# Patient Record
Sex: FEMALE | Race: OTHER | HISPANIC OR LATINO | Employment: OTHER | URBAN - METROPOLITAN AREA
[De-identification: names, ages, dates, MRNs, and addresses within clinical notes are randomized per-mention and may not be internally consistent; named-entity substitution may affect disease eponyms.]

---

## 2017-03-02 ENCOUNTER — ALLSCRIPTS OFFICE VISIT (OUTPATIENT)
Dept: OTHER | Facility: OTHER | Age: 62
End: 2017-03-02

## 2017-05-08 ENCOUNTER — HOSPITAL ENCOUNTER (OUTPATIENT)
Dept: RADIOLOGY | Facility: CLINIC | Age: 62
Discharge: HOME | End: 2017-05-08
Payer: COMMERCIAL

## 2017-05-08 ENCOUNTER — TRANSCRIBE ORDERS (OUTPATIENT)
Dept: URGENT CARE | Facility: CLINIC | Age: 62
End: 2017-05-08

## 2017-05-08 DIAGNOSIS — M54.2 NECK PAIN: ICD-10-CM

## 2017-05-08 DIAGNOSIS — M54.2 NECK PAIN: Primary | ICD-10-CM

## 2017-05-08 PROCEDURE — 72040 X-RAY EXAM NECK SPINE 2-3 VW: CPT

## 2017-08-16 ENCOUNTER — ALLSCRIPTS OFFICE VISIT (OUTPATIENT)
Dept: OTHER | Facility: OTHER | Age: 62
End: 2017-08-16

## 2017-10-20 ENCOUNTER — GENERIC CONVERSION - ENCOUNTER (OUTPATIENT)
Dept: OTHER | Facility: OTHER | Age: 62
End: 2017-10-20

## 2017-10-20 ENCOUNTER — ALLSCRIPTS OFFICE VISIT (OUTPATIENT)
Dept: OTHER | Facility: OTHER | Age: 62
End: 2017-10-20

## 2017-10-20 DIAGNOSIS — Z12.31 ENCOUNTER FOR SCREENING MAMMOGRAM FOR MALIGNANT NEOPLASM OF BREAST: ICD-10-CM

## 2017-12-01 ENCOUNTER — GENERIC CONVERSION - ENCOUNTER (OUTPATIENT)
Dept: OTHER | Facility: OTHER | Age: 62
End: 2017-12-01

## 2017-12-01 DIAGNOSIS — E78.1 PURE HYPERGLYCERIDEMIA: ICD-10-CM

## 2017-12-01 DIAGNOSIS — R73.01 IMPAIRED FASTING GLUCOSE: ICD-10-CM

## 2017-12-07 ENCOUNTER — LAB CONVERSION - ENCOUNTER (OUTPATIENT)
Dept: OTHER | Facility: OTHER | Age: 62
End: 2017-12-07

## 2017-12-07 LAB
A/G RATIO (HISTORICAL): 1.7 (CALC) (ref 1–2.5)
ALBUMIN SERPL BCP-MCNC: 4.3 G/DL (ref 3.6–5.1)
ALP SERPL-CCNC: 84 U/L (ref 33–130)
ALT SERPL W P-5'-P-CCNC: 19 U/L (ref 6–29)
AST SERPL W P-5'-P-CCNC: 17 U/L (ref 10–35)
BILIRUB SERPL-MCNC: 0.6 MG/DL (ref 0.2–1.2)
BUN SERPL-MCNC: 18 MG/DL (ref 7–25)
BUN/CREA RATIO (HISTORICAL): ABNORMAL (CALC) (ref 6–22)
CALCIUM SERPL-MCNC: 9.8 MG/DL (ref 8.6–10.4)
CHLORIDE SERPL-SCNC: 105 MMOL/L (ref 98–110)
CHOLEST SERPL-MCNC: 204 MG/DL
CHOLEST/HDLC SERPL: 3.3 (CALC)
CO2 SERPL-SCNC: 31 MMOL/L (ref 20–31)
CREAT SERPL-MCNC: 0.84 MG/DL (ref 0.5–0.99)
EGFR AFRICAN AMERICAN (HISTORICAL): 88 ML/MIN/1.73M2
EGFR-AMERICAN CALC (HISTORICAL): 76 ML/MIN/1.73M2
GAMMA GLOBULIN (HISTORICAL): 2.5 G/DL (CALC) (ref 1.9–3.7)
GLUCOSE (HISTORICAL): 102 MG/DL (ref 65–99)
HDLC SERPL-MCNC: 61 MG/DL
LDL CHOLESTEROL (HISTORICAL): 122 MG/DL (CALC)
NON-HDL-CHOL (CHOL-HDL) (HISTORICAL): 143 MG/DL (CALC)
POTASSIUM SERPL-SCNC: 4.7 MMOL/L (ref 3.5–5.3)
SODIUM SERPL-SCNC: 142 MMOL/L (ref 135–146)
TOTAL PROTEIN (HISTORICAL): 6.8 G/DL (ref 6.1–8.1)
TRIGL SERPL-MCNC: 106 MG/DL

## 2018-01-08 ENCOUNTER — ALLSCRIPTS OFFICE VISIT (OUTPATIENT)
Dept: OTHER | Facility: OTHER | Age: 63
End: 2018-01-08

## 2018-01-09 NOTE — PROGRESS NOTES
Assessment   1  Paronychia of toe of right foot (681 11) (L03 031)   2  Allergic rhinitis (477 9) (J30 9)    Plan   Allergic rhinitis    · Fexofenadine HCl - 180 MG Oral Tablet (Allegra Allergy); TAKE 1 TABLET DAILY  Paronychia of toe of right foot    · Cephalexin 500 MG Oral Capsule (Keflex); TAKE 1 CAPSULE 3 TIMES DAILY   · Ibuprofen 600 MG Oral Tablet; TAKE 1 TABLET 3 TIMES DAILY WITH FOOD AS    NEEDED    Discussion/Summary      Paronychia of right great toe - no area to I&D appreciated today  start Keflex and 600mg Ibuprofen for pain control  hold on Aleve  continue with warm soaks  follow up if not improving  work note provided  for a refill of her Allegra at end of visit, refill sent to pharmacy  Chief Complaint   toe pain      History of Present Illness   HPI: 63yo female presents c/o right great toe pain since 12/31/17  no trauma  getting worse, wore slippers today because hurts to put a shoe on  has been doing warm soaks and Aleve with minimal improvement  Review of Systems        Constitutional: no fever-- and-- no chills  ROS reviewed  Active Problems   1  Acid reflux disease (530 81) (K21 9)   2  Benign paroxysmal positional vertigo (386 11) (H81 10)   3  Carpal tunnel syndrome on left (354 0) (G56 02)   4  Carpal tunnel syndrome, bilateral (354 0) (G56 03)   5  Cataract (366 9) (H26 9)   6  Depression with anxiety (300 4) (F41 8)   7  Dyspareunia (625 0)   8  Encounter for annual routine gynecological examination (V72 31) (Z01 419)   9  Encounter for screening mammogram for breast cancer (V76 12) (Z12 31)   10  Essential hypertriglyceridemia (272 1) (E78 1)   11  Generalized osteoarthritis of multiple sites (715 09) (M15 9)   12  Hemorrhoids (455 6) (K64 9)   13  Impaired fasting glucose (790 21) (R73 01)   14  Insomnia (780 52) (G47 00)   15  Palpitations (785 1) (R00 2)   16  Vaginal dryness (625 8) (N89 8)   17   Varicose Veins Of Lower Extremities (454 9)    Past Medical History   1  History of Abnormal glucose (790 29) (R73 09)   2  History of Bilateral hand numbness (782 0) (R20 0)   3  History of Colonoscopy (Fiberoptic) Screening   4  History of Contact dermatitis due to plants, except food, unspecified contact dermatitis     type (692 6) (L25 5)   5  History of Cramp of limb (729 82) (R25 2)   6  History of Encounter for screening mammogram for malignant neoplasm of breast     (V76 12) (Z12 31)   7  History of Encounter for screening mammogram for malignant neoplasm of breast     (V76 12) (Z12 31)   8  History of bursitis (V13 59) (Z87 39)   9  History of chest pain (V13 89) (Z87 898)   10  History of depression (V11 8) (Z86 59)   11  History of flank pain (V13 89) (Z87 898)   12  History of ganglion cyst (V13 3) (Z87 39)   13  History of low back pain (V13 59) (Z87 39)   14  History of nausea (V12 79) (Z87 898)   15  History of paronychia of finger (V13 3) (Z87 2)   16  History of renal calculi (V13 01) (Z87 442)   17  History of vertigo (V12 49) (Z87 898)   18  History of Joint pain, hip (719 45) (M25 559)   19  History of Joint pain, knee (719 46) (M25 569)   20  History of Left knee pain (719 46) (M25 562)   21  History of Limb pain (729 5) (M79 609)   22  History of Otitis externa, unspecified laterality   23  History of Pain of thigh, unspecified laterality (729 5) (M79 659)   24  History of Pain, joint, hand (719 44) (M25 549)   25  History of Patellofemoral dysfunction (719 86) (M25 869)   26  History of Preop examination (V72 84) (Z01 818)   27  History of Right knee sprain (844 9) (S83 91XA)   28  History of Routine Gynecological Exam With Cervical Pap Smear (V72 31)   29  History of Screening for cervical cancer (V76 2) (Z12 4)   30  History of Stiffness of finger joint (719 54) (M25 649)   31  History of Tear of cartilage or meniscus of knee, current (836 2)   32  History of Tear of medial meniscus of right knee (836 0) (S88 795A)   33   History of Trigger middle finger of right hand (727 03) (M65 331)   34  History of Trochanteric bursitis of right hip (726 5) (M70 61)    Family History   Mother    1  Family history of Coronary Artery Disease (V17 49)   2  Family history of Diabetes Mellitus (V18 0)   3  Family history of Hypertension (V17 49)   4  Family history of Hypothyroidism  Father    5  Family history of arthritis (V17 7) (Z82 61)  Family History    6  Denied: Family history of Crohn's disease   7  Denied: Family history of psoriasis   8  Denied: Family history of rheumatoid arthritis   9  Denied: Family history of systemic lupus erythematosus   10  Denied: Family history of ulcerative colitis    Social History    · Being A Social Drinker   · Rare   · Never A Smoker   · No drug use    Surgical History   1  History of Knee Surgery Right   2  History of Tubal Ligation    Current Meds    1  Aspirin EC 81 MG Oral Tablet Delayed Release; TAKE 1 TABLET DAILY AS DIRECTED; Therapy: 38SLM0720 to (Kristy Stoll)  Requested for: 96DQJ1097; Last     Rx:10Laj6691 Ordered   2  Fexofenadine HCl - 180 MG Oral Tablet; TAKE 1 TABLET DAILY; Last Rx:03Nov2017     Ordered   3  Omega-3 CAPS; OMEGA XL HAS VITIAMIN E BY [employer]      TAKE TWO SOFT GEL CAPSULES; Therapy: (Recorded:02Mar2017) to Recorded   4  Omeprazole 20 MG Oral Capsule Delayed Release; TAKE 1 CAPSULE DAILY EVERY     MORNING BEFORE BREAKFAST -DO NOT CRUSH; Therapy: 83OIT8451 to (Kristy Stoll)  Requested for: 34FVD1169; Last     Rx:70Dju1464 Ordered   5  Premarin 0 625 MG/GM Vaginal Cream; INSERT 0 5 GRAM INTRAVAGINALLY DAILY  (     3 WEEKS ON  1 WEEK OFF ); Therapy: 40RDZ0018 to (Last Rx:20Oct2017)  Requested for: 20Oct2017 Ordered    Allergies   1  No Known Drug Allergies  2   Pollen    Vitals    Recorded: 93HXB6331 09:37AM   Temperature 96 1 F, Tympanic   Heart Rate 76   Respiration 14   Systolic 154, LUE, Sitting   Diastolic 80, LUE, Sitting   Height 5 ft 1 in   Weight 144 lb 6 oz BMI Calculated 27 28   BSA Calculated 1 64   Pain Scale 0     Physical Exam        Constitutional      General appearance: No acute distress, well appearing and well nourished  Musculoskeletal      Gait and station: Normal        Digits and nails: Abnormal  -- right great toe with erythema and mild edema surrounding entire nail bed  no area of fluctuance  no drainage  not tender to palpation over nail bed           Signatures    Electronically signed by : MARCELLUS Medrano ; Jan 8 2018 10:35AM EST                       (Author)

## 2018-01-12 ENCOUNTER — GENERIC CONVERSION - ENCOUNTER (OUTPATIENT)
Dept: OTHER | Facility: OTHER | Age: 63
End: 2018-01-12

## 2018-01-12 ENCOUNTER — HOSPITAL ENCOUNTER (OUTPATIENT)
Dept: MAMMOGRAPHY | Facility: HOSPITAL | Age: 63
Discharge: HOME | End: 2018-01-12
Payer: COMMERCIAL

## 2018-01-12 VITALS
DIASTOLIC BLOOD PRESSURE: 78 MMHG | SYSTOLIC BLOOD PRESSURE: 110 MMHG | TEMPERATURE: 97.8 F | BODY MASS INDEX: 27.4 KG/M2 | HEIGHT: 61 IN | RESPIRATION RATE: 18 BRPM | WEIGHT: 145.13 LBS | HEART RATE: 74 BPM

## 2018-01-12 DIAGNOSIS — Z12.31 ENCOUNTER FOR SCREENING MAMMOGRAM FOR MALIGNANT NEOPLASM OF BREAST: ICD-10-CM

## 2018-01-12 PROCEDURE — 77067 SCR MAMMO BI INCL CAD: CPT

## 2018-01-12 NOTE — MISCELLANEOUS
Message  Return to work or school:   Radha Baird is under my professional care  She was seen in my office on 1/19/16   She is able to return to work on  1/21/16      Dr Fito Fuller/april          Signatures   Electronically signed by : Kuldip Florian, ; Jan 21 2016  5:55AM EST                       (Author)

## 2018-01-13 ENCOUNTER — LAB CONVERSION - ENCOUNTER (OUTPATIENT)
Dept: OTHER | Facility: OTHER | Age: 63
End: 2018-01-13

## 2018-01-13 VITALS
BODY MASS INDEX: 27.59 KG/M2 | HEIGHT: 61 IN | HEART RATE: 80 BPM | DIASTOLIC BLOOD PRESSURE: 72 MMHG | RESPIRATION RATE: 16 BRPM | SYSTOLIC BLOOD PRESSURE: 110 MMHG | WEIGHT: 146.13 LBS | TEMPERATURE: 98 F

## 2018-01-13 LAB — HBA1C MFR BLD HPLC: 5.9 % OF TOTAL HGB

## 2018-01-15 ENCOUNTER — GENERIC CONVERSION - ENCOUNTER (OUTPATIENT)
Dept: OTHER | Facility: OTHER | Age: 63
End: 2018-01-15

## 2018-01-15 NOTE — RESULT NOTES
Message  - Your recent labs test showed abnormal urine test possibly secondary to stones  We will contact if further reports suggests an infection or condition requiring further treatment  - It also showed sightly high cholesterol otherwise within normal limits  - The plan - Continue healthy lifestyle measures  Verified Results  (1) URINALYSIS w URINE C/S REFLEX (will reflex a microscopy if leukocytes, occult blood, or nitrites are not within normal limits) 76ZOS9247 12:00AM [facility]     Test Name Result Flag Reference   COLOR YELLOW  YELLOW   APPEARANCE CLOUDY A CLEAR   SPECIFIC GRAVITY 1 018  1 001-1 035   PH 5 5  5 0-8 0   GLUCOSE NEGATIVE  NEGATIVE   BILIRUBIN NEGATIVE  NEGATIVE   KETONES NEGATIVE  NEGATIVE   OCCULT BLOOD 2+ A NEGATIVE   PROTEIN NEGATIVE  NEGATIVE   NITRITE NEGATIVE  NEGATIVE   LEUKOCYTE ESTERASE 3+ A NEGATIVE   WBC 10-20 /HPF A < OR = 5   RBC 0-2 /HPF  < OR = 2   SQUAMOUS EPITHELIAL CELLS 0-5 /HPF  < OR = 5   BACTERIA FEW /HPF A NONE SEEN   HYALINE CAST NONE SEEN /LPF  NONE SEEN   REFLEXIVE URINE CULTURE      CULTURE INDICATED - RESULTS TO FOLLOW     (1) LIPID PANEL, FASTING 28QWV2128 09:31AM [facility]     Test Name Result Flag Reference   CHOLESTEROL, TOTAL 219 mg/dL H 125-200   HDL CHOLESTEROL 64 mg/dL  > OR = 46   TRIGLICERIDES 97 mg/dL  <381   LDL-CHOLESTEROL 136 mg/dL (calc) H <130   Desirable range <100 mg/dL for patients with CHD or  diabetes and <70 mg/dL for diabetic patients with  known heart disease  CHOL/HDLC RATIO 3 4 (calc)  < OR = 5 0   NON HDL CHOLESTEROL 155 mg/dL (calc)     Target for non-HDL cholesterol is 30 mg/dL higher than   LDL cholesterol target       (1) CBC/PLT/DIFF 84IMV9577 09:31AM [facility]   REPORT COMMENT:  FASTING:YES     Test Name Result Flag Reference   WHITE BLOOD CELL COUNT 8 0 Thousand/uL  3 8-10 8   RED BLOOD CELL COUNT 4 64 Million/uL  3 80-5 10   HEMOGLOBIN 13 9 g/dL  11 7-15 5   HEMATOCRIT 43 0 %  35 0-45 0   MCV 92 6 fL  80 0-100 0   MCH 30 0 pg  27 0-33 0   MCHC 32 4 g/dL  32 0-36 0   RDW 12 6 %  11 0-15 0   PLATELET COUNT 201 Thousand/uL  140-400   MPV 9 0 fL  7 5-11 5   ABSOLUTE NEUTROPHILS 6104 cells/uL  5234-8635   ABSOLUTE LYMPHOCYTES 1472 cells/uL  850-3900   ABSOLUTE MONOCYTES 320 cells/uL  200-950   ABSOLUTE EOSINOPHILS 88 cells/uL     ABSOLUTE BASOPHILS 16 cells/uL  0-200   NEUTROPHILS 76 3 %     LYMPHOCYTES 18 4 %     MONOCYTES 4 0 %     EOSINOPHILS 1 1 %     BASOPHILS 0 2 %         Plan  Acid reflux disease    · Omeprazole 20 MG Oral Capsule Delayed Release; TAKE 1 CAPSULE DAILY  EVERY MORNING BEFORE BREAKFAST -DO NOT CRUSH  Flank pain    · (1) URINALYSIS w URINE C/S REFLEX (will reflex a microscopy if leukocytes, occult  blood, or nitrites are not within normal limits); Status:Complete;   Done: 40UIH4095  12:00AM  Flank pain, Nephrolithiasis    · Naproxen 500 MG Oral Tablet; TAKE 1 TABLET EVERY 12 HOURS WITH FOOD  AS NEEDED  Nausea    · Ondansetron 4 MG Oral Tablet Dispersible; TAKE 4 MG Twice daily PRN  Nephrolithiasis    · *1 - [facility]ician Referral  Consult  Status: Active  Requested  for: 27HZD5747  Care Summary provided  : Yes    Signatures   Electronically signed by : China Noyola, ; Jan 21 2016  1:09PM EST                       (Author)

## 2018-01-15 NOTE — PROGRESS NOTES
Assessment    1  Nephrolithiasis (592 0) (N20 0)   2  Nausea (787 02) (R11 0)   3  Acid reflux disease (530 81) (K21 9)   4  Flank pain (789 09) (R10 9)    Plan  Acid reflux disease    · Omeprazole 20 MG Oral Capsule Delayed Release; TAKE 1 CAPSULE DAILY  EVERY MORNING BEFORE BREAKFAST -DO NOT CRUSH  Flank pain    · (1) URINALYSIS w URINE C/S REFLEX (will reflex a microscopy if leukocytes, occult  blood, or nitrites are not within normal limits); Status:Complete;   Done: 05ZZV5230  12:00AM  Flank pain, Nephrolithiasis    · Naproxen 500 MG Oral Tablet; TAKE 1 TABLET EVERY 12 HOURS WITH FOOD  AS NEEDED  Nausea    · Ondansetron 4 MG Oral Tablet Dispersible; TAKE 4 MG Twice daily PRN  Nephrolithiasis    · *1 - [facility] Physician Referral  Consult  Status: Active  Requested  for: [id number]  Care Summary provided  : Yes    Discussion/Summary    Patient with h/o renal stones here with recurrence of left flank pain  Passed a 2 mm sized stone few weeks ago  Latest CT result reviewed shows obstructing distal left ureter calculus measuring 3 mm & nonobstructing left renal collecting system calculi measuring up to 6 mm  Pain likely 2/2 to obstructing calculi  R/o UTI  For urine dip/UA/Cx, To continue to ensure hydration and check for passage of stone  DC Percocet and continue naproxen with PPI cover for pain  Patient also referred to urologist 2/2 multiple/repeated stone formation  Routine follow up iin 1 - 2 weeks if pain persists and yet to have specialist input  More urgent if significant worsening of symptoms or as needed  Chief Complaint  f/u of flank pain      History of Present Illness  Patient with h/o nephrolithiasis here for f/u  Reports recurrence of dull aching left flank pain x 3 D  Nausea with use of percocet but no other associated symptoms  Attended the ER, repeat CT showed a stone of approx 3 mm size  Review of Systems    Constitutional: as noted in HPI       Over the past 2 weeks, how often have you been bothered by the following problems? 1 ) Little interest or pleasure in doing things? Not at all    2 ) Feeling down, depressed or hopeless? Not at all    3 ) Trouble falling asleep or sleeping too much? Several days  4 ) Feeling tired or having little energy? Several days  5 ) Poor appetite or overeating? Not at all    6 ) Feeling bad about yourself, or that you are a failure, or have let yourself or your family down? Not at all    7 ) Trouble concentrating on things, such as reading a newspaper or watching television? Not at all    8 ) Moving or speaking so slowly that other people could have noticed, or the opposite, moving or speaking faster than usual? Not at all    9 ) Thoughts that you would be better off dead or of hurting yourself in some way? Not at all  Constitutional: as noted in HPI  Cardiovascular: no complaints of slow or fast heart rate, no chest pain, no palpitations, no leg claudication or lower extremity edema  Respiratory: no complaints of shortness of breath, no wheezing, no dyspnea on exertion, no orthopnea or PND  Gastrointestinal: no complaints of abdominal pain, no constipation, no nausea or diarrhea, no vomiting, no bloody stools  Genitourinary: as noted in HPI  Musculoskeletal: no complaints of arthralgia, no myalgia, no joint swelling or stiffness, no limb pain or swelling  Neurological: no fainting  Active Problems    1  Abnormal glucose (790 29) (R73 09)   2  Acid reflux disease (530 81) (K21 9)   3  Benign paroxysmal positional vertigo (386 11) (H81 10)   4  Bilateral hand numbness (782 0) (R20 0)   5  Carpal tunnel syndrome on left (354 0) (G56 02)   6  Carpal tunnel syndrome, bilateral (354 0) (G56 01,G56 02)   7  Cataract (366 9) (H26 9)   8  Cramp of limb (729 82) (R25 2)   9  Depression (311) (F32 9)   10  Depression with anxiety (300 4) (F41 8)   11  Essential hypertriglyceridemia (272 1) (E78 1)   12   Generalized osteoarthritis of multiple sites (715 09) (M15 9)   13  Impaired fasting glucose (790 21) (R73 01)   14  Insomnia (780 52) (G47 00)   15  Joint pain, hip (719 45) (M25 559)   16  Joint pain, knee (719 46) (M25 569)   17  Left knee pain (719 46) (M25 562)   18  Limb pain (729 5) (M79 609)   19  Lumbago (724 2) (M54 5)   20  Nephrolithiasis (592 0) (N20 0)   21  Pain of thigh, unspecified laterality (729 5) (M79 659)   22  Pain, joint, hand (719 44) (M79 643)   23  Palpitations (785 1) (R00 2)   24  Paronychia, finger (681 02) (L03 019)   25  Patellofemoral dysfunction (719 86) (M25 869)   26  Preop examination (V72 84) (Z01 818)   27  Right knee sprain (844 9) (S83 91XA)   28  Routine Gynecological Exam With Cervical Pap Smear (V72 31)   29  Stiffness of finger joint (719 54) (M25 649)   30  Tear of cartilage or meniscus of knee, current (836 2)   31  Tear of medial meniscus of right knee (836 0) (S83 241A)   32  Varicose Veins Of Lower Extremities (454 9)   33  Vertigo (780 4) (R42)    Past Medical History    1  History of Colonoscopy (Fiberoptic) Screening   2  History of Encounter for screening mammogram for malignant neoplasm of breast   (V76 12) (Z12 31)   3  History of Encounter for screening mammogram for malignant neoplasm of breast   (V76 12) (Z12 31)   4  History of bursitis (V13 59) (Z87 39)   5  History of chest pain (V13 89) (Z87 898)   6  History of Otitis externa, unspecified laterality    The active problems and past medical history were reviewed and updated today  Surgical History    1  History of Knee Surgery Right   2  History of Tubal Ligation    The surgical history was reviewed and updated today  Family History    1  Family history of Coronary Artery Disease (V17 49)   2  Family history of Diabetes Mellitus (V18 0)   3  Family history of Hypertension (V17 49)   4  Family history of Hypothyroidism    5  Family history of arthritis (V17 7) (Z82 61)    6  Denied: Family history of Crohn's disease   7  Denied: Family history of psoriasis   8  Denied: Family history of rheumatoid arthritis   9  Denied: Family history of systemic lupus erythematosus   10  Denied: Family history of ulcerative colitis    Social History    · Being A Social Drinker   · Never A Smoker   · No drug use  The social history was reviewed and updated today  The social history was reviewed and is unchanged  Current Meds   1  Allegra 60 MG TABS Recorded   2  Allegra Allergy 180 MG Oral Tablet; TAKE 1 TABLET DAILY; Therapy: (Recorded:25Pqc7420) to Recorded   3  Aspirin 81 MG Oral Tablet Recorded   4  Aspirin EC 81 MG Oral Tablet Delayed Release; TAKE 1 TABLET DAILY AS DIRECTED; Therapy: 82XJQ9993 to (Evaluate:84Fbw8343)  Requested for: 89AMB0499; Last   Rx:20Nov2015 Ordered   5  Escitalopram Oxalate 10 MG Oral Tablet Recorded   6  Nabumetone 500 MG Oral Tablet; TAKE 1 TABLET Twice daily PRN pain After Meals; Therapy: 41PVX7428 to (Quique Hidalgo)  Requested for: 56HBE1792; Last   Rx:87Cve4196 Ordered   7  Naproxen 500 MG Oral Tablet; TAKE 1 TABLET EVERY 12 HOURS WITH FOOD AS   NEEDED; Therapy: 43GKU7021 to (Evaluate:09Xyp1832)  Requested for: 22ZCN6508; Last   Rx:29Cau8255 Ordered   8  Omega 3 CAPS; take 1 capsule daily; Therapy: (Recorded:40Kao8996) to Recorded   9  Omeprazole 20 MG Oral Capsule Delayed Release Recorded   10  Omeprazole 20 MG Oral Capsule Delayed Release; TAKE 1 CAPSULE DAILY EVERY    MORNING BEFORE BREAKFAST -DO NOT CRUSH; Therapy: 64SWZ9018 to (Evaluate:33Uqu2687)  Requested for: 46HRD4531; Last    Rx:20Nov2015 Ordered   11  Synvisc One 48 MG/6ML Intra-articular Solution Prefilled Syringe; INJECT 6 ML Once    intraarticularly in B/L knees; Therapy: 88UYI8412 to (Evaluate:90Ygp2121); Last Rx:38Ifw3944 Ordered    Allergies    1  No Known Drug Allergies    2   Pollen    Vitals  Vital Signs [Data Includes: Current Encounter]    Recorded: 31UJH3118 05:05PM   Temperature 97 7 F   Heart Rate 84   Respiration 16   Systolic 555   Diastolic 70   Weight 465 lb 8 0 oz     Physical Exam    Constitutional   General appearance: No acute distress, well appearing and well nourished  Eyes   Conjunctiva and lids: No swelling, erythema or discharge  Ears, Nose, Mouth, and Throat   External inspection of ears and nose: Normal     Pulmonary   Respiratory effort: No increased work of breathing or signs of respiratory distress  Auscultation of lungs: Clear to auscultation  Cardiovascular   Auscultation of heart: Normal rate and rhythm, normal S1 and S2, without murmurs  Abdomen   Abdomen: Abnormal   mild left sided flank tenderness, no guarding or rebound tenderness  Liver and spleen: No hepatomegaly or splenomegaly  Lymphatic   Palpation of lymph nodes in neck: No lymphadenopathy  Musculoskeletal   Gait and station: Normal     Neurologic   Sensation: No sensory loss  Psychiatric   Mood and affect: Normal          Results/Data  Encounter Results   (1) URINALYSIS w URINE C/S REFLEX (will reflex a microscopy if leukocytes, occult blood, or nitrites are not within normal limits) 93IKM2909 12:00AM [facility]     Test Name Result Flag Reference   COLOR YELLOW  YELLOW   APPEARANCE CLOUDY A CLEAR   SPECIFIC GRAVITY 1 018  1 001-1 035   PH 5 5  5 0-8 0   GLUCOSE NEGATIVE  NEGATIVE   BILIRUBIN NEGATIVE  NEGATIVE   KETONES NEGATIVE  NEGATIVE   OCCULT BLOOD 2+ A NEGATIVE   PROTEIN NEGATIVE  NEGATIVE   NITRITE NEGATIVE  NEGATIVE   LEUKOCYTE ESTERASE 3+ A NEGATIVE   WBC 10-20 /HPF A < OR = 5   RBC 0-2 /HPF  < OR = 2   SQUAMOUS EPITHELIAL CELLS 0-5 /HPF  < OR = 5   BACTERIA FEW /HPF A NONE SEEN   HYALINE CAST NONE SEEN /LPF  NONE SEEN   REFLEXIVE URINE CULTURE      CULTURE INDICATED - RESULTS TO FOLLOW       Attending Note  Attending Note: I discussed the case with the Resident and reviewed the Resident's note, I supervised the Resident and I agree with the Resident management plan as it was presented to me   Level of Participation: I was present in clinic, but did not examine the patient  Diagnosis and Plan: Flank pain - as documented  I agree with the Resident's note        Future Appointments    Date/Time Provider Specialty Site   02/16/2016 05:20 PM Filipe, [address] [place] [facility]   02/25/2016 08:20 AM Bonny Givens DO Rheumatology [facility]   05/20/2016 01:00 PM Kerr, [address] [facility]     Signatures   Electronically signed by : Beto Selby, ; Jan 21 2016  6:48AM EST                       (Author)    Electronically signed by : Jean Carlos Lobato DO; Jan 21 2016  2:08PM EST                       (Author)

## 2018-01-17 NOTE — RESULT NOTES
Verified Results  (1) COMPREHENSIVE METABOLIC PANEL 63TEG9824 38:01MP [facility]   REPORT COMMENT:  FASTING:YES     Test Name Result Flag Reference   GLUCOSE 91 mg/dL  65-99   Fasting reference interval   UREA NITROGEN (BUN) 20 mg/dL  7-25   CREATININE 0 74 mg/dL  0 50-1 05   For patients >52years of age, the reference limit  for Creatinine is approximately 13% higher for people  identified as -American  eGFR NON-AFR  AMERICAN 89 mL/min/1 73m2  > OR = 60   eGFR AFRICAN AMERICAN 103 mL/min/1 73m2  > OR = 60   BUN/CREATININE RATIO   4-86   NOT APPLICABLE (calc)   SODIUM 142 mmol/L  135-146   POTASSIUM 4 4 mmol/L  3 5-5 3   CHLORIDE 104 mmol/L     CARBON DIOXIDE 28 mmol/L  19-30   CALCIUM 9 6 mg/dL  8 6-10 4   PROTEIN, TOTAL 6 5 g/dL  6 1-8 1   ALBUMIN 4 2 g/dL  3 6-5 1   GLOBULIN 2 3 g/dL (calc)  1 9-3 7   ALBUMIN/GLOBULIN RATIO 1 8 (calc)  1 0-2 5   BILIRUBIN, TOTAL 0 8 mg/dL  0 2-1 2   ALKALINE PHOSPHATASE 74 U/L     AST 16 U/L  10-35   ALT 15 U/L  6-29     (1) LIPID PANEL, FASTING 33UDK3431 09:31AM Marianela Dent     Test Name Result Flag Reference   CHOLESTEROL, TOTAL 219 mg/dL H 125-200   HDL CHOLESTEROL 64 mg/dL  > OR = 46   TRIGLICERIDES 97 mg/dL  <928   LDL-CHOLESTEROL 136 mg/dL (calc) H <130   Desirable range <100 mg/dL for patients with CHD or  diabetes and <70 mg/dL for diabetic patients with  known heart disease  CHOL/HDLC RATIO 3 4 (calc)  < OR = 5 0   NON HDL CHOLESTEROL 155 mg/dL (calc)     Target for non-HDL cholesterol is 30 mg/dL higher than   LDL cholesterol target       (1) CBC/PLT/DIFF 98MNJ2622 09:31AM Marianela Dent   REPORT COMMENT:  FASTING:YES     Test Name Result Flag Reference   WHITE BLOOD CELL COUNT 8 0 Thousand/uL  3 8-10 8   RED BLOOD CELL COUNT 4 64 Million/uL  3 80-5 10   HEMOGLOBIN 13 9 g/dL  11 7-15 5   HEMATOCRIT 43 0 %  35 0-45 0   MCV 92 6 fL  80 0-100 0   MCH 30 0 pg  27 0-33 0   MCHC 32 4 g/dL  32 0-36 0   RDW 12 6 %  11 0-15 0   PLATELET COUNT 234 Thousand/uL  140-400   MPV 9 0 fL  7 5-11 5   ABSOLUTE NEUTROPHILS 6104 cells/uL  8649-1825   ABSOLUTE LYMPHOCYTES 1472 cells/uL  850-3900   ABSOLUTE MONOCYTES 320 cells/uL  200-950   ABSOLUTE EOSINOPHILS 88 cells/uL     ABSOLUTE BASOPHILS 16 cells/uL  0-200   NEUTROPHILS 76 3 %     LYMPHOCYTES 18 4 %     MONOCYTES 4 0 %     EOSINOPHILS 1 1 %     BASOPHILS 0 2 %       (Q) TSH, 3RD GENERATION [id number] 09:31AM [facility]   REPORT COMMENT:  FASTING:YES     Test Name Result Flag Reference   TSH 3 08 mIU/L  0 40-4 50     (Q) HEMOGLOBIN A1c [id number] 09:31AM [facility]   REPORT COMMENT:  FASTING:YES     Test Name Result Flag Reference   HEMOGLOBIN A1c 5 9 % of total Hgb H <5 7   According to ADA guidelines, hemoglobin A1c <7 0%  represents optimal control in non-pregnant diabetic  patients  Different metrics may apply to specific  patient populations  Standards of Medical Care in    Diabetes Care  2013;36:s11-s66     For the purpose of screening for the presence of  diabetes  <5 7%       Consistent with the absence of diabetes  5 7-6 4%    Consistent with increased risk for diabetes              (prediabetes)  >or=6 5%    Consistent with diabetes     This assay result is consistent with an increased risk  of diabetes  Currently, no consensus exists for use of hemoglobin  A1c for diagnosis of diabetes for children

## 2018-01-22 VITALS
SYSTOLIC BLOOD PRESSURE: 120 MMHG | DIASTOLIC BLOOD PRESSURE: 72 MMHG | TEMPERATURE: 97.3 F | HEIGHT: 61 IN | RESPIRATION RATE: 16 BRPM | WEIGHT: 145 LBS | HEART RATE: 82 BPM | BODY MASS INDEX: 27.38 KG/M2

## 2018-01-22 VITALS
HEART RATE: 76 BPM | RESPIRATION RATE: 14 BRPM | SYSTOLIC BLOOD PRESSURE: 120 MMHG | DIASTOLIC BLOOD PRESSURE: 80 MMHG | BODY MASS INDEX: 27.26 KG/M2 | TEMPERATURE: 96.1 F | WEIGHT: 144.38 LBS | HEIGHT: 61 IN

## 2018-01-23 NOTE — RESULT NOTES
Verified Results  (Q) HEMOGLOBIN A1c [id number] 11:31AM [facility]   REPORT COMMENT:  FASTING:NO     Test Name Result Flag Reference   HEMOGLOBIN A1c 5 9 % of total Hgb H <5 7   For someone without known diabetes, a hemoglobin   A1c value between 5 7% and 6 4% is consistent with  prediabetes and should be confirmed with a   follow-up test      For someone with known diabetes, a value <7%  indicates that their diabetes is well controlled  A1c  targets should be individualized based on duration of  diabetes, age, comorbid conditions, and other  considerations  This assay result is consistent with an increased risk  of diabetes  Currently, no consensus exists regarding use of  hemoglobin A1c for diagnosis of diabetes for children

## 2018-01-23 NOTE — MISCELLANEOUS
January 15, 2018      Dear Lazaro Christina,    We did try to call you  Your recent mammogram on 1/12/18 was entirely negative  There were no abnormalities  Your next mammogram will be due in January 2019  If you have any questions, please call our office  Sincerely,      EZEKIEL Barraza        Electronically signed Ranjit Benites   Reilly 15 2018  2:28PM EST

## 2018-01-23 NOTE — MISCELLANEOUS
Message  PATIENT IS UNDER MY PROFESSIONAL CARE, PLEASE EXCUSE FROM WORK 1/7/2018, SHE WAS SEEN IN MY OFFICE 1/8/2018 AND MAY RETURN TO WORK ON 1/9/2018     Return to work or school:         DR Juana Ramirez MD       Signatures   Electronically signed by : Dona Clemons, ; Jan 8 2018  9:57AM EST                       (Author)

## 2018-01-24 VITALS
WEIGHT: 143.38 LBS | BODY MASS INDEX: 27.09 KG/M2 | DIASTOLIC BLOOD PRESSURE: 70 MMHG | SYSTOLIC BLOOD PRESSURE: 116 MMHG | RESPIRATION RATE: 16 BRPM | HEART RATE: 80 BPM | TEMPERATURE: 97.2 F

## 2018-02-12 ENCOUNTER — OFFICE VISIT (OUTPATIENT)
Dept: FAMILY MEDICINE CLINIC | Facility: CLINIC | Age: 63
End: 2018-02-12
Payer: COMMERCIAL

## 2018-02-12 VITALS
RESPIRATION RATE: 16 BRPM | TEMPERATURE: 98.6 F | BODY MASS INDEX: 27.75 KG/M2 | HEART RATE: 80 BPM | WEIGHT: 147 LBS | HEIGHT: 61 IN

## 2018-02-12 DIAGNOSIS — M72.2 PLANTAR FASCIITIS, BILATERAL: Primary | ICD-10-CM

## 2018-02-12 PROBLEM — J30.9 ALLERGIC RHINITIS: Status: ACTIVE | Noted: 2018-01-08

## 2018-02-12 PROBLEM — K64.9 HEMORRHOIDS: Status: ACTIVE | Noted: 2017-12-01

## 2018-02-12 PROCEDURE — 99213 OFFICE O/P EST LOW 20 MIN: CPT | Performed by: FAMILY MEDICINE

## 2018-02-12 RX ORDER — CHLORAL HYDRATE 500 MG
CAPSULE ORAL
COMMUNITY
End: 2019-09-12

## 2018-02-12 RX ORDER — NAPROXEN 500 MG/1
500 TABLET ORAL 2 TIMES DAILY WITH MEALS
Qty: 14 TABLET | Refills: 0 | Status: SHIPPED | OUTPATIENT
Start: 2018-02-12 | End: 2019-01-04 | Stop reason: SDUPTHER

## 2018-02-12 NOTE — ASSESSMENT & PLAN NOTE
Prescribed naproxen 500 mg b i d  For pain for 7 days  Advised patient to take medication with food  Advised to use Motrin  as needed after finishing course of naproxen  advised patient to use ice packs to reduce inflammation  Patient provided handout about plantar fasciitis exercise-  Heel  Stretch, calf stretch, seated plantar facia stretch, heel raise, toe Curls  Advised patient to do these exercises twice daily  Follow-up in 3-4 weeks if symptoms are not resolved

## 2018-02-12 NOTE — PROGRESS NOTES
Brayden Tucker 1955 female MRN: [id number]    Acute Visit        ASSESSMENT/PLAN  Problem List Items Addressed This Visit     Plantar fasciitis, bilateral - Primary      Prescribed naproxen 500 mg b i d  For pain for 7 days  Advised patient to take medication with food  Advised to use Motrin  as needed after finishing course of naproxen  advised patient to use ice packs to reduce inflammation  Patient provided handout about plantar fasciitis exercise-  Heel  Stretch, calf stretch, seated plantar facia stretch, heel raise, toe Curls  Advised patient to do these exercises twice daily  Follow-up in 3-4 weeks if symptoms are not resolved  Relevant Medications    naproxen (NAPROSYN) 500 mg tablet                Future Appointments  Date Time Provider Rodney Delphine   3/12/2018 4:40 PM Kale Sanchez MD [facility]        SUBJECTIVE   Chief complaint:  Pain in both heels     MARILEE Tucker is a 66 y o  female who presented for an acute visit complaining of bilateral heel pain  She has history of plantar fasciitis in the past   The she reported that she is having worsening bilateral heel pain  Right>left  Since few weeks  She reported pain worse in morning  After getting out of bed  She has been taking Tylenol or Motrin as needed  She has been using sneakers  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   For more than 8 hours a day  She denies any trauma to feet  Review of Systems   Constitutional: Negative for activity change, appetite change, chills, fatigue and fever  HENT: Negative for congestion, drooling, mouth sores, rhinorrhea, sore throat and tinnitus  Eyes: Negative  Respiratory: Negative for apnea, cough, chest tightness, shortness of breath and wheezing  Cardiovascular: Negative for chest pain, palpitations and leg swelling     Gastrointestinal: Negative for abdominal distention, abdominal pain, anal bleeding, constipation, diarrhea, nausea and vomiting  Genitourinary: Negative  Musculoskeletal: Negative for arthralgias, back pain, gait problem, joint swelling, neck pain and neck stiffness  Heel pain   Skin: Negative  Allergic/Immunologic: Negative  Neurological: Negative  Hematological: Negative  Psychiatric/Behavioral: Negative  Historical Information   The patient history was reviewed as follows:  Past Medical History:   Diagnosis Date    Acid reflux     Anxiety     Arthritis     osteoarthritis multiple sites    Benign paroxysmal positional vertigo     Carpal tunnel syndrome     Chronic kidney disease     nephrolithiasis    Depression     Insomnia      Past Surgical History:   Procedure Laterality Date    COLONOSCOPY      KNEE SURGERY Right     NM INCISE FINGER TENDON SHEATH Right 8/25/2016    Procedure: RELEASE LONG AND RING TRIGGER FINGERS;  Surgeon: Leopold Mitts, MD;  Location: [facility];  Service: Orthopedics    NM WRIST Benita Flair LIG Right 8/25/2016    Procedure: RELEASE CARPAL TUNNEL ENDOSCOPIC;  Surgeon: Leopold Mitts, MD;  [facility];  Service: Orthopedics    TUBAL LIGATION       Family History   Problem Relation Age of Onset    Diabetes Mother     Heart disease Mother     Hypertension Mother       Social History   History   Alcohol Use No     History   Drug Use No     History   Smoking Status    Never Smoker   Smokeless Tobacco    Not on file       Medications:   Meds/Allergies   Current Outpatient Prescriptions   Medication Sig Dispense Refill    aspirin 81 MG tablet Take 81 mg by mouth daily   fexofenadine (ALLEGRA) 180 MG tablet Take 180 mg by mouth daily   omeprazole (PriLOSEC) 20 mg delayed release capsule Take 20 mg by mouth daily        HYDROcodone-acetaminophen (NORCO) 5-325 mg per tablet 1 tab by mouth every 6 hours for pain 20 tablet 0    naproxen (NAPROSYN) 500 mg tablet Take 1 tablet (500 mg total) by mouth 2 (two) times a day with meals 14 tablet 0    Omega-3 1000 MG CAPS Take by mouth       No current facility-administered medications for this visit  Allergies   Allergen Reactions    Pollen Extract Allergic Rhinitis and Sneezing       OBJECTIVE  Vitals:   Vitals:    02/12/18 1655   Pulse: 80   Resp: 16   Temp: 98 6 °F (37 °C)   Weight: 66 7 kg (147 lb)   Height: 5' 0 6" (1 539 m)       Invasive Devices          No matching active lines, drains, or airways          Physical Exam   Constitutional: She is oriented to person, place, and time  She appears well-developed and well-nourished  HENT:   Head: Normocephalic  Right Ear: External ear normal    Left Ear: External ear normal    Mouth/Throat: Oropharynx is clear and moist    Eyes: Conjunctivae and EOM are normal  Pupils are equal, round, and reactive to light  Neck: Normal range of motion  Neck supple  Cardiovascular: Normal rate, regular rhythm and normal heart sounds  Pulmonary/Chest: Effort normal and breath sounds normal  She has no wheezes  She has no rales  Abdominal: Soft  Bowel sounds are normal  There is no tenderness  There is no rebound and no guarding  Musculoskeletal: Normal range of motion  Severe tenderness on palpation of heel right more than left  No bony spur palpated  No redness, swelling  Neurological: She is alert and oriented to person, place, and time  No cranial nerve deficit  She exhibits normal muscle tone  Skin: Skin is warm and dry  Psychiatric: She has a normal mood and affect   Her behavior is normal  Judgment and thought content normal             _____________________________________________________________________     Piper Peña MD, PGY-2  [facility]   2/12/2018

## 2018-02-12 NOTE — PATIENT INSTRUCTIONS
Plantar Fasciitis Exercises   AMBULATORY CARE:   What you need to know about plantar fasciitis exercises:  Plantar fasciitis exercises help stretch your plantar fascia, calf muscles, and Achilles tendon  They also help strengthen the muscles that support your heel and foot  Exercises and stretching can help prevent plantar fasciitis from getting worse or coming back  Contact your healthcare provider if:   · Your pain and swelling increase  · You develop new knee, hip, or back pain  · You have questions or concerns about your condition or care  How to do plantar fasciitis exercises:  Ask your healthcare provider when to start these exercises and how often to do them  · Heel stretch:  Stand up straight with your hands on a wall  Place your injured leg slightly behind your other leg  Keep your heels flat on the floor, lean forward, and bend both knees  Hold for 30 seconds  · Calf stretch:  Stand up straight with your hands on a wall  Step forward so that your uninjured foot is in front of your injured foot  Keep your front leg bent and your back leg straight  Gently lean forward until you feel your calf stretch  Hold for 30 seconds and then relax  · Seated plantar fascia stretch:  Sit on a firm surface, such as the floor or a mat  Extend your legs out in front of you  Raise your injured foot a few inches off the ground  Keep your leg straight  Grab the toes of your injured foot and pull them toward you  With your other hand, feel your plantar fascia  You should feel it press outward  Hold for 30 seconds  If you cannot reach your toes, loop a towel or tie around your foot  Gently pull on the towel or tie and flex your toes toward you  · Heel raises:  Stand on the injured leg  Raise your other leg off the ground  Hold onto a railing or wall for balance  Slowly rise up on the toes of your injured leg  Hold for 5 seconds  Slowly lower your heel to the ground             · Toe curls: Place a towel on the floor  Put your foot flat on the towel  Grab the towel with your toes by curling them around the towel  Lift the towel up with your toes  Follow up with your healthcare provider as directed:  Write down your questions so you remember to ask them during your visits  © 2017 [employer]  Information is for End User's use only and may not be sold, redistributed or otherwise used for commercial purposes  All illustrations and images included in CareNotes® are the copyrighted property of A D A M , Inc  or [employer] Hubbard  The above information is an  only  It is not intended as medical advice for individual conditions or treatments  Talk to your doctor, nurse or pharmacist before following any medical regimen to see if it is safe and effective for you

## 2018-02-26 NOTE — RESULT NOTES
Verified Results  * MAMMO SCREENING BILATERAL W CAD [id number] 10:21AM Sadia Lopes Order Number: [id number]    - Patient Instructions: To schedule this appointment, please contact Central Scheduling at [phone]  Do not wear any perfume, powder, lotion or deodorant on breast or underarm area  Please bring your doctors order, referral (if needed) and insurance information with you on the day of the test  Failure to bring this information may result in this test being rescheduled  Arrive 15 minutes prior to your appointment time to register  On the day of your test, please bring any prior mammogram or breast studies with you that were not performed at a [facility]  Failure to bring prior exams may result in your test needing to be rescheduled  Test Name Result Flag Reference   MAMMO SCREENING BILATERAL W CAD (Report)     Patient History:   Patient is postmenopausal and had first child at age 37  Family history of breast cancer at age 48 or over in paternal    cousin  Benign excisional biopsy of the left breast, 1974  Patient has never smoked  Patient's BMI is 27 8  Reason for exam: screening, asymptomatic  Screening     Mammo Screening Bilateral W CAD: January 12, 2018 - Check In #:    [de-identified]   Bilateral MLO, CC, and XCCL view(s) were taken  Technologist: BIRD Bush (BIRD)(M)   Prior study comparison: October 28, 2016, mammo screening    bilateral W CAD performed at [facility] 22 September 24, 2015, bilateral digital screening    mammogram performed at [facility] 22 October 17, 2014, digital bilateral screening mammogram,    performed at [address] [place] [address]  The breast tissue is heterogeneously dense, potentially limiting    the sensitivity of mammography   Patient risk, included in this    report, assists in determining the appropriate screening regimen    (such as 3-D mammography or the inclusion of automated breast    ultrasound or MRI)  3-D mammography may also remain indicated as    screening  Bilateral digital mammography is performed  No    dominant soft tissue mass, unexplained architectural distortion    or suspicious calcifications are noted  The skin and nipple    contours are within normal limits  Left breast postsurgical    scarring and distortion is unchanged  No evidence of malignancy  No significant changes when compared to prior studies  1  No evidence of malignancy  2  No significant change when compared with the prior study  ACR BI-RADSï¾® Assessments: BiRad:2 - Benign     Recommendation:   Routine screening mammogram of both breasts in 1 year  Analyzed by CAD     The patient is scheduled in a reminder system for screening    mammography  8-10% of cancers will be missed on mammography  Management of a    palpable abnormality must be based on clinical grounds  Patients   will be notified of their results via letter from our facility  Accredited by [employer] of Radiology and FDA       Transcription Location: BIRD [address]: [id number]     Risk Value(s):   Tyrer-Cuzick 10 Year: 3 700%, Tyrer-Cuzick Lifetime: 8 800%,    Myriad Table: 1 5%, MARIA ESTHER 5 Year: 1 5%, NCI Lifetime: 7 8%   Signed by:   Janeen Monzon MD   1/12/18

## 2018-04-20 ENCOUNTER — OFFICE VISIT (OUTPATIENT)
Dept: FAMILY MEDICINE CLINIC | Facility: CLINIC | Age: 63
End: 2018-04-20
Payer: COMMERCIAL

## 2018-04-20 VITALS
RESPIRATION RATE: 16 BRPM | HEIGHT: 61 IN | BODY MASS INDEX: 27.49 KG/M2 | WEIGHT: 145.6 LBS | SYSTOLIC BLOOD PRESSURE: 110 MMHG | HEART RATE: 80 BPM | TEMPERATURE: 97.8 F | DIASTOLIC BLOOD PRESSURE: 78 MMHG

## 2018-04-20 DIAGNOSIS — R05.9 COUGH: Primary | ICD-10-CM

## 2018-04-20 PROCEDURE — 99213 OFFICE O/P EST LOW 20 MIN: CPT | Performed by: FAMILY MEDICINE

## 2018-04-20 RX ORDER — FLUTICASONE PROPIONATE 50 MCG
1 SPRAY, SUSPENSION (ML) NASAL DAILY
Qty: 16 G | Refills: 0 | Status: SHIPPED | OUTPATIENT
Start: 2018-04-20 | End: 2019-05-03 | Stop reason: SDUPTHER

## 2018-04-20 NOTE — PROGRESS NOTES
Assessment/Plan     Cough  - secondary to post nasal drip from viral URI vs allergic rhinitis  - supportive care will give prescription for flonase spray, can continue mucinex and zyrtec, advised good fluid hydration   - follow up if symptoms worsen or fail to improve      Diagnoses and all orders for this visit:    Cough  -     fluticasone (FLONASE) 50 mcg/act nasal spray; 1 spray into each nostril daily       Subjective     Chief Complaint: Patient is here for cough  HPI:   This is a 65 yo F who presents for one week history of cough  She reports that last Friday she went to the park and then had itchy throat and cough with postnasal drip  She started an allergy medication, zyrtec  However, Saturday she woke up achy and with temperature of 100  She feels generalized fatigue  Cough without sputum  She has associated rhinorrhea and sneezing  She is throat clearing frequently with mild hoarseness of voice  She started mucinex yesterday with some improvement of symptoms  The following portions of the patient's history were reviewed and updated as appropriate: allergies, current medications, past family history, past medical history, past social history, past surgical history and problem list     Review of Systems  Review of Systems   Constitutional: Positive for fatigue and fever  HENT: Positive for congestion, postnasal drip, rhinorrhea, sinus pressure, sneezing and sore throat  Negative for ear pain and nosebleeds  Ear fullness    Respiratory: Positive for cough  Negative for shortness of breath  Cardiovascular: Negative for chest pain and palpitations  Gastrointestinal: Negative for abdominal pain, constipation, diarrhea, nausea and vomiting  Genitourinary: Negative for difficulty urinating  Musculoskeletal: Positive for myalgias  Neurological: Negative for dizziness and headaches       Objective   Vitals:    04/20/18 1051   BP: 110/78   Pulse: 80   Resp: 16   Temp: 97 8 °F (36 6 °C) Physical Exam   Constitutional: She is oriented to person, place, and time  She appears well-developed and well-nourished  HENT:   Head: Normocephalic and atraumatic  Right Ear: External ear normal    Left Ear: External ear normal    Mouth/Throat: Oropharynx is clear and moist    Post nasal drip in oropharynx  Small effusion of middle ear bilateral  No erythema of TM  Eyes: Conjunctivae are normal    Neck: Normal range of motion  No thyromegaly present  Cardiovascular: Normal rate, regular rhythm and normal heart sounds  No murmur heard  Pulmonary/Chest: Effort normal and breath sounds normal  No respiratory distress  She has no wheezes  Abdominal: Soft  Bowel sounds are normal  She exhibits no distension  There is no tenderness  Musculoskeletal: She exhibits no edema  Lymphadenopathy:     She has no cervical adenopathy  Neurological: She is alert and oriented to person, place, and time  Skin: Skin is warm and dry  Lab Results: I have reviewed all the lab results

## 2018-04-20 NOTE — ASSESSMENT & PLAN NOTE
- secondary to post nasal drip from viral URI vs allergic rhinitis  - supportive care will give prescription for flonase spray, can continue mucinex and zyrtec, advised good fluid hydration   - follow up if symptoms worsen or fail to improve

## 2018-04-24 ENCOUNTER — OFFICE VISIT (OUTPATIENT)
Dept: FAMILY MEDICINE CLINIC | Facility: CLINIC | Age: 63
End: 2018-04-24
Payer: COMMERCIAL

## 2018-04-24 ENCOUNTER — TELEPHONE (OUTPATIENT)
Dept: FAMILY MEDICINE CLINIC | Facility: CLINIC | Age: 63
End: 2018-04-24

## 2018-04-24 VITALS
RESPIRATION RATE: 18 BRPM | BODY MASS INDEX: 29.09 KG/M2 | HEIGHT: 60 IN | WEIGHT: 148.2 LBS | TEMPERATURE: 97.9 F | SYSTOLIC BLOOD PRESSURE: 124 MMHG | DIASTOLIC BLOOD PRESSURE: 82 MMHG | HEART RATE: 70 BPM

## 2018-04-24 DIAGNOSIS — J30.1 SEASONAL ALLERGIC RHINITIS DUE TO POLLEN: Primary | ICD-10-CM

## 2018-04-24 PROCEDURE — 99213 OFFICE O/P EST LOW 20 MIN: CPT | Performed by: FAMILY MEDICINE

## 2018-04-24 PROCEDURE — 3008F BODY MASS INDEX DOCD: CPT | Performed by: FAMILY MEDICINE

## 2018-04-24 RX ORDER — MOMETASONE FUROATE 50 UG/1
2 SPRAY, METERED NASAL DAILY
Qty: 17 G | Refills: 0 | Status: SHIPPED | OUTPATIENT
Start: 2018-04-24 | End: 2019-05-03 | Stop reason: ALTCHOICE

## 2018-04-24 RX ORDER — GUAIFENESIN, PSEUDOEPHEDRINE HYDROCHLORIDE 600; 60 MG/1; MG/1
1 TABLET, EXTENDED RELEASE ORAL EVERY 12 HOURS
Qty: 10 TABLET | Refills: 0 | Status: SHIPPED | OUTPATIENT
Start: 2018-04-24 | End: 2018-04-29

## 2018-04-24 NOTE — PATIENT INSTRUCTIONS
Allergic Rhinitis   AMBULATORY CARE:   Allergic rhinitis , or hay fever, is swelling of the inside of your nose  The swelling is a reaction to allergens in the air  An allergen can be anything that causes an allergic reaction  Allergies to weeds, grass, trees, or mold often cause seasonal allergic rhinitis  Indoor dust mites, cockroaches, pet dander, or mold can also cause allergic rhinitis  Common signs and symptoms include the following:   · Sneezing    · Nasal congestion    · Runny nose    · Itchy nose, eyes, or mouth    · Red, watery eyes    · Postnasal drip (nasal drainage down the back of your throat)    · Cough or frequent throat clearing    · Feeling tired or lethargic    · Dark circles under your eyes  Call 911 for the following:   · You have chest pain or shortness of breath  Seek care immediately if:   · You have severe pain  · You cough up blood  Contact your healthcare provider if:   · You have a fever  · You have ear or sinus pain, or a headache  · Your symptoms get worse, even after treatment  · You have yellow, green, brown, or bloody mucus coming from your nose  · Your nose is bleeding or you have pain inside your nose  · You have trouble sleeping because of your symptoms  · You have questions or concerns about your condition or care  Treatment:   · Antihistamines  help reduce itching, sneezing, and a runny nose  Some antihistamines can make you sleepy  · Nasal steroids  help decrease inflammation in your nose  · Decongestants  help clear your stuffy nose  · Immunotherapy  may be needed if your symptoms are severe or other treatments do not work  Immunotherapy is used to inject an allergen into your skin  At first, the therapy contains tiny amounts of the allergen  Your healthcare provider will slowly increase the amount of allergen  This may help your body be less sensitive to the allergen and stop reacting to it   You may need immunotherapy for weeks or longer  Manage allergic rhinitis:  The best way to manage allergic rhinitis is to avoid allergens that can trigger your symptoms  Any of the following may help decrease your symptoms:  · Rinse your nose and sinuses  with a salt water solution or use a salt water nasal spray  This will help thin the mucus in your nose and rinse away pollen and dirt  It will also help reduce swelling so you can breathe normally  Ask your healthcare provider how often to rinse your nose  · Reduce exposure to dust mites  Wash sheets and towels in hot water every week  Cover your pillows and mattresses with allergen-free covers  Limit the number of stuffed animals and soft toys your child has  Wash your child's toys in hot water regularly  Vacuum weekly and use a vacuum  with an air filter  If possible, get rid of carpets and curtains  These collect dust and dust mites  · Reduce exposure to pollen  Keep windows and doors closed in your house and car  Stay inside when air pollution or the pollen count is high  Run your air conditioner on recycle, and change air filters often  Shower and wash your hair before bed every night to rinse away pollen  · Reduce exposure to pet dander  If possible, do not keep cats, dogs, birds, or other pets  If you do keep pets in your home, keep them out of bedrooms and carpeted rooms  Bathe them often  · Reduce exposure to mold  Do not spend time in basements  Choose artificial plants instead of live plants  Keep your home's humidity at less than 45%  Do not have ponds or standing water in your home or yard  · Do not smoke  Avoid others who smoke  Ask your healthcare provider for information if you currently smoke and need help to quit  Follow up with your healthcare provider as directed:  Write down your questions so you remember to ask them during your visits     © 2017 [facility] Information is for End User's use only and may not be sold, redistributed or otherwise used for commercial purposes  All illustrations and images included in CareNotes® are the copyrighted property of [employer]  or Singh Hubbard  The above information is an  only  It is not intended as medical advice for individual conditions or treatments  Talk to your doctor, nurse or pharmacist before following any medical regimen to see if it is safe and effective for you

## 2018-04-24 NOTE — TELEPHONE ENCOUNTER
Dr Lan Wallis,  Treated patient for a URI  The post nasal drip is worse and clots of blood when she blows her nose  Please  Advise

## 2018-04-24 NOTE — ASSESSMENT & PLAN NOTE
Patient continues with allergic rhinitis  Anticipatory counseling  Instructed to avoid insertion of foreign objects into nostrils (fingers, tissues) --> had had some scant bleeding  Sent for [employer], Nasonex, Mucinex D

## 2018-04-24 NOTE — PROGRESS NOTES
Dirk Model 1955 female MRN: [id number]    Acute Visit    SUBJECTIVE    CC: Sinusitis      HPI:  Dirk Martinez is a 66 y o  female who presented for an acute visit complaining of Sinusitis   This is a new problem  The current episode started 1 to 4 weeks ago  The problem has been gradually worsening since onset  There has been no fever  Her pain is at a severity of 4/10  The pain is mild  Associated symptoms include congestion, headaches, sinus pressure, sneezing and a sore throat  Pertinent negatives include no chills, coughing, diaphoresis, ear pain or shortness of breath  Past treatments include acetaminophen and saline sprays  The treatment provided moderate relief  Review of Systems   Constitutional: Negative for activity change, chills, diaphoresis and fever  HENT: Positive for congestion, postnasal drip, sinus pressure, sneezing and sore throat  Negative for drooling, ear discharge, ear pain, facial swelling, rhinorrhea and tinnitus  Eyes: Negative for pain and visual disturbance  Respiratory: Negative for cough, chest tightness, shortness of breath and wheezing  Cardiovascular: Negative for chest pain and palpitations  Gastrointestinal: Negative for abdominal pain, blood in stool, constipation, diarrhea, nausea and vomiting  Genitourinary: Negative for dysuria  Musculoskeletal: Negative for arthralgias and myalgias  Skin: Negative for rash  Neurological: Positive for headaches  Negative for dizziness and weakness  Hematological: Negative for adenopathy  All other systems reviewed and are negative        Historical Information   The patient history was reviewed as follows:  Past Medical History:   Diagnosis Date    Abnormal glucose     last assessed 12/22/15    Acid reflux     Anxiety     Arthritis     osteoarthritis multiple sites    Benign paroxysmal positional vertigo     Carpal tunnel syndrome     Chronic kidney disease     nephrolithiasis    Depression     Insomnia     Patellofemoral dysfunction     last assessed  9/15/14     Past Surgical History:   Procedure Laterality Date    COLONOSCOPY      KNEE SURGERY Right     [place] INCISE FINGER TENDON SHEATH Right 8/25/2016    Procedure: RELEASE LONG AND RING TRIGGER FINGERS;  Surgeon: Jennifer Casper MD;  [facility];  Service: Orthopedics    [place] WRIST Saige Kristie LIG Right 8/25/2016    Procedure: RELEASE CARPAL TUNNEL ENDOSCOPIC;  Surgeon: Jennifer Casper MD;  [facility];  Service: Orthopedics    TUBAL LIGATION       Family History   Problem Relation Age of Onset    Diabetes Mother     Heart disease Mother     Hypertension Mother     Hypothyroidism Mother     Arthritis Father     Crohn's disease Family     Psoriasis Family     Rheum arthritis Family     Lupus Family      systemic- erythematosus    Ulcerative colitis Family       Social History   History   Alcohol Use No     Comment: rare/social as per [employer]     History   Drug Use No     History   Smoking Status    Never Smoker   Smokeless Tobacco    Never Used       Medications:   Meds/Allergies   Current Outpatient Prescriptions   Medication Sig Dispense Refill    aspirin 81 MG tablet Take 81 mg by mouth daily   fexofenadine (ALLEGRA) 180 MG tablet Take 180 mg by mouth daily   fluticasone (FLONASE) 50 mcg/act nasal spray 1 spray into each nostril daily 16 g 0    HYDROcodone-acetaminophen (NORCO) 5-325 mg per tablet 1 tab by mouth every 6 hours for pain 20 tablet 0    mometasone (NASONEX) 50 mcg/act nasal spray 2 sprays into each nostril daily 17 g 0    naproxen (NAPROSYN) 500 mg tablet Take 1 tablet (500 mg total) by mouth 2 (two) times a day with meals 14 tablet 0    Omega-3 1000 MG CAPS Take by mouth      omeprazole (PriLOSEC) 20 mg delayed release capsule Take 20 mg by mouth daily        pseudoephedrine-guaifenesin (MUCINEX D)  MG per tablet Take 1 tablet by mouth every 12 (twelve) hours for 5 days 10 tablet 0    Sodium Chloride-Sodium Bicarb (CLASSIC NETI POT SINUS WASH) 2300-700 MG KIT 1 Bottle (1 kit total) into each nostril 2 (two) times a day 1 kit 0     No current facility-administered medications for this visit  Allergies   Allergen Reactions    Pollen Extract Allergic Rhinitis and Sneezing       OBJECTIVE    Vitals:   Vitals:    04/24/18 1655   BP: 124/82   Pulse: 70   Resp: 18   Temp: 97 9 °F (36 6 °C)   TempSrc: Tympanic   Weight: 67 2 kg (148 lb 3 2 oz)   Height: 5' (1 524 m)       Physical Exam   Constitutional: She is oriented to person, place, and time  She appears well-developed and well-nourished  No distress  HENT:   Head: Normocephalic and atraumatic  Right Ear: Tympanic membrane, external ear and ear canal normal  No tenderness  Left Ear: Tympanic membrane, external ear and ear canal normal  No tenderness  Nose: Mucosal edema and rhinorrhea present  No sinus tenderness or septal deviation  No epistaxis  Right sinus exhibits maxillary sinus tenderness  Left sinus exhibits maxillary sinus tenderness  Mouth/Throat: Oropharynx is clear and moist and mucous membranes are normal  Normal dentition  No oropharyngeal exudate, posterior oropharyngeal erythema or tonsillar abscesses  Boggy erythematous mucosa  Scant clear mucus  Eyes: Conjunctivae and EOM are normal  Pupils are equal, round, and reactive to light  Right eye exhibits no discharge  Left eye exhibits no discharge  Neck: Normal range of motion  Neck supple  Cardiovascular: Normal rate, regular rhythm, normal heart sounds and intact distal pulses  No murmur heard  Pulmonary/Chest: Effort normal and breath sounds normal  No respiratory distress  She has no wheezes  Abdominal: Soft  Musculoskeletal: She exhibits no edema  Lymphadenopathy:     She has no cervical adenopathy  Neurological: She is alert and oriented to person, place, and time  Skin: Skin is warm and dry   No rash noted  She is not diaphoretic  Nursing note and vitals reviewed  Lab:  I have personally reviewed all pertinent results  No visits with results within 3 Month(s) from this visit  Latest known visit with results is:   Lab Conversion - Encounter on 01/13/2018   Component Date Value Ref Range Status    Hemoglobin A1C 01/12/2018 5 9* <5 7 % of total Hgb Final    Comment: Result Comment: For someone without known diabetes, a hemoglobin   A1c value between 5 7% and 6 4% is consistent with  prediabetes and should be confirmed with a   follow-up test      For someone with known diabetes, a value <7%  indicates that their diabetes is well controlled  A1c  targets should be individualized based on duration of  diabetes, age, comorbid conditions, and other  considerations  This assay result is consistent with an increased risk  of diabetes  Currently, no consensus exists regarding use of  hemoglobin A1c for diagnosis of diabetes for children  Performed at: [id number] [facility], [address] [place] [address]          Lab Conversion - Encounter on 01/13/2018   Component Date Value Ref Range Status    Hemoglobin A1C 01/12/2018 5 9* <5 7 % of total Hgb Final    Comment: Result Comment: For someone without known diabetes, a hemoglobin   A1c value between 5 7% and 6 4% is consistent with  prediabetes and should be confirmed with a   follow-up test      For someone with known diabetes, a value <7%  indicates that their diabetes is well controlled  A1c  targets should be individualized based on duration of  diabetes, age, comorbid conditions, and other  considerations  This assay result is consistent with an increased risk  of diabetes  Currently, no consensus exists regarding use of  hemoglobin A1c for diagnosis of diabetes for children    Performed at: [address],[facility], [facility]  [address] [place] [id number]         Imaging:  I have personally reviewed all pertinent results  EKG, Pathology, and Other Studies:   I have personally reviewed all pertinent results  Assessment/Plan   Allergic rhinitis  Patient continues with allergic rhinitis  Anticipatory counseling  Instructed to avoid insertion of foreign objects into nostrils (fingers, tissues) --> had had some scant bleeding  Sent for [employer], Nasonex, Mucinex D    Nnamdi Ayala was seen today for sinusitis  Diagnoses and all orders for this visit:    Seasonal allergic rhinitis due to pollen  -     pseudoephedrine-guaifenesin (MUCINEX D)  MG per tablet; Take 1 tablet by mouth every 12 (twelve) hours for 5 days  -     mometasone (NASONEX) 50 mcg/act nasal spray; 2 sprays into each nostril daily  -     Sodium Chloride-Sodium Bicarb (CLASSIC NETI POT SINUS WASH) 2300-700 MG KIT; 1 Bottle (1 kit total) into each nostril 2 (two) times a day      - PCP: Melinda Busch MD  - Follow-up appointments: [facility]    No future appointments    _____________________________________________________________________   The attending physician, Dr Jaqui Callahan, agreed with the plan  Shaylee Morrow MD, PGY-2  [facility]   4/24/2018        Please be aware that this note contains text that was dictated and there may be errors pertaining to "sound-alike "words during the dictation process

## 2018-05-24 ENCOUNTER — HOSPITAL ENCOUNTER (OUTPATIENT)
Dept: RADIOLOGY | Facility: HOSPITAL | Age: 63
Discharge: HOME | End: 2018-05-24
Payer: COMMERCIAL

## 2018-05-24 ENCOUNTER — OFFICE VISIT (OUTPATIENT)
Dept: FAMILY MEDICINE CLINIC | Facility: CLINIC | Age: 63
End: 2018-05-24
Payer: COMMERCIAL

## 2018-05-24 VITALS
TEMPERATURE: 97.7 F | RESPIRATION RATE: 14 BRPM | DIASTOLIC BLOOD PRESSURE: 78 MMHG | BODY MASS INDEX: 28.51 KG/M2 | HEART RATE: 78 BPM | SYSTOLIC BLOOD PRESSURE: 120 MMHG | OXYGEN SATURATION: 98 % | WEIGHT: 146 LBS

## 2018-05-24 DIAGNOSIS — M72.2 PLANTAR FASCIITIS, BILATERAL: ICD-10-CM

## 2018-05-24 DIAGNOSIS — M72.2 PLANTAR FASCIITIS, BILATERAL: Primary | ICD-10-CM

## 2018-05-24 PROCEDURE — 99213 OFFICE O/P EST LOW 20 MIN: CPT | Performed by: FAMILY MEDICINE

## 2018-05-24 PROCEDURE — 73630 X-RAY EXAM OF FOOT: CPT

## 2018-05-24 NOTE — ASSESSMENT & PLAN NOTE
This is a chronic problem for which she has been doing usual conservative measures with NSAIDs, shoe inserts, exercises  Will refer to Podiatry t/c CSI, check XR B feet prior to appt

## 2018-05-24 NOTE — PROGRESS NOTES
Jaswant Croydon 1955 female MRN: [id number]    Family Medicine Acute Visit    ASSESSMENT/PLAN  Problem List Items Addressed This Visit        Musculoskeletal and Integument    Plantar fasciitis, bilateral - Primary     This is a chronic problem for which she has been doing usual conservative measures with NSAIDs, shoe inserts, exercises  Will refer to Podiatry t/c CSI, check XR B feet prior to appt  Relevant Orders    Ambulatory referral to Podiatry    XR foot 3+ vw left    XR foot 3+ vw right              No future appointments  SUBJECTIVE  CC: sick visit (pt  states bilateral foot pain pt  states she has Plantar Fascitus and is feeling alot of pain especially in her R foot  pt  is looking for a referral to Ortho)    HPI:  Jaswant Roman is a 66 y o  female who presents for B heel pain x 5 months  Has known h/o plantar fasciitis, last seen by Dr Terell Boudreaux for this in Feb 2018, given Rx for naproxen for 7 days  Equal B, stabbing, burning pain  Shoots up calves when standing  Worst first thing in AM with first steps of day  Ibuprofen inconsistent relief  Does Aquafit for her sciatica which helps a little bit  No loss of sensation, no swelling, no paresthesias  Uses inserts in shoes for years  Acupuncture for shoulder and elbow pain  Has h/o CTS B, had surgical tx on R 2016  Review of Systems   Musculoskeletal: Positive for arthralgias (shoulder/elbow pain)  Neurological: Negative for numbness         Historical Information   The patient history was reviewed as follows:  Past Medical History:   Diagnosis Date    Abnormal glucose     last assessed 12/22/15    Acid reflux     Anxiety     Arthritis     osteoarthritis multiple sites    Benign paroxysmal positional vertigo     Carpal tunnel syndrome     Chronic kidney disease     nephrolithiasis    Depression     Insomnia     Patellofemoral dysfunction     last assessed  9/15/14         Past Surgical History:   Procedure Laterality Date    COLONOSCOPY      KNEE SURGERY Right     [place] INCISE FINGER TENDON SHEATH Right 8/25/2016    Procedure: RELEASE LONG AND RING TRIGGER FINGERS;  Surgeon: Seven Galvin MD;  [facility];  Service: Orthopedics    [place] WRIST Daysheila Calderon LIG Right 8/25/2016    Procedure: RELEASE CARPAL TUNNEL ENDOSCOPIC;  Surgeon: Seven Galvin MD;  [facility];  Service: Orthopedics    TUBAL LIGATION       Family History   Problem Relation Age of Onset    Diabetes Mother     Heart disease Mother     Hypertension Mother     Hypothyroidism Mother     Arthritis Father     Crohn's disease Family     Psoriasis Family     Rheum arthritis Family     Lupus Family      systemic- erythematosus    Ulcerative colitis Family       Social History   History   Alcohol Use No     Comment: rare/social as per [employer]     History   Drug Use No     History   Smoking Status    Never Smoker   Smokeless Tobacco    Never Used       Medications:     Current Outpatient Prescriptions:     fexofenadine (ALLEGRA) 180 MG tablet, Take 180 mg by mouth daily  , Disp: , Rfl:     fluticasone (FLONASE) 50 mcg/act nasal spray, 1 spray into each nostril daily, Disp: 16 g, Rfl: 0    mometasone (NASONEX) 50 mcg/act nasal spray, 2 sprays into each nostril daily, Disp: 17 g, Rfl: 0    naproxen (NAPROSYN) 500 mg tablet, Take 1 tablet (500 mg total) by mouth 2 (two) times a day with meals, Disp: 14 tablet, Rfl: 0    Sodium Chloride-Sodium Bicarb (CLASSIC NETI POT SINUS WASH) 2300-700 MG KIT, 1 Bottle (1 kit total) into each nostril 2 (two) times a day, Disp: 1 kit, Rfl: 0    aspirin 81 MG tablet, Take 81 mg by mouth daily  , Disp: , Rfl:     HYDROcodone-acetaminophen (NORCO) 5-325 mg per tablet, 1 tab by mouth every 6 hours for pain, Disp: 20 tablet, Rfl: 0    Omega-3 1000 MG CAPS, Take by mouth, Disp: , Rfl:     omeprazole (PriLOSEC) 20 mg delayed release capsule, Take 20 mg by mouth daily  , Disp: , Rfl:     Allergies   Allergen Reactions    Pollen Extract Allergic Rhinitis and Sneezing       OBJECTIVE  Vitals:   Vitals:    05/24/18 1102   BP: 120/78   BP Location: Left arm   Patient Position: Sitting   Cuff Size: Standard   Pulse: 78   Resp: 14   Temp: 97 7 °F (36 5 °C)   TempSrc: Probe   SpO2: 98%   Weight: 66 2 kg (146 lb)       Physical Exam   Constitutional: She is oriented to person, place, and time  She appears well-developed and well-nourished  No distress  Musculoskeletal: She exhibits tenderness (Tender over B mid plantar feet and heels, with mass at medial plantar aspect of mid R foot)  She exhibits no edema  No erythema, no wounds over feet   Neurological: She is alert and oriented to person, place, and time  Vitals reviewed       Katheryn Christensen MD  [address] [facility]  5/24/2018

## 2018-06-01 ENCOUNTER — TELEPHONE (OUTPATIENT)
Dept: FAMILY MEDICINE CLINIC | Facility: CLINIC | Age: 63
End: 2018-06-01

## 2018-08-07 ENCOUNTER — EVALUATION (OUTPATIENT)
Dept: PHYSICAL THERAPY | Facility: CLINIC | Age: 63
End: 2018-08-07
Payer: COMMERCIAL

## 2018-08-07 ENCOUNTER — TRANSCRIBE ORDERS (OUTPATIENT)
Dept: PHYSICAL THERAPY | Facility: CLINIC | Age: 63
End: 2018-08-07

## 2018-08-07 DIAGNOSIS — M72.2 PLANTAR FASCIAL FIBROMATOSIS: Primary | ICD-10-CM

## 2018-08-07 DIAGNOSIS — M72.2 PLANTAR FASCIAL FIBROMATOSIS OF BOTH FEET: Primary | ICD-10-CM

## 2018-08-07 PROCEDURE — 97140 MANUAL THERAPY 1/> REGIONS: CPT | Performed by: PHYSICAL THERAPIST

## 2018-08-07 PROCEDURE — G8978 MOBILITY CURRENT STATUS: HCPCS | Performed by: PHYSICAL THERAPIST

## 2018-08-07 PROCEDURE — G8979 MOBILITY GOAL STATUS: HCPCS | Performed by: PHYSICAL THERAPIST

## 2018-08-07 PROCEDURE — 97162 PT EVAL MOD COMPLEX 30 MIN: CPT | Performed by: PHYSICAL THERAPIST

## 2018-08-07 NOTE — LETTER
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 2        Dear Dr Patricia Ramirez:    Please review the attached Plan of Care from [address] [place] [address] & [place] recent visit  Please verify that you agree therapy should continue by signing the attached document and sending it back to our office  If you have any questions or concerns, please don't hesitate to call  Sincerely,    Kamlesh Dillon, PT      Referring Provider:      I certify that I have read the below Plan of Care and certify the need for these services furnished under this plan of treatment while under my care  Davian Doshi, COLTMARCELLUS  [address] [facility] [address]: [phone]          PT Evaluation     Today's date: 2018  Patient name: Isha Terry  : 1955  MRN: [id number]  Referring provider: YE Kasper  Dx:   Encounter Diagnosis     ICD-10-CM    1  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 2                   Assessment  Impairments: abnormal coordination, abnormal gait, abnormal muscle firing, abnormal or restricted ROM, abnormal movement, activity intolerance, impaired balance, impaired physical strength, lacks appropriate home exercise program, pain with function, safety issue and weight-bearing intolerance  Patient presents with symptom irritability yes  Assessment details: Isha Terry is a 66 y o  female who presents with pain, decreased strength, decreased ROM, decreased joint mobility and ambulatory dysfunction  Due to these impairments, Patient has difficulty performing a/iadls, recreational activities, work-related activities and engaging in social activities  Patient's clinical presentation is consistent with their referring diagnosis   Patient would benefit from skilled physical therapy to address their aforementioned impairments, improve their level of function and to improve their overall quality of life  Understanding of Dx/Px/POC: good   Prognosis: good    Goals  Short Term Goals: to be achieved in 4 weeks  1) Patient to be independent with basic HEP  2) Decrease pain at it's worst to 3/10 on VAS  3) Increase LE strength by 1/2 MMT grade in all deficient planes  4) Patient to negotiate steps with a reciprocal pattern with use of HR  5) Increase ambulatory tolerance to 10 minutes  Long Term Goals: to be achieved by discharge  1) FOTO equal to or greater than 55   2) Patient to be independent with comprehensive HEP  3) Abolish pain for improved quality of life  4) Increase LE strength to 5/5 MMT grade in all planes to improve A/IADLS  5) Patient to negotiate steps with a reciprocal pattern without use of Hr  6) Increase ambulatory tolerance to 30 minutes  Plan  Patient would benefit from: skilled PT  Referral necessary: No  Planned modality interventions: biofeedback, cryotherapy, hydrotherapy, TENS and unattended electrical stimulation  Planned therapy interventions: activity modification, ADL retraining, balance, balance/weight bearing training, behavior modification, body mechanics training, functional ROM exercises, gait training, home exercise program, IADL retraining, joint mobilization, manual therapy, massage, neuromuscular re-education, patient education, strengthening, stretching, therapeutic activities, therapeutic exercise, transfer training, abdominal trunk stabilization, flexibility, graded exercise and graded activity  Frequency: 2x week  Duration in weeks: 6  Treatment plan discussed with: patient        Subjective Evaluation    History of Present Illness  Mechanism of injury: Pt presents today with a prolonged history of titus foot pain that began in December   She received injections 2-3 times with only slight relief for a short period of time, but her symptoms returned  She has pain in the arch of her  Foot when she is walking  She notes that she gets shooting pains and pressure  She also has pain with active ankle eversion  She no longer has pain first thing upon waking in the morning, but she will notice some pain if she has been sitting for a while and then when she goes to stand up  She does report that when she plantar flexes her right foot she gets a cramp in the arch along with pain  Pain  Current pain ratin  At best pain ratin  At worst pain ratin  Location: R>L, MLA, into her gastroc at times  Quality: sharp and knife-like (hot and shooting)  Progression: worsening    Patient Goals  Patient goal: To get the pain away        Objective     Active Range of Motion   Left Ankle/Foot   Dorsiflexion (ke): 0 degrees   Plantar flexion: 60 degrees   Inversion: 35 degrees   Eversion: 20 degrees   Great toe flexion: 75 degrees     Right Ankle/Foot   Dorsiflexion (ke): 0 degrees   Plantar flexion: 60 degrees WFL and with pain  Inversion: 35 degrees   Eversion: 20 degrees with pain  Great toe flexion: 70 degrees     Passive Range of Motion   Left Ankle/Foot    Dorsiflexion (ke): 5 degrees   Plantar flexion: 60 degrees   Inversion: 35 degrees   Eversion: 20 degrees   Great toe extension: 90 degrees     Right Ankle/Foot    Dorsiflexion (ke): 5 degrees   Plantar flexion: 60 degrees   Inversion: 35 degrees   Eversion: 20 degrees   Great toe extension: 90 degrees     Joint Play   Left Ankle/Foot  Joints within functional limits are the talocrural joint, subtalar joint, midfoot and forefoot  Right Ankle/Foot  Hypomobile in the talocrural joint, subtalar joint, midfoot and forefoot       Strength/Myotome Testing     Left Ankle/Foot   Normal strength  Plantar flexion: 3+  Great toe extension: 5    Right Ankle/Foot   Dorsiflexion: 4  Plantar flexion: 3  Inversion: 4  Eversion: 4-  Great toe extension: 5    Additional Strength Details  2 SL heel raises on R with pain, 10 on L    Hip flexion 3+/5 R, 4/5 L  Knee 5/5 t/o titus LE's     Tests   Left Ankle/Foot   Positive for windlass  Negative for anterior drawer and posterior drawer  Right Ankle/Foot   Positive for windlass  Negative for anterior drawer and posterior drawer  Additional Tests Details  Squat- anterior tibial translation  Pain at 60 degrees of knee flexion  SLS- able to perform with minimal varus titus but challenge with balance     Gait- lack of push off on R LE   Decreased stance time on R      Flowsheet Rows      Most Recent Value   PT/OT G-Codes   Current Score  34   Projected Score  55   FOTO information reviewed  Yes   Assessment Type  Evaluation   G code set  Mobility: Walking & Moving Around   Mobility: Walking and Moving Around Current Status ()  CL   Mobility: Walking and Moving Around Goal Status ()  CK          Precautions: depression, anxiety    Daily Treatment Diary     Manual  8/7            IASTM titus plantar fascia 10'            TCJ mobs (R)                                                        Exercise Diary  8/7            bike             gastroc stretch 20"x4            Seated heel raise (with bolster) 20x            Toe curls             Ankle tband 4way             Side stepping             SLS             Eccentric foot supination                                                                                                                                                                             Modalities              CP PRN

## 2018-08-07 NOTE — PROGRESS NOTES
PT Evaluation     Today's date: 2018  Patient name: Chelly Quesada  : 1955  MRN: [id number]  Referring provider: YE Tripathi  Dx:   Encounter Diagnosis     ICD-10-CM    1  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 2                   Assessment  Impairments: abnormal coordination, abnormal gait, abnormal muscle firing, abnormal or restricted ROM, abnormal movement, activity intolerance, impaired balance, impaired physical strength, lacks appropriate home exercise program, pain with function, safety issue and weight-bearing intolerance  Patient presents with symptom irritability yes  Assessment details: Chelly Quesada is a 66 y o  female who presents with pain, decreased strength, decreased ROM, decreased joint mobility and ambulatory dysfunction  Due to these impairments, Patient has difficulty performing a/iadls, recreational activities, work-related activities and engaging in social activities  Patient's clinical presentation is consistent with their referring diagnosis  Patient would benefit from skilled physical therapy to address their aforementioned impairments, improve their level of function and to improve their overall quality of life  Understanding of Dx/Px/POC: good   Prognosis: good    Goals  Short Term Goals: to be achieved in 4 weeks  1) Patient to be independent with basic HEP  2) Decrease pain at it's worst to 3/10 on VAS  3) Increase LE strength by 1/2 MMT grade in all deficient planes  4) Patient to negotiate steps with a reciprocal pattern with use of HR  5) Increase ambulatory tolerance to 10 minutes  Long Term Goals: to be achieved by discharge  1) FOTO equal to or greater than 55   2) Patient to be independent with comprehensive HEP  3) Abolish pain for improved quality of life  4) Increase LE strength to 5/5 MMT grade in all planes to improve A/IADLS  5) Patient to negotiate steps with a reciprocal pattern without use of Hr    6) Increase ambulatory tolerance to 30 minutes  Plan  Patient would benefit from: skilled PT  Referral necessary: No  Planned modality interventions: biofeedback, cryotherapy, hydrotherapy, TENS and unattended electrical stimulation  Planned therapy interventions: activity modification, ADL retraining, balance, balance/weight bearing training, behavior modification, body mechanics training, functional ROM exercises, gait training, home exercise program, IADL retraining, joint mobilization, manual therapy, massage, neuromuscular re-education, patient education, strengthening, stretching, therapeutic activities, therapeutic exercise, transfer training, abdominal trunk stabilization, flexibility, graded exercise and graded activity  Frequency: 2x week  Duration in weeks: 6  Treatment plan discussed with: patient        Subjective Evaluation    History of Present Illness  Mechanism of injury: Pt presents today with a prolonged history of titus foot pain that began in December  She received injections 2-3 times with only slight relief for a short period of time, but her symptoms returned  She has pain in the arch of her  Foot when she is walking  She notes that she gets shooting pains and pressure  She also has pain with active ankle eversion  She no longer has pain first thing upon waking in the morning, but she will notice some pain if she has been sitting for a while and then when she goes to stand up  She does report that when she plantar flexes her right foot she gets a cramp in the arch along with pain     Pain  Current pain ratin  At best pain ratin  At worst pain ratin  Location: R>L, MLA, into her gastroc at times  Quality: sharp and knife-like (hot and shooting)  Progression: worsening    Patient Goals  Patient goal: To get the pain away        Objective     Active Range of Motion   Left Ankle/Foot   Dorsiflexion (ke): 0 degrees   Plantar flexion: 60 degrees   Inversion: 35 degrees   Eversion: 20 degrees Great toe flexion: 75 degrees     Right Ankle/Foot   Dorsiflexion (ke): 0 degrees   Plantar flexion: 60 degrees WFL and with pain  Inversion: 35 degrees   Eversion: 20 degrees with pain  Great toe flexion: 70 degrees     Passive Range of Motion   Left Ankle/Foot    Dorsiflexion (ke): 5 degrees   Plantar flexion: 60 degrees   Inversion: 35 degrees   Eversion: 20 degrees   Great toe extension: 90 degrees     Right Ankle/Foot    Dorsiflexion (ke): 5 degrees   Plantar flexion: 60 degrees   Inversion: 35 degrees   Eversion: 20 degrees   Great toe extension: 90 degrees     Joint Play   Left Ankle/Foot  Joints within functional limits are the talocrural joint, subtalar joint, midfoot and forefoot  Right Ankle/Foot  Hypomobile in the talocrural joint, subtalar joint, midfoot and forefoot  Strength/Myotome Testing     Left Ankle/Foot   Normal strength  Plantar flexion: 3+  Great toe extension: 5    Right Ankle/Foot   Dorsiflexion: 4  Plantar flexion: 3  Inversion: 4  Eversion: 4-  Great toe extension: 5    Additional Strength Details  2 SL heel raises on R with pain, 10 on L    Hip flexion 3+/5 R, 4/5 L  Knee 5/5 t/o titus LE's     Tests   Left Ankle/Foot   Positive for windlass  Negative for anterior drawer and posterior drawer  Right Ankle/Foot   Positive for windlass  Negative for anterior drawer and posterior drawer  Additional Tests Details  Squat- anterior tibial translation  Pain at 60 degrees of knee flexion  SLS- able to perform with minimal varus titus but challenge with balance     Gait- lack of push off on R LE   Decreased stance time on R      Flowsheet Rows      Most Recent Value   PT/OT G-Codes   Current Score  34   Projected Score  55   FOTO information reviewed  Yes   Assessment Type  Evaluation   G code set  Mobility: Walking & Moving Around   Mobility: Walking and Moving Around Current Status ()  CL   Mobility: Walking and Moving Around Goal Status ()  CK          Precautions: depression, anxiety    Daily Treatment Diary     Manual  8/7            IAS titus plantar fascia 10'            TCJ mobs (R)                                                        Exercise Diary  8/7            bike             gastroc stretch 20"x4            Seated heel raise (with bolster) 20x            Toe curls             Ankle tband 4way             Side stepping             SLS             Eccentric foot supination                                                                                                                                                                             Modalities              CP PRN

## 2018-08-14 ENCOUNTER — OFFICE VISIT (OUTPATIENT)
Dept: PHYSICAL THERAPY | Facility: CLINIC | Age: 63
End: 2018-08-14
Payer: COMMERCIAL

## 2018-08-14 DIAGNOSIS — M72.2 PLANTAR FASCIAL FIBROMATOSIS OF BOTH FEET: Primary | ICD-10-CM

## 2018-08-14 PROCEDURE — 97112 NEUROMUSCULAR REEDUCATION: CPT | Performed by: PHYSICAL THERAPIST

## 2018-08-14 PROCEDURE — 97010 HOT OR COLD PACKS THERAPY: CPT | Performed by: PHYSICAL THERAPIST

## 2018-08-14 PROCEDURE — 97110 THERAPEUTIC EXERCISES: CPT | Performed by: PHYSICAL THERAPIST

## 2018-08-14 PROCEDURE — 97140 MANUAL THERAPY 1/> REGIONS: CPT | Performed by: PHYSICAL THERAPIST

## 2018-08-14 NOTE — PROGRESS NOTES
Daily Note     Today's date: 2018  Patient name: Kiesha Jarvis  : 1955  MRN: [id number]  Referring provider: YE Serna  Dx:   Encounter Diagnosis     ICD-10-CM    1  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 2                   Subjective: Pt notes that beginning on Saturday she noted some increase in pain in both of her feet that has since persisted  Objective: See treatment diary below      Assessment: Tolerated treatment well  Patient demonstrated fatigue post treatment, exhibited good technique with therapeutic exercises, would benefit from continued PT and noted some discomfort with tband plantar flexion  Progressed HEP  Plan: Continue per plan of care  Progress treatment as tolerated          Precautions: depression, anxiety    Daily Treatment Diary     Manual             IASTM titus plantar fascia 10' 10'           TCJ mobs (R)  5'                                                      Exercise Diary             bike  6'           gastroc stretch 20"x4 20"x4           Seated heel raise (with bolster) 20x 20x           Toe curls  20x           Ankle tband 4way  RTB  20x           Side stepping  NV           SLS  NV           Eccentric foot supination  NV                                                                                                                                                                           Modalities              CP PRN  10' post

## 2018-08-16 ENCOUNTER — OFFICE VISIT (OUTPATIENT)
Dept: PHYSICAL THERAPY | Facility: CLINIC | Age: 63
End: 2018-08-16
Payer: COMMERCIAL

## 2018-08-16 DIAGNOSIS — M72.2 PLANTAR FASCIAL FIBROMATOSIS OF BOTH FEET: Primary | ICD-10-CM

## 2018-08-16 PROCEDURE — 97140 MANUAL THERAPY 1/> REGIONS: CPT

## 2018-08-16 PROCEDURE — 97112 NEUROMUSCULAR REEDUCATION: CPT

## 2018-08-16 PROCEDURE — 97110 THERAPEUTIC EXERCISES: CPT

## 2018-08-16 NOTE — PROGRESS NOTES
Daily Note     Today's date: 2018  Patient name: Sosa Mullins  : 1955  MRN: [id number]  Referring provider: YE Quezada  Dx:   Encounter Diagnosis     ICD-10-CM    1  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 2                   Subjective: Patient states that her R foot continues to be more problematic than her L and pain is intermittent with activity  Has been maximally compliant with her HEP  Objective: See treatment diary below  Precautions: depression, anxiety    Daily Treatment Diary     Manual            IASTM titus plantar fascia 10' 10' 10           TCJ mobs (R)  5' 10- AR                                                     Exercise Diary            bike  6' 6 min           gastroc stretch 20"x4 20"x4 :20x4          Seated heel raise (with bolster) 20x 20x 20x          Toe curls  20x 20x          Ankle tband 4way  RTB  20x GTB x 20           Side stepping  NV           SLS  NV :10x5 B          Eccentric foot supination  NV Stand GTB x 10 B                                                                                                                                                                          Modalities             CP PRN  10' post deferred                                      Assessment: Tolerated treatment well  Patient demonstrated fatigue post treatment and exhibited good technique with therapeutic exercises      Plan: Continue per plan of care

## 2018-08-21 ENCOUNTER — OFFICE VISIT (OUTPATIENT)
Dept: PHYSICAL THERAPY | Facility: CLINIC | Age: 63
End: 2018-08-21
Payer: COMMERCIAL

## 2018-08-21 DIAGNOSIS — M72.2 PLANTAR FASCIAL FIBROMATOSIS OF BOTH FEET: Primary | ICD-10-CM

## 2018-08-21 PROCEDURE — 97140 MANUAL THERAPY 1/> REGIONS: CPT

## 2018-08-21 PROCEDURE — 97110 THERAPEUTIC EXERCISES: CPT

## 2018-08-21 PROCEDURE — 97112 NEUROMUSCULAR REEDUCATION: CPT

## 2018-08-21 NOTE — PROGRESS NOTES
Daily Note     Today's date: 2018  Patient name: Jose   : 1955  MRN: [id number]  Referring provider: YE Champion  Dx:   Encounter Diagnosis     ICD-10-CM    1  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 2                   Subjective: Patient states that she has noticed a slight improvement with her tolerance to activity since starting therapy  Remains TTP on R foot  Objective: See treatment diary below  Precautions: depression, anxiety    Daily Treatment Diary     Manual           IASTM titus plantar fascia 10' 10' 10  10          TCJ mobs (R)  5' 10- AR [address] [place] [address]                                                    Exercise Diary           bike  6' 6 min  6 min          gastroc stretch 20"x4 20"x4 :20x4 ;20x4         Seated heel raise (with bolster) 20x 20x 20x 20x         Toe curls  20x 20x 20x         Ankle tband 4way  RTB  20x GTB x 20  GTB x 20          Side stepping  NV           SLS  NV :10x5 B :10-x 5         Eccentric foot supination  NV Stand GTB x 10 B                                                                                                                                                                          Modalities             CP PRN  10' post deferred                                        Assessment: Tolerated treatment fair  Patient exhibited good technique with therapeutic exercises      Plan: Continue per plan of care

## 2018-08-23 ENCOUNTER — OFFICE VISIT (OUTPATIENT)
Dept: PHYSICAL THERAPY | Facility: CLINIC | Age: 63
End: 2018-08-23
Payer: COMMERCIAL

## 2018-08-23 DIAGNOSIS — M72.2 PLANTAR FASCIAL FIBROMATOSIS OF BOTH FEET: Primary | ICD-10-CM

## 2018-08-23 PROCEDURE — 97140 MANUAL THERAPY 1/> REGIONS: CPT

## 2018-08-23 PROCEDURE — 97110 THERAPEUTIC EXERCISES: CPT

## 2018-08-23 PROCEDURE — 97112 NEUROMUSCULAR REEDUCATION: CPT

## 2018-08-23 NOTE — PROGRESS NOTES
Daily Note     Today's date: 2018  Patient name: Pio Livingston  : 1955  MRN: [id number]  Referring provider: YE Huerta  Dx:   Encounter Diagnosis     ICD-10-CM    1  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 2                   Subjective: Patient states that she continues to have the most discomfort with standing HR and walking for extended periods of time  Objective: See treatment diary below  Precautions: depression, anxiety    Daily Treatment Diary     Manual          IASTM titus plantar fascia 10' 10' 10  10  10        TCJ mobs (R)  5' 10- AR [address] [place] [address] np                                                   Exercise Diary          bike  6' 6 min  6 min  6 min        gastroc stretch 20"x4 20"x4 :20x4 ;20x4 :20x4        Seated heel raise (with bolster) 20x 20x 20x 20x 20x        Toe curls  20x 20x 20x 3 min        Ankle tband 4way  RTB  20x GTB x 20  GTB x 20  BTBx 20         Side stepping  NV           SLS  NV :10x5 B :10-x 5 :10x5        Eccentric foot supination  NV Stand GTB x 10 B  GTB x 20                                                                                                                                                                         Modalities             CP PRN  10' post deferred                                        Assessment: Tolerated treatment well  Patient exhibited good technique with therapeutic exercises      Plan: Continue per plan of care

## 2018-08-28 ENCOUNTER — OFFICE VISIT (OUTPATIENT)
Dept: PHYSICAL THERAPY | Facility: CLINIC | Age: 63
End: 2018-08-28
Payer: COMMERCIAL

## 2018-08-28 DIAGNOSIS — M72.2 PLANTAR FASCIAL FIBROMATOSIS OF BOTH FEET: Primary | ICD-10-CM

## 2018-08-28 PROCEDURE — 97140 MANUAL THERAPY 1/> REGIONS: CPT

## 2018-08-28 PROCEDURE — 97112 NEUROMUSCULAR REEDUCATION: CPT

## 2018-08-28 PROCEDURE — 97110 THERAPEUTIC EXERCISES: CPT

## 2018-08-28 NOTE — PROGRESS NOTES
Daily Note     Today's date: 2018  Patient name: Neftali Grigsby  : 1955  MRN: [id number]  Referring provider: YE Carroll  Dx:   Encounter Diagnosis     ICD-10-CM    1  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 2        Start Time:   Stop Time: 1700  Total time in clinic (min): 45 minutes    Subjective: Patient states that pushing wheelchairs bothers both of her feet, pt reports pain "5/10" in both feet before session  Objective: See treatment diary below  Precautions: depression, anxiety    Daily Treatment Diary     Manual         IASTM titus plantar fascia 10' 10' 10  10  10 10'       TCJ mobs (R)  5' 10- AR 5 - WA np AR                                                  Exercise Diary         bike  6' 6 min  6 min  6 min 6 min       gastroc stretch 20"x4 20"x4 :20x4 ;20x4 :20x4 20"x4       Seated heel raise (with bolster) 20x 20x 20x 20x 20x 20x       Toe curls  20x 20x 20x 3 min 3 min        Ankle tband 4way  RTB  20x GTB x 20  GTB x 20  BTBx 20  BTB  20x       Side stepping  NV           SLS  NV :10x5 B :10-x 5 :10x5 10"x5       Eccentric foot supination  NV Stand GTB x 10 B  GTB x 20  GTB  20x                                                                                                                                                                       Modalities             CP PRN  10' post deferred                                        Assessment: Tolerated treatment well  Patient exhibited good technique with therapeutic exercises  Pt has low tolerance to IASTM this session, with pain reported in L >R  Plan: Continue per plan of care

## 2018-08-30 ENCOUNTER — OFFICE VISIT (OUTPATIENT)
Dept: PHYSICAL THERAPY | Facility: CLINIC | Age: 63
End: 2018-08-30
Payer: COMMERCIAL

## 2018-08-30 DIAGNOSIS — M72.2 PLANTAR FASCIAL FIBROMATOSIS OF BOTH FEET: Primary | ICD-10-CM

## 2018-08-30 PROCEDURE — 97110 THERAPEUTIC EXERCISES: CPT

## 2018-08-30 PROCEDURE — 97112 NEUROMUSCULAR REEDUCATION: CPT

## 2018-08-30 NOTE — PROGRESS NOTES
Daily Note     Today's date: 2018  Patient name: Shailesh Saab  : 1955  MRN: [id number]  Referring provider: YE Ching  Dx:   Encounter Diagnosis     ICD-10-CM    1  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 2                   Subjective: Patient states that she experienced moderate amount of pain/soreness following last treatment session with IASTM  Held today due to pain and remains TTP  Will resume at nv  Objective: See treatment diary below  Precautions: depression, anxiety    Daily Treatment Diary     Manual        IASTM titus plantar fascia 10' 10' 10  10  10 10' held      TCJ mobs (R)  5' 10- AR 5 - WA np AR held                                                 Exercise Diary        bike  6' 6 min  6 min  6 min 6 min 6 mi      gastroc stretch 20"x4 20"x4 :20x4 ;20x4 :20x4 20"x4 20 x 4      Seated heel raise (with bolster) 20x 20x 20x 20x 20x 20x 20x      Toe curls  20x 20x 20x 3 min 3 min  3 min       Ankle tband 4way  RTB  20x GTB x 20  GTB x 20  BTBx 20  BTB  20x BTB x 20       Side stepping  NV           SLS  NV :10x5 B :10-x 5 :10x5 10"x5 :10 x 5      Eccentric foot supination  NV Stand GTB x 10 B  GTB x 20  GTB  20x                                                                                                                                                                       Modalities             CP PRN  10' post deferred                                        Assessment: Tolerated treatment fair  Patient exhibited good technique with therapeutic exercises      Plan: Continue per plan of care

## 2018-09-04 ENCOUNTER — OFFICE VISIT (OUTPATIENT)
Dept: PHYSICAL THERAPY | Facility: CLINIC | Age: 63
End: 2018-09-04
Payer: COMMERCIAL

## 2018-09-04 DIAGNOSIS — M72.2 PLANTAR FASCIAL FIBROMATOSIS OF BOTH FEET: Primary | ICD-10-CM

## 2018-09-04 PROCEDURE — G8980 MOBILITY D/C STATUS: HCPCS | Performed by: PHYSICAL THERAPIST

## 2018-09-04 PROCEDURE — 97110 THERAPEUTIC EXERCISES: CPT

## 2018-09-04 PROCEDURE — G8979 MOBILITY GOAL STATUS: HCPCS | Performed by: PHYSICAL THERAPIST

## 2018-09-04 PROCEDURE — 97112 NEUROMUSCULAR REEDUCATION: CPT

## 2018-09-04 PROCEDURE — 97140 MANUAL THERAPY 1/> REGIONS: CPT

## 2018-09-04 NOTE — PROGRESS NOTES
Daily Note     Today's date: 2018  Patient name: Dimas Brown  : 1955  MRN: [id number]  Referring provider: YE Boone  Dx:   Encounter Diagnosis     ICD-10-CM    1  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 2                   Subjective: Pt reports her pain being slightly less since her last visit  Objective: See treatment diary below  Precautions: depression, anxiety    Daily Treatment Diary     Manual   9     IASTM titus plantar fascia 10' 10' 10  10  10 10' held 10'     TCJ mobs (R)  5' 10- AR 5 - WA np AR held held                                                Exercise Diary       bike  6' 6 min  6 min  6 min 6 min 6 mi 6 min     gastroc stretch 20"x4 20"x4 :20x4 ;20x4 :20x4 20"x4 20 x 4 20x4     Seated heel raise (with bolster) 20x 20x 20x 20x 20x 20x 20x 20x     Toe curls  20x 20x 20x 3 min 3 min  3 min  3 min     Ankle tband 4way  RTB  20x GTB x 20  GTB x 20  BTBx 20  BTB  20x BTB x 20  BTB x20     Side stepping  NV           SLS  NV :10x5 B :10-x 5 :10x5 10"x5 :10 x 5 :10x5     Eccentric foot supination  NV Stand GTB x 10 B  GTB x 20  GTB  20x                                                                                                                                                                       Modalities             CP PRN  10' post deferred                                        Assessment: Resumed gentle manual therapy today  Pt tolerated well  No adverse response from treatment  Plan: Continue per plan of care

## 2018-09-06 ENCOUNTER — APPOINTMENT (OUTPATIENT)
Dept: PHYSICAL THERAPY | Facility: CLINIC | Age: 63
End: 2018-09-06
Payer: COMMERCIAL

## 2018-09-26 NOTE — PROGRESS NOTES
9/26/18- Pt was responding poorly to treatment, but was overall improving  She did not follow up with additional care, and at this time, will be discharged from skilled PT

## 2019-01-03 ENCOUNTER — TELEPHONE (OUTPATIENT)
Dept: FAMILY MEDICINE CLINIC | Facility: CLINIC | Age: 64
End: 2019-01-03

## 2019-01-03 NOTE — TELEPHONE ENCOUNTER
Not under media 6/13/2013 states PT to have colonoscopy in 10 years  I believe PT is not dude until 2023?

## 2019-01-04 ENCOUNTER — OFFICE VISIT (OUTPATIENT)
Dept: FAMILY MEDICINE CLINIC | Facility: CLINIC | Age: 64
End: 2019-01-04

## 2019-01-04 VITALS
TEMPERATURE: 97.9 F | HEIGHT: 61 IN | BODY MASS INDEX: 28.17 KG/M2 | SYSTOLIC BLOOD PRESSURE: 112 MMHG | HEART RATE: 84 BPM | RESPIRATION RATE: 16 BRPM | WEIGHT: 149.2 LBS | DIASTOLIC BLOOD PRESSURE: 68 MMHG

## 2019-01-04 DIAGNOSIS — M72.2 PLANTAR FASCIITIS, BILATERAL: Primary | ICD-10-CM

## 2019-01-04 DIAGNOSIS — Z12.39 SCREENING FOR BREAST CANCER: ICD-10-CM

## 2019-01-04 PROCEDURE — 99213 OFFICE O/P EST LOW 20 MIN: CPT | Performed by: FAMILY MEDICINE

## 2019-01-04 RX ORDER — NAPROXEN 500 MG/1
500 TABLET ORAL 2 TIMES DAILY WITH MEALS
Qty: 14 TABLET | Refills: 0 | Status: SHIPPED | OUTPATIENT
Start: 2019-01-04 | End: 2019-03-01 | Stop reason: SDUPTHER

## 2019-01-04 NOTE — ASSESSMENT & PLAN NOTE
Present for approximately 1 year  Patient is established with Podiatry and has s/g for plantar fascitis scheduled in march 2019, due to recurrence and worsening symptoms  Acutely worsened after a trip to [place] where patient stopped home exercises  Previously following with PT    Advised patient to re-establish with PT in the interim  Naproxen 500 mg PO BID x1 week prescribed  F/U with Podiatry as scheduled  RTC PRN

## 2019-01-04 NOTE — PROGRESS NOTES
Assessment/Plan: Kristina Isaac is a 66 y o  female with:   Problem List Items Addressed This Visit        Musculoskeletal and Integument    Plantar fasciitis, bilateral - Primary     Present for approximately 1 year  Patient is established with Podiatry and has s/g for plantar fascitis scheduled in march 2019, due to recurrence and worsening symptoms  Acutely worsened after a trip to [place] where patient stopped home exercises  Previously following with PT  Advised patient to re-establish with PT in the interim  Naproxen 500 mg PO BID x1 week prescribed  F/U with Podiatry as scheduled  RTC PRN         Relevant Medications    naproxen (NAPROSYN) 500 mg tablet    Other Relevant Orders    Ambulatory referral to Physical Therapy       Other    Screening for breast cancer     Last mammogram one year ago, benign  Annual screening mammogram ordered today  Relevant Orders    Mammo screening bilateral w 3d & cad        Chief Complaint:  Chief Complaint   Patient presents with    Follow-up     HPI:   Kristina Isaac is a 66 y o  female with past medical history of arthritis, and plantar fasciitis B/L here for re-evaluation of foot pain, described as needles on the sole of foot greater on right  Pain is worse on dorsiflexion, and hyper extension of toes  Patient currently following with Podiatry, and has tentative schedule for S/G for plantar fasciitis in March 2019  She is also following with PT which helped  Over the last several months patient transition to doing home exercises  Patient reports symptoms of b/l foot pain worsening after a trip to [place] where she stopped doing home exercises  In the past she has tried naproxen 500 b i d  which helped improve acute pain  Patient denies systemic involvment, numbness, tingling, weakness, edema, rash  She is also due for annual breast cancer screening, but denies any symptoms        History:   Current Outpatient Prescriptions   Medication Sig Dispense Refill    aspirin 81 MG tablet Take 81 mg by mouth daily   fexofenadine (ALLEGRA) 180 MG tablet Take 180 mg by mouth daily   fluticasone (FLONASE) 50 mcg/act nasal spray 1 spray into each nostril daily 16 g 0    HYDROcodone-acetaminophen (NORCO) 5-325 mg per tablet 1 tab by mouth every 6 hours for pain 20 tablet 0    mometasone (NASONEX) 50 mcg/act nasal spray 2 sprays into each nostril daily 17 g 0    naproxen (NAPROSYN) 500 mg tablet Take 1 tablet (500 mg total) by mouth 2 (two) times a day with meals 14 tablet 0    Omega-3 1000 MG CAPS Take by mouth      omeprazole (PriLOSEC) 20 mg delayed release capsule Take 20 mg by mouth daily   Sodium Chloride-Sodium Bicarb (CLASSIC NETI POT SINUS WASH) 2300-700 MG KIT 1 Bottle (1 kit total) into each nostril 2 (two) times a day 1 kit 0     No current facility-administered medications for this visit        Past Medical History:   Diagnosis Date    Abnormal glucose     last assessed 12/22/15    Acid reflux     Anxiety     Arthritis     osteoarthritis multiple sites    Benign paroxysmal positional vertigo     Carpal tunnel syndrome     Chronic kidney disease     nephrolithiasis    Depression     Insomnia     Patellofemoral dysfunction     last assessed  9/15/14     Social History   Substance Use Topics    Smoking status: Never Smoker    Smokeless tobacco: Never Used    Alcohol use No      Comment: rare/social as per [employer]     Patient Active Problem List   Diagnosis    Allergic rhinitis    Cataract    Depression with anxiety    Generalized osteoarthritis of multiple sites    Hemorrhoids    Impaired fasting glucose    Insomnia    Plantar fasciitis, bilateral    Cough    Screening for breast cancer       ROS:  As Per HPI    Physical Exam:  /68   Pulse 84   Temp 97 9 °F (36 6 °C)   Resp 16   Ht 5' 1" (1 549 m)   Wt 67 7 kg (149 lb 3 2 oz)   BMI 28 19 kg/m²   Physical Exam   Constitutional: She is oriented to person, place, and time  No distress  HENT:   Head: Normocephalic and atraumatic  Eyes: Pupils are equal, round, and reactive to light  Conjunctivae and EOM are normal  No scleral icterus  Cardiovascular: Normal rate, regular rhythm and normal heart sounds  No murmur heard  Pulmonary/Chest: Effort normal and breath sounds normal  No respiratory distress  She has no wheezes  Abdominal: Soft  Normal appearance and bowel sounds are normal    Musculoskeletal: She exhibits no edema or tenderness  Tenderness on planter aspect of b/l feet (greater on right) on passive dorsiflexion  Plantar TTP b/l greater on R  No neurovascular compromise  No rash or lesions  Exam otherwise benign  Neurological: She is alert and oriented to person, place, and time  She displays normal reflexes  She exhibits normal muscle tone  Skin: Skin is warm and dry  She is not diaphoretic  Psychiatric: She has a normal mood and affect  Vitals reviewed        Pablito Overton MD

## 2019-03-01 ENCOUNTER — OFFICE VISIT (OUTPATIENT)
Dept: FAMILY MEDICINE CLINIC | Facility: CLINIC | Age: 64
End: 2019-03-01

## 2019-03-01 DIAGNOSIS — R73.01 IMPAIRED FASTING GLUCOSE: ICD-10-CM

## 2019-03-01 DIAGNOSIS — F41.8 DEPRESSION WITH ANXIETY: ICD-10-CM

## 2019-03-01 DIAGNOSIS — E66.3 OVERWEIGHT (BMI 25.0-29.9): ICD-10-CM

## 2019-03-01 DIAGNOSIS — M72.2 PLANTAR FASCIITIS, BILATERAL: Primary | ICD-10-CM

## 2019-03-01 PROBLEM — Z12.11 COLON CANCER SCREENING: Status: ACTIVE | Noted: 2019-03-01

## 2019-03-01 PROCEDURE — 99213 OFFICE O/P EST LOW 20 MIN: CPT | Performed by: FAMILY MEDICINE

## 2019-03-01 RX ORDER — NAPROXEN 500 MG/1
500 TABLET ORAL 2 TIMES DAILY WITH MEALS
Qty: 14 TABLET | Refills: 0 | Status: SHIPPED | OUTPATIENT
Start: 2019-03-01 | End: 2019-05-03 | Stop reason: ALTCHOICE

## 2019-03-01 NOTE — PATIENT INSTRUCTIONS
Fascitis plantar   CUIDADO AMBULATORIO:   Fascitis plantar  es la hinchazón de la fascia plantar  La fascia plantar es josiane rutledge de fibras que conectan el hueso del talón a la parte delantera del pie  Ayuda a brindar soporte al arco del pie y a amortiguar los golpes  La fascitis plantar se provoca por pequeños desgarres en la fascia plantar  Con el paso del [place], los desgarres causan hinchazón e irritación  Los signos y síntomas:   · Dolor cerca del talón, especialmente a primera hora de la mañana    · Dolor al estar de pie, sentado o caminar por mucho tiempo    · Enrojecimiento, inflamación o calor sobre la parte lesionada del pie  Comuníquese con sanchez médico o terapeuta si:   · Sanchez dolor e inflamación aumentan    · Presenta dolor de rodilla, cadera o espalda  · Usted tiene preguntas o inquietudes acerca de sanchez condición o cuidado  El tratamiento  podría incluir cualquiera de los siguientes:  · Medicamentos,  para ayudar a disminuir la hinchazón y el dolor  Se pueden inyectar esteroides en el talón para disminuir la hinchazón y el dolor  · Plantillas, férulas o cinta,  para ayudar a brindar soporte al pie y a disminuir la tensión de la fascia plantar  Josiane férula que se coloca reggie la noche puede ayudar a estirar la fascia plantar mientras duerme  · Estiramientos y ejercicios  puede ayudar a disminuir el dolor y la inflamación  También ayudan a fortalecer los músculos que sostienen los talones y los pies  · La terapia extracorpórea por ondas de choque (TEOC)  utiliza ondas sonoras para disminuir la hinchazón de la fascia plantar  Se podría requerir de [employer]	Love de que otros tratamientos no funcionen  · Cirugía  jose rafael vez se necesita separar la fascia plantar del talón  Cuidados personales:   · Use la férula o las plantillas partha se le indique  Es posible que usted necesite usar josiane férula por la noche para mantener el pie estirado mientras duerme   [place] ayudará a evitar el dolor liv temprano por Kathryn Rouleau  Las plantillas ayudarán a disminuir la tensión en la fascia plantar al caminar o hacer ejercicio  · Repose según le indicaron  Descanse tanto partha sea posible para disminuir la hinchazón y evitar un daño mayor  Pregunte a sanchez médico cuándo puede retomar joao [employer]  · Aplique hielo sobre la fascia plantar  El hielo ayuda a evitar daño al tejido y a disminuir la inflamación y el dolor  Llene josiane botella de agua con agua y congélela  Envuelva josiane toalla alrededor de la botella o cúbrala con josiane jovan de [place]  Ruede la botella del agua bajo el pie por 10 minutos en la mañana y después del [place]  · Masaje de la fascia plantar partha le indicaron  [place] podría ayudar a disminuir la hinchazón y el dolor  Ruede josiane pelota de golf debajo de los pies reggie 10 minutos  Onur esto 3 veces al día  · Vaya a terapia física según indicaciones  Un fisioterapeuta le puede enseñar ejercicios para ayudarle a mejorar el movimiento y la fuerza, y para disminuir el dolor  Evite la fascitis plantar:   · Mantenga un peso saludable  [place] ayudará a diminuir la Jimenes's  Consulte con sanchez médico cuánto debería pesar  Pida que le ayude a crear un plan para bajar de peso si usted tiene sobrepeso  · Onur ejercicios de bajo impacto  Los ejercicios de bajo impacto disminuyen la tensión en la fascia plantar  Por ejemplo, nadar o andar en bicicleta  · Comience nuevas actividades lentamente  Aumente gradualmente la intensidad y la duración  · Use calzado que le quede angelica y que brinde soporte al arco   Reemplace el calzado antes de que la plantilla o el amortiguador de golpes se desgaste  Evite caminar o pararse descalzo o en sandalias por largos períodos de tiempo  · Realice ejercicios de calentamiento antes de hacer josiane actividad física  Pregunte a sanchez médico cómo estirar los músculos de la fascia plantar y la pantorilla    Acuda a joao consultas de control con sanchez médico o podólogo según le indicaron:  Anote joao preguntas para que se acuerde de hacerlas reggie joao visitas  © 2017 [address] [employer] [employer] Information is for End User's use only and may not be sold, redistributed or otherwise used for commercial purposes  All illustrations and images included in CareNotes® are the copyrighted property of [employer] M [employer]  or Singh Hubbard  Esta información es sólo para uso en educación  [place] intención no es darle un consejo médico sobre enfermedades o tratamientos  Colsulte con montilla Panora Revering farmacéutico antes de seguir cualquier régimen médico para saber si es seguro y efectivo para usted

## 2019-03-01 NOTE — PROGRESS NOTES
Mehrdad Chen 1955 female MRN: [id number]    Family Medicine Visit    ASSESSMENT/PLAN  Problem List Items Addressed This Visit        Endocrine    Impaired fasting glucose     -Repeat HbA1C, previous 5 9%  -Emphasized importance of low-carbohydrate diet to prevent conversion to T2DM         Relevant Orders    Comprehensive metabolic panel (Completed)    HEMOGLOBIN A1C W/ EAG ESTIMATION (Completed)       Musculoskeletal and Integument    Plantar fasciitis, bilateral - Primary     -Patient would like 2nd opinion for Podiatry regarding plantar fasciitis  -Referral given for alternative Podiatrist today per patient request  -Encouraged to continue physical therapy and home exercises for plantar fasciitis  -Information in Hungarian given on plantar fasciitis via AVS  -Refilled naproxen today per patient request, advised judicious use of NSAIDs, continue PPI         Relevant Medications    naproxen (NAPROSYN) 500 mg tablet    Other Relevant Orders    Ambulatory referral to Podiatry       Other    Depression with anxiety     -Patient declines pharmacological therapy at this time, and declines referral for counseling  -Will complete FMLA paperwork per patient request due to high level of personal stress at this time & frequent pain affecting her day-to-day functioning; will call when paperwork is completed  -Recommend close follow up in 1 month         Overweight (BMI 25 0-29 9)     -Repeat fasting lipid panel, CMP, and HbA1C as noted above  -TSH to assess thyroid dysfunction  -Improve diet by limiting fat, carb, & calorie intake  -Discussed weight reduction via diet & exercise  -Exercise tolerance likely to improve once plantar fasciitis is treated more adequately         Relevant Orders    Lipid panel (Completed)    Comprehensive metabolic panel (Completed)    TSH, 3rd generation with Free T4 reflex (Completed)          Follow up in 1 month  Will call when paperwork is completed      Future Appointments   Date Time Provider Rodney Akers   5/10/2019  1:00 PM [facility]          SUBJECTIVE  CC: Establish care with a new provider    HPI:  Mae Dillon is a 60 y o  female who presents for:    · Hx of B/L plantar fasciitis, FMLA paperwork: Referred to Podiatry at visit 5/24/18 and seen in January by Dr Kathy Luna, where she was referred to PT to re-establish care and given naproxen 500 mg BID x1 week  Pain is stabbing in B/L heels, worse in am and when walking after sitting for a while; naproxen helped but needs refill  Reports following with Dr Mark Betancur, has had 2 steroid injections since December "that helped a little " Apparently had surgery scheduled March 2019 but she had to cancel it due to her father being very sick with COPD and CHF  Would like new referral to Podiatry for a second opinion  Also asks for completion of FMLA paperwork (brought with her today) for her job to take time off (approximately 1 day per week at most) due to her chronic pain and stress/anxiety she is experiencing due to her father's illness  She is not interested in medication for anxiety at this time      · Hx of prediabetes: HbA1C 5 9% January 2018, needs labs as no lipid panel or CMP done since 12/2017    Review of Systems   Constitutional: Negative for chills and fever  Eyes: Negative for visual disturbance  Respiratory: Negative for chest tightness and shortness of breath  Cardiovascular: Negative for chest pain, palpitations and leg swelling  Gastrointestinal: Negative for abdominal pain, diarrhea, nausea and vomiting  Genitourinary: Negative for dysuria  Musculoskeletal: Positive for arthralgias and myalgias  Negative for gait problem  Tenderness and pain of B/L plantar feet   Skin: Negative for rash  Neurological: Negative for syncope, weakness and light-headedness  Psychiatric/Behavioral: Negative for agitation, sleep disturbance and suicidal ideas  The patient is nervous/anxious      All other systems reviewed and are negative  Historical Information   The patient history was reviewed as follows:    Past Medical History:   Diagnosis Date    Abnormal glucose     last assessed 12/22/15    Acid reflux     Anxiety     Arthritis     osteoarthritis multiple sites    Benign paroxysmal positional vertigo     Carpal tunnel syndrome     Chronic kidney disease     nephrolithiasis    Depression     Insomnia     Patellofemoral dysfunction     last assessed  9/15/14     Past Surgical History:   Procedure Laterality Date    COLONOSCOPY      KNEE SURGERY Right     [place] INCISE FINGER TENDON SHEATH Right 8/25/2016    Procedure: RELEASE LONG AND RING TRIGGER FINGERS;  Surgeon: Kayla Hernandez MD;  [facility];  Service: Orthopedics    [place] WRIST Marya Salas LIG Right 8/25/2016    Procedure: RELEASE CARPAL TUNNEL ENDOSCOPIC;  Surgeon: Kayla Hernandez MD;  [facility];  Service: Orthopedics    TUBAL LIGATION       Family History   Problem Relation Age of Onset    Diabetes Mother     Heart disease Mother     Hypertension Mother     Hypothyroidism Mother     Arthritis Father     Crohn's disease Family     Psoriasis Family     Rheum arthritis Family     Lupus Family         systemic- erythematosus    Ulcerative colitis Family       Social History   Social History     Substance and Sexual Activity   Alcohol Use No    Comment: rare/social as per [employer]scripts     Social History     Substance and Sexual Activity   Drug Use No     Social History     Tobacco Use   Smoking Status Never Smoker   Smokeless Tobacco Never Used       Medications:     Current Outpatient Medications:     aspirin 81 MG tablet, Take 81 mg by mouth daily  , Disp: , Rfl:     fexofenadine (ALLEGRA) 180 MG tablet, Take 180 mg by mouth daily  , Disp: , Rfl:     fluticasone (FLONASE) 50 mcg/act nasal spray, 1 spray into each nostril daily, Disp: 16 g, Rfl: 0    HYDROcodone-acetaminophen ([address]) 5-325 mg per tablet, 1 tab by mouth every 6 hours for pain (Patient not taking: Reported on 3/1/2019), Disp: 20 tablet, Rfl: 0    mometasone (NASONEX) 50 mcg/act nasal spray, 2 sprays into each nostril daily, Disp: 17 g, Rfl: 0    naproxen (NAPROSYN) 500 mg tablet, Take 1 tablet (500 mg total) by mouth 2 (two) times a day with meals (Patient not taking: Reported on 3/1/2019), Disp: 14 tablet, Rfl: 0    Omega-3 1000 MG CAPS, Take by mouth, Disp: , Rfl:     omeprazole (PriLOSEC) 20 mg delayed release capsule, Take 20 mg by mouth daily  , Disp: , Rfl:     Sodium Chloride-Sodium Bicarb (CLASSIC NETI POT SINUS WASH) 2300-700 MG KIT, 1 Bottle (1 kit total) into each nostril 2 (two) times a day, Disp: 1 kit, Rfl: 0  Allergies   Allergen Reactions    Pollen Extract Allergic Rhinitis and Sneezing       OBJECTIVE    Vitals: There were no vitals filed for this visit          Physical Exam               Lexie Reid MD, PGY-2  [facility]   3/1/2019

## 2019-03-02 ENCOUNTER — APPOINTMENT (OUTPATIENT)
Dept: LAB | Facility: CLINIC | Age: 64
End: 2019-03-02
Payer: COMMERCIAL

## 2019-03-02 ENCOUNTER — TRANSCRIBE ORDERS (OUTPATIENT)
Dept: LAB | Facility: CLINIC | Age: 64
End: 2019-03-02

## 2019-03-02 DIAGNOSIS — R73.01 IMPAIRED FASTING GLUCOSE: ICD-10-CM

## 2019-03-02 DIAGNOSIS — E66.3 OVERWEIGHT (BMI 25.0-29.9): ICD-10-CM

## 2019-03-02 LAB
ALBUMIN SERPL BCP-MCNC: 3.6 G/DL (ref 3.5–5)
ALP SERPL-CCNC: 84 U/L (ref 46–116)
ALT SERPL W P-5'-P-CCNC: 25 U/L (ref 12–78)
ANION GAP SERPL CALCULATED.3IONS-SCNC: 9 MMOL/L (ref 4–13)
AST SERPL W P-5'-P-CCNC: 16 U/L (ref 5–45)
BILIRUB SERPL-MCNC: 0.5 MG/DL (ref 0.2–1)
BUN SERPL-MCNC: 23 MG/DL (ref 5–25)
CALCIUM SERPL-MCNC: 8.9 MG/DL (ref 8.3–10.1)
CHLORIDE SERPL-SCNC: 108 MMOL/L (ref 100–108)
CHOLEST SERPL-MCNC: 190 MG/DL (ref 50–200)
CO2 SERPL-SCNC: 26 MMOL/L (ref 21–32)
CREAT SERPL-MCNC: 0.76 MG/DL (ref 0.6–1.3)
EST. AVERAGE GLUCOSE BLD GHB EST-MCNC: 143 MG/DL
GFR SERPL CREATININE-BSD FRML MDRD: 84 ML/MIN/1.73SQ M
GLUCOSE P FAST SERPL-MCNC: 119 MG/DL (ref 65–99)
HBA1C MFR BLD: 6.6 % (ref 4.2–6.3)
HDLC SERPL-MCNC: 55 MG/DL (ref 40–60)
LDLC SERPL CALC-MCNC: 116 MG/DL (ref 0–100)
NONHDLC SERPL-MCNC: 135 MG/DL
POTASSIUM SERPL-SCNC: 3.7 MMOL/L (ref 3.5–5.3)
PROT SERPL-MCNC: 6.8 G/DL (ref 6.4–8.2)
SODIUM SERPL-SCNC: 143 MMOL/L (ref 136–145)
TRIGL SERPL-MCNC: 97 MG/DL
TSH SERPL DL<=0.05 MIU/L-ACNC: 3.61 UIU/ML (ref 0.36–3.74)

## 2019-03-02 PROCEDURE — 84443 ASSAY THYROID STIM HORMONE: CPT

## 2019-03-02 PROCEDURE — 36415 COLL VENOUS BLD VENIPUNCTURE: CPT

## 2019-03-02 PROCEDURE — 80061 LIPID PANEL: CPT

## 2019-03-02 PROCEDURE — 80053 COMPREHEN METABOLIC PANEL: CPT

## 2019-03-02 PROCEDURE — 83036 HEMOGLOBIN GLYCOSYLATED A1C: CPT

## 2019-03-03 NOTE — ASSESSMENT & PLAN NOTE
-Patient would like 2nd opinion for Podiatry regarding plantar fasciitis  -Referral given for alternative Podiatrist today per patient request  -Encouraged to continue physical therapy and home exercises for plantar fasciitis  -Information in German given on plantar fasciitis via AVS  -Refilled naproxen today per patient request, advised judicious use of NSAIDs, continue PPI

## 2019-03-03 NOTE — ASSESSMENT & PLAN NOTE
-Repeat fasting lipid panel, CMP, and HbA1C as noted above  -TSH to assess thyroid dysfunction  -Improve diet by limiting fat, carb, & calorie intake  -Discussed weight reduction via diet & exercise  -Exercise tolerance likely to improve once plantar fasciitis is treated more adequately

## 2019-03-03 NOTE — ASSESSMENT & PLAN NOTE
-Repeat HbA1C, previous 5 9%  -Emphasized importance of low-carbohydrate diet to prevent conversion to T2DM

## 2019-03-03 NOTE — ASSESSMENT & PLAN NOTE
-Patient declines pharmacological therapy at this time, and declines referral for counseling  -Will complete FMLA paperwork per patient request due to high level of personal stress at this time & frequent pain affecting her day-to-day functioning; will call when paperwork is completed  -Recommend close follow up in 1 month

## 2019-03-14 ENCOUNTER — TELEPHONE (OUTPATIENT)
Dept: FAMILY MEDICINE CLINIC | Facility: CLINIC | Age: 64
End: 2019-03-14

## 2019-03-14 NOTE — TELEPHONE ENCOUNTER
Pt came in and was wondering what are her results of her lab work she had done back on 3/2/19 and also to find out if the paper work for [employer] that she gave Michele Mukherjee was finished  I don't see anything noted, so was unable to give pt an answer  Can someone please give pt a call , ok to leave message   Thanks

## 2019-03-15 ENCOUNTER — OFFICE VISIT (OUTPATIENT)
Dept: FAMILY MEDICINE CLINIC | Facility: CLINIC | Age: 64
End: 2019-03-15

## 2019-03-15 VITALS
HEART RATE: 68 BPM | RESPIRATION RATE: 16 BRPM | HEIGHT: 61 IN | BODY MASS INDEX: 27.68 KG/M2 | WEIGHT: 146.6 LBS | DIASTOLIC BLOOD PRESSURE: 76 MMHG | SYSTOLIC BLOOD PRESSURE: 118 MMHG | TEMPERATURE: 97.8 F

## 2019-03-15 DIAGNOSIS — E11.9 TYPE 2 DIABETES MELLITUS WITHOUT COMPLICATION, WITHOUT LONG-TERM CURRENT USE OF INSULIN (HCC): Primary | ICD-10-CM

## 2019-03-15 PROCEDURE — 99213 OFFICE O/P EST LOW 20 MIN: CPT | Performed by: FAMILY MEDICINE

## 2019-03-15 NOTE — PROGRESS NOTES
Mae Dillon 1955 female MRN: [id number]    Family Medicine Follow-up Visit    ASSESSMENT/PLAN  Problem List Items Addressed This Visit        Endocrine    Type 2 diabetes mellitus without complication, without long-term current use of insulin ([facility] [address] ) - Primary     Lab Results   Component Value Date    HGBA1C 6 6 (H) 03/02/2019   -New diagnosis of diabetes discovered today after A1c obtained 2/2 hx of pre-DM  -Start metformin 500 mg, but with slow titration to optimize tolerance and reduce risk of GI side effects  -Start metformin 500 mg 1 tablet QD x2 weeks, then titrate up to metformin 500 mg BID x1 month  -Goal of metformin will be 1000 mg BID, but will assess tolerance at next visit  -Will plan to start statin at next visit and calculate ASCVD for strength of statin  -Obtain urine microalbumin, foot exam, Ophthalmology referral at next visit  -Advised on low-carbohydrate diet for T2DM, exercise regularly (as tolerated 2/2 chronic pain)         Relevant Medications    metFORMIN (GLUCOPHAGE) 500 mg tablet          Follow up in 6 weeks  Will complete FMLA paperwork 3/18/19  Future Appointments   Date Time Provider Rodney Akers   3/26/2019  4:30 PM Chika Navarrete, PT BE [facility] [address] [facility]   5/3/2019 11:30 AM MD ANJALI Bliss BE [facility]   5/10/2019  1:00 PM [address]          SUBJECTIVE  CC: Follow-up (lab results)      HPI:  Mae Dillon is a 60 y o  female who presents for lab results, new Dx of T2DM  3/2/19 labwork showed  but otherwise CMP WNL, HbA1C 6 6% (obtained due to previous hx of Pre-DM), TSH WNL,  but lipid panel otherwise WNL  Patient states that her mom and 2 brothers have T2DM  Also provides more detail to complete FMLA paperwork  Patient has severe sciatica and also follows with Podiatry for plantar fasciitis and tarsal tunnel syndrome, and was given an ankle brace recently which has been helping   Needs light duty with less time on her feet and the ability to leave or miss one day per week if pain is flaring up, works at [employer] and also stress from taking care of her father with terminal COPD  Review of Systems   Constitutional: Negative for chills and fever  Eyes: Negative for visual disturbance  Respiratory: Negative for chest tightness and shortness of breath  Cardiovascular: Negative for chest pain and palpitations  Gastrointestinal: Negative for abdominal pain, diarrhea, nausea and vomiting  Genitourinary: Negative for dysuria  Musculoskeletal: Positive for arthralgias, back pain and myalgias  Negative for gait problem  Skin: Negative for rash  Neurological: Negative for syncope, weakness and light-headedness  Psychiatric/Behavioral: Negative for agitation, sleep disturbance and suicidal ideas  Very stressed out due to caring for ill father and her high level of pain chronically   All other systems reviewed and are negative        Historical Information   The patient history was reviewed as follows:    Past Medical History:   Diagnosis Date    Abnormal glucose     last assessed 12/22/15    Acid reflux     Anxiety     Arthritis     osteoarthritis multiple sites    Benign paroxysmal positional vertigo     Carpal tunnel syndrome     Chronic kidney disease     nephrolithiasis    Depression     Insomnia     Patellofemoral dysfunction     last assessed  9/15/14     Past Surgical History:   Procedure Laterality Date    COLONOSCOPY      KNEE SURGERY Right     [place] INCISE FINGER TENDON SHEATH Right 8/25/2016    Procedure: RELEASE LONG AND RING TRIGGER FINGERS;  Surgeon: Gaudencio Millan MD;  [facility];  Service: Orthopedics    [place] WRIST [place] LIG Right 8/25/2016    Procedure: RELEASE CARPAL TUNNEL ENDOSCOPIC;  Surgeon: Gaudencio Millan MD;  [facility];  Service: Orthopedics    TUBAL LIGATION       Family History   Problem Relation Age of Onset    Diabetes Mother  Heart disease Mother     Hypertension Mother     Hypothyroidism Mother     Arthritis Father     Crohn's disease Family     Psoriasis Family     Rheum arthritis Family     Lupus Family         systemic- erythematosus    Ulcerative colitis Family       Social History   Social History     Substance and Sexual Activity   Alcohol Use No    Comment: rare/social as per [employer]     Social History     Substance and Sexual Activity   Drug Use No     Social History     Tobacco Use   Smoking Status Never Smoker   Smokeless Tobacco Never Used       Medications:     Current Outpatient Medications:     fluticasone (FLONASE) 50 mcg/act nasal spray, 1 spray into each nostril daily, Disp: 16 g, Rfl: 0    mometasone (NASONEX) 50 mcg/act nasal spray, 2 sprays into each nostril daily, Disp: 17 g, Rfl: 0    Sodium Chloride-Sodium Bicarb (CLASSIC NETI POT SINUS [address]) 2300-700 MG KIT, 1 Bottle (1 kit total) into each nostril 2 (two) times a day, Disp: 1 kit, Rfl: 0    aspirin 81 MG tablet, Take 81 mg by mouth daily  , Disp: , Rfl:     fexofenadine (ALLEGRA) 180 MG tablet, Take 180 mg by mouth daily  , Disp: , Rfl:     HYDROcodone-acetaminophen (NORCO) 5-325 mg per tablet, 1 tab by mouth every 6 hours for pain (Patient not taking: Reported on 3/1/2019), Disp: 20 tablet, Rfl: 0    naproxen (NAPROSYN) 500 mg tablet, Take 1 tablet (500 mg total) by mouth 2 (two) times a day with meals (Patient not taking: Reported on 3/15/2019), Disp: 14 tablet, Rfl: 0    Omega-3 1000 MG CAPS, Take by mouth, Disp: , Rfl:     omeprazole (PriLOSEC) 20 mg delayed release capsule, Take 20 mg by mouth daily  , Disp: , Rfl:   Allergies   Allergen Reactions    Pollen Extract Allergic Rhinitis and Sneezing       OBJECTIVE    Vitals:   Vitals:    03/15/19 0924   BP: 118/76   BP Location: Left arm   Patient Position: Sitting   Cuff Size: Large   Pulse: 68   Resp: 16   Temp: 97 8 °F (36 6 °C)   TempSrc: Tympanic   Weight: 66 5 kg (146 lb 9 6 oz) Height: 5' 1" (1 549 m)       Physical Exam   Constitutional: She is oriented to person, place, and time  She appears well-developed and well-nourished  No distress  HENT:   Head: Normocephalic and atraumatic  Eyes: Pupils are equal, round, and reactive to light  Conjunctivae and EOM are normal  Right eye exhibits no discharge  Left eye exhibits no discharge  No scleral icterus  Neck: Normal range of motion  Neck supple  Cardiovascular: Normal rate, regular rhythm and intact distal pulses  Pulmonary/Chest: Effort normal and breath sounds normal  No respiratory distress  Abdominal: Soft  Bowel sounds are normal  She exhibits no distension  There is no tenderness  Musculoskeletal: Normal range of motion  She exhibits no edema  Tenderness over B/L lumbar paravertebral musculature w/o hypertonicity; tenderness over B/L heels, R ankle brace in place   Neurological: She is alert and oriented to person, place, and time  Skin: Skin is warm and dry  No rash noted  Psychiatric: She has a normal mood and affect  Her behavior is normal    Nursing note and vitals reviewed       Lexie Reid MD, PGY-2  [facility]   3/15/2019

## 2019-03-15 NOTE — TELEPHONE ENCOUNTER
Spoke with Dr Pérez Settler about FMLA papers she has not filled out due some language barrier  Per Dr Jones made appt to f/u labs will address FMLA papers at visit

## 2019-03-15 NOTE — LETTER
March 15, 2019     Patient: Shlomo Mauricio   YOB: 1955   Date of Visit: 3/15/2019       To Whom it May Concern:    Rashid Senthil is under my professional care  She was seen in my office on 3/15/2019  Please excuse her from work on 3/14/19  She may return to work on 3/17/19  If you have any questions or concerns, please don't hesitate to call           Sincerely,          Jitendra Hernandez MD

## 2019-03-15 NOTE — PATIENT INSTRUCTIONS
Please start taking the metformin 500 mg with meals, 1 tablet once daily for 2 weeks, and if you don't have any belly upset (loose stools, nausea), please increase it to 1 tablet twice daily and continue for 1 month  Please return to the clinic in 6 weeks and see how you are doing  Diabetes tipo 2 en adultos   CUIDADO AMBULATORIO:   La diabetes tipo 2  es josiane enfermedad que afecta la forma en que el cuerpo utiliza la glucosa (azúcar)  Generalmente, cuando el nivel de azúcar en la domenico [place], el páncreas produce más insulina  La insulina ayuda al cuerpo a extraer el azúcar de la domenico con el fin de usarla partha mehdi de [place]  La diabetes mellitus tipo 2 se desarrolla ya sea porque el cuerpo no puede producir suficiente insulina, o es incapaz de usarla adecuadamente  Después de [employer], sanchez páncreas podría dejar de producir insulina  Los síntomas más comunes incluyen los siguientes:   · Más hambre o sed de lo usual     · Orina con frecuencia     · Pérdida de peso involuntaria     · Visión borrosa  Llame al 911 en tien de presentar alguno de los siguientes:   · Usted tiene alguno de los siguientes signos de derrame cerebral:      ¨ Adormecimiento o caída de un lado de sanchez manas     ¨ Debilidad en un brazo o josiane pierna    ¨ Confusión o debilidad para hablar    ¨ Mareos o dolor de donna intenso, o pérdida de la visión  · Usted tiene alguno de los siguientes signos de un ataque cardíaco:      ¨ Estornudos, presión, o dolor en sanchez pecho que dura mas de 5 minutos o regresa  ¨ Malestar o dolor en sanchez espalda, damaris, mandíbula, abdomen, o brazo     ¨ Dificultad para respirar    ¨ Náuseas o vómito    ¨ Siente un desvanecimiento o tiene sudores fríos especialmente en el pecho o dificultad para respirar  Busque atención médica de inmediato si:   · Usted tiene dolor abdominal intenso, o el dolor se extiende a sanchez espalda  Es posible que también esté vomitando       · Usted tiene dificultad para permanecer despierto o concentrarse  · Usted está temblando o sudando  · Usted tiene visión borrosa o doble  · Sanchez aliento huele a fruta o alicia  · Sanchez respiración es profunda y [place], o rápida y superficial      · Sanchez ritmo cardíaco es acelerado y débil  Pregúntele a sanchez [place] vitaminas y minerales son adecuados para usted  · Tiene vómitos o diarrea  · Tiene malestar estomacal y no puede ingerir los alimentos de sanchez plan de comidas  · Usted se siente débil o más cansado de lo habitual      · Usted tiene [place], po de [place] o se irrita con facilidad  · Sanchez piel está liudmila, tibia, seca o inflamada  · Usted tiene josiane herida que no cicatriza  · Usted tiene entumecimiento en los brazos o piernas  · Usted tiene problemas para sobrellevar sanchez enfermedad o se siente ansioso o deprimido  · Usted tiene preguntas o inquietudes acerca de sanchez condición o cuidado  El tratamiento para la diabetes tipo 2  incluye el mantener el azúcar en la domenico a un nivel normal  Usted debe comer los alimentos adecuados y ejercitarse regularmente  Es posible que necesite medicamentos si no puede controlar sanchez nivel de azúcar en la domenico con nutrición y ejercicios  Es posible que también necesite un medicamento para prevenir enfermedades del corazón, josiane complicación de la diabetes tipo 2  Es posible que usted necesite alguno de los siguientes:  · Pueden recetarse medicamentos hipoglucémicos o la insulina  se puede administrar para disminuir la cantidad de azúcar en sanchez domenico  · Medicamentos para la presión arterial  podrían administrarse para disminuir sanchez presión arterial  Sanchez presión arterial debería estar a menos de 140/90  · El medicamento para disminuir el colesterol  podría administrarse para evitar josiane enfermedad cardíaca  · Los medicamentos antiplaquetarios , partha la aspirina, [place] [facility] a prevenir coágulos de Jose C Barbosa  Amery joao antiplaquetarios exactamente partha se le haya indicado   Estos medicamentos podrían causar mas probabilidad para sangrado y para desarrollar moretones  Si le chinchilla indicado el uso de aspirina, no tome acetaminofén o ibuprofeno en sanchez lugar  · Ce[place] joao medicamentos partha se le haya indicado  Consulte con sanchez médico si usted juan f que sanchez medicamento no le está ayudando o si presenta efectos secundarios  Infórmele si es alérgico a cualquier medicamento  Mantenga josiane lista actualizada de los [place], las vitaminas y los productos herbales que no  Incluya los siguientes datos de los medicamentos: cantidad, frecuencia y motivo de administración  Traiga con usted la lista o los envases de la píldoras a joao citas de seguimiento  Lleve la lista de los medicamentos con usted en tien de josiane emergencia  Revise sanchez nivel de azúcar en la domenico  A usted le enseñarán cómo revisar josiane pequeña gota de domenico en un monitor de glucosa (glucómetro)  Usted tendrá que revisar sanchez nivel de azúcar en la domenico por lo menos 3 veces al día si está usando insulina  Pregunte a sanchez médico cuándo y con qué frecuencia revisarlo reggie el día  Si usted revisa sanchez nivel de azúcar en la domenico  antes de josiane comida , debe estar entre 80 y 130 mg/dL  Si usted revisa sanchez nivel de azúcar en la domenico de 1 a 2 horas  después de josiane comida , debe estar por debajo de 180 mg/dL  Pregúntele a sanchez médico si estas son buenas metas para usted  Anote joao resultados y muéstreselos a sanchez médico  Él podría usar los resultados para hacer cambios en joao medicamentos, alimentación o programa de ejercicios  En tien que sanchez nivel de azúcar esté demasiado bajo: Sanchez nivel de azúcar en domenico está demasiado bajo si llega a menos de 70 mg/dL  Si el nivel es demasiado bajo, coma o marge 15 gramos de carbohidratos de acción rápida  Estos se encuentran naturalmente en las frutas  Los carbohidratos de acción rápida aumentarán sanchez nivel de azúcar en la domenico enseguida   Ejemplos de 15 gramos de carbohidrato de acción rápida son 4 onzas (1/2 taza) de jugo de fruta o 4 onzas de gaseosa regular  Otros ejemplos son 2 cucharadas de pasas de uva o entre 3 y 4 tabletas de glucosa  Revise sanchez nivel de azúcar en la domenico 15 minutos más tarde  Si el nivel es todavía bajo (menos de 100 mg/dL), ingiera otros 15 gramos de carbohidratos  Cuando el nivel vuelva a 100 mg/dL, coma un refrigerio o alimento que contenga carbohidratos  [place] ayudará a evitar otra caída de sanchez nivel de azúcar en la domenico  Siempre siga cuidadosamente las instrucciones de sanchez médico acerca de partha tratar los niveles bajos de azúcar en la domenico  Revise joao pies todos los días para kathi si tienen llagas:  Use zapatos y calcetines que le queden [place]  No se recorte las [employer]  Consulte con sanchez médico para obtener más información acerca del cuidado de los pies  Siga sanchez plan de comidas:  Un dietista le ayudará a diseñar un plan de alimentación para mantener estable sanchez nivel de azúcar en la domenico  No se salte ninguna comida  Sanchez nivel de azúcar en la doemnico puede bajar demasiado si usted ha tomado medicamento para la diabetes y no ha comido  · Mantenga un registro de los carbohidratos (alimentos dulces o de almidón)  Sanchez nivel de azúcar en la domenico puede aumentar demasiado si usted consume demasiados carbohidratos  Sanchez dietista puede ayudarlo a diseñar comidas y meriendas que tengan la cantidad [place] de carbohidratos  · Consuma alimentos bajos en grasa , partha por ejemplo presas de gladys sin piel y [employer] baja en grasa  · [place] menos sodio (sal)  Limite los alimentos altos en sodio partha por ejemplo la salsa de soya, berto tostadas y sopas  No añada sal a la comida que usted prepare  Restrinja el uso de sal de andrade  Usted debe consumir menos de 2,300 mg de sodio por día  · Consuma alimentos altos en fibra partha verduras, pan integral y frijoles  · Limite el consumo de alcohol    El alcohol afecta sanchez azúcar en la domenico y puede hacer más difícil el control de sanchez diabetes  Limite el consumo de alcohol a 1 trago por día si usted es art  Si usted es hombre, limite el consumo de alcohol a 2 tragos por día  Un trago equivale a 12 onzas de cerveza, 5 onzas de vino o 1 onza y ½ de licor  Mantenga un peso saludable:  Consulte con sanchez médico cuánto debería pesar  Un peso saludable puede ayudarlo a controlar sanchez diabetes  Solicite a sanchez médico que le ayude a crear un plan para perder peso de josiane forma wilkins si usted tiene sobrepeso  Juntos podrán fijar metas de pérdida de peso alcanzables  Ejercítese según indicaciones:  La actividad física puede ayudarlo a mantener el nivel de glucosa en la domenico estable, disminuir sanchez riesgo de insuficiencia cardíaca y Jose a bajar de [place]  Sukhdeep Marie estiramiento antes y después de [address]  Realice josiane actividad física al menos 150 minutos cada semana  Reparta esta cantidad de ejercicio reggie al menos 3 días a la semana  No deje de ejercitar por más de 2 días seguidos  Incluya ejercicios para fortalecer los músculos de 2 a 3 días a la semana  Los Clipt[place] deben incluir entrenamiento del equilibrio de 2 a 3 veces a la semana  Actividades que ayudan a aumentar el equilibrio incluyen yoga y kathryn chi  Colabore con sanchez médico para elaborar un plan de ejercicios  · Revise sanchez nivel de azúcar en la domenico antes y después de hacer josiane [place]  Puede que los [employer] sugieran que cambie la cantidad de insulina que usted no o los alimentos que consume  Si sanchez nivel de azúcar en la domenico es alto, revise sanchez domenico u [facility] para katih si hay cetonas y hágalo antes de ejercitarse  No ollie ejercicio si sanchez nivel de azúcar en la domenico es alto y usted tiene cetonas  · Si sanchez nivel de azúcar en la domenico está por debajo de los 100 mg/dL  , coma josiane merienda con carbohidratos antes de hacer ejercicio  Por ejemplo 4 a 6 galletas de soda, 1/2 banano, 1 taza (8 onzas) de [place], medio vaso (4 onzas) de jugo   [place] agua o líquidos que no contengan azúcar antes, reggie y [address] de la [place]  Pregúntele a sanchez dietista o médico cuáles líquidos debería lucius cuando se ejercite  · No se siente por más de 30 minutos  Si usted no puede caminar, al [employer]  [place] le ayudará a permanecer activo y mantener la circulación sanguínea  No fume:  La nicotina puede perjudicar los vasos sanguíneos e impedir que pueda controlar sanchez diabetes  Pida información a sanchez médico si usted actualmente fuma y necesita ayuda para dejar de fumar  No use cigarrillos electrónicos o tabaco sin humo en vez de cigarrillos o para tratar de dejar de fumar  Todos estos aún contienen nicotina  Tómese la presión arterial nell partha le indicaron:  Pregúntele a sanchez médico cuál debería ser sanchez presión arterial  La mayoría de los adultos con diabetes y presión arterial kristen deben tener josiane presión arterial sistólica (primer número) inferior a 140  La presión arterial diastólica (gadiel número) debe ser inferior a 90  Use identificación de alerta médica:  Use un brazalete o collar de alerta médica o lleve consigo josiane tarjeta que indique que tiene diabetes  Pregúntele a sanchez médico dónde conseguir estos artículos  Pregunte sobre las vacunas:  Usted corre un mayor riesgo de presentar enfermedades graves si usted contrae la gripe, neumonía o hepatitis  Pregúntele a sanchez médico si usted debe ponerse la vacuna contra la gripe, neumonía o hepatitis B y cuándo debe vacunarse  Acuda a joao consultas de control con sanchez médico según le indicaron  Necesitará regresar para que le realicen el examen de hemoglobina A1c cada 3 meses  Tendrá que regresar por lo menos josiane vez al año para que le revisen los pies  Necesitará de un examen de la vista josiane vez al año para revisar si tiene retinopatía  También necesitará exámenes de orina anuales para revisar si hay problemas renales   Es posible que usted necesite más exámenes para determinar si hay enfermedad cardíaca partha un electrocardiograma, un examen de estrés, monitoreo de montilla presión arterial y exámenes de Jose C Barbosa  Anote joao preguntas para que se acuerde de hacerlas reggie joao visitas  © 2017 [address] Information is for End User's use only and may not be sold, redistributed or otherwise used for commercial purposes  All illustrations and images included in CareNotes® are the copyrighted property of A D A M , Inc  or Singh Hubbard  Esta información es sólo para uso en educación  [place] intención no es darle un consejo médico sobre enfermedades o tratamientos  Colsulte con montilla Ladena Creed farmacéutico antes de seguir cualquier régimen médico para saber si es seguro y efectivo para usted  Metformina (Por la boca)   Se [place] para tratar la diabetes tipo 2  Tigist(s) : Fortamet, Glucophage, Glucophage XR, Glumetza, Riomet   Existen muchas otras marcas de [employer]  Janice medicamento no debe ser usado cuando:   Janice medicamento no es adecuado para todas las personas  No lo use si usted tiene alguna reacción alérgica a la metformina o si tiene problemas renales severos o acidosis metabólica  Forma de usar janice medicamento:   Líquido, Tableta, Tableta de liberación prolongada  · [place] joao medicamentos partha se le haya indicado  Es probable que sea necesario cambiar montilla dosis varias veces hasta encontrar la que funciona mejor para usted  · Lo más conveniente es lucius janice medicamento con comida o con [place]  · [place] la tableta de liberación prolongada entera  No triture, rompa o mastique  Infórmele a montilla médico si usted tiene dificultad para tragar pastillas enteras  · Mida el líquido oral con [place], [place] para uso oral o taza especialmente marcadas para medir medicamentos  · [place] y siga las instrucciones para el paciente que vienen con el medicamento  Hable con montilla médico o farmacéutico si tiene alguna pregunta    · Si olvida josiane dosis: Si olvida josiane dosis de [employer], tómdimaso lo [place] pronto posible  Si es huber la hora para sanchez próxima dosis, espere hasta entonces para francesco sanchez dosis regular  No use medicamento adicional para reponer la dosis olvidada  · Guarde el medicamento en un recipiente cerrado a temperatura ambiente y alejado del calor, la humedad y la bala directa  Medicamentos y [employer] que debe evitar:   Consulte con sanchez médico o farmacéutico antes de usar cualquier medicamento, incluyendo los que compra sin receta médica, las vitaminas y los productos herbales  · Algunos medicamentos pueden afectar la función de la metformina  Informe a sanchez médico si está usando cualquiera de los siguientes:  ¨ Acetazolamida  ¨ Diclorfenamida  ¨ Diurético (pastilla de agua)  ¨ Estrógeno o píldoras anticonceptivas  ¨ Medicamentos para el corazón o la presión sanguínea  ¨ Isoniazida  ¨ Ácido nicotínico  ¨ Medicamentos con fenotiazina  ¨ Fenitoína  ¨ Medicamentos esteroideos  ¨ El medicamento tiroideo  ¨ Topiramato  ¨ Zonisamida  Precauciones reggie el uso de piter medicamento:   · Dígale a sanchez médico si usted está embarazada o amamantando, o si padece de enfermedad del corazón o de los vasos sanguíneos, insuficiencia cardíaca, problemas con la circulación, enfermedad renal, del hígado, anemia, un desorden de la glándula adrenal o de la glándula pituitaria, o deficiencia de vitamina B12  Dígale a sanchez médico si usted ha tenido algún ataque cardíaco  Informe a sanchez médico si usted consume alcohol  · Francesco éste medicamento con exceso puede causar Elena Clonts, rosa grave enfermedad llamada acidosis láctica  · Parte de las tabletas de liberación prolongada  puede salir en joao evacuaciones  [place] es normal   · Informe al doctor o dentista que lo trate que esta utilizando Joe  Es posible que deba dejar de tomarlo antes de someterse a [place], sajan X o TAC o algún otro examen médico   · El médico solicitará exámenes de laboratorio reggie las citas de rutina para revisar los efectos de [place]  Asista a todas joao citas  · Guarde todos los medicamentos fuera del alcance de los niños  Nunca comparta joao medicamentos con [facility]  Efectos secundarios que pueden presentarse reggie el uso de piter medicamento:   Consulte inmediatamente con el médico si nota cualquiera de estos efectos secundarios:  · Reacción alérgica: Comezón o ronchas, hinchazón del alan o las madhuri, hinchazón u hormigueo en la boca o garganta, opresión en el pecho, dificultad para respirar  · Confusión, ritmo cardíaco acelerado, aumento de apetito, temblores  · [place] o escalofríos  · Dolor de [place], náusea, vómito, po musculares o calambres  · Dificultad para respirar, ritmo cardíaco lento, desmayo, mareos  · Cansancio o debilidad inusuales  Consulte con el médico si nota los siguientes efectos secundarios menos graves:   · Diarrea, flatulencia, náusea  Consulte con el médico si nota otros efectos secundarios que juan f son causados por piter medicamento  Llame a montilla médico para consultarle Shane Carey puede notificar joao efectos secundarios al [facility] al [phone]  © 2017 [address] [employer] Information is for End User's use only and may not be sold, redistributed or otherwise used for commercial purposes  Esta información es sólo para uso en educación  [place] intención no es darle un consejo médico sobre enfermedades o tratamientos  Colsulte con montilla Ayesha Antis farmacéutico antes de seguir cualquier régimen médico para saber si es seguro y efectivo para usted

## 2019-03-16 NOTE — ASSESSMENT & PLAN NOTE
Lab Results   Component Value Date    HGBA1C 6 6 (H) 03/02/2019   -New diagnosis of diabetes discovered today after A1c obtained 2/2 hx of pre-DM  -Start metformin 500 mg, but with slow titration to optimize tolerance and reduce risk of GI side effects  -Start metformin 500 mg 1 tablet QD x2 weeks, then titrate up to metformin 500 mg BID x1 month  -Goal of metformin will be 1000 mg BID, but will assess tolerance at next visit  -Will plan to start statin at next visit and calculate ASCVD for strength of statin  -Obtain urine microalbumin, foot exam, Ophthalmology referral at next visit  -Advised on low-carbohydrate diet for T2DM, exercise regularly (as tolerated 2/2 chronic pain)

## 2019-03-26 ENCOUNTER — EVALUATION (OUTPATIENT)
Dept: PHYSICAL THERAPY | Facility: CLINIC | Age: 64
End: 2019-03-26
Payer: COMMERCIAL

## 2019-03-26 DIAGNOSIS — M72.2 PLANTAR FASCIITIS, BILATERAL: Primary | ICD-10-CM

## 2019-03-26 PROCEDURE — 97535 SELF CARE MNGMENT TRAINING: CPT | Performed by: PHYSICAL THERAPIST

## 2019-03-26 PROCEDURE — 97110 THERAPEUTIC EXERCISES: CPT | Performed by: PHYSICAL THERAPIST

## 2019-03-26 PROCEDURE — 97140 MANUAL THERAPY 1/> REGIONS: CPT | Performed by: PHYSICAL THERAPIST

## 2019-03-26 PROCEDURE — 97162 PT EVAL MOD COMPLEX 30 MIN: CPT | Performed by: PHYSICAL THERAPIST

## 2019-03-26 NOTE — PROGRESS NOTES
PT Evaluation     Today's date: 3/26/2019  Patient name: Amber Lo  : 1955  MRN: [id number]  Referring provider: Pebbles Loza MD  Dx:   Encounter Diagnosis     ICD-10-CM    1  Plantar fasciitis, bilateral M72 2                   Assessment  Assessment details: The patient presents with b/L chronic plantar fasciitis  The patient demonstrates impairments including decreased ankle ROM, plantar fascia and gastroc tissue restriction, limited standing tolerance, gait dysfunction, and difficulty balancing  The patient would benefit from skilled therapy to address her deficits and meet her goals  Impairments: abnormal gait, abnormal muscle tone, abnormal or restricted ROM, impaired balance, impaired physical strength and pain with function  Understanding of Dx/Px/POC: fair   Prognosis: fair    Goals  STG: To be completed in 4 weeks    1  The patient will report no more than 6/10 pain during all adls  2  The patient will increase ankle dorsiflexion to 5 degrees for increased foot clearance during gait  3  The patient is compliant with her HEP  LTG: To be completed in 8 weeks    1  The patient will report no more than 3/10 pain during all adls  2  The patient will increase ankle dorsiflexion to 10 degrees for increased foot clearance during gait  3  The patient will increase ankle strength to 5/5 MMT for increased stability when ambulating on uneven ground            Plan  Patient would benefit from: skilled physical therapy  Planned modality interventions: cryotherapy  Other planned modality interventions: high level laser, kinesiotape  Planned therapy interventions: joint mobilization, manual therapy, neuromuscular re-education, home exercise program, therapeutic exercise, therapeutic activities, stretching, strengthening and balance  Frequency: 2x week  Duration in weeks: 8  Plan of Care beginning date: 3/26/2019  Plan of Care expiration date: 2019  Treatment plan discussed with: patient        Subjective Evaluation    History of Present Illness  Date of onset: 12/26/2014  Mechanism of injury: Kasey Gordillo is a 60 y o  female who presents with b/L plantar fasciitis  The patient reports trouble standing in the morning and walking on her treadmill  The patient also notes pain when standing after sitting for 10 minutes  The patient denies parasthesias or trouble sleeping at night  The patient has underwent multiple injections without lasting relief  She currently uses a plantar fascia brace and surgery was recommended  The patient declined surgery as her father is ill and she wants to be able to attend to him  PMH includes DM2, depression, and anxiety  Recurrent probem    Quality of life: fair    Pain  Current pain rating: 3  At best pain rating: 3  At worst pain rating: 10  Quality: dull ache, sharp and throbbing  Aggravating factors: standing and walking  Progression: no change    Treatments  Current treatment: physical therapy  Patient Goals  Patient goals for therapy: independence with ADLs/IADLs, decreased pain and return to sport/leisure activities          Objective     Active Range of Motion   Left Ankle/Foot   Dorsiflexion (ke): 5 degrees   Plantar flexion: 40 degrees     Right Ankle/Foot   Dorsiflexion (ke): 13 degrees   Plantar flexion: 40 degrees     Strength/Myotome Testing     Left Ankle/Foot   Dorsiflexion: 4  Plantar flexion: 4-  Inversion: 4  Eversion: 4    Right Ankle/Foot   Dorsiflexion: 4  Plantar flexion: 4-  Inversion: 4  Eversion: 4    Functional Assessment        Single Leg Stance   Left: 30 seconds  Right: 6 seconds    General Comments:       Ankle/Foot Comments   Diffuse swelling          Precautions: DM2, depression, anxiety    Daily Treatment Diary     Manual  3/26            IASTM: B plantar fascia             Laser: B plantar fascia [id number] [id number]                                                       Exercise Diary  3/26            B stand Gast[address] St  20"x2            B stand [address]  20"x1            Plantar Fascia St  20"x2            Towel Scrunches 1x30            Ankle 4-way             Biodex: SLS             AlterG: ambulation                                                                                                                                                                                          Modalities

## 2019-04-01 NOTE — TELEPHONE ENCOUNTER
Please call patient and inform her FMLA paperwork is ready to be picked up from  bin thanks  Copy already made for scanning    Germania Wolf

## 2019-04-02 ENCOUNTER — OFFICE VISIT (OUTPATIENT)
Dept: PHYSICAL THERAPY | Facility: CLINIC | Age: 64
End: 2019-04-02
Payer: COMMERCIAL

## 2019-04-02 DIAGNOSIS — M72.2 PLANTAR FASCIITIS, BILATERAL: Primary | ICD-10-CM

## 2019-04-02 PROCEDURE — 97110 THERAPEUTIC EXERCISES: CPT | Performed by: PHYSICAL THERAPIST

## 2019-04-02 PROCEDURE — 97140 MANUAL THERAPY 1/> REGIONS: CPT | Performed by: PHYSICAL THERAPIST

## 2019-04-02 PROCEDURE — 97112 NEUROMUSCULAR REEDUCATION: CPT | Performed by: PHYSICAL THERAPIST

## 2019-04-03 ENCOUNTER — OFFICE VISIT (OUTPATIENT)
Dept: PHYSICAL THERAPY | Facility: CLINIC | Age: 64
End: 2019-04-03
Payer: COMMERCIAL

## 2019-04-03 DIAGNOSIS — M72.2 PLANTAR FASCIITIS, BILATERAL: ICD-10-CM

## 2019-04-03 PROCEDURE — 97140 MANUAL THERAPY 1/> REGIONS: CPT

## 2019-04-03 PROCEDURE — 97112 NEUROMUSCULAR REEDUCATION: CPT

## 2019-04-03 PROCEDURE — 97110 THERAPEUTIC EXERCISES: CPT

## 2019-04-04 ENCOUNTER — OFFICE VISIT (OUTPATIENT)
Dept: PHYSICAL THERAPY | Facility: CLINIC | Age: 64
End: 2019-04-04
Payer: COMMERCIAL

## 2019-04-04 DIAGNOSIS — M72.2 PLANTAR FASCIITIS, BILATERAL: Primary | ICD-10-CM

## 2019-04-04 PROCEDURE — 97112 NEUROMUSCULAR REEDUCATION: CPT | Performed by: PHYSICAL THERAPIST

## 2019-04-04 PROCEDURE — 97110 THERAPEUTIC EXERCISES: CPT | Performed by: PHYSICAL THERAPIST

## 2019-04-04 PROCEDURE — 97140 MANUAL THERAPY 1/> REGIONS: CPT | Performed by: PHYSICAL THERAPIST

## 2019-04-08 ENCOUNTER — TRANSCRIBE ORDERS (OUTPATIENT)
Dept: ADMINISTRATIVE | Facility: HOSPITAL | Age: 64
End: 2019-04-08

## 2019-04-08 DIAGNOSIS — R52 PAIN: Primary | ICD-10-CM

## 2019-04-09 ENCOUNTER — OFFICE VISIT (OUTPATIENT)
Dept: PHYSICAL THERAPY | Facility: CLINIC | Age: 64
End: 2019-04-09
Payer: COMMERCIAL

## 2019-04-09 DIAGNOSIS — M72.2 PLANTAR FASCIITIS, BILATERAL: Primary | ICD-10-CM

## 2019-04-09 DIAGNOSIS — E11.9 TYPE 2 DIABETES MELLITUS WITHOUT COMPLICATION, WITHOUT LONG-TERM CURRENT USE OF INSULIN (HCC): Primary | ICD-10-CM

## 2019-04-09 PROCEDURE — 97112 NEUROMUSCULAR REEDUCATION: CPT | Performed by: PHYSICAL THERAPIST

## 2019-04-09 PROCEDURE — 97140 MANUAL THERAPY 1/> REGIONS: CPT | Performed by: PHYSICAL THERAPIST

## 2019-04-09 PROCEDURE — 97110 THERAPEUTIC EXERCISES: CPT | Performed by: PHYSICAL THERAPIST

## 2019-04-10 ENCOUNTER — APPOINTMENT (OUTPATIENT)
Dept: PHYSICAL THERAPY | Facility: CLINIC | Age: 64
End: 2019-04-10
Payer: COMMERCIAL

## 2019-04-11 ENCOUNTER — APPOINTMENT (OUTPATIENT)
Dept: PHYSICAL THERAPY | Facility: CLINIC | Age: 64
End: 2019-04-11
Payer: COMMERCIAL

## 2019-04-28 ENCOUNTER — HOSPITAL ENCOUNTER (OUTPATIENT)
Dept: MRI IMAGING | Facility: HOSPITAL | Age: 64
Discharge: HOME | End: 2019-04-28
Attending: PODIATRIST
Payer: COMMERCIAL

## 2019-04-28 DIAGNOSIS — R52 PAIN: ICD-10-CM

## 2019-04-28 PROCEDURE — 73721 MRI JNT OF LWR EXTRE W/O DYE: CPT

## 2019-04-30 ENCOUNTER — OFFICE VISIT (OUTPATIENT)
Dept: PHYSICAL THERAPY | Facility: CLINIC | Age: 64
End: 2019-04-30
Payer: COMMERCIAL

## 2019-04-30 DIAGNOSIS — M72.2 PLANTAR FASCIITIS, BILATERAL: Primary | ICD-10-CM

## 2019-04-30 PROCEDURE — 97110 THERAPEUTIC EXERCISES: CPT | Performed by: PHYSICAL THERAPIST

## 2019-04-30 PROCEDURE — 97112 NEUROMUSCULAR REEDUCATION: CPT | Performed by: PHYSICAL THERAPIST

## 2019-04-30 PROCEDURE — 97140 MANUAL THERAPY 1/> REGIONS: CPT | Performed by: PHYSICAL THERAPIST

## 2019-05-02 ENCOUNTER — OFFICE VISIT (OUTPATIENT)
Dept: PHYSICAL THERAPY | Facility: CLINIC | Age: 64
End: 2019-05-02
Payer: COMMERCIAL

## 2019-05-02 DIAGNOSIS — M72.2 PLANTAR FASCIITIS, BILATERAL: Primary | ICD-10-CM

## 2019-05-02 PROCEDURE — 97140 MANUAL THERAPY 1/> REGIONS: CPT | Performed by: PHYSICAL THERAPIST

## 2019-05-02 PROCEDURE — 97112 NEUROMUSCULAR REEDUCATION: CPT | Performed by: PHYSICAL THERAPIST

## 2019-05-02 PROCEDURE — 97110 THERAPEUTIC EXERCISES: CPT | Performed by: PHYSICAL THERAPIST

## 2019-05-03 ENCOUNTER — OFFICE VISIT (OUTPATIENT)
Dept: FAMILY MEDICINE CLINIC | Facility: CLINIC | Age: 64
End: 2019-05-03

## 2019-05-03 VITALS
HEART RATE: 64 BPM | WEIGHT: 142.2 LBS | DIASTOLIC BLOOD PRESSURE: 76 MMHG | RESPIRATION RATE: 16 BRPM | SYSTOLIC BLOOD PRESSURE: 100 MMHG | BODY MASS INDEX: 26.85 KG/M2 | TEMPERATURE: 97.6 F | HEIGHT: 61 IN

## 2019-05-03 DIAGNOSIS — J30.1 SEASONAL ALLERGIC RHINITIS DUE TO POLLEN: Primary | ICD-10-CM

## 2019-05-03 DIAGNOSIS — E11.9 TYPE 2 DIABETES MELLITUS WITHOUT COMPLICATION, WITHOUT LONG-TERM CURRENT USE OF INSULIN (HCC): ICD-10-CM

## 2019-05-03 PROCEDURE — 99213 OFFICE O/P EST LOW 20 MIN: CPT | Performed by: FAMILY MEDICINE

## 2019-05-03 RX ORDER — CETIRIZINE HYDROCHLORIDE 10 MG/1
10 TABLET ORAL DAILY PRN
COMMUNITY
End: 2019-09-12

## 2019-05-03 RX ORDER — FLUTICASONE PROPIONATE 50 MCG
1 SPRAY, SUSPENSION (ML) NASAL DAILY
Qty: 16 G | Refills: 4 | Status: SHIPPED | OUTPATIENT
Start: 2019-05-03 | End: 2021-03-02 | Stop reason: SDUPTHER

## 2019-05-05 RX ORDER — NAPROXEN 500 MG/1
TABLET ORAL
COMMUNITY
End: 2019-09-12 | Stop reason: SDUPTHER

## 2019-05-07 ENCOUNTER — OFFICE VISIT (OUTPATIENT)
Dept: PHYSICAL THERAPY | Facility: CLINIC | Age: 64
End: 2019-05-07
Payer: COMMERCIAL

## 2019-05-07 DIAGNOSIS — M72.2 PLANTAR FASCIITIS, BILATERAL: Primary | ICD-10-CM

## 2019-05-07 PROCEDURE — 97140 MANUAL THERAPY 1/> REGIONS: CPT | Performed by: PHYSICAL THERAPIST

## 2019-05-07 PROCEDURE — 97110 THERAPEUTIC EXERCISES: CPT | Performed by: PHYSICAL THERAPIST

## 2019-05-07 PROCEDURE — 97112 NEUROMUSCULAR REEDUCATION: CPT | Performed by: PHYSICAL THERAPIST

## 2019-05-09 ENCOUNTER — OFFICE VISIT (OUTPATIENT)
Dept: PHYSICAL THERAPY | Facility: CLINIC | Age: 64
End: 2019-05-09
Payer: COMMERCIAL

## 2019-05-09 DIAGNOSIS — M72.2 PLANTAR FASCIITIS, BILATERAL: Primary | ICD-10-CM

## 2019-05-09 PROCEDURE — 97112 NEUROMUSCULAR REEDUCATION: CPT | Performed by: PHYSICAL THERAPIST

## 2019-05-09 PROCEDURE — 97110 THERAPEUTIC EXERCISES: CPT | Performed by: PHYSICAL THERAPIST

## 2019-05-10 ENCOUNTER — HOSPITAL ENCOUNTER (OUTPATIENT)
Dept: MAMMOGRAPHY | Facility: HOSPITAL | Age: 64
Discharge: HOME | End: 2019-05-10
Payer: COMMERCIAL

## 2019-05-10 VITALS — BODY MASS INDEX: 26.81 KG/M2 | WEIGHT: 142 LBS | HEIGHT: 61 IN

## 2019-05-10 DIAGNOSIS — Z12.39 SCREENING FOR BREAST CANCER: ICD-10-CM

## 2019-05-10 PROCEDURE — 77063 BREAST TOMOSYNTHESIS BI: CPT

## 2019-05-10 PROCEDURE — 77067 SCR MAMMO BI INCL CAD: CPT

## 2019-05-14 ENCOUNTER — OFFICE VISIT (OUTPATIENT)
Dept: PHYSICAL THERAPY | Facility: CLINIC | Age: 64
End: 2019-05-14
Payer: COMMERCIAL

## 2019-05-14 DIAGNOSIS — M72.2 PLANTAR FASCIITIS, BILATERAL: Primary | ICD-10-CM

## 2019-05-14 PROCEDURE — 97112 NEUROMUSCULAR REEDUCATION: CPT | Performed by: PHYSICAL THERAPIST

## 2019-05-14 PROCEDURE — 97110 THERAPEUTIC EXERCISES: CPT | Performed by: PHYSICAL THERAPIST

## 2019-05-16 ENCOUNTER — OFFICE VISIT (OUTPATIENT)
Dept: PHYSICAL THERAPY | Facility: CLINIC | Age: 64
End: 2019-05-16
Payer: COMMERCIAL

## 2019-05-16 DIAGNOSIS — M72.2 PLANTAR FASCIITIS, BILATERAL: Primary | ICD-10-CM

## 2019-05-16 PROCEDURE — 97112 NEUROMUSCULAR REEDUCATION: CPT | Performed by: PHYSICAL THERAPIST

## 2019-05-16 PROCEDURE — 97140 MANUAL THERAPY 1/> REGIONS: CPT | Performed by: PHYSICAL THERAPIST

## 2019-05-16 PROCEDURE — 97110 THERAPEUTIC EXERCISES: CPT | Performed by: PHYSICAL THERAPIST

## 2019-05-21 ENCOUNTER — OFFICE VISIT (OUTPATIENT)
Dept: PHYSICAL THERAPY | Facility: CLINIC | Age: 64
End: 2019-05-21
Payer: COMMERCIAL

## 2019-05-21 DIAGNOSIS — M72.2 PLANTAR FASCIITIS, BILATERAL: Primary | ICD-10-CM

## 2019-05-21 PROCEDURE — 97110 THERAPEUTIC EXERCISES: CPT | Performed by: PHYSICAL THERAPIST

## 2019-05-21 PROCEDURE — 97140 MANUAL THERAPY 1/> REGIONS: CPT | Performed by: PHYSICAL THERAPIST

## 2019-05-21 PROCEDURE — 97112 NEUROMUSCULAR REEDUCATION: CPT | Performed by: PHYSICAL THERAPIST

## 2019-05-23 ENCOUNTER — OFFICE VISIT (OUTPATIENT)
Dept: PHYSICAL THERAPY | Facility: CLINIC | Age: 64
End: 2019-05-23
Payer: COMMERCIAL

## 2019-05-23 DIAGNOSIS — M72.2 PLANTAR FASCIITIS, BILATERAL: Primary | ICD-10-CM

## 2019-05-23 PROCEDURE — 97112 NEUROMUSCULAR REEDUCATION: CPT | Performed by: PHYSICAL THERAPIST

## 2019-05-23 PROCEDURE — 97110 THERAPEUTIC EXERCISES: CPT | Performed by: PHYSICAL THERAPIST

## 2019-05-23 PROCEDURE — 97140 MANUAL THERAPY 1/> REGIONS: CPT | Performed by: PHYSICAL THERAPIST

## 2019-05-30 ENCOUNTER — APPOINTMENT (OUTPATIENT)
Dept: PHYSICAL THERAPY | Facility: CLINIC | Age: 64
End: 2019-05-30
Payer: COMMERCIAL

## 2019-06-11 ENCOUNTER — OFFICE VISIT (OUTPATIENT)
Dept: FAMILY MEDICINE CLINIC | Facility: CLINIC | Age: 64
End: 2019-06-11

## 2019-06-11 VITALS
RESPIRATION RATE: 16 BRPM | SYSTOLIC BLOOD PRESSURE: 102 MMHG | WEIGHT: 139 LBS | DIASTOLIC BLOOD PRESSURE: 68 MMHG | TEMPERATURE: 97.9 F | HEART RATE: 72 BPM | BODY MASS INDEX: 27.29 KG/M2 | HEIGHT: 60 IN

## 2019-06-11 DIAGNOSIS — E66.3 OVERWEIGHT (BMI 25.0-29.9): ICD-10-CM

## 2019-06-11 DIAGNOSIS — E11.9 TYPE 2 DIABETES MELLITUS WITHOUT COMPLICATION, WITHOUT LONG-TERM CURRENT USE OF INSULIN (HCC): Primary | ICD-10-CM

## 2019-06-11 LAB — SL AMB POCT HEMOGLOBIN AIC: 5.3 (ref ?–6.5)

## 2019-06-11 PROCEDURE — 82043 UR ALBUMIN QUANTITATIVE: CPT | Performed by: FAMILY MEDICINE

## 2019-06-11 PROCEDURE — 83036 HEMOGLOBIN GLYCOSYLATED A1C: CPT | Performed by: FAMILY MEDICINE

## 2019-06-11 PROCEDURE — 99213 OFFICE O/P EST LOW 20 MIN: CPT | Performed by: FAMILY MEDICINE

## 2019-06-11 PROCEDURE — 82570 ASSAY OF URINE CREATININE: CPT | Performed by: FAMILY MEDICINE

## 2019-06-12 LAB
CREAT UR-MCNC: <13 MG/DL
MICROALBUMIN UR-MCNC: <5 MG/L (ref 0–20)

## 2019-07-12 LAB
LEFT EYE DIABETIC RETINOPATHY: NORMAL
RIGHT EYE DIABETIC RETINOPATHY: NORMAL

## 2019-07-12 PROCEDURE — 2023F DILAT RTA XM W/O RTNOPTHY: CPT | Performed by: FAMILY MEDICINE

## 2019-09-05 DIAGNOSIS — E11.9 TYPE 2 DIABETES MELLITUS WITHOUT COMPLICATION, WITHOUT LONG-TERM CURRENT USE OF INSULIN (HCC): ICD-10-CM

## 2019-09-12 ENCOUNTER — OFFICE VISIT (OUTPATIENT)
Dept: FAMILY MEDICINE CLINIC | Facility: CLINIC | Age: 64
End: 2019-09-12

## 2019-09-12 VITALS
TEMPERATURE: 98 F | HEIGHT: 59 IN | SYSTOLIC BLOOD PRESSURE: 100 MMHG | DIASTOLIC BLOOD PRESSURE: 70 MMHG | WEIGHT: 135.6 LBS | RESPIRATION RATE: 16 BRPM | BODY MASS INDEX: 27.34 KG/M2 | HEART RATE: 72 BPM

## 2019-09-12 DIAGNOSIS — E11.9 TYPE 2 DIABETES MELLITUS WITHOUT COMPLICATION, WITHOUT LONG-TERM CURRENT USE OF INSULIN (HCC): Primary | ICD-10-CM

## 2019-09-12 DIAGNOSIS — N81.10 FEMALE CYSTOCELE: ICD-10-CM

## 2019-09-12 DIAGNOSIS — M77.8 RIGHT ELBOW TENDINITIS: ICD-10-CM

## 2019-09-12 LAB — SL AMB POCT HEMOGLOBIN AIC: 5.5 (ref ?–6.5)

## 2019-09-12 PROCEDURE — 83036 HEMOGLOBIN GLYCOSYLATED A1C: CPT | Performed by: FAMILY MEDICINE

## 2019-09-12 PROCEDURE — 99213 OFFICE O/P EST LOW 20 MIN: CPT | Performed by: FAMILY MEDICINE

## 2019-09-12 PROCEDURE — 3044F HG A1C LEVEL LT 7.0%: CPT | Performed by: FAMILY MEDICINE

## 2019-09-12 RX ORDER — NAPROXEN 500 MG/1
500 TABLET ORAL 2 TIMES DAILY WITH MEALS
Qty: 28 TABLET | Refills: 0 | Status: SHIPPED | OUTPATIENT
Start: 2019-09-12 | End: 2019-11-21

## 2019-09-12 NOTE — PATIENT INSTRUCTIONS
Leidy Strange, you are doing so well you can stop the metformin for now and we will re-check in 3 months  Please return for a pap exam at my next available appointment, and we will get you referred for the pelvic therapy at that visit  Cystocele   WHAT YOU NEED TO KNOW:   What is a cystocele? A cystocele is a condition where part of your bladder falls into your vagina because of weakened or stretched pelvic muscles  In some cases your bladder may begin to slip through your vaginal opening  What increases my risk of a cystocele? A cystocele is commonly caused by weak pelvic muscles and ligaments that hold and support the bladder  Your risk of cystocele may be increased because of the following:  · Pregnancy or childbirth: This causes increased pressure and may stretch and weaken your pelvic muscles  You may have had trauma during childbirth from forceps or your baby's head  · Aging:  Aging and decreased hormones can cause your pelvic muscles to become weaker  · Obesity:  If you weigh more than your healthcare provider recommends, it may increase your risk of a cystocele  · Straining:  Constipation, severe coughing, or lifting heavy objects may cause increased pressure inside your abdomen and cause a cystocele  · Surgery:  Pelvic surgery such as a hysterectomy can increase your risk of a cystocele  · Collagen diseases: You may have a disease, such as Marfan's syndrome and Moses-Danlos syndrome, that makes your pelvic muscles weak  What are the signs and symptoms of a cystocele?    · A soft bulge or lump in your vagina    · Low back pain that is relieved when you lie down    · Pelvic pain or pressure, especially when you urinate or have sex    · Pink or red urine    · Pressure in your abdomen, or you feel that you cannot completely empty your bladder    · Difficult, painful, or frequent urination, especially at night    · Urine leaks out when you cough, sneeze, or laugh  How is a cystocele diagnosed? Your healthcare provider will ask about your health history  This includes your lifestyle, past pregnancies, and any health conditions you have  You may need one or more of the following tests:  · Pelvic exam:  Your healthcare provider gently puts a speculum in your vagina to open it and asks you to strain or bear down  This may help find the location and size of your cystocele  Your healthcare provider may also ask you to tighten the muscles of your pelvis as if you are trying to stop urinating  This helps find how strong your pelvic muscles are  · Blood and urine test:  You may need blood or urine tests to check for an infection  · Ultrasound:  Sound waves are used to show pictures of the inside of your abdomen  A small wand with lotion on it is gently moved around your abdomen  The wand may also be placed in your vagina  Pictures of your bladder, vagina, rectum, or other pelvic organs are seen on a monitor  · X-ray: This test can take pictures of your kidneys, bladder, and ureters  You may be given dye to help the pictures show up better  Tell healthcare providers if you are allergic to iodine or shellfish  You may also be allergic to the dye  You may have x-rays taken while you urinate  · MRI:  This scan uses powerful magnets and a computer to take pictures of your bladder, vagina, and pelvic area  You may be given dye to help the pictures show up better  Tell healthcare providers if you are allergic to iodine or shellfish  You may also be allergic to the dye  Do not enter the MRI room with anything metal  Metal can cause serious injury  Tell healthcare providers if you have any metal in or on your body  · Urodynamics: This test checks if the muscles of your bladder are working properly  It also measures how much urine your bladder can hold  This test can also show whether your bladder fills and empties in a normal way      · Cystoscopy:  A thin tube with a scope is inserted into your urethra and up into your bladder  Your healthcare providers will look at the inside of your bladder for stones, bleeding, tumors, or signs of infection  How is a cystocele treated? Depending on your symptoms, you may need any of the following:  · Estrogen therapy:  Estrogen may help strengthen the pelvic muscles and keep your cystocele from getting worse  This may be taken as a pill, applied as a cream, or inserted in your vagina  · Pessary or tampon:  A pessary is a plastic or rubber ring and a tampon is a plug of cotton or other absorbent material  These are placed inside the vagina to support the bulging tissues in your bladder and vagina  · Surgery: You may need surgery to lift your bladder back into place  During surgery, stitches or a mesh patch may be placed between your bladder and vagina to hold your bladder in place  What are the risks of cystocele? You may have discomfort or an infection from using a pessary  You may have bleeding or an infection from surgery  If your cystocele is not treated, you may continue to have pain or difficulty urinating  If the cystocele comes out of your vaginal opening, it becomes more difficult to treat and control your symptoms  How can a cystocele be prevented? · Do Kegel exercises regularly: These exercises can help your pelvic floor muscles get stronger  Tighten the muscles of your pelvis (the muscles you use to stop urinating)  Hold the muscles tight for 5 seconds, then relax for 5 seconds  Gradually work up to hold the muscles contracted for 10 seconds  Do at least 3 sets of 10 repetitions a day  · Avoid straining:  Do not lift heavy objects, stand for long periods of time, or strain to have a bowel movement  Prevent constipation by drinking plenty of liquids and eating foods high in fiber  Ask how much liquid you should drink every day  High fiber foods include fresh fruits, vegetables, and whole grains      · Maintain a healthy weight:  Ask your healthcare provider if you are at a healthy weight and what is the best exercise program for you  Try to exercise at least 30 minutes every day  Exercise can also help prevent constipation  When should I contact my healthcare provider? · You have a fever  · You have chills or feel weak and achy  · You have lower abdominal pain or back pain that does not go away  · You cannot urinate  · You have questions or concerns about your condition or care  When should I seek immediate care? · You have abnormal bleeding from your vagina  · You have a mass coming out of your vagina that you cannot push back in     · You have severe lower abdominal pain  · You have a bad-smelling discharge coming from your vagina  CARE AGREEMENT:   You have the right to help plan your care  Learn about your health condition and how it may be treated  Discuss treatment options with your caregivers to decide what care you want to receive  You always have the right to refuse treatment  The above information is an  only  It is not intended as medical advice for individual conditions or treatments  Talk to your doctor, nurse or pharmacist before following any medical regimen to see if it is safe and effective for you  © 2017 [facility] Information is for End User's use only and may not be sold, redistributed or otherwise used for commercial purposes  All illustrations and images included in CareNotes® are the copyrighted property of [employer]  or [employer]

## 2019-09-12 NOTE — PROGRESS NOTES
Assessment/Plan:     Problem List Items Addressed This Visit        Endocrine    Type 2 diabetes mellitus without complication, without long-term current use of insulin ([facility] [address] ) - Primary     Lab Results   Component Value Date    HGBA1C 5 5 09/12/2019   -HbA1C far below goal on metformin & low-carbohydrate diet  -Will trial off of metformin and repeat HbA1C in 3 months  -UTD on DM eye exam, urine microalbumin  -DM foot exam done today, full sensation intact         Relevant Orders    POCT hemoglobin A1c (Completed)       Musculoskeletal and Integument    Right elbow tendinitis     -Chronic pain with reported hx of tendinitis R elbow per patient  -Conservative management with elbow brace during use, application of topical voltaren gel, naproxen 500 mg BID x14d  -If symptoms persist, will recommend imaging and/or PT referral         Relevant Medications    Elastic Bandages & Supports (TENNIS ELBOW NEOPRENE BRACE) MISC    diclofenac sodium (VOLTAREN) 1 %    naproxen (NAPROSYN) 500 mg tablet       Genitourinary    Female cystocele     -Description of symptoms suggestive of cystocele  -Defer pelvic exam today, as patient needs to return for pap  -Will examine at next visit when patient returns for pap  -Anticipate referral to pelvic health PT, possibly urogynecology                 Subjective:      Patient ID: Saige Rodriguez is a 60 y o  female  -T2DM: Patient taking metformin 500 mg BID, last HbA1C 5 3%, patient adherent to low-carbohydrate diet and physical activity, as her job working at [employer] keeps her very physically active  Up-to-date on urine microalbumin  Had eye exam 7/12/19  Needs foot exam today  -HM:  Patient asking about when she is due for Pap, chart reveals last pap was 5/6/13 (pap with HPV cotesting)  She has concerns that her bladder seems to be hanging lower than it used to be, causing a bulge in her vagina  No previous dx of cystocele   Denies pain or urinary incontinence; does have history of vaginal deliveries     -R elbow tendinitis: Previously diagnosed by Sports Medicine in the past, acting up currently due to overuse  The following portions of the patient's history were reviewed and updated as appropriate: allergies, current medications, past family history, past medical history, past social history, past surgical history and problem list     Review of Systems   Constitutional: Negative for chills, fatigue and fever  Eyes: Negative for visual disturbance  Respiratory: Negative for chest tightness and shortness of breath  Cardiovascular: Negative for chest pain, palpitations and leg swelling  Gastrointestinal: Negative for abdominal pain, diarrhea, nausea and vomiting  Genitourinary: Negative for dysuria  Musculoskeletal: Negative for gait problem  R elbow pain and soreness   Skin: Negative for rash  Neurological: Negative for syncope, weakness and light-headedness  Psychiatric/Behavioral: Negative for agitation, sleep disturbance and suicidal ideas  All other systems reviewed and are negative  Objective:  /70 (BP Location: Right arm, Patient Position: Sitting, Cuff Size: Standard)   Pulse 72   Temp 98 °F (36 7 °C) (Tympanic)   Resp 16   Ht 4' 11" (1 499 m)   Wt 61 5 kg (135 lb 9 6 oz)   BMI 27 39 kg/m²        Physical Exam   Constitutional: She is oriented to person, place, and time  She appears well-developed and well-nourished  HENT:   Head: Normocephalic and atraumatic  Eyes: Conjunctivae are normal  Right eye exhibits no discharge  Left eye exhibits no discharge  Neck: Normal range of motion  Neck supple  Cardiovascular: Normal rate, regular rhythm and intact distal pulses  Pulses are no weak pulses  Pulses:       Dorsalis pedis pulses are 2+ on the right side, and 2+ on the left side  Pulmonary/Chest: Effort normal and breath sounds normal  She has no wheezes  Abdominal: Soft   Bowel sounds are normal  She exhibits no distension  There is no tenderness  Musculoskeletal: Normal range of motion  She exhibits tenderness  Feet:    R elbow tenderness to palpation and discomfort with passive flexion   Feet:   Right Foot:   Skin Integrity: Negative for ulcer, skin breakdown, erythema, warmth, callus or dry skin  Left Foot:   Skin Integrity: Negative for ulcer, skin breakdown, erythema, warmth, callus or dry skin  Neurological: She is alert and oriented to person, place, and time  Skin: Skin is warm and dry  Capillary refill takes less than 2 seconds  Psychiatric: She has a normal mood and affect  Her behavior is normal  Judgment and thought content normal    Vitals reviewed  Patient's shoes and socks removed  Right Foot/Ankle   Right Foot Inspection  Skin Exam: skin normal and skin intact no dry skin, no warmth, no callus, no erythema, no maceration, no abnormal color, no pre-ulcer, no ulcer and no callus                          Toe Exam: ROM and strength within normal limits  Sensory       Monofilament testing: intact  Vascular  Capillary refills: < 3 seconds  The right DP pulse is 2+  Left Foot/Ankle  Left Foot Inspection  Skin Exam: skin normal and skin intactno dry skin, no warmth, no erythema, no maceration, normal color, no pre-ulcer, no ulcer and no callus                         Toe Exam: ROM and strength within normal limits                   Sensory       Monofilament: intact  Vascular  Capillary refills: < 3 seconds  The left DP pulse is 2+  Assign Risk Category:  No deformity present; No loss of protective sensation;  No weak pulses       Risk: 0            POCT hemoglobin A1c    Ref Range & Units 9/12/19  5:04 PM   Hemoglobin A1C 6 5 5 5              MARCELLUS Kimball  PGY-3  [place] [id number]

## 2019-09-14 PROBLEM — M77.8 RIGHT ELBOW TENDINITIS: Status: ACTIVE | Noted: 2019-09-14

## 2019-09-14 PROBLEM — N81.10 FEMALE CYSTOCELE: Status: ACTIVE | Noted: 2019-09-14

## 2019-09-14 NOTE — ASSESSMENT & PLAN NOTE
Lab Results   Component Value Date    HGBA1C 5 5 09/12/2019   -HbA1C far below goal on metformin & low-carbohydrate diet  -Will trial off of metformin and repeat HbA1C in 3 months  -UTD on DM eye exam, urine microalbumin  -DM foot exam done today, full sensation intact

## 2019-09-14 NOTE — ASSESSMENT & PLAN NOTE
-Description of symptoms suggestive of cystocele  -Defer pelvic exam today, as patient needs to return for pap  -Will examine at next visit when patient returns for pap  -Anticipate referral to pelvic health PT, possibly urogynecology

## 2019-09-14 NOTE — ASSESSMENT & PLAN NOTE
-Chronic pain with reported hx of tendinitis R elbow per patient  -Conservative management with elbow brace during use, application of topical voltaren gel, naproxen 500 mg BID x14d  -If symptoms persist, will recommend imaging and/or PT referral

## 2019-11-21 ENCOUNTER — OFFICE VISIT (OUTPATIENT)
Dept: FAMILY MEDICINE CLINIC | Facility: CLINIC | Age: 64
End: 2019-11-21

## 2019-11-21 VITALS
DIASTOLIC BLOOD PRESSURE: 80 MMHG | BODY MASS INDEX: 25.37 KG/M2 | HEART RATE: 82 BPM | TEMPERATURE: 97.7 F | HEIGHT: 61 IN | SYSTOLIC BLOOD PRESSURE: 108 MMHG | RESPIRATION RATE: 16 BRPM | WEIGHT: 134.4 LBS

## 2019-11-21 DIAGNOSIS — G56.02 LEFT CARPAL TUNNEL SYNDROME: Primary | ICD-10-CM

## 2019-11-21 PROCEDURE — 99213 OFFICE O/P EST LOW 20 MIN: CPT | Performed by: FAMILY MEDICINE

## 2019-11-21 PROCEDURE — 3008F BODY MASS INDEX DOCD: CPT | Performed by: FAMILY MEDICINE

## 2019-11-21 RX ORDER — LORATADINE 10 MG/1
10 TABLET ORAL DAILY
COMMUNITY

## 2019-11-21 NOTE — PATIENT INSTRUCTIONS
Carpal Tunnel Surgery   AMBULATORY CARE:   What you need to know about carpal tunnel surgery:  Carpal tunnel surgery, or decompression, is used to take pressure off the median nerve in your wrist  The median nerve controls muscles and feeling in the hand  Surgery may be done through an opening on your palm  This is called open surgery  Your surgeon may instead put a scope and tools into 1 or 2 small incisions on your wrist or palm  This is called endoscopic surgery  How to prepare for surgery: Your healthcare provider will tell you how to prepare for surgery  He may tell you not to eat or drink anything after midnight on the day of your surgery  He will tell you which medicines to take or not take on the day of surgery  Arrange to have someone drive you home after surgery  You may get antibiotics before surgery to prevent an infection  Tell your healthcare provider if you have ever had an allergic reaction to antibiotics  What will happen during surgery:   · You may be given local or regional anesthesia to help prevent pain during surgery  Local anesthesia will make only your wrist numb  Regional anesthesia will make your wrist, hand, and arm numb  You may instead be given general anesthesia to keep you asleep and free from pain  You may need this anesthesia if your surgeon thinks surgery will take a long time or involve a large part of your wrist      · For open surgery, your surgeon will make an incision on the palm of your hand  The incision may extend to your wrist  A ligament will be cut to release the pressure on the nerve  This ligament is a band of tissue that connects joints in your wrist  Your surgeon may also remove scar tissue or anything else that may be pressing on the nerve  · For endoscopic surgery, your surgeon will make 1 or 2 incisions on your wrist or palm  He will insert the endoscope with the camera through an incision to help guide him during surgery   Tools may be put in your wrist to help protect the nerves  He will then cut the ligament that is pressing on the nerve  · The incision will be closed with stitches and covered with bandages  What will happen after surgery:  A splint may be placed on your wrist to keep it from moving  You will be taken to a room where you will rest until you are fully awake and gain feeling in your arm  Your healthcare provider may ask you to move your fingers soon after your surgery  Do not try to get out of bed until your healthcare provider says it is okay  Risks of carpal tunnel surgery: You may bleed more than expected or get an infection  Your skin may bruise  A thick, painful scar may form where you had surgery  You may develop trigger finger (fingers locked in a bent position)  Surgery may cause long-term numbness or weakness in your fingers, hand, or wrist  Your symptoms may not go away, and you may need surgery again  Seek care immediately if:   · Your stitches come apart  · Blood soaks through your bandage  · You cannot feel or move your hand or fingers  · You feel a lump or swelling in your wrist   Contact your healthcare provider or hand specialist if:   · You have a fever or chills  · You feel weak or achy  · You have pain, even after you take medicine  · You have swelling, stiffness, or numbness in your fingers  · Your finger becomes stuck in the same position  · You have questions or concerns about your condition or care  Medicines: You may need any of the following:  · Antibiotics  help prevent or fight a bacterial infection  · Prescription pain medicine  may be given  Ask how to take this medicine safely  · Take your medicine as directed  Contact your healthcare provider if you think your medicine is not helping or if you have side effects  Tell him or her if you are allergic to any medicine  Keep a list of the medicines, vitamins, and herbs you take  Include the amounts, and when and why you take them  Bring the list or the pill bottles to follow-up visits  Carry your medicine list with you in case of an emergency  Go to physical or occupational therapy, if directed:  A physical therapist can teach you exercises to help improve movement and strength  Physical therapy can also help decrease pain and loss of function  An occupational therapist can help you find ways to do work and other activities to reduce strain on your wrist   Apply ice to your wrist:  Ice helps decrease swelling and pain  Ice may also help prevent tissue damage  Use an ice pack or put crushed ice in a plastic bag  Cover the ice pack or bag with a towel  Place it on your wrist for 15 to 20 minutes every hour, or as directed  Limit activity as directed:  Do not pull, lift, or move heavy objects until your healthcare provider says it is okay  Ask when you can return to work  Take time to rest your hand  If you work on a computer, rest your hand often  You may need to elevate your arm several times a day  This helps decrease pain and swelling  Follow up with your healthcare provider or hand specialist as directed: You may need to return to have your stitches removed  Ask how long you need to wear your splint  Write down your questions so you remember to ask them during your visits  © 2017 [facility] Information is for End User's use only and may not be sold, redistributed or otherwise used for commercial purposes  All illustrations and images included in CareNotes® are the copyrighted property of [employer]  or Singh Hubbard  The above information is an  only  It is not intended as medical advice for individual conditions or treatments  Talk to your doctor, nurse or pharmacist before following any medical regimen to see if it is safe and effective for you

## 2019-11-21 NOTE — PROGRESS NOTES
Assessment/Plan:     Problem List Items Addressed This Visit        Nervous and Auditory    Left carpal tunnel syndrome - Primary     -Hx of B/L carpal tunnel syndrome/ trigger finger s/p R carpal tunnel/trigger finger release in 2016 by Dr Shmuel Collins  -Patient now symptomatic of L-sided symptoms, requesting referral to Orthopedic Surgery for further evaluation  -Referral given for Dr Shmuel Collins today  -Of note, patient is likely a suitable surgical candidate, as her T2DM is very well-controlled (last HbA1C 5 5% 9/2019)  -Continue conservative measures for symptoms relief         Relevant Orders    Ambulatory referral to Hand Surgery            Subjective:      Patient ID: Garret Maloney is a 60 y o  female  HPI  Hx of B/L carpal tunnel and trigger fingers with surgery (R wrist carpal tunnel release, R long/ring trigger finger release) done on R hand 8/25/16 by Dr Shmuel Collins, but surgery not on L side that year due to patient experiencing pain with recovery after R wrist/hand surgery  However, she is having a lot of pain now and would like a referral back to Orthopedic Surgery, as she is considering L wrist/hand  Not currently having any numbness or tingling in hand but having frequent locking of fingers with active flexion  No swelling or skin changes  Not using any medication at this time other than advil/motrin PRN (usually 1-2 times per week)  The following portions of the patient's history were reviewed and updated as appropriate: allergies, current medications, past family history, past medical history, past social history, past surgical history and problem list     Review of Systems   Constitutional: Negative for chills, fatigue and fever  Eyes: Negative for visual disturbance  Respiratory: Negative for chest tightness and shortness of breath  Cardiovascular: Negative for chest pain, palpitations and leg swelling  Gastrointestinal: Negative for abdominal pain, diarrhea, nausea and vomiting  Genitourinary: Negative for dysuria  Musculoskeletal: Positive for arthralgias  Negative for gait problem  Skin: Negative for rash  Neurological: Negative for syncope, weakness and light-headedness  Psychiatric/Behavioral: Negative for agitation, sleep disturbance and suicidal ideas  All other systems reviewed and are negative  Objective:      /80 (BP Location: Left arm, Patient Position: Sitting, Cuff Size: Large)   Pulse 82   Temp 97 7 °F (36 5 °C) (Tympanic)   Resp 16   Ht 5' 0 7" (1 542 m)   Wt 61 kg (134 lb 6 4 oz)   BMI 25 65 kg/m²          Physical Exam   Constitutional: She is oriented to person, place, and time  She appears well-developed and well-nourished  HENT:   Head: Normocephalic and atraumatic  Eyes: Conjunctivae are normal    Neck: Normal range of motion  Neck supple  Cardiovascular: Normal rate, regular rhythm and intact distal pulses  Pulmonary/Chest: Effort normal and breath sounds normal    Abdominal: Soft  Bowel sounds are normal    Musculoskeletal:   L wrist Phalen (+), Tinnel (-), locking/trigger finger elicited with active flexion of L middle and ring fingers   Neurological: She is alert and oriented to person, place, and time  Skin: Skin is warm and dry  Capillary refill takes less than 2 seconds  Psychiatric: She has a normal mood and affect  Her behavior is normal  Judgment and thought content normal    Vitals reviewed

## 2019-11-22 PROBLEM — G56.02 LEFT CARPAL TUNNEL SYNDROME: Status: ACTIVE | Noted: 2019-11-22

## 2019-11-23 NOTE — ASSESSMENT & PLAN NOTE
-Hx of B/L carpal tunnel syndrome/ trigger finger s/p R carpal tunnel/trigger finger release in 2016 by Dr Yoli Elliott  -Patient now symptomatic of L-sided symptoms, requesting referral to Orthopedic Surgery for further evaluation  -Referral given for Dr Yoli Elliott today  -Of note, patient is likely a suitable surgical candidate, as her T2DM is very well-controlled (last HbA1C 5 5% 9/2019)  -Continue conservative measures for symptoms relief

## 2019-12-12 ENCOUNTER — ANNUAL EXAM (OUTPATIENT)
Dept: FAMILY MEDICINE CLINIC | Facility: CLINIC | Age: 64
End: 2019-12-12

## 2019-12-12 VITALS
HEART RATE: 70 BPM | DIASTOLIC BLOOD PRESSURE: 72 MMHG | BODY MASS INDEX: 25.34 KG/M2 | TEMPERATURE: 97.4 F | SYSTOLIC BLOOD PRESSURE: 102 MMHG | RESPIRATION RATE: 12 BRPM | HEIGHT: 61 IN | WEIGHT: 134.2 LBS

## 2019-12-12 DIAGNOSIS — N81.10 FEMALE CYSTOCELE: ICD-10-CM

## 2019-12-12 DIAGNOSIS — Z01.419 ENCOUNTER FOR GYNECOLOGICAL EXAMINATION WITHOUT ABNORMAL FINDING: Primary | ICD-10-CM

## 2019-12-12 DIAGNOSIS — A64 STI (SEXUALLY TRANSMITTED INFECTION): ICD-10-CM

## 2019-12-12 PROCEDURE — G0145 SCR C/V CYTO,THINLAYER,RESCR: HCPCS | Performed by: PATHOLOGY

## 2019-12-12 PROCEDURE — 87624 HPV HI-RISK TYP POOLED RSLT: CPT | Performed by: FAMILY MEDICINE

## 2019-12-12 PROCEDURE — 87491 CHLMYD TRACH DNA AMP PROBE: CPT | Performed by: FAMILY MEDICINE

## 2019-12-12 PROCEDURE — G0124 SCREEN C/V THIN LAYER BY MD: HCPCS | Performed by: PATHOLOGY

## 2019-12-12 PROCEDURE — 99213 OFFICE O/P EST LOW 20 MIN: CPT | Performed by: FAMILY MEDICINE

## 2019-12-12 PROCEDURE — 87591 N.GONORRHOEAE DNA AMP PROB: CPT | Performed by: FAMILY MEDICINE

## 2019-12-12 NOTE — PROGRESS NOTES
[address] [place] [address]  1955    Subjective      Esvin Reynolds is a 60 y o  female who presents for well woman exam      GYN:  · Denies vaginal discharge, labial erythema or lesions, dyspareunia  · Does sometimes feel her vaginal wall is "hanging down" when her bladder is full  Not bothersome  · Menarche at age 15  · Menopause at age 54 or 52  · Contraception: None  · Patient is sexually active with 1 partner, , no issues  · Gynecologic surgeries: Had BTL  OB:  ·  female  · Pregnancies were uncomplicated  :  · Denies dysuria, urinary frequency or urgency  · Denies hematuria, flank pain, incontinence  Breast:  · Denies breast mass, skin changes, dimpling, reddening, nipple retraction  · Denies breast discharge  · Patient does do monthly breast exams  · Patient does not have a family history of breast, endometrial, or ovarian cancer  General:  · Diet: well balanced diet and low carbohydrate diet  · Exercise: Participates in regular, daily exercise through active play and her job at [employer]  · Work: currently employed [employer] [place]  · ETOH: denies use  · Tobacco: Never used  · Recreational drug use: denies use    Screening:  · Cervical cancer: Last pap done 13, negative cytology with high-risk HPV negative  · Breast cancer: Last mammogram was 5/10/19  Results were normal--routine follow-up in 12 months  · Colon cancer: Had colonoscopy 13, diverticulosis and external hemorrhoids but no polyps  · STD screening: Accepts GC/chlamydia today  Review of Systems  Pertinent items are noted in HPI        Objective      /72 (BP Location: Left arm, Patient Position: Sitting, Cuff Size: Standard)   Pulse 70   Temp (!) 97 4 °F (36 3 °C) (Tympanic)   Resp 12   Ht 5' 0 7" (1 542 m)   Wt 60 9 kg (134 lb 3 2 oz)   BMI 25 61 kg/m²     General:   alert, appears stated age and cooperative   Heart: regular rate and rhythm, S1, S2 normal, no murmur, click, rub or gallop   Lungs: clear to auscultation bilaterally   Breasts: normal appearance, no masses or tenderness   Abdomen: soft, non-tender, without masses or organomegaly   Vulva: normal   Vagina: normal mucosa, normal discharge; mild cystocele   Cervix: multiparous appearance, no cervical motion tenderness and no lesions   Uterus: normal size   Adnexa: not evaluated               Assessment/Plan:   Problem List Items Addressed This Visit     None      Visit Diagnoses     Encounter for gynecological examination without abnormal finding    -  Primary    Relevant Orders    Liquid-based pap, screening    Chlamydia/GC amplified DNA by PCR (Completed)    HPV High Risk    STI (sexually transmitted infection)        Relevant Orders    Chlamydia/GC amplified DNA by PCR (Completed)          -Pap smear with HPV co-testing performed today   -Will await results and notify patient when results received  -STI Screening: GC/chlamydia requested by patient, additional STI tests declined by patient  -Patient declines referral to pelvic PT for cystocele as it is not bothersome to her currently  -Mammogram ordered today: Not Indicated (up to date, next due 5/2020)  -Colonoscopy ordered today: Not Indicated (up to date, next due 6/2023)  -Health maintenance counseling performed on the following topics:   -Regular exercise (at least 150 minutes of moderate aerobic activity per week or 75 minutes of vigorous aerobic activity per week) for CV & bone health    -Diet well balanced diet and low carb diet to maintain a healthy weight   -Adequate intake of calcium & vitamin D to reduce osteoporosis risk      MARCELLUS Persaud  PGY-3  [place] [id number]

## 2019-12-13 LAB
C TRACH DNA SPEC QL NAA+PROBE: NEGATIVE
HPV HR 12 DNA CVX QL NAA+PROBE: NEGATIVE
HPV16 DNA CVX QL NAA+PROBE: NEGATIVE
HPV18 DNA CVX QL NAA+PROBE: NEGATIVE
N GONORRHOEA DNA SPEC QL NAA+PROBE: NEGATIVE

## 2019-12-19 LAB
LAB AP GYN PRIMARY INTERPRETATION: ABNORMAL
Lab: ABNORMAL
PATH INTERP SPEC-IMP: ABNORMAL

## 2020-01-24 ENCOUNTER — OFFICE VISIT (OUTPATIENT)
Dept: OBGYN CLINIC | Facility: HOSPITAL | Age: 65
End: 2020-01-24
Payer: COMMERCIAL

## 2020-01-24 VITALS
HEART RATE: 77 BPM | HEIGHT: 60 IN | BODY MASS INDEX: 27.01 KG/M2 | WEIGHT: 137.57 LBS | SYSTOLIC BLOOD PRESSURE: 100 MMHG | DIASTOLIC BLOOD PRESSURE: 70 MMHG

## 2020-01-24 DIAGNOSIS — M65.342 TRIGGER FINGER, LEFT RING FINGER: Primary | ICD-10-CM

## 2020-01-24 DIAGNOSIS — G56.02 LEFT CARPAL TUNNEL SYNDROME: ICD-10-CM

## 2020-01-24 PROCEDURE — 20550 NJX 1 TENDON SHEATH/LIGAMENT: CPT | Performed by: ORTHOPAEDIC SURGERY

## 2020-01-24 PROCEDURE — 99203 OFFICE O/P NEW LOW 30 MIN: CPT | Performed by: ORTHOPAEDIC SURGERY

## 2020-01-24 RX ORDER — LIDOCAINE HYDROCHLORIDE 10 MG/ML
1 INJECTION, SOLUTION INFILTRATION; PERINEURAL
Status: COMPLETED | OUTPATIENT
Start: 2020-01-24 | End: 2020-01-24

## 2020-01-24 RX ORDER — BETAMETHASONE SODIUM PHOSPHATE AND BETAMETHASONE ACETATE 3; 3 MG/ML; MG/ML
6 INJECTION, SUSPENSION INTRA-ARTICULAR; INTRALESIONAL; INTRAMUSCULAR; SOFT TISSUE
Status: COMPLETED | OUTPATIENT
Start: 2020-01-24 | End: 2020-01-24

## 2020-01-24 RX ADMIN — BETAMETHASONE SODIUM PHOSPHATE AND BETAMETHASONE ACETATE 6 MG: 3; 3 INJECTION, SUSPENSION INTRA-ARTICULAR; INTRALESIONAL; INTRAMUSCULAR; SOFT TISSUE at 09:21

## 2020-01-24 RX ADMIN — LIDOCAINE HYDROCHLORIDE 1 ML: 10 INJECTION, SOLUTION INFILTRATION; PERINEURAL at 09:21

## 2020-01-24 NOTE — PATIENT INSTRUCTIONS
What is it TRIGGER FINGER? Stenosing tenosynovitis, commonly known as trigger finger or trigger thumb, involves the pulleys and tendons in the hand that bend the fingers  The tendons work like long ropes connecting the muscles of the forearm with the bones of the fingers and thumb  In the finger, the pulleys are a series of rings that form a tunnel through which the tendons must glide, much like the guides on a fishing ortiz through which the line (or tendon) must pass  These pulleys hold the tendons close against the bone  The tendons and the tunnel have a slick lining that allows easy gliding of the tendon through the pulleys (see Figure 1)  Trigger finger/thumb occurs when the pulley at the base of the finger becomes too thick and constricting around the tendon, making it hard for the tendon to move freely through the pulley  Sometimes the tendon develops a nodule (knot) or swelling of its lining  Because of the increased resistance to the gliding of the tendon through the  pulley, one may feel pain, popping, or a catching feeling in the finger or thumb (see Figure 2)  When the tendon catches, it produces irritation and more swelling  This causes a vicious cycle of triggering, irritation, and swelling  Sometimes the finger becomes stuck or locked, and is hard to straighten or bend  What causes it? Causes for this condition are not always clear  Some trigger fingers are associated with medical conditions such as rheumatoid arthritis, gout, and diabetes  Local trauma to the palm/base of the finger may be a factor on occasion, but in most cases there is not a clear cause  Signs and symptoms   Trigger finger/thumb may start with discomfort felt at the base of the finger or thumb, where they join the palm  This area is often tender to local pressure  A nodule may sometimes be found in this area   When the finger begins to trigger or lock, the patient may think the  problem is at the middle knuckle of the finger or the tip knuckle of the thumb, since the tendon that is sticking is the one that moves these joints  Treatment  The goal of treatment in trigger finger/thumb is to eliminate the catching or locking and allow full movement of the finger or thumb without discomfort  Swelling around the flexor tendon and tendon sheath must be reduced to allow smooth gliding of the tendon  The wearing of a splint or taking an oral anti-inflammatory medication may sometimes  help  Treatment may also include changing activities to reduce swelling  An injection of steroid into the area around the tendon and pulley is often effective in relieving the trigger finger/thumb  If non-surgical forms of treatment do not relieve the symptoms, surgery may be recommended  This surgery is performed as an outpatient, usually with simple local anesthesia  The goal of surgery is to open the pulley at the base of the finger so that the tendon can glide more freely (see Figure 3)  Active motion of the finger generally begins immediately after surgery  Normal use of the hand can usually be resumed once comfort permits  Some patients may feel tenderness, discomfort, and swelling about the area of their surgery longer than others  Occasionally, hand therapy is required after surgery to regain  better use  © 2012 American [employer] Hand  www handcare  org

## 2020-01-24 NOTE — PROGRESS NOTES
ASSESSMENT/PLAN:    Assessment:   Trigger Finger  left  ring finger  Left carpal tunnel syndrome- resolved    Plan:   steroid injections to left ring trigger finger  We will observe carpal tunnel  Follow Up:  6  week(s)    To Do Next Visit:       General Discussions:     Trigger FInger: The anatomy and physiology of trigger finger was discussed with the patient today in the office  Edema and increased contact pressure within the flexor tendons at the A1 pulley can cause pain, crepitation, and limitation of function  Treatment options include resting MP blocking splints to decrease edema, oral anti-inflammatory medications, home or formal therapy exercises, up to 2 steroid injections within the tendon sheath, or surgical release  While majority of patients do respond to conservative treatment, up to 20% may require surgical release  Operative Discussions:       _____________________________________________________  CHIEF COMPLAINT:  Chief Complaint   Patient presents with    Left Wrist - Pain, Numbness, Tingling    Left Ring Finger - Locking, Pain         SUBJECTIVE:  Zulay Morales is a 63 y o  female who presents with Pain  Moderate  Intermittant  Burning, Numbness, Catching and Locking to the right ring finger  This started  3 month(s) ago as Sudden      Radiation: Yes to the  ring finger  Previous Treatments: activity modification without relief  Associated symptoms: No Complaints    PAST MEDICAL HISTORY:  Past Medical History:   Diagnosis Date    Abnormal glucose     last assessed 12/22/15    Acid reflux     Anxiety     Arthritis     osteoarthritis multiple sites    Benign paroxysmal positional vertigo     Carpal tunnel syndrome     Chronic kidney disease     nephrolithiasis    Depression     Insomnia     Patellofemoral dysfunction     last assessed  9/15/14       PAST SURGICAL HISTORY:  Past Surgical History:   Procedure Laterality Date    COLONOSCOPY      KNEE SURGERY Right     DC INCISE FINGER TENDON SHEATH Right 8/25/2016    Procedure: RELEASE LONG AND RING TRIGGER FINGERS;  Surgeon: Ami Bonner MD;  Location: [facility];  Service: Orthopedics    NV WRIST Sidonie Shelling LIG Right 8/25/2016    Procedure: RELEASE CARPAL TUNNEL ENDOSCOPIC;  Surgeon: Ami Bonner MD;  Location: [facility];  Service: Orthopedics    TUBAL LIGATION         FAMILY HISTORY:  Family History   Problem Relation Age of Onset    Diabetes Mother     Heart disease Mother     Hypertension Mother     Hypothyroidism Mother     Arthritis Father     Crohn's disease Family     Psoriasis Family     Rheum arthritis Family     Lupus Family         systemic- erythematosus    Ulcerative colitis Family     Breast cancer additional onset Cousin 46        paternal       SOCIAL HISTORY:  Social History     Tobacco Use    Smoking status: Never Smoker    Smokeless tobacco: Never Used   Substance Use Topics    Alcohol use: No     Comment: rare/social as per [employer]    Drug use: No       MEDICATIONS:    Current Outpatient Medications:     diclofenac sodium (VOLTAREN) 1 %, Apply 2 g topically 4 (four) times a day, Disp: 100 g, Rfl: 1    fluticasone (FLONASE) 50 mcg/act nasal spray, 1 spray into each nostril daily, Disp: 16 g, Rfl: 4    glucose blood (ONE TOUCH ULTRA TEST) test strip, onetouch ultra blue  strp, Disp: , Rfl:     loratadine (CLARITIN) 10 mg tablet, Take 10 mg by mouth daily, Disp: , Rfl:     ALLERGIES:  Allergies   Allergen Reactions    Pollen Extract Allergic Rhinitis and Sneezing       REVIEW OF SYSTEMS:  Pertinent items are noted in HPI      LABS:  HgA1c:   Lab Results   Component Value Date    HGBA1C 5 5 09/12/2019     BMP:   Lab Results   Component Value Date    CALCIUM 8 9 03/02/2019     12/07/2017    K 3 7 03/02/2019    CO2 26 03/02/2019     03/02/2019    BUN 23 03/02/2019    CREATININE 0 76 03/02/2019 _____________________________________________________  PHYSICAL EXAMINATION:  Vital signs: /70   Pulse 77   Ht 5' (1 524 m)   Wt 62 4 kg (137 lb 9 1 oz)   BMI 26 87 kg/m²   General: well developed and well nourished, alert, oriented times 3 and appears comfortable  Psychiatric: Normal  HEENT: Trachea Midline, No torticollis  Cardiovascular: No discernable arrhythmia  Pulmonary: No wheezing or stridor  Skin: No Erythema  Neurovascular: Sensation Intact to the Median, Ulnar, Radial Nerve, Motor Intact to the Median, Ulnar, Radial Nerve and Pulses Intact    MUSCULOSKELETAL EXAMINATION:  LEFT SIDE: No extensor tendon subluxation, tenderness to ring finger A1 pulley, crepitation and nodule to A1 pulley of ring finger, AIN 5/5, apb 5/5, negative tinels to carpal tunnel   _____________________________________________________  STUDIES REVIEWED:  No Studies to review      PROCEDURES PERFORMED:  Hand/upper extremity injection: L ring A1  Date/Time: 1/24/2020 9:21 AM  Consent given by: patient  Site marked: site marked  Supporting Documentation  Indications: tendon swelling   Procedure Details  Condition:trigger finger Location: ring finger - L ring A1   Medications administered: 1 mL lidocaine 1 %; 6 mg betamethasone acetate-betamethasone sodium phosphate 6 (3-3) mg/mL    Patient tolerance: patient tolerated the procedure well with no immediate complications  Dressing:  Sterile dressing applied             Scribe Attestation    I,:   Nimisha Nguyen am acting as a scribe while in the presence of the attending physician :        I,:   Chester Epps MD personally performed the services described in this documentation    as scribed in my presence :

## 2020-02-20 NOTE — PROGRESS NOTES
Assessment/Plan:     Problem List Items Addressed This Visit        Endocrine    Type 2 diabetes mellitus without complication, without long-term current use of insulin ([facility] [address] )       Lab Results   Component Value Date    HGBA1C 6 0 02/25/2020   -T2DM remains well-controlled off medications  -Patient adherent to low carbohydrate diet  -UTD on urine microalbumin (6/2019), eye exam (7/2019); foot exam done today  -Patient previously decline starting statin, but indicated for diabetic >36years old  -Will discuss again with patient following lipid panel  -ACEI not indicated, no microalbuminuria  -Repeat HbA1C every 6 months         Relevant Orders    POCT hemoglobin A1c (Completed)    Comprehensive metabolic panel    CBC and differential       Other    Screening for breast cancer    Relevant Orders    Mammo screening bilateral w cad    Overweight (BMI 25 0-29 9)     -BMI Counseling: Body mass index is 25 53 kg/m²  The BMI is above normal  Nutrition recommendations include reducing portion sizes, 3-5 servings of fruits/vegetables daily, reducing fast food intake, moderation in carbohydrate intake and reducing intake of saturated fat and trans fat  Exercise recommendations include moderate aerobic physical activity for 150 minutes/week, exercising 3-5 times per week and strength training exercises  Relevant Orders    Lipid panel      Other Visit Diagnoses     Encounter for hepatitis C screening test for low risk patient    -  Primary    Relevant Orders    Hepatitis C antibody    Screening for HIV (human immunodeficiency virus)        Relevant Orders    HIV 1/2 AG-AB combo            Subjective:      Patient ID: Marcela Cho is a 63 y o  female  HPI     Patient here for T2DM follow up, not on amedications due to HbA1C 5 5 9/2019 and patient stopped on metformin at that time  UTD on urine microalbumin, eye exam done 7/12/19, needs foot exam  BP 98/70 today, not far from her baseline   She is not on any anti-hypertensive medication, no dizziness or lightheadedness symptoms, went to the gym today then had lunch and feels well, says she is drinking enough water  BP Readings from Last 5 Encounters:   02/25/20 98/70   01/24/20 100/70   12/12/19 102/72   11/21/19 108/80   09/12/19 100/70     -HM: Needs mammogram 5/2020, needs repeat labs March 2020, agrees to HIV and HCV testing    The following portions of the patient's history were reviewed and updated as appropriate: allergies, current medications, past family history, past medical history, past social history, past surgical history and problem list     Review of Systems   Constitutional: Negative for chills, fatigue and fever  Eyes: Negative for visual disturbance  Respiratory: Negative for chest tightness and shortness of breath  Cardiovascular: Negative for chest pain, palpitations and leg swelling  Gastrointestinal: Negative for abdominal pain, diarrhea, nausea and vomiting  Genitourinary: Negative for dysuria  Musculoskeletal: Negative for gait problem  Hand pain/trigger finger improved s/p injection by Ortho   Skin: Negative for rash  Neurological: Negative for syncope, weakness and light-headedness  Psychiatric/Behavioral: Negative for agitation, sleep disturbance and suicidal ideas  All other systems reviewed and are negative  Objective:    BP 98/70 (BP Location: Left arm, Patient Position: Sitting, Cuff Size: Standard)   Pulse 70   Temp (!) 96 7 °F (35 9 °C) (Tympanic)   Resp 14   Ht 5' 0 7" (1 542 m)   Wt 60 7 kg (133 lb 12 8 oz)   BMI 25 53 kg/m²      Physical Exam   Constitutional: She is oriented to person, place, and time  She appears well-developed and well-nourished  HENT:   Head: Normocephalic and atraumatic  Eyes: Conjunctivae are normal  Right eye exhibits no discharge  Left eye exhibits no discharge  Neck: Normal range of motion  Neck supple     Cardiovascular: Normal rate, regular rhythm and intact distal pulses  Pulses are no weak pulses  Pulses:       Dorsalis pedis pulses are 2+ on the right side, and 2+ on the left side  Pulmonary/Chest: Effort normal and breath sounds normal    Abdominal: Soft  Bowel sounds are normal    Musculoskeletal: She exhibits no edema  Feet:    Feet:   Right Foot:   Skin Integrity: Negative for ulcer, skin breakdown, erythema, warmth, callus or dry skin  Neurological: She is alert and oriented to person, place, and time  Skin: Skin is warm and dry  Capillary refill takes less than 2 seconds  Psychiatric: She has a normal mood and affect  Her behavior is normal    Vitals reviewed  Patient's shoes and socks removed  Right Foot/Ankle   Right Foot Inspection  Skin Exam: skin normal and skin intact no dry skin, no warmth, no callus, no erythema, no maceration, no abnormal color, no pre-ulcer, no ulcer and no callus                          Toe Exam: ROM and strength within normal limits  Sensory       Monofilament testing: intact  Vascular  Capillary refills: < 3 seconds  The right DP pulse is 2+  Left Foot/Ankle  Left Foot Inspection                           Toe Exam: ROM and strength within normal limits                   Sensory       Monofilament: intact  Vascular  Capillary refills: < 3 seconds  The left DP pulse is 2+  Assign Risk Category:  No deformity present; No loss of protective sensation;  No weak pulses       Risk: 0

## 2020-02-25 ENCOUNTER — OFFICE VISIT (OUTPATIENT)
Dept: FAMILY MEDICINE CLINIC | Facility: CLINIC | Age: 65
End: 2020-02-25

## 2020-02-25 VITALS
HEART RATE: 70 BPM | BODY MASS INDEX: 25.26 KG/M2 | WEIGHT: 133.8 LBS | HEIGHT: 61 IN | TEMPERATURE: 96.7 F | RESPIRATION RATE: 14 BRPM | DIASTOLIC BLOOD PRESSURE: 70 MMHG | SYSTOLIC BLOOD PRESSURE: 98 MMHG

## 2020-02-25 DIAGNOSIS — Z12.39 SCREENING FOR BREAST CANCER: ICD-10-CM

## 2020-02-25 DIAGNOSIS — E66.3 OVERWEIGHT (BMI 25.0-29.9): ICD-10-CM

## 2020-02-25 DIAGNOSIS — E11.9 TYPE 2 DIABETES MELLITUS WITHOUT COMPLICATION, WITHOUT LONG-TERM CURRENT USE OF INSULIN (HCC): ICD-10-CM

## 2020-02-25 DIAGNOSIS — Z11.4 SCREENING FOR HIV (HUMAN IMMUNODEFICIENCY VIRUS): ICD-10-CM

## 2020-02-25 DIAGNOSIS — Z11.59 ENCOUNTER FOR HEPATITIS C SCREENING TEST FOR LOW RISK PATIENT: Primary | ICD-10-CM

## 2020-02-25 LAB — SL AMB POCT HEMOGLOBIN AIC: 6 (ref ?–6.5)

## 2020-02-25 PROCEDURE — 1036F TOBACCO NON-USER: CPT | Performed by: FAMILY MEDICINE

## 2020-02-25 PROCEDURE — 99213 OFFICE O/P EST LOW 20 MIN: CPT | Performed by: FAMILY MEDICINE

## 2020-02-25 PROCEDURE — 3044F HG A1C LEVEL LT 7.0%: CPT | Performed by: FAMILY MEDICINE

## 2020-02-25 PROCEDURE — 83036 HEMOGLOBIN GLYCOSYLATED A1C: CPT | Performed by: FAMILY MEDICINE

## 2020-02-25 PROCEDURE — 2022F DILAT RTA XM EVC RTNOPTHY: CPT | Performed by: FAMILY MEDICINE

## 2020-02-25 PROCEDURE — 3044F HG A1C LEVEL LT 7.0%: CPT | Performed by: ORTHOPAEDIC SURGERY

## 2020-02-25 PROCEDURE — 3008F BODY MASS INDEX DOCD: CPT | Performed by: FAMILY MEDICINE

## 2020-02-25 NOTE — ASSESSMENT & PLAN NOTE
Lab Results   Component Value Date    HGBA1C 6 0 02/25/2020   -T2DM remains well-controlled off medications  -Patient adherent to low carbohydrate diet  -UTD on urine microalbumin (6/2019), eye exam (7/2019); foot exam done today  -Patient previously decline starting statin, but indicated for diabetic >36years old  -Will discuss again with patient following lipid panel  -ACEI not indicated, no microalbuminuria  -Repeat HbA1C every 6 months

## 2020-02-25 NOTE — ASSESSMENT & PLAN NOTE
-BMI Counseling: Body mass index is 25 53 kg/m²  The BMI is above normal  Nutrition recommendations include reducing portion sizes, 3-5 servings of fruits/vegetables daily, reducing fast food intake, moderation in carbohydrate intake and reducing intake of saturated fat and trans fat  Exercise recommendations include moderate aerobic physical activity for 150 minutes/week, exercising 3-5 times per week and strength training exercises

## 2020-03-06 ENCOUNTER — OFFICE VISIT (OUTPATIENT)
Dept: OBGYN CLINIC | Facility: HOSPITAL | Age: 65
End: 2020-03-06
Payer: COMMERCIAL

## 2020-03-06 VITALS
DIASTOLIC BLOOD PRESSURE: 68 MMHG | BODY MASS INDEX: 26.11 KG/M2 | HEIGHT: 60 IN | HEART RATE: 77 BPM | SYSTOLIC BLOOD PRESSURE: 105 MMHG | WEIGHT: 133 LBS

## 2020-03-06 DIAGNOSIS — M65.342 TRIGGER FINGER, LEFT RING FINGER: Primary | ICD-10-CM

## 2020-03-06 PROCEDURE — 1036F TOBACCO NON-USER: CPT | Performed by: ORTHOPAEDIC SURGERY

## 2020-03-06 PROCEDURE — 99213 OFFICE O/P EST LOW 20 MIN: CPT | Performed by: ORTHOPAEDIC SURGERY

## 2020-03-06 PROCEDURE — 20550 NJX 1 TENDON SHEATH/LIGAMENT: CPT | Performed by: ORTHOPAEDIC SURGERY

## 2020-03-06 PROCEDURE — 2022F DILAT RTA XM EVC RTNOPTHY: CPT | Performed by: ORTHOPAEDIC SURGERY

## 2020-03-06 PROCEDURE — 3008F BODY MASS INDEX DOCD: CPT | Performed by: ORTHOPAEDIC SURGERY

## 2020-03-06 PROCEDURE — 3044F HG A1C LEVEL LT 7.0%: CPT | Performed by: ORTHOPAEDIC SURGERY

## 2020-03-06 RX ORDER — LIDOCAINE HYDROCHLORIDE 20 MG/ML
1 INJECTION, SOLUTION EPIDURAL; INFILTRATION; INTRACAUDAL; PERINEURAL
Status: COMPLETED | OUTPATIENT
Start: 2020-03-06 | End: 2020-03-06

## 2020-03-06 RX ORDER — BETAMETHASONE SODIUM PHOSPHATE AND BETAMETHASONE ACETATE 3; 3 MG/ML; MG/ML
6 INJECTION, SUSPENSION INTRA-ARTICULAR; INTRALESIONAL; INTRAMUSCULAR; SOFT TISSUE
Status: COMPLETED | OUTPATIENT
Start: 2020-03-06 | End: 2020-03-06

## 2020-03-06 RX ADMIN — LIDOCAINE HYDROCHLORIDE 1 ML: 20 INJECTION, SOLUTION EPIDURAL; INFILTRATION; INTRACAUDAL; PERINEURAL at 09:37

## 2020-03-06 RX ADMIN — BETAMETHASONE SODIUM PHOSPHATE AND BETAMETHASONE ACETATE 6 MG: 3; 3 INJECTION, SUSPENSION INTRA-ARTICULAR; INTRALESIONAL; INTRAMUSCULAR; SOFT TISSUE at 09:37

## 2020-03-06 NOTE — PROGRESS NOTES
ASSESSMENT/PLAN:    Assessment:   Left ring trigger finger    Plan:   Left ring trigger injected today  Activities as tolerated    Follow Up:  2  month(s)    To Do Next Visit:  recheck    General Discussions:     Trigger FInger: The anatomy and physiology of trigger finger was discussed with the patient today in the office  Edema and increased contact pressure within the flexor tendons at the A1 pulley can cause pain, crepitation, and limitation of function  Treatment options include resting MP blocking splints to decrease edema, oral anti-inflammatory medications, home or formal therapy exercises, up to 2 steroid injections within the tendon sheath, or surgical release  While majority of patients do respond to conservative treatment, up to 20% may require surgical release        _____________________________________________________  CHIEF COMPLAINT:  Chief Complaint   Patient presents with    Left Hand - Follow-up         SUBJECTIVE:  Enid Suazo is a 63y o  year old female who presents for follow up regarding Trigger Finger  left  ring finger  Since last visit, Enid Suazo has tried steroid injections with only partial relief  Today there is Catching and Locking to the left ring finger          PAST MEDICAL HISTORY:  Past Medical History:   Diagnosis Date    Abnormal glucose     last assessed 12/22/15    Acid reflux     Anxiety     Arthritis     osteoarthritis multiple sites    Benign paroxysmal positional vertigo     Carpal tunnel syndrome     Chronic kidney disease     nephrolithiasis    Depression     Insomnia     Patellofemoral dysfunction     last assessed  9/15/14       PAST SURGICAL HISTORY:  Past Surgical History:   Procedure Laterality Date    COLONOSCOPY      KNEE SURGERY Right     [place] INCISE FINGER TENDON SHEATH Right 8/25/2016    Procedure: RELEASE LONG AND RING TRIGGER FINGERS;  Surgeon: Lina Arora MD;  [facility];  Service: Orthopedics    [place] WRIST Sammi Burton LIG Right 8/25/2016    Procedure: RELEASE CARPAL TUNNEL ENDOSCOPIC;  Surgeon: Renato Peoples MD;  Location: [facility];  Service: Orthopedics    TUBAL LIGATION         FAMILY HISTORY:  Family History   Problem Relation Age of Onset    Diabetes Mother     Heart disease Mother     Hypertension Mother     Hypothyroidism Mother     Arthritis Father     Crohn's disease Family     Psoriasis Family     Rheum arthritis Family     Lupus Family         systemic- erythematosus    Ulcerative colitis Family     Breast cancer additional onset Cousin 46        paternal       SOCIAL HISTORY:  Social History     Tobacco Use    Smoking status: Never Smoker    Smokeless tobacco: Never Used   Substance Use Topics    Alcohol use: No     Comment: rare/social as per [employer]    Drug use: No       MEDICATIONS:    Current Outpatient Medications:     diclofenac sodium (VOLTAREN) 1 %, Apply 2 g topically 4 (four) times a day, Disp: 100 g, Rfl: 1    fluticasone (FLONASE) 50 mcg/act nasal spray, 1 spray into each nostril daily, Disp: 16 g, Rfl: 4    glucose blood (ONE TOUCH ULTRA TEST) test strip, onetouch ultra blue  strp, Disp: , Rfl:     loratadine (CLARITIN) 10 mg tablet, Take 10 mg by mouth daily, Disp: , Rfl:     ALLERGIES:  Allergies   Allergen Reactions    Pollen Extract Allergic Rhinitis and Sneezing       REVIEW OF SYSTEMS:  Pertinent items are noted in HPI  A comprehensive review of systems was negative      LABS:  HgA1c:   Lab Results   Component Value Date    HGBA1C 6 0 02/25/2020     BMP:   Lab Results   Component Value Date    CALCIUM 8 9 03/02/2019     12/07/2017    K 3 7 03/02/2019    CO2 26 03/02/2019     03/02/2019    BUN 23 03/02/2019    CREATININE 0 76 03/02/2019           _____________________________________________________    PHYSICAL EXAMINATION:  /68   Pulse 77   Ht 5' (1 524 m)   Wt 60 3 kg (133 lb)   BMI 25 97 kg/m²   General: well developed and well nourished, alert, oriented times 3 and appears comfortable  Psychiatric: Normal  HEENT: Trachea Midline, No torticollis  Cardiovascular: No discernable arrhythmia  Pulmonary: No wheezing or stridor  Skin: No masses, erythema, lacerations, fluctation, ulcerations  Neurovascular: Sensation Intact to the Median, Ulnar, Radial Nerve, Motor Intact to the Median, Ulnar, Radial Nerve and Pulses Intact    MUSCULOSKELETAL EXAMINATION:  left ring finger:  Positive palpable nodule over the A1 pulley  Positive tenderness to palpation over A1 pulley  Positive catching   Positive clicking       _____________________________________________________  STUDIES REVIEWED:  No Studies to review      PROCEDURES PERFORMED:  Hand/upper extremity injection: L ring A1  Date/Time: 3/6/2020 9:37 AM  Consent given by: patient  Site marked: site marked  Timeout: Immediately prior to procedure a time out was called to verify the correct patient, procedure, equipment, support staff and site/side marked as required   Supporting Documentation  Indications: pain   Procedure Details  Condition:trigger finger Location: ring finger - L ring A1   Needle size: 25 G  Ultrasound guidance: no  Approach: volar  Medications administered: 6 mg betamethasone acetate-betamethasone sodium phosphate 6 (3-3) mg/mL; 1 mL lidocaine (PF) 2 %    Patient tolerance: patient tolerated the procedure well with no immediate complications  Dressing:  Sterile dressing applied                Scribe Attestation    I,:   Jacy Rosen am acting as a scribe while in the presence of the attending physician :        I,:   Maria Ines Tadeo MD personally performed the services described in this documentation    as scribed in my presence :

## 2020-03-13 LAB
ALBUMIN SERPL-MCNC: 4.2 G/DL (ref 3.6–5.1)
ALBUMIN/GLOB SERPL: 1.9 (CALC) (ref 1–2.5)
ALP SERPL-CCNC: 69 U/L (ref 37–153)
ALT SERPL-CCNC: 15 U/L (ref 6–29)
AST SERPL-CCNC: 13 U/L (ref 10–35)
BASOPHILS # BLD AUTO: 27 CELLS/UL (ref 0–200)
BASOPHILS NFR BLD AUTO: 0.4 %
BILIRUB SERPL-MCNC: 0.7 MG/DL (ref 0.2–1.2)
BUN SERPL-MCNC: 20 MG/DL (ref 7–25)
BUN/CREAT SERPL: NORMAL (CALC) (ref 6–22)
CALCIUM SERPL-MCNC: 9.6 MG/DL (ref 8.6–10.4)
CHLORIDE SERPL-SCNC: 106 MMOL/L (ref 98–110)
CHOLEST SERPL-MCNC: 206 MG/DL
CHOLEST/HDLC SERPL: 2.9 (CALC)
CO2 SERPL-SCNC: 30 MMOL/L (ref 20–32)
CREAT SERPL-MCNC: 0.83 MG/DL (ref 0.5–0.99)
EOSINOPHIL # BLD AUTO: 87 CELLS/UL (ref 15–500)
EOSINOPHIL NFR BLD AUTO: 1.3 %
ERYTHROCYTE [DISTWIDTH] IN BLOOD BY AUTOMATED COUNT: 12.7 % (ref 11–15)
GLOBULIN SER CALC-MCNC: 2.2 G/DL (CALC) (ref 1.9–3.7)
GLUCOSE SERPL-MCNC: 91 MG/DL (ref 65–99)
HCT VFR BLD AUTO: 45.1 % (ref 35–45)
HCV AB S/CO SERPL IA: 0.01
HCV AB SERPL QL IA: NORMAL
HDLC SERPL-MCNC: 72 MG/DL
HGB BLD-MCNC: 15.3 G/DL (ref 11.7–15.5)
HIV 1+2 AB+HIV1 P24 AG SERPL QL IA: NORMAL
LDLC SERPL CALC-MCNC: 119 MG/DL (CALC)
LYMPHOCYTES # BLD AUTO: 2519 CELLS/UL (ref 850–3900)
LYMPHOCYTES NFR BLD AUTO: 37.6 %
MCH RBC QN AUTO: 30.9 PG (ref 27–33)
MCHC RBC AUTO-ENTMCNC: 33.9 G/DL (ref 32–36)
MCV RBC AUTO: 91.1 FL (ref 80–100)
MONOCYTES # BLD AUTO: 422 CELLS/UL (ref 200–950)
MONOCYTES NFR BLD AUTO: 6.3 %
NEUTROPHILS # BLD AUTO: 3645 CELLS/UL (ref 1500–7800)
NEUTROPHILS NFR BLD AUTO: 54.4 %
NONHDLC SERPL-MCNC: 134 MG/DL (CALC)
PLATELET # BLD AUTO: 254 THOUSAND/UL (ref 140–400)
PMV BLD REES-ECKER: 11 FL (ref 7.5–12.5)
POTASSIUM SERPL-SCNC: 4.4 MMOL/L (ref 3.5–5.3)
PROT SERPL-MCNC: 6.4 G/DL (ref 6.1–8.1)
RBC # BLD AUTO: 4.95 MILLION/UL (ref 3.8–5.1)
SL AMB EGFR AFRICAN AMERICAN: 87 ML/MIN/1.73M2
SL AMB EGFR NON AFRICAN AMERICAN: 75 ML/MIN/1.73M2
SODIUM SERPL-SCNC: 142 MMOL/L (ref 135–146)
TRIGL SERPL-MCNC: 55 MG/DL
WBC # BLD AUTO: 6.7 THOUSAND/UL (ref 3.8–10.8)

## 2020-05-26 ENCOUNTER — TELEMEDICINE (OUTPATIENT)
Dept: FAMILY MEDICINE CLINIC | Facility: CLINIC | Age: 65
End: 2020-05-26

## 2020-05-26 DIAGNOSIS — E11.9 TYPE 2 DIABETES MELLITUS WITHOUT COMPLICATION, WITHOUT LONG-TERM CURRENT USE OF INSULIN (HCC): Primary | ICD-10-CM

## 2020-05-26 PROCEDURE — 99212 OFFICE O/P EST SF 10 MIN: CPT | Performed by: FAMILY MEDICINE

## 2020-05-26 RX ORDER — ATORVASTATIN CALCIUM 10 MG/1
10 TABLET, FILM COATED ORAL DAILY
Qty: 90 TABLET | Refills: 1 | Status: SHIPPED | OUTPATIENT
Start: 2020-05-26 | End: 2020-05-28 | Stop reason: SDUPTHER

## 2020-05-28 DIAGNOSIS — E11.9 TYPE 2 DIABETES MELLITUS WITHOUT COMPLICATION, WITHOUT LONG-TERM CURRENT USE OF INSULIN (HCC): ICD-10-CM

## 2020-05-28 RX ORDER — ATORVASTATIN CALCIUM 10 MG/1
10 TABLET, FILM COATED ORAL DAILY
Qty: 90 TABLET | Refills: 1 | Status: SHIPPED | OUTPATIENT
Start: 2020-05-28 | End: 2022-01-25 | Stop reason: SDUPTHER

## 2020-08-19 ENCOUNTER — OFFICE VISIT (OUTPATIENT)
Dept: OBGYN CLINIC | Facility: HOSPITAL | Age: 65
End: 2020-08-19
Payer: COMMERCIAL

## 2020-08-19 VITALS
WEIGHT: 138 LBS | HEART RATE: 73 BPM | BODY MASS INDEX: 27.09 KG/M2 | SYSTOLIC BLOOD PRESSURE: 107 MMHG | HEIGHT: 60 IN | DIASTOLIC BLOOD PRESSURE: 71 MMHG

## 2020-08-19 DIAGNOSIS — M65.342 TRIGGER FINGER, LEFT RING FINGER: Primary | ICD-10-CM

## 2020-08-19 PROCEDURE — 1036F TOBACCO NON-USER: CPT | Performed by: ORTHOPAEDIC SURGERY

## 2020-08-19 PROCEDURE — 99214 OFFICE O/P EST MOD 30 MIN: CPT | Performed by: ORTHOPAEDIC SURGERY

## 2020-08-19 PROCEDURE — 3008F BODY MASS INDEX DOCD: CPT | Performed by: ORTHOPAEDIC SURGERY

## 2020-08-19 PROCEDURE — 2022F DILAT RTA XM EVC RTNOPTHY: CPT | Performed by: ORTHOPAEDIC SURGERY

## 2020-08-19 PROCEDURE — 3008F BODY MASS INDEX DOCD: CPT | Performed by: FAMILY MEDICINE

## 2020-08-19 PROCEDURE — 3044F HG A1C LEVEL LT 7.0%: CPT | Performed by: ORTHOPAEDIC SURGERY

## 2020-08-19 RX ORDER — LIDOCAINE HYDROCHLORIDE AND EPINEPHRINE 10; 10 MG/ML; UG/ML
20 INJECTION, SOLUTION INFILTRATION; PERINEURAL ONCE
Status: CANCELLED | OUTPATIENT
Start: 2020-08-19 | End: 2020-08-19

## 2020-08-19 NOTE — PROGRESS NOTES
ASSESSMENT/PLAN:    Assessment:   Left ring trigger finger    Plan:   Trigger Finger Release  left  ring finger    Follow Up: After Surgery    To Do Next Visit:  Sutures out    Operative Discussions:  Trigger Finger Release: The anatomy and physiology of trigger finger was discussed with the patient today in the office  Edema and increased contact pressure within the flexor tendons at the A1 pulley can cause pain, crepitation, and limitation of function  Treatment options include resting MP blocking splints to decrease edema, oral anti-inflammatory medications, home or formal therapy exercises, up to 2 steroid injections or surgical release  While majority of patients do respond to conservative treatment, up to 20% may require surgical release  The patient has elected release of the trigger finger  The patient has elected to undergo a release of the A1 pulley (trigger finger)  A small incision will be made over the palmar aspect of the hand, the tendon sheath holding the flexor tendons will be released  In the postoperative period, light activities are allowed immediately, driving is allowed when narcotic medication has stopped, and the incision may get wet after 2 days  Heavy activities (lifting more than approximately 10 pounds) will be allowed after the follow up appointment in 1-2 weeks  While the pain and discomfort within the wrist typically improves rapidly, some residual discomfort may be present for up to 6 weeks  The nodule that is typically palpable in the palmar aspect of the hand will not be removed, as this would necessitate removal of a portion of the flexor tendon, however the catching, clicking, and locking should resolve  Approximate success rate is 98%  The risks and benefits of the procedure were explained to the patient, which include, but are not limited to: Bleeding, infection, recurrence, pain, scar, damage to tendons, damage to nerves, and damage to blood vessels, need for future surgery and complications related to anesthesia  If bony work is done, risks also include malunion and nonunion  These risks, along with alternative conservative treatment options, and postoperative protocols were voiced back and understood by the patient  All questions were answered to the patient's satisfaction  The patient agrees to comply with a standard postoperative protocol, and is willing to proceed  Education was provided via written and auditory forms  There were no barriers to learning  Written handouts regarding wound care, incision and scar care, and general preoperative information, as well as risks and benefits were provided to the patient       _____________________________________________________  CHIEF COMPLAINT:  Chief Complaint   Patient presents with    Left Ring Finger - Follow-up, Locking         SUBJECTIVE:  Mary Rodriguez is a 63y o  year old female who presents for follow up regarding Trigger Finger  left  ring finger  Since last visit, Mary Rodriguez has tried steroid injections with only partial relief  Today there is Catching and Locking to the left ring finger      Radiation: None  Associated symptoms: Catching and Locking    PAST MEDICAL HISTORY:  Past Medical History:   Diagnosis Date    Abnormal glucose     last assessed 12/22/15    Acid reflux     Anxiety     Arthritis     osteoarthritis multiple sites    Benign paroxysmal positional vertigo     Carpal tunnel syndrome     Chronic kidney disease     nephrolithiasis    Depression     Insomnia     Patellofemoral dysfunction     last assessed  9/15/14       PAST SURGICAL HISTORY:  Past Surgical History:   Procedure Laterality Date    COLONOSCOPY      KNEE SURGERY Right     [place] INCISE FINGER TENDON SHEATH Right 8/25/2016    Procedure: RELEASE LONG AND RING TRIGGER FINGERS;  Surgeon: Leopold Mitts, MD;  Location: [facility] OR;  Service: Orthopedics    ID WRIST Benita Flair LIG Right 8/25/2016 Procedure: RELEASE CARPAL TUNNEL ENDOSCOPIC;  Surgeon: Asuncion George MD;  Location:  MAIN OR;  Service: Orthopedics    TUBAL LIGATION         FAMILY HISTORY:  Family History   Problem Relation Age of Onset    Diabetes Mother     Heart disease Mother     Hypertension Mother     Hypothyroidism Mother     Arthritis Father     Crohn's disease Family     Psoriasis Family     Rheum arthritis Family     Lupus Family         systemic- erythematosus    Ulcerative colitis Family     Breast cancer additional onset Cousin 46        paternal       SOCIAL HISTORY:  Social History     Tobacco Use    Smoking status: Never Smoker    Smokeless tobacco: Never Used   Substance Use Topics    Alcohol use: No     Comment: rare/social as per [employer]    Drug use: No       MEDICATIONS:    Current Outpatient Medications:     atorvastatin (LIPITOR) 10 mg tablet, Take 1 tablet (10 mg total) by mouth daily, Disp: 90 tablet, Rfl: 1    diclofenac sodium (VOLTAREN) 1 %, Apply 2 g topically 4 (four) times a day, Disp: 100 g, Rfl: 1    fluticasone (FLONASE) 50 mcg/act nasal spray, 1 spray into each nostril daily, Disp: 16 g, Rfl: 4    glucose blood (ONE TOUCH ULTRA TEST) test strip, 1 each by Other route daily as needed (hypoglycemia) Use as instructed, Disp: 90 each, Rfl: 1    loratadine (CLARITIN) 10 mg tablet, Take 10 mg by mouth daily, Disp: , Rfl:     ALLERGIES:  Allergies   Allergen Reactions    Pollen Extract Allergic Rhinitis and Sneezing       REVIEW OF SYSTEMS:  Pertinent items are noted in HPI  A comprehensive review of systems was negative      LABS:  HgA1c:   Lab Results   Component Value Date    HGBA1C 6 0 02/25/2020     BMP:   Lab Results   Component Value Date    CALCIUM 9 6 03/12/2020     12/07/2017    K 4 4 03/12/2020    CO2 30 03/12/2020     03/12/2020    BUN 20 03/12/2020    CREATININE 0 83 03/12/2020           _____________________________________________________    PHYSICAL EXAMINATION:  /71   Pulse 73   Ht 5' (1 524 m)   Wt 62 6 kg (138 lb)   BMI 26 95 kg/m²   General: well developed and well nourished, alert, oriented times 3 and appears comfortable  Psychiatric: Normal  HEENT: Trachea Midline, No torticollis  Cardiovascular: No discernable arrhythmia  Pulmonary: No wheezing or stridor  Skin: No masses, erythema, lacerations, fluctation, ulcerations  Neurovascular: Sensation Intact to the Median, Ulnar, Radial Nerve, Motor Intact to the Median, Ulnar, Radial Nerve and Pulses Intact    MUSCULOSKELETAL EXAMINATION:  left ring finger:  Positive palpable nodule over the A1 pulley  Positive tenderness to palpation over A1 pulley  Positive catching   Positive clicking     _____________________________________________________  STUDIES REVIEWED:  No Studies to review      PROCEDURES PERFORMED:  Procedures  No Procedures performed today      Scribe Attestation    I,:    am acting as a scribe while in the presence of the attending physician :        I,:    personally performed the services described in this documentation    as scribed in my presence :

## 2020-08-19 NOTE — H&P
ASSESSMENT/PLAN:    Assessment:   Left ring trigger finger    Plan:   Trigger Finger Release  left  ring finger    Follow Up: After Surgery    To Do Next Visit:  Sutures out    Operative Discussions:  Trigger Finger Release: The anatomy and physiology of trigger finger was discussed with the patient today in the office  Edema and increased contact pressure within the flexor tendons at the A1 pulley can cause pain, crepitation, and limitation of function  Treatment options include resting MP blocking splints to decrease edema, oral anti-inflammatory medications, home or formal therapy exercises, up to 2 steroid injections or surgical release  While majority of patients do respond to conservative treatment, up to 20% may require surgical release  The patient has elected release of the trigger finger  The patient has elected to undergo a release of the A1 pulley (trigger finger)  A small incision will be made over the palmar aspect of the hand, the tendon sheath holding the flexor tendons will be released  In the postoperative period, light activities are allowed immediately, driving is allowed when narcotic medication has stopped, and the incision may get wet after 2 days  Heavy activities (lifting more than approximately 10 pounds) will be allowed after the follow up appointment in 1-2 weeks  While the pain and discomfort within the wrist typically improves rapidly, some residual discomfort may be present for up to 6 weeks  The nodule that is typically palpable in the palmar aspect of the hand will not be removed, as this would necessitate removal of a portion of the flexor tendon, however the catching, clicking, and locking should resolve  Approximate success rate is 98%  The risks and benefits of the procedure were explained to the patient, which include, but are not limited to: Bleeding, infection, recurrence, pain, scar, damage to tendons, damage to nerves, and damage to blood vessels, need for future surgery and complications related to anesthesia  If bony work is done, risks also include malunion and nonunion  These risks, along with alternative conservative treatment options, and postoperative protocols were voiced back and understood by the patient  All questions were answered to the patient's satisfaction  The patient agrees to comply with a standard postoperative protocol, and is willing to proceed  Education was provided via written and auditory forms  There were no barriers to learning  Written handouts regarding wound care, incision and scar care, and general preoperative information, as well as risks and benefits were provided to the patient       _____________________________________________________  CHIEF COMPLAINT:  Chief Complaint   Patient presents with    Left Ring Finger - Follow-up, Locking         SUBJECTIVE:  Deysi Dunaway is a 63y o  year old female who presents for follow up regarding Trigger Finger  left  ring finger  Since last visit, Deysi Dunaway has tried steroid injections with only partial relief  Today there is Catching and Locking to the left ring finger      Radiation: None  Associated symptoms: Catching and Locking    PAST MEDICAL HISTORY:  Past Medical History:   Diagnosis Date    Abnormal glucose     last assessed 12/22/15    Acid reflux     Anxiety     Arthritis     osteoarthritis multiple sites    Benign paroxysmal positional vertigo     Carpal tunnel syndrome     Chronic kidney disease     nephrolithiasis    Depression     Insomnia     Patellofemoral dysfunction     last assessed  9/15/14       PAST SURGICAL HISTORY:  Past Surgical History:   Procedure Laterality Date    COLONOSCOPY      KNEE SURGERY Right     [place] INCISE FINGER TENDON SHEATH Right 8/25/2016    Procedure: RELEASE LONG AND RING TRIGGER FINGERS;  Surgeon: Indio Moss MD;  Location: [facility] OR;  Service: Orthopedics    SD WRIST Peggye Peaks LIG Right 8/25/2016 Procedure: RELEASE CARPAL TUNNEL ENDOSCOPIC;  Surgeon: Ector Marie MD;  Location:  MAIN OR;  Service: Orthopedics    TUBAL LIGATION         FAMILY HISTORY:  Family History   Problem Relation Age of Onset    Diabetes Mother     Heart disease Mother     Hypertension Mother     Hypothyroidism Mother     Arthritis Father     Crohn's disease Family     Psoriasis Family     Rheum arthritis Family     Lupus Family         systemic- erythematosus    Ulcerative colitis Family     Breast cancer additional onset Cousin 46        paternal       SOCIAL HISTORY:  Social History     Tobacco Use    Smoking status: Never Smoker    Smokeless tobacco: Never Used   Substance Use Topics    Alcohol use: No     Comment: rare/social as per [employer]    Drug use: No       MEDICATIONS:    Current Outpatient Medications:     atorvastatin (LIPITOR) 10 mg tablet, Take 1 tablet (10 mg total) by mouth daily, Disp: 90 tablet, Rfl: 1    diclofenac sodium (VOLTAREN) 1 %, Apply 2 g topically 4 (four) times a day, Disp: 100 g, Rfl: 1    fluticasone (FLONASE) 50 mcg/act nasal spray, 1 spray into each nostril daily, Disp: 16 g, Rfl: 4    glucose blood (ONE TOUCH ULTRA TEST) test strip, 1 each by Other route daily as needed (hypoglycemia) Use as instructed, Disp: 90 each, Rfl: 1    loratadine (CLARITIN) 10 mg tablet, Take 10 mg by mouth daily, Disp: , Rfl:     ALLERGIES:  Allergies   Allergen Reactions    Pollen Extract Allergic Rhinitis and Sneezing       REVIEW OF SYSTEMS:  Pertinent items are noted in HPI  A comprehensive review of systems was negative      LABS:  HgA1c:   Lab Results   Component Value Date    HGBA1C 6 0 02/25/2020     BMP:   Lab Results   Component Value Date    CALCIUM 9 6 03/12/2020     12/07/2017    K 4 4 03/12/2020    CO2 30 03/12/2020     03/12/2020    BUN 20 03/12/2020    CREATININE 0 83 03/12/2020           _____________________________________________________    PHYSICAL EXAMINATION:  /71   Pulse 73   Ht 5' (1 524 m)   Wt 62 6 kg (138 lb)   BMI 26 95 kg/m²   General: well developed and well nourished, alert, oriented times 3 and appears comfortable  Psychiatric: Normal  HEENT: Trachea Midline, No torticollis  Cardiovascular: No discernable arrhythmia  Pulmonary: No wheezing or stridor  Skin: No masses, erythema, lacerations, fluctation, ulcerations  Neurovascular: Sensation Intact to the Median, Ulnar, Radial Nerve, Motor Intact to the Median, Ulnar, Radial Nerve and Pulses Intact    MUSCULOSKELETAL EXAMINATION:  left ring finger:  Positive palpable nodule over the A1 pulley  Positive tenderness to palpation over A1 pulley  Positive catching   Positive clicking     _____________________________________________________  STUDIES REVIEWED:  No Studies to review      PROCEDURES PERFORMED:  Procedures  No Procedures performed today      Scribe Attestation    I,:    am acting as a scribe while in the presence of the attending physician :        I,:    personally performed the services described in this documentation    as scribed in my presence :

## 2020-08-19 NOTE — H&P (VIEW-ONLY)
ASSESSMENT/PLAN:    Assessment:   Left ring trigger finger    Plan:   Trigger Finger Release  left  ring finger    Follow Up: After Surgery    To Do Next Visit:  Sutures out    Operative Discussions:  Trigger Finger Release: The anatomy and physiology of trigger finger was discussed with the patient today in the office  Edema and increased contact pressure within the flexor tendons at the A1 pulley can cause pain, crepitation, and limitation of function  Treatment options include resting MP blocking splints to decrease edema, oral anti-inflammatory medications, home or formal therapy exercises, up to 2 steroid injections or surgical release  While majority of patients do respond to conservative treatment, up to 20% may require surgical release  The patient has elected release of the trigger finger  The patient has elected to undergo a release of the A1 pulley (trigger finger)  A small incision will be made over the palmar aspect of the hand, the tendon sheath holding the flexor tendons will be released  In the postoperative period, light activities are allowed immediately, driving is allowed when narcotic medication has stopped, and the incision may get wet after 2 days  Heavy activities (lifting more than approximately 10 pounds) will be allowed after the follow up appointment in 1-2 weeks  While the pain and discomfort within the wrist typically improves rapidly, some residual discomfort may be present for up to 6 weeks  The nodule that is typically palpable in the palmar aspect of the hand will not be removed, as this would necessitate removal of a portion of the flexor tendon, however the catching, clicking, and locking should resolve  Approximate success rate is 98%  The risks and benefits of the procedure were explained to the patient, which include, but are not limited to: Bleeding, infection, recurrence, pain, scar, damage to tendons, damage to nerves, and damage to blood vessels, need for future surgery and complications related to anesthesia  If bony work is done, risks also include malunion and nonunion  These risks, along with alternative conservative treatment options, and postoperative protocols were voiced back and understood by the patient  All questions were answered to the patient's satisfaction  The patient agrees to comply with a standard postoperative protocol, and is willing to proceed  Education was provided via written and auditory forms  There were no barriers to learning  Written handouts regarding wound care, incision and scar care, and general preoperative information, as well as risks and benefits were provided to the patient       _____________________________________________________  CHIEF COMPLAINT:  Chief Complaint   Patient presents with    Left Ring Finger - Follow-up, Locking         SUBJECTIVE:  Carol Ann Garcia is a 63y o  year old female who presents for follow up regarding Trigger Finger  left  ring finger  Since last visit, Carol Ann Garcia has tried steroid injections with only partial relief  Today there is Catching and Locking to the left ring finger      Radiation: None  Associated symptoms: Catching and Locking    PAST MEDICAL HISTORY:  Past Medical History:   Diagnosis Date    Abnormal glucose     last assessed 12/22/15    Acid reflux     Anxiety     Arthritis     osteoarthritis multiple sites    Benign paroxysmal positional vertigo     Carpal tunnel syndrome     Chronic kidney disease     nephrolithiasis    Depression     Insomnia     Patellofemoral dysfunction     last assessed  9/15/14       PAST SURGICAL HISTORY:  Past Surgical History:   Procedure Laterality Date    COLONOSCOPY      KNEE SURGERY Right     MO INCISE FINGER TENDON SHEATH Right 8/25/2016    Procedure: RELEASE LONG AND RING TRIGGER FINGERS;  Surgeon: Bonita Hernández MD;  Location: [facility] OR;  Service: Orthopedics    MO WRIST Evy Karson LIG Right 8/25/2016 Procedure: RELEASE CARPAL TUNNEL ENDOSCOPIC;  Surgeon: Tiki Bautista MD;  Location:  MAIN OR;  Service: Orthopedics    TUBAL LIGATION         FAMILY HISTORY:  Family History   Problem Relation Age of Onset    Diabetes Mother     Heart disease Mother     Hypertension Mother     Hypothyroidism Mother     Arthritis Father     Crohn's disease Family     Psoriasis Family     Rheum arthritis Family     Lupus Family         systemic- erythematosus    Ulcerative colitis Family     Breast cancer additional onset Cousin 46        paternal       SOCIAL HISTORY:  Social History     Tobacco Use    Smoking status: Never Smoker    Smokeless tobacco: Never Used   Substance Use Topics    Alcohol use: No     Comment: rare/social as per [employer]    Drug use: No       MEDICATIONS:    Current Outpatient Medications:     atorvastatin (LIPITOR) 10 mg tablet, Take 1 tablet (10 mg total) by mouth daily, Disp: 90 tablet, Rfl: 1    diclofenac sodium (VOLTAREN) 1 %, Apply 2 g topically 4 (four) times a day, Disp: 100 g, Rfl: 1    fluticasone (FLONASE) 50 mcg/act nasal spray, 1 spray into each nostril daily, Disp: 16 g, Rfl: 4    glucose blood (ONE TOUCH ULTRA TEST) test strip, 1 each by Other route daily as needed (hypoglycemia) Use as instructed, Disp: 90 each, Rfl: 1    loratadine (CLARITIN) 10 mg tablet, Take 10 mg by mouth daily, Disp: , Rfl:     ALLERGIES:  Allergies   Allergen Reactions    Pollen Extract Allergic Rhinitis and Sneezing       REVIEW OF SYSTEMS:  Pertinent items are noted in HPI  A comprehensive review of systems was negative      LABS:  HgA1c:   Lab Results   Component Value Date    HGBA1C 6 0 02/25/2020     BMP:   Lab Results   Component Value Date    CALCIUM 9 6 03/12/2020     12/07/2017    K 4 4 03/12/2020    CO2 30 03/12/2020     03/12/2020    BUN 20 03/12/2020    CREATININE 0 83 03/12/2020           _____________________________________________________    PHYSICAL EXAMINATION:  /71   Pulse 73   Ht 5' (1 524 m)   Wt 62 6 kg (138 lb)   BMI 26 95 kg/m²   General: well developed and well nourished, alert, oriented times 3 and appears comfortable  Psychiatric: Normal  HEENT: Trachea Midline, No torticollis  Cardiovascular: No discernable arrhythmia  Pulmonary: No wheezing or stridor  Skin: No masses, erythema, lacerations, fluctation, ulcerations  Neurovascular: Sensation Intact to the Median, Ulnar, Radial Nerve, Motor Intact to the Median, Ulnar, Radial Nerve and Pulses Intact    MUSCULOSKELETAL EXAMINATION:  left ring finger:  Positive palpable nodule over the A1 pulley  Positive tenderness to palpation over A1 pulley  Positive catching   Positive clicking     _____________________________________________________  STUDIES REVIEWED:  No Studies to review      PROCEDURES PERFORMED:  Procedures  No Procedures performed today      Scribe Attestation    I,:    am acting as a scribe while in the presence of the attending physician :        I,:    personally performed the services described in this documentation    as scribed in my presence :

## 2020-09-10 ENCOUNTER — OFFICE VISIT (OUTPATIENT)
Dept: FAMILY MEDICINE CLINIC | Facility: CLINIC | Age: 65
End: 2020-09-10

## 2020-09-10 VITALS
BODY MASS INDEX: 26.66 KG/M2 | HEART RATE: 74 BPM | HEIGHT: 60 IN | WEIGHT: 135.8 LBS | RESPIRATION RATE: 16 BRPM | SYSTOLIC BLOOD PRESSURE: 120 MMHG | DIASTOLIC BLOOD PRESSURE: 90 MMHG | TEMPERATURE: 98.1 F

## 2020-09-10 DIAGNOSIS — M77.8 RIGHT ELBOW TENDINITIS: ICD-10-CM

## 2020-09-10 DIAGNOSIS — E11.9 TYPE 2 DIABETES MELLITUS WITHOUT COMPLICATION, WITHOUT LONG-TERM CURRENT USE OF INSULIN (HCC): Primary | ICD-10-CM

## 2020-09-10 DIAGNOSIS — Z23 ENCOUNTER FOR IMMUNIZATION: ICD-10-CM

## 2020-09-10 DIAGNOSIS — M15.9 GENERALIZED OSTEOARTHRITIS OF MULTIPLE SITES: ICD-10-CM

## 2020-09-10 LAB — SL AMB POCT HEMOGLOBIN AIC: 5.8 (ref ?–6.5)

## 2020-09-10 PROCEDURE — 90471 IMMUNIZATION ADMIN: CPT | Performed by: FAMILY MEDICINE

## 2020-09-10 PROCEDURE — 90472 IMMUNIZATION ADMIN EACH ADD: CPT | Performed by: FAMILY MEDICINE

## 2020-09-10 PROCEDURE — 3044F HG A1C LEVEL LT 7.0%: CPT | Performed by: FAMILY MEDICINE

## 2020-09-10 PROCEDURE — 90715 TDAP VACCINE 7 YRS/> IM: CPT | Performed by: FAMILY MEDICINE

## 2020-09-10 PROCEDURE — 90682 RIV4 VACC RECOMBINANT DNA IM: CPT | Performed by: FAMILY MEDICINE

## 2020-09-10 PROCEDURE — 83036 HEMOGLOBIN GLYCOSYLATED A1C: CPT | Performed by: FAMILY MEDICINE

## 2020-09-10 PROCEDURE — 1036F TOBACCO NON-USER: CPT | Performed by: FAMILY MEDICINE

## 2020-09-10 PROCEDURE — 82043 UR ALBUMIN QUANTITATIVE: CPT | Performed by: FAMILY MEDICINE

## 2020-09-10 PROCEDURE — 99213 OFFICE O/P EST LOW 20 MIN: CPT | Performed by: FAMILY MEDICINE

## 2020-09-10 PROCEDURE — 82570 ASSAY OF URINE CREATININE: CPT | Performed by: FAMILY MEDICINE

## 2020-09-10 NOTE — ASSESSMENT & PLAN NOTE
Patient reports bilateral shoulder pain  -She started PT last week and reports she feels is helping  -Continue Tylenol/Ibuprofen PRN for pain and Voltaren cream

## 2020-09-10 NOTE — ASSESSMENT & PLAN NOTE
Lab Results   Component Value Date    HGBA1C 5 8 09/10/2020   Well controlled off medications  -Reports lower FBG 84  Asymptomatic  Recommended against checking BG since she is not taking insulin but patient reports she needs no make sure her BG is ok  -Microalbumin/Cr ratio collected at this time   Follow up results    -Flu shot and Tdap given at this time

## 2020-09-10 NOTE — PROGRESS NOTES
Assessment/Plan:    Type 2 diabetes mellitus without complication, without long-term current use of insulin ([facility] )    Lab Results   Component Value Date    HGBA1C 5 8 09/10/2020   Well controlled off medications  -Reports lower FBG 84  Asymptomatic  Recommended against checking BG since she is not taking insulin but patient reports she needs no make sure her BG is ok  -Microalbumin/Cr ratio collected at this time  Follow up results    -Flu shot and Tdap given at this time    Generalized osteoarthritis of multiple sites  Patient reports bilateral shoulder pain  -She started PT last week and reports she feels is helping  -Continue Tylenol/Ibuprofen PRN for pain and Voltaren cream            Problem List Items Addressed This Visit        Endocrine    Type 2 diabetes mellitus without complication, without long-term current use of insulin ([facility] ) - Primary       Lab Results   Component Value Date    HGBA1C 5 8 09/10/2020   Well controlled off medications  -Reports lower FBG 84  Asymptomatic  Recommended against checking BG since she is not taking insulin but patient reports she needs no make sure her BG is ok  -Microalbumin/Cr ratio collected at this time   Follow up results    -Flu shot and Tdap given at this time         Relevant Medications    glucose blood (ONE TOUCH ULTRA TEST) test strip    Other Relevant Orders    POCT hemoglobin A1c    Microalbumin / creatinine urine ratio       Musculoskeletal and Integument    Generalized osteoarthritis of multiple sites     Patient reports bilateral shoulder pain  -She started PT last week and reports she feels is helping  -Continue Tylenol/Ibuprofen PRN for pain and Voltaren cream            Right elbow tendinitis    Relevant Medications    diclofenac sodium (VOLTAREN) 1 %      Other Visit Diagnoses     Encounter for immunization        Relevant Orders    influenza vaccine, quadrivalent, recombinant, PF, 0 5 mL, for patients 18 yr+ (FLUBLOK) (Completed)    TDAP VACCINE GREATER THAN OR EQUAL TO 8YO IM (Completed)            Subjective:      Patient ID: Jay Reyes is a 63 y o  female  HPI     Patient is a 62 yo F who presents for diabetes follow up  She has been well controlled without medications  She reports lowest BG 84 last week  She denies symptoms  She states she eats very healthy and tries to keep pasta and sugars limited  She is interested in getting flu shot at this visit  She also reports bilateral shoulder pain  This is a chronic problem  She started PT this week and she feels its helping  The following portions of the patient's history were reviewed and updated as appropriate:   She  has a past medical history of Abnormal glucose, Acid reflux, Anxiety, Arthritis, Benign paroxysmal positional vertigo, Carpal tunnel syndrome, Chronic kidney disease, Depression, Insomnia, and Patellofemoral dysfunction  She  has a past surgical history that includes Colonoscopy; Tubal ligation; Knee surgery (Right); pr wrist arthroscop,release xvers lig (Right, 8/25/2016); and pr incise finger tendon sheath (Right, 8/25/2016)  She  reports that she has never smoked  She has never used smokeless tobacco  She reports that she does not drink alcohol or use drugs  Current Outpatient Medications   Medication Sig Dispense Refill    diclofenac sodium (VOLTAREN) 1 % Apply 2 g topically 4 (four) times a day 100 g 1    fluticasone (FLONASE) 50 mcg/act nasal spray 1 spray into each nostril daily 16 g 4    glucose blood (ONE TOUCH ULTRA TEST) test strip 1 each by Other route daily as needed (hypoglycemia) Use as instructed 90 each 1    loratadine (CLARITIN) 10 mg tablet Take 10 mg by mouth daily      atorvastatin (LIPITOR) 10 mg tablet Take 1 tablet (10 mg total) by mouth daily (Patient not taking: Reported on 9/10/2020) 90 tablet 1     No current facility-administered medications for this visit  She is allergic to pollen extract       Review of Systems   Constitutional: Negative for fever  Eyes: Negative for visual disturbance  Respiratory: Negative for cough  Cardiovascular: Negative for chest pain  Gastrointestinal: Negative for abdominal distention, abdominal pain, constipation and diarrhea  Musculoskeletal: Positive for arthralgias (Shoulder pain  Bilateral)  Negative for back pain  Neurological: Negative for light-headedness and headaches  Psychiatric/Behavioral: The patient is not nervous/anxious  Objective:      /90 (BP Location: Left arm, Patient Position: Sitting, Cuff Size: Standard)   Pulse 74   Temp 98 1 °F (36 7 °C) (Tympanic)   Resp 16   Ht 5' (1 524 m)   Wt 61 6 kg (135 lb 12 8 oz)   BMI 26 52 kg/m²          Physical Exam  Vitals signs reviewed  Constitutional:       General: She is not in acute distress  Appearance: She is well-developed  She is not diaphoretic  HENT:      Head: Normocephalic and atraumatic  Neck:      Musculoskeletal: Normal range of motion and neck supple  Thyroid: No thyromegaly  Cardiovascular:      Rate and Rhythm: Normal rate and regular rhythm  Heart sounds: Normal heart sounds  No murmur  No friction rub  No gallop  Pulmonary:      Effort: Pulmonary effort is normal  No respiratory distress  Breath sounds: Normal breath sounds  No wheezing or rales  Chest:      Chest wall: No tenderness  Abdominal:      General: Bowel sounds are normal  There is no distension  Palpations: Abdomen is soft  There is no mass  Tenderness: There is no abdominal tenderness  There is no guarding or rebound  Musculoskeletal: Normal range of motion  General: No tenderness or deformity  Lymphadenopathy:      Cervical: No cervical adenopathy  Skin:     General: Skin is warm and dry  Findings: No erythema or rash  Neurological:      Mental Status: She is alert and oriented to person, place, and time     Psychiatric:         Behavior: Behavior normal          Thought Content: Thought content normal          Judgment: Judgment normal

## 2020-09-11 LAB
CREAT UR-MCNC: 122 MG/DL
MICROALBUMIN UR-MCNC: 11.3 MG/L (ref 0–20)
MICROALBUMIN/CREAT 24H UR: 9 MG/G CREATININE (ref 0–30)

## 2020-09-11 PROCEDURE — 3061F NEG MICROALBUMINURIA REV: CPT | Performed by: FAMILY MEDICINE

## 2020-09-11 RX ORDER — ACETAMINOPHEN 325 MG/1
650 TABLET ORAL EVERY 6 HOURS PRN
COMMUNITY
End: 2020-09-15 | Stop reason: HOSPADM

## 2020-09-11 NOTE — PRE-PROCEDURE INSTRUCTIONS
Pre-Surgery Instructions:   Medication Instructions    acetaminophen (TYLENOL) 325 mg tablet Patient was instructed by Physician and understands   BIOTIN 5000 PO Patient was instructed by Physician and understands   Cyanocobalamin (VITAMIN B12 PO) Patient was instructed by Physician and understands   diclofenac sodium (VOLTAREN) 1 % Patient was instructed by Physician and understands   fluticasone (FLONASE) 50 mcg/act nasal spray Patient was instructed by Physician and understands   Ibuprofen (ADVIL PO) Patient was instructed by Physician and understands   loratadine (CLARITIN) 10 mg tablet Patient was instructed by Physician and understands   TURMERIC PO Patient was instructed by Physician and understands  Have you had / have a sore throat? No  have you had / have a cough less than 1 week? No  Have you had / have a fever greater than 100 0 - 100  4? No  Are you experiencing any shortness of breath? No    Review with patient via phone medications list  Advice ASC call  Verbalized understanding

## 2020-09-15 ENCOUNTER — HOSPITAL ENCOUNTER (OUTPATIENT)
Facility: HOSPITAL | Age: 65
Setting detail: OUTPATIENT SURGERY
Discharge: HOME | End: 2020-09-15
Attending: ORTHOPAEDIC SURGERY | Admitting: ORTHOPAEDIC SURGERY
Payer: COMMERCIAL

## 2020-09-15 VITALS
TEMPERATURE: 98.3 F | BODY MASS INDEX: 25.11 KG/M2 | OXYGEN SATURATION: 98 % | HEART RATE: 69 BPM | DIASTOLIC BLOOD PRESSURE: 63 MMHG | SYSTOLIC BLOOD PRESSURE: 120 MMHG | HEIGHT: 61 IN | WEIGHT: 133 LBS | RESPIRATION RATE: 18 BRPM

## 2020-09-15 DIAGNOSIS — M65.342 TRIGGER FINGER, LEFT RING FINGER: Primary | ICD-10-CM

## 2020-09-15 PROCEDURE — 26055 INCISE FINGER TENDON SHEATH: CPT | Performed by: ORTHOPAEDIC SURGERY

## 2020-09-15 RX ORDER — NAPROXEN SODIUM 220 MG
220 TABLET ORAL 2 TIMES DAILY WITH MEALS
Qty: 10 TABLET | Refills: 0 | Status: SHIPPED | OUTPATIENT
Start: 2020-09-15 | End: 2021-08-09 | Stop reason: ALTCHOICE

## 2020-09-15 RX ORDER — LIDOCAINE HYDROCHLORIDE AND EPINEPHRINE 10; 10 MG/ML; UG/ML
20 INJECTION, SOLUTION INFILTRATION; PERINEURAL ONCE
Status: DISCONTINUED | OUTPATIENT
Start: 2020-09-15 | End: 2020-09-15 | Stop reason: HOSPADM

## 2020-09-15 RX ORDER — MAGNESIUM HYDROXIDE 1200 MG/15ML
LIQUID ORAL AS NEEDED
Status: DISCONTINUED | OUTPATIENT
Start: 2020-09-15 | End: 2020-09-15 | Stop reason: HOSPADM

## 2020-09-15 RX ORDER — SENNOSIDES 8.6 MG
650 CAPSULE ORAL EVERY 8 HOURS
Qty: 15 TABLET | Refills: 0 | Status: SHIPPED | OUTPATIENT
Start: 2020-09-15

## 2020-09-15 RX ORDER — HYDROCODONE BITARTRATE AND ACETAMINOPHEN 5; 325 MG/1; MG/1
1 TABLET ORAL EVERY 6 HOURS PRN
Qty: 5 TABLET | Refills: 0 | Status: SHIPPED | OUTPATIENT
Start: 2020-09-15 | End: 2020-09-20

## 2020-09-15 RX ADMIN — SODIUM BICARBONATE: 84 INJECTION, SOLUTION INTRAVENOUS at 10:53

## 2020-09-15 NOTE — INTERVAL H&P NOTE
H&P reviewed  After examining the patient I find no changes in the patients condition since the H&P had been written    Patient for left ring finger trigger finger release    Vitals:    09/15/20 1028   BP: (!) 90/46   Pulse: 73   Resp: 16   Temp: (!) 97 2 °F (36 2 °C)   SpO2: 99%

## 2020-09-15 NOTE — OP NOTE
OPERATIVE REPORT  PATIENT NAME: Mae Dillon  :  1955  MRN: [id number]  Pt Location: [facility]    SURGERY DATE: 09/15/20    Surgeon(s) and Role:     * Evita Mace MD - Primary    Pre-Op Diagnosis:  Trigger finger, left ring finger [M65 342]    Post-Op Diagnosis:   Trigger finger, left ring finger [M65 342]    Procedure(s) (LRB):  RELEASE TRIGGER FINGER RING (Left)    Specimen(s):  * No orders in the log *    Estimated Blood Loss:   Minimal      Anesthesia Type:   Local    Operative Indications: The patient has a history of Trigger Finger  left  ring finger that was recalcitrant to conservative management  The decision was made to bring the patient to the operating room for Trigger Finger Release  left  ring finger  Risks of the procedure were explained which include, but are not limited to bleeding; infection; damage to nerves, arteries,veins, tendons; scar; pain; need for reoperation; failure to give desired result; and risks of anaesthesia  All questions were answered to satisfaction and they were willing to proceed  Operative Findings:  Left ring trigger finger    Complications:   None    Procedure and Technique:  After the patient, site, and procedure were identified, the patient was brought into the operating room in a supine position  Local anaesthesia was adminstered in the preoperative holding area  A tourniquet was not used  The  left upper extremity was then prepped and drapped in a normal, sterile, orthopedic fashion  After the patient, site, and procedure were once again identified, attention was turned to the left ring finger  An incision was made over the flexor tendon sheath at the level of the A1 pulley  Dissection was carried out in-line with the flexor tendon sheath and the radial and ulnar digital artery and nerve were protected  The A1 pulley was identified at the base of the incision    Under direct visualization, the A1 pulley was divided along the midline in its entirety with care taken to avoid injury to the underlying tendon  The tendons were examined to ensure that no further catching, popping, clicking or locking occurred with motion of the finger  At the completion of the procedure, hemostasis was obtained with cautery and direct pressure  The wounds were copiously irrigated with sterile solution  The wounds were closed with Prolene  Sterile dressings were applied, including Xeroform, gauze, tweeners, webril, ACE  Please note, all sponge, needle, and instrument counts were correct prior to closure  Loupe magnification was utilized  The patient tolerated the procedure well       I was present for the entire procedure and A qualified resident physician was not available    Patient Disposition:  APU and hemodynamically stable    SIGNATURE: Hugo Pendleton MD  DATE: 09/15/20  TIME: 11:23 AM

## 2020-09-22 NOTE — PROGRESS NOTES
Assessment:   S/P Release Trigger Finger Ring - Left on 9/15/2020    Plan:   Resume activities as tolerated and scar tissue massage  No soaking x 48 hours    Follow Up:  PRN      CHIEF COMPLAINT:  No chief complaint on file  SUBJECTIVE:  Ventura Armstrong is a 63 y o  female who presents for follow up S/P Release Trigger Finger Ring - Left on 9/15/2020  Patient states she has some swelling and stiffness but denies any clicking/catching of the finger  PHYSICAL EXAMINATION:  Vital signs: There were no vitals taken for this visit    General: well developed and well nourished, alert, oriented times 3 and appears comfortable  Psychiatric: Normal    MUSCULOSKELETAL EXAMINATION:  Incision: Clean, dry, intact  Range of Motion: As expected, full composite fist possible and no clicking/triggering  Neurovascular status: Neuro intact, good cap refill  Done today: Sutures out and Steri strips applied      STUDIES REVIEWED:  No Studies to review      PROCEDURES PERFORMED:  Procedures  No Procedures performed today

## 2020-09-25 ENCOUNTER — OFFICE VISIT (OUTPATIENT)
Dept: OBGYN CLINIC | Facility: HOSPITAL | Age: 65
End: 2020-09-25

## 2020-09-25 VITALS
DIASTOLIC BLOOD PRESSURE: 64 MMHG | SYSTOLIC BLOOD PRESSURE: 100 MMHG | WEIGHT: 139 LBS | HEART RATE: 81 BPM | HEIGHT: 61 IN | BODY MASS INDEX: 26.24 KG/M2

## 2020-09-25 DIAGNOSIS — Z98.890 S/P TRIGGER FINGER RELEASE: Primary | ICD-10-CM

## 2020-09-25 PROCEDURE — 99024 POSTOP FOLLOW-UP VISIT: CPT | Performed by: PHYSICIAN ASSISTANT

## 2020-09-25 PROCEDURE — 3008F BODY MASS INDEX DOCD: CPT | Performed by: FAMILY MEDICINE

## 2021-01-10 DIAGNOSIS — E11.9 TYPE 2 DIABETES MELLITUS WITHOUT COMPLICATION, WITHOUT LONG-TERM CURRENT USE OF INSULIN (HCC): ICD-10-CM

## 2021-01-11 RX ORDER — BLOOD SUGAR DIAGNOSTIC
STRIP MISCELLANEOUS
Qty: 100 EACH | Refills: 3 | Status: SHIPPED | OUTPATIENT
Start: 2021-01-11

## 2021-01-13 ENCOUNTER — HOSPITAL ENCOUNTER (OUTPATIENT)
Dept: MAMMOGRAPHY | Facility: HOSPITAL | Age: 66
Discharge: HOME | End: 2021-01-13
Payer: COMMERCIAL

## 2021-01-13 VITALS — BODY MASS INDEX: 26.24 KG/M2 | WEIGHT: 139 LBS | HEIGHT: 61 IN

## 2021-01-13 DIAGNOSIS — Z12.39 SCREENING FOR BREAST CANCER: ICD-10-CM

## 2021-01-13 PROCEDURE — 77067 SCR MAMMO BI INCL CAD: CPT

## 2021-01-13 PROCEDURE — 77063 BREAST TOMOSYNTHESIS BI: CPT

## 2021-01-26 ENCOUNTER — TELEMEDICINE (OUTPATIENT)
Dept: FAMILY MEDICINE CLINIC | Facility: CLINIC | Age: 66
End: 2021-01-26

## 2021-01-26 VITALS — TEMPERATURE: 98.2 F

## 2021-01-26 DIAGNOSIS — U07.1 COVID-19: Primary | ICD-10-CM

## 2021-01-26 PROCEDURE — 99213 OFFICE O/P EST LOW 20 MIN: CPT | Performed by: FAMILY MEDICINE

## 2021-01-26 NOTE — PROGRESS NOTES
COVID-19 Virtual Visit     Assessment/Plan:    Problem List Items Addressed This Visit        Other    COVID-19 - Primary     Symptoms started 01/20/2021 with chills cough sore throat nausea vomiting  Symptoms slightly improving today but still has sore throat chills and 1 episode of vomiting today  Intermittent cough  Tested positive 01/22/2021  Recommend to continue supportive treatment and stay in self quarantine  ED precautions discussed  Daily COVID follow-ups              Disposition:     I recommended continued isolation until at least 24 hours have passed since recovery defined as resolution of fever without the use of fever-reducing medications AND improvement in COVID symptoms AND 10 days have passed since onset of symptoms (or 10 days have passed since date of first positive viral diagnostic test for asymptomatic patients)  I have spent 9 minutes directly with the patient  Greater than 50% of this time was spent in counseling/coordination of care regarding: prognosis and instructions for management  Encounter provider Alen oLvett MD    Provider located at [facility] [id number] [facility] [id number] [phone]    Recent Visits  No visits were found meeting these conditions  Showing recent visits within past 7 days and meeting all other requirements     Today's Visits  Date Type Provider Dept   01/26/21 Mariah Dewey MD [facility] today's visits and meeting all other requirements     Future Appointments  No visits were found meeting these conditions  Showing future appointments within next 150 days and meeting all other requirements          Subjective:   Cody Moreno is a 61 y o  female who has been screened for COVID-19  Patient's symptoms include chills, sore throat, cough, nausea and vomiting   Patient denies fever, fatigue, malaise, congestion, rhinorrhea, anosmia, loss of taste, shortness of breath, chest tightness, abdominal pain, diarrhea, myalgias and headaches         Date of positive COVID-19 PCR: 1/22/2021    Lab Results   Component Value Date    SARSCORONAVI Detected (A) 01/22/2021     Past Medical History:   Diagnosis Date    Abnormal glucose     last assessed 12/22/15    Acid reflux     Anxiety     Arthritis     osteoarthritis multiple sites    Benign paroxysmal positional vertigo     Carpal tunnel syndrome     Chronic kidney disease     nephrolithiasis    Depression     GERD (gastroesophageal reflux disease)     Insomnia     Patellofemoral dysfunction     last assessed  9/15/14     Past Surgical History:   Procedure Laterality Date    CARPAL TUNNEL RELEASE      COLONOSCOPY      KNEE SURGERY Right     [place] INCISE FINGER TENDON SHEATH Right 8/25/2016    Procedure: RELEASE LONG AND RING TRIGGER FINGERS;  Surgeon: Lina Arora MD;  [facility];  Service: Orthopedics    [place] INCISE FINGER TENDON SHEATH Left 9/15/2020    Procedure: RELEASE TRIGGER FINGER RING;  Surgeon: Lina Arora MD;  [facility];  Service: Orthopedics    [place] WRIST Veronique  LIG Right 8/25/2016    Procedure: RELEASE CARPAL TUNNEL ENDOSCOPIC;  Surgeon: Lina Arora MD;  [facility];  Service: Orthopedics    TUBAL LIGATION       Current Outpatient Medications   Medication Sig Dispense Refill    acetaminophen (Tylenol 8 Hour) 650 mg CR tablet Take 1 tablet (650 mg total) by mouth every 8 (eight) hours 15 tablet 0    atorvastatin (LIPITOR) 10 mg tablet Take 1 tablet (10 mg total) by mouth daily 90 tablet 1    BIOTIN 5000 PO Take by mouth daily      Cyanocobalamin (VITAMIN B12 PO) Take by mouth daily      diclofenac sodium (VOLTAREN) 1 % Apply 2 g topically 4 (four) times a day (Patient taking differently: Apply 2 g topically as needed ) 100 g 1    fluticasone (FLONASE) 50 mcg/act nasal spray 1 spray into each nostril daily (Patient taking differently: 1 spray into each nostril as needed ) 16 g 4    loratadine (CLARITIN) 10 mg tablet Take 10 mg by mouth daily      naproxen sodium (ALEVE) 220 MG tablet Take 1 tablet (220 mg total) by mouth 2 (two) times a day with meals for 5 days 10 tablet 0    OneTouch Ultra test strip USE 1 STRIP DAILY AS NEEDED FOR HYPOGLYCEMIA AS  DIRECTED 100 each 3    TURMERIC PO Take by mouth daily       No current facility-administered medications for this visit  Allergies   Allergen Reactions    Bee Pollen Other (See Comments)     Other reaction(s): Sneezing    Pollen Extract Allergic Rhinitis and Sneezing       Review of Systems   Constitutional: Positive for chills  Negative for fatigue and fever  HENT: Positive for sore throat  Negative for congestion and rhinorrhea  Respiratory: Positive for cough  Negative for chest tightness and shortness of breath  Cardiovascular: Negative for chest pain and palpitations  Gastrointestinal: Positive for nausea and vomiting  Negative for abdominal pain and diarrhea  Musculoskeletal: Negative for myalgias  Neurological: Negative for headaches  All other systems reviewed and are negative  Objective:    Vitals:    01/26/21 1253   Temp: 98 2 °F (36 8 °C)   TempSrc: Temporal       VIRTUAL VISIT [address]  [facility]   acknowledges that she has consented to an online visit or consultation  She understands that the online visit is based solely on information provided by her, and that, in the absence of a face-to-face physical evaluation by the physician, the diagnosis she receives is both limited and provisional in terms of accuracy and completeness  This is not intended to replace a full medical face-to-face evaluation by the physician  Dm Ko understands and accepts these terms

## 2021-01-26 NOTE — ASSESSMENT & PLAN NOTE
Symptoms started 01/20/2021 with chills cough sore throat nausea vomiting  Symptoms slightly improving today but still has sore throat chills and 1 episode of vomiting today  Intermittent cough    Tested positive 01/22/2021  Recommend to continue supportive treatment and stay in self quarantine  ED precautions discussed  Daily COVID follow-ups

## 2021-01-28 ENCOUNTER — TELEMEDICINE (OUTPATIENT)
Dept: FAMILY MEDICINE CLINIC | Facility: CLINIC | Age: 66
End: 2021-01-28

## 2021-01-28 VITALS — TEMPERATURE: 98.5 F

## 2021-01-28 DIAGNOSIS — U07.1 COVID-19: Primary | ICD-10-CM

## 2021-01-28 PROCEDURE — 99213 OFFICE O/P EST LOW 20 MIN: CPT | Performed by: FAMILY MEDICINE

## 2021-01-28 RX ORDER — BENZONATATE 100 MG/1
100 CAPSULE ORAL 3 TIMES DAILY PRN
Qty: 21 CAPSULE | Refills: 0 | Status: SHIPPED | OUTPATIENT
Start: 2021-01-28

## 2021-01-28 RX ORDER — ONDANSETRON 4 MG/1
4 TABLET, ORALLY DISINTEGRATING ORAL EVERY 8 HOURS PRN
Qty: 20 TABLET | Refills: 0 | Status: SHIPPED | OUTPATIENT
Start: 2021-01-28

## 2021-01-28 NOTE — PROGRESS NOTES
COVID-19 Virtual Visit     Assessment/Plan:    Problem List Items Addressed This Visit        Other    [id number] - Primary     -Symptoms started 01/20/2021 with chills cough sore throat nausea vomiting  Feel like she is improving but cough and nausea are making it hard for her  To eat  -Tested positive 01/22/2021  -Recommend to continue supportive treatment and stay in self quarantine  -Will send Zofran and tesalon pearls to pharmacy  to help with nausea  and cough  -ED precautions discussed  -Daily COVID follow-ups         Relevant Medications    ondansetron (ZOFRAN-ODT) 4 mg disintegrating tablet    benzonatate (TESSALON PERLES) 100 mg capsule         Disposition:     I have spent 8 minutes directly with the patient  Greater than 50% of this time was spent in counseling/coordination of care regarding: risks and benefits of treatment options, instructions for management and patient and family education  Encounter provider Kurt Braun MD    Provider located at [address] [id number] [phone]    Recent Visits  Date Type Provider Dept   01/26/21 Hemant Blandon MD [facility]   Showing recent visits within past 7 days and meeting all other requirements     Today's Visits  Date Type Provider Dept   01/28/21 Hemant Blandon MD [facility]   Showing today's visits and meeting all other requirements     Future Appointments  No visits were found meeting these conditions  Showing future appointments within next 150 days and meeting all other requirements        Patient agrees to participate in a virtual check in via telephone or video visit instead of presenting to the office to address urgent/immediate medical needs  Patient is aware this is a billable service  After connecting through Telephone, the patient was identified by name and date of birth   Paul Null was informed that this was a telemedicine visit and that the exam was being conducted confidentially over secure lines  My office door was closed  No one else was in the room  Ever Reed acknowledged consent and understanding of privacy and security of the telemedicine visit  I informed the patient that I have reviewed her record in Epic and presented the opportunity for her to ask any questions regarding the visit today  The patient agreed to participate  It was my intent to perform this visit via video technology but the patient was not able to do a video connection so the visit was completed via audio telephone only  Subjective:   Ever Reed is a 61 y o  female who has been screened for COVID-19  Symptom change since last report: improving  Patient's symptoms include fatigue, cough and nausea  Patient denies fever, chills, malaise, congestion, rhinorrhea, sore throat, anosmia, loss of taste, shortness of breath, chest tightness, abdominal pain, vomiting, diarrhea, myalgias and headaches  Mckenna Harris has been staying home and has isolated themselves in her home  She is taking care to not share personal items and is cleaning all surfaces that are touched often, like counters, tabletops, and doorknobs using household cleaning sprays or wipes  She is wearing a mask when she leaves her room  Date of symptom onset: 1/19/2021  Date of positive COVID-19 PCR: 1/22/2021    Has had trouble keeping food down due to  Nausea and cough  She is drinking  adequately       Lab Results   Component Value Date    SARSCORONAVI Detected (A) 01/22/2021     Past Medical History:   Diagnosis Date    Abnormal glucose     last assessed 12/22/15    Acid reflux     Anxiety     Arthritis     osteoarthritis multiple sites    Benign paroxysmal positional vertigo     Carpal tunnel syndrome     Chronic kidney disease     nephrolithiasis    Depression     GERD (gastroesophageal reflux disease)     Insomnia     Patellofemoral dysfunction     last assessed  9/15/14     Past Surgical History:   Procedure Laterality Date    CARPAL TUNNEL RELEASE      COLONOSCOPY      KNEE SURGERY Right     [place] INCISE FINGER TENDON SHEATH Right 8/25/2016    Procedure: RELEASE LONG AND RING TRIGGER FINGERS;  Surgeon: Bonnie Mitchell MD;  [facility];  Service: Orthopedics    [place] INCISE FINGER TENDON SHEATH Left 9/15/2020    Procedure: RELEASE TRIGGER FINGER RING;  Surgeon: Bonnie Mitchell MD;  [facility];  Service: Orthopedics    [place] WRIST [place] LIG Right 8/25/2016    Procedure: RELEASE CARPAL TUNNEL ENDOSCOPIC;  Surgeon: Bonnie Mitchell MD;  [facility];  Service: Orthopedics    TUBAL LIGATION       Current Outpatient Medications   Medication Sig Dispense Refill    acetaminophen (Tylenol 8 Hour) 650 mg CR tablet Take 1 tablet (650 mg total) by mouth every 8 (eight) hours (Patient not taking: Reported on 1/28/2021) 15 tablet 0    atorvastatin (LIPITOR) 10 mg tablet Take 1 tablet (10 mg total) by mouth daily (Patient not taking: Reported on 1/28/2021) 90 tablet 1    benzonatate (TESSALON PERLES) 100 mg capsule Take 1 capsule (100 mg total) by mouth 3 (three) times a day as needed for cough 21 capsule 0    BIOTIN 5000 PO Take by mouth daily      Cyanocobalamin (VITAMIN B12 PO) Take by mouth daily      diclofenac sodium (VOLTAREN) 1 % Apply 2 g topically 4 (four) times a day (Patient not taking: Reported on 1/28/2021) 100 g 1    fluticasone (FLONASE) 50 mcg/act nasal spray 1 spray into each nostril daily (Patient not taking: Reported on 1/28/2021) 16 g 4    loratadine (CLARITIN) 10 mg tablet Take 10 mg by mouth daily      naproxen sodium (ALEVE) 220 MG tablet Take 1 tablet (220 mg total) by mouth 2 (two) times a day with meals for 5 days 10 tablet 0    ondansetron (ZOFRAN-ODT) 4 mg disintegrating tablet Take 1 tablet (4 mg total) by mouth every 8 (eight) hours as needed for nausea or vomiting 20 tablet 0    OneTouch Ultra test strip USE 1 STRIP DAILY AS NEEDED FOR HYPOGLYCEMIA AS  DIRECTED (Patient not taking: Reported on 1/28/2021) 100 each 3    TURMERIC PO Take by mouth daily       No current facility-administered medications for this visit  Allergies   Allergen Reactions    Bee Pollen Other (See Comments)     Other reaction(s): Sneezing    Pollen Extract Allergic Rhinitis and Sneezing       Review of Systems   Constitutional: Positive for fatigue  Negative for chills and fever  HENT: Negative for congestion, rhinorrhea and sore throat  Respiratory: Positive for cough  Negative for chest tightness and shortness of breath  Gastrointestinal: Positive for nausea  Negative for abdominal pain, diarrhea and vomiting  Musculoskeletal: Negative for myalgias  Neurological: Negative for headaches  All other systems reviewed and are negative  Objective:    Vitals:    01/28/21 1312   Temp: 98 5 °F (36 9 °C)       VIRTUAL VISIT [address]  [facility]  acknowledges that she has consented to an online visit or consultation  She understands that the online visit is based solely on information provided by her, and that, in the absence of a face-to-face physical evaluation by the physician, the diagnosis she receives is both limited and provisional in terms of accuracy and completeness  This is not intended to replace a full medical face-to-face evaluation by the physician  Paul Null understands and accepts these terms

## 2021-01-28 NOTE — ASSESSMENT & PLAN NOTE
-Symptoms started 01/20/2021 with chills cough sore throat nausea vomiting  Feel like she is improving but cough and nausea are making it hard for her  To eat  -Tested positive 01/22/2021  -Recommend to continue supportive treatment and stay in self quarantine  -Will send Zofran and tesalon pearls to pharmacy  to help with nausea  and cough     -ED precautions discussed  -Daily COVID follow-ups

## 2021-01-29 ENCOUNTER — TELEMEDICINE (OUTPATIENT)
Dept: FAMILY MEDICINE CLINIC | Facility: CLINIC | Age: 66
End: 2021-01-29

## 2021-01-29 VITALS — TEMPERATURE: 98.7 F

## 2021-01-29 DIAGNOSIS — U07.1 COVID-19: Primary | ICD-10-CM

## 2021-01-29 PROCEDURE — 99213 OFFICE O/P EST LOW 20 MIN: CPT | Performed by: FAMILY MEDICINE

## 2021-01-29 PROCEDURE — 1036F TOBACCO NON-USER: CPT | Performed by: FAMILY MEDICINE

## 2021-01-29 NOTE — ASSESSMENT & PLAN NOTE
Minor improvement since yesterday  -Symptoms onset: 01/20/2021   -Tested positive 01/22/2021  -Fever last night of 100 6, no fever this morning     Continues to have HA, nausea, chills, and sore throat, coughing is improving with tessalon    -Recommend to continue supportive treatment and stay in self quarantine until full 14 days, February 2nd  -continue zofran PRN for nausea and Tessalon for cough  -ED precautions discussed  Follow up on Monday

## 2021-01-29 NOTE — PROGRESS NOTES
COVID-19 Virtual Visit     Assessment/Plan:    Problem List Items Addressed This Visit        Other    COVID-19 - Primary     Minor improvement since yesterday  -Symptoms onset: 01/20/2021   -Tested positive 01/22/2021  -Fever last night of 100 6, no fever this morning  Continues to have HA, nausea, chills, and sore throat, coughing is improving with tessalon    -Recommend to continue supportive treatment and stay in self quarantine until full 14 days, February 2nd  -continue zofran PRN for nausea and Tessalon for cough  -ED precautions discussed  Follow up on Monday              Disposition:     I recommended continued isolation until at least 24 hours have passed since recovery defined as resolution of fever without the use of fever-reducing medications AND improvement in COVID symptoms AND 10 days have passed since onset of symptoms (or 10 days have passed since date of first positive viral diagnostic test for asymptomatic patients)  I have spent 15 minutes directly with the patient  Encounter provider Frank Scott DO    Provider located at [address] [id number] [phone]    Recent Visits  Date Type Provider Dept   01/28/21  MD Beatriz Soto   01/26/21 Telemedicine MD Beatriz Marin   Showing recent visits within past 7 days and meeting all other requirements     Today's Visits  Date Type Provider Dept   01/29/21 [address] [place] [address], [address] [place] [address] today's visits and meeting all other requirements     Future Appointments  No visits were found meeting these conditions  Showing future appointments within next 150 days and meeting all other requirements        Patient agrees to participate in a virtual check in via telephone or video visit instead of presenting to the office to address urgent/immediate medical needs   Patient is aware this is a billable service  After connecting through Telephone, the patient was identified by name and date of birth  Konrad Nash was informed that this was a telemedicine visit and that the exam was being conducted confidentially over secure lines  Konrad Nash acknowledged consent and understanding of privacy and security of the telemedicine visit  I informed the patient that I have reviewed her record in Epic and presented the opportunity for her to ask any questions regarding the visit today  The patient agreed to participate  Subjective:   Konrad Nash is a 61 y o  female who has been screened for COVID-19  Symptom change since last report: unchanged  Patient's symptoms include fever (last night ), chills, sore throat, cough, nausea and headache  Patient denies fatigue, malaise, congestion, rhinorrhea, anosmia, loss of taste, shortness of breath, chest tightness, abdominal pain, vomiting, diarrhea and myalgias  Agustina Deleon has been staying home and has isolated themselves in her home  She is taking care to not share personal items and is cleaning all surfaces that are touched often, like counters, tabletops, and doorknobs using household cleaning sprays or wipes  She is wearing a mask when she leaves her room  Date of symptom onset: 1/19/2021  Date of positive COVID-19 PCR: 1/22/2021    Patient states her symptoms are about the same compared to yesterday however is slowly improving with the Zofran  She had a fever of 100 6 last night but no fever today  She continues to have headache, nausea, chills, and sorethroat  Cough improving with Tessalon Perles       Lab Results   Component Value Date    SARSCORONAVI Detected (A) 01/22/2021     Past Medical History:   Diagnosis Date    Abnormal glucose     last assessed 12/22/15    Acid reflux     Anxiety     Arthritis     osteoarthritis multiple sites    Benign paroxysmal positional vertigo     Carpal tunnel syndrome     Chronic kidney disease nephrolithiasis    Depression     GERD (gastroesophageal reflux disease)     Insomnia     Patellofemoral dysfunction     last assessed  9/15/14     Past Surgical History:   Procedure Laterality Date    CARPAL TUNNEL RELEASE      COLONOSCOPY      KNEE SURGERY Right     [place] INCISE FINGER TENDON SHEATH Right 8/25/2016    Procedure: RELEASE LONG AND RING TRIGGER FINGERS;  Surgeon: Isac Car MD;  [facility];  Service: Orthopedics    [place] INCISE FINGER TENDON SHEATH Left 9/15/2020    Procedure: RELEASE TRIGGER FINGER RING;  Surgeon: Isac Car MD;  [facility];  Service: Orthopedics    [place] WRIST [place] LIG Right 8/25/2016    Procedure: RELEASE CARPAL TUNNEL ENDOSCOPIC;  Surgeon: Isac Car MD;  [facility];  Service: Orthopedics    TUBAL LIGATION       Current Outpatient Medications   Medication Sig Dispense Refill    benzonatate (TESSALON PERLES) 100 mg capsule Take 1 capsule (100 mg total) by mouth 3 (three) times a day as needed for cough 21 capsule 0    BIOTIN 5000 PO Take by mouth daily      Cyanocobalamin (VITAMIN B12 PO) Take by mouth daily      loratadine (CLARITIN) 10 mg tablet Take 10 mg by mouth daily      ondansetron (ZOFRAN-ODT) 4 mg disintegrating tablet Take 1 tablet (4 mg total) by mouth every 8 (eight) hours as needed for nausea or vomiting 20 tablet 0    TURMERIC PO Take by mouth daily      acetaminophen (Tylenol 8 Hour) 650 mg CR tablet Take 1 tablet (650 mg total) by mouth every 8 (eight) hours (Patient not taking: Reported on 1/28/2021) 15 tablet 0    atorvastatin (LIPITOR) 10 mg tablet Take 1 tablet (10 mg total) by mouth daily (Patient not taking: Reported on 1/28/2021) 90 tablet 1    diclofenac sodium (VOLTAREN) 1 % Apply 2 g topically 4 (four) times a day (Patient not taking: Reported on 1/28/2021) 100 g 1    fluticasone (FLONASE) 50 mcg/act nasal spray 1 spray into each nostril daily (Patient not taking: Reported on 1/28/2021) 16 g 4    naproxen sodium (ALEVE) 220 MG tablet Take 1 tablet (220 mg total) by mouth 2 (two) times a day with meals for 5 days 10 tablet 0    OneTouch Ultra test strip USE 1 STRIP DAILY AS NEEDED FOR HYPOGLYCEMIA AS  DIRECTED (Patient not taking: Reported on 1/28/2021) 100 each 3     No current facility-administered medications for this visit  Allergies   Allergen Reactions    Bee Pollen Other (See Comments)     Other reaction(s): Sneezing    Pollen Extract Allergic Rhinitis and Sneezing       Review of Systems   Constitutional: Positive for chills and fever (last night )  Negative for fatigue  HENT: Positive for sore throat  Negative for congestion and rhinorrhea  Respiratory: Positive for cough  Negative for chest tightness and shortness of breath  Gastrointestinal: Positive for nausea  Negative for abdominal pain, diarrhea and vomiting  Musculoskeletal: Negative for myalgias  Neurological: Positive for headaches  Objective:    Vitals:    01/29/21 0945   Temp: 98 7 °F (37 1 °C)   TempSrc: Temporal       Physical Exam  VIRTUAL VISIT [address]  [place]   acknowledges that she has consented to an online visit or consultation  She understands that the online visit is based solely on information provided by her, and that, in the absence of a face-to-face physical evaluation by the physician, the diagnosis she receives is both limited and provisional in terms of accuracy and completeness  This is not intended to replace a full medical face-to-face evaluation by the physician  Dm Ko understands and accepts these terms

## 2021-02-01 ENCOUNTER — TELEMEDICINE (OUTPATIENT)
Dept: FAMILY MEDICINE CLINIC | Facility: CLINIC | Age: 66
End: 2021-02-01

## 2021-02-01 DIAGNOSIS — U07.1 COVID-19: Primary | ICD-10-CM

## 2021-02-01 PROCEDURE — 99213 OFFICE O/P EST LOW 20 MIN: CPT | Performed by: FAMILY MEDICINE

## 2021-02-01 NOTE — ASSESSMENT & PLAN NOTE
Symptom onset 1/20  Tested positive 1/22  Currently experiencing headache, mild wheezing, cough, chest congestion  No dyspnea  SpO2 93% at rest, 95% with ambulation  HR 70s    Recommending continuing isolation and supportive care including adequate hydration  ED precautions discussed  Follow up in 3 days or sooner if needed

## 2021-02-01 NOTE — PROGRESS NOTES
COVID-19 Virtual Visit     Assessment/Plan:    Problem List Items Addressed This Visit        Other    COVID-19 - Primary     Symptom onset 1/20  Tested positive 1/22  Currently experiencing headache, mild wheezing, cough, chest congestion  No dyspnea  SpO2 93% at rest, 95% with ambulation  HR 70s  Recommending continuing isolation and supportive care including adequate hydration  ED precautions discussed  Follow up in 3 days or sooner if needed                    Disposition:     I recommended continued isolation until at least 24 hours have passed since recovery defined as resolution of fever without the use of fever-reducing medications AND improvement in COVID symptoms AND 10 days have passed since onset of symptoms (or 10 days have passed since date of first positive viral diagnostic test for asymptomatic patients)  I have spent 15 minutes directly with the patient  Encounter provider Rodney Lopez DO    Provider located at [address] [address]  [phone]    Recent Visits  Date Type Provider Dept   01/29/21 1653 [facility], [address]    [facility]   01/26/21 Telemedicine MD Beatriz Hull   Showing recent visits within past 7 days and meeting all other requirements     Today's Visits  Date Type Provider Dept   02/01/21 Telemedicine DO Beatriz Blanco   Showing today's visits and meeting all other requirements     Future Appointments  No visits were found meeting these conditions  Showing future appointments within next 150 days and meeting all other requirements      This virtual check-in was done via [employer] and patient was informed that this is a secure, HIPAA-compliant platform  She agrees to proceed      Patient agrees to participate in a virtual check in via telephone or video visit instead of presenting to the office to address urgent/immediate medical needs  Patient is aware this is a billable service  After connecting through [facility], the patient was identified by name and date of birth  Hans Mckeon was informed that this was a telemedicine visit and that the exam was being conducted confidentially over secure lines  My office door was closed  No one else was in the room  Hans Mckeon acknowledged consent and understanding of privacy and security of the telemedicine visit  I informed the patient that I have reviewed her record in Epic and presented the opportunity for her to ask any questions regarding the visit today  The patient agreed to participate  Subjective:   Hans Mckeon is a 61 y o  female who has been screened for COVID-19  Patient's symptoms include sore throat, cough and headache  Patient denies congestion and abdominal pain  Arcadio Munguia has been staying home and has isolated themselves in her home       Date of symptom onset: 1/20/2021  Date of positive COVID-19 PCR: 1/22/2021         Lab Results   Component Value Date    SARSCORONAVI Detected (A) 01/22/2021     Past Medical History:   Diagnosis Date    Abnormal glucose     last assessed 12/22/15    Acid reflux     Anxiety     Arthritis     osteoarthritis multiple sites    Benign paroxysmal positional vertigo     Carpal tunnel syndrome     Chronic kidney disease     nephrolithiasis    Depression     GERD (gastroesophageal reflux disease)     Insomnia     Patellofemoral dysfunction     last assessed  9/15/14     Past Surgical History:   Procedure Laterality Date    CARPAL TUNNEL RELEASE      COLONOSCOPY      KNEE SURGERY Right     [place] INCISE FINGER TENDON SHEATH Right 8/25/2016    Procedure: RELEASE LONG AND RING TRIGGER FINGERS;  Surgeon: Jimbo Ojeda MD;  Location: [facility] OR;  Service: Orthopedics    MO INCISE FINGER TENDON SHEATH Left 9/15/2020    Procedure: RELEASE TRIGGER FINGER [address];  Surgeon: Aliyah Putnam Hermann Benavides MD;  [facility];  Service: Orthopedics    [place] WRIST Keny Revering LIG Right 8/25/2016    Procedure: RELEASE CARPAL TUNNEL ENDOSCOPIC;  Surgeon: Maria Ines Tadeo MD;  [facility];  Service: Orthopedics    TUBAL LIGATION       Current Outpatient Medications   Medication Sig Dispense Refill    acetaminophen (Tylenol 8 Hour) 650 mg CR tablet Take 1 tablet (650 mg total) by mouth every 8 (eight) hours (Patient not taking: Reported on 1/28/2021) 15 tablet 0    atorvastatin (LIPITOR) 10 mg tablet Take 1 tablet (10 mg total) by mouth daily (Patient not taking: Reported on 1/28/2021) 90 tablet 1    benzonatate (TESSALON PERLES) 100 mg capsule Take 1 capsule (100 mg total) by mouth 3 (three) times a day as needed for cough 21 capsule 0    BIOTIN 5000 PO Take by mouth daily      Cyanocobalamin (VITAMIN B12 PO) Take by mouth daily      diclofenac sodium (VOLTAREN) 1 % Apply 2 g topically 4 (four) times a day (Patient not taking: Reported on 1/28/2021) 100 g 1    fluticasone (FLONASE) 50 mcg/act nasal spray 1 spray into each nostril daily (Patient not taking: Reported on 1/28/2021) 16 g 4    loratadine (CLARITIN) 10 mg tablet Take 10 mg by mouth daily      naproxen sodium (ALEVE) 220 MG tablet Take 1 tablet (220 mg total) by mouth 2 (two) times a day with meals for 5 days 10 tablet 0    ondansetron (ZOFRAN-ODT) 4 mg disintegrating tablet Take 1 tablet (4 mg total) by mouth every 8 (eight) hours as needed for nausea or vomiting 20 tablet 0    OneTouch Ultra test strip USE 1 STRIP DAILY AS NEEDED FOR HYPOGLYCEMIA AS  DIRECTED (Patient not taking: Reported on 1/28/2021) 100 each 3    TURMERIC PO Take by mouth daily       No current facility-administered medications for this visit        Allergies   Allergen Reactions    Bee Pollen Other (See Comments)     Other reaction(s): Sneezing    Pollen Extract Allergic Rhinitis and Sneezing       Review of Systems   Constitutional: Positive for appetite change  HENT: Positive for sore throat  Negative for congestion  Respiratory: Positive for cough  Cardiovascular: Negative for chest pain  Gastrointestinal: Negative for abdominal pain  Musculoskeletal: Negative for back pain  Neurological: Positive for headaches  Objective: There were no vitals filed for this visit  It was my intent to perform this visit via video technology but the patient was not able to do a video connection so the visit was completed via audio telephone only  VIRTUAL VISIT [address]  Skye Mukherjee  acknowledges that she has consented to an online visit or consultation  She understands that the online visit is based solely on information provided by her, and that, in the absence of a face-to-face physical evaluation by the physician, the diagnosis she receives is both limited and provisional in terms of accuracy and completeness  This is not intended to replace a full medical face-to-face evaluation by the physician  Richelle Jo understands and accepts these terms

## 2021-02-15 ENCOUNTER — TELEPHONE (OUTPATIENT)
Dept: FAMILY MEDICINE CLINIC | Facility: CLINIC | Age: 66
End: 2021-02-15

## 2021-02-15 NOTE — TELEPHONE ENCOUNTER
I called patient  She is in no distress  Reports 0/10 pain and that she simply wanted to call to schedule an appointment  She reports intermittent chest fluttering that is brought on by laying on her left side and anxiety since January    She has no symptoms at the moment  I advised the patient if she experiences any blurry vision, balance issues, chest pain, N/V, or anything else out of the ordinary to seek medical care  Also told patient to call back with any questions or concerns not of an emergent nature  Will be seen tomorrow

## 2021-02-15 NOTE — TELEPHONE ENCOUNTER
I agree  In case of any symptoms mentioned by [facility] EMERGENCY [facility] patient should proceed to ER otherwise follow-up tomorrow    Thank you

## 2021-02-15 NOTE — TELEPHONE ENCOUNTER
Made virtual appt for tomorrow morning with Dr Waylon Walker  Patient complaint of irregular heartbeat  She has been experiencing since having Covid  Not sure if she should go to ER   Please call her

## 2021-02-16 ENCOUNTER — TELEMEDICINE (OUTPATIENT)
Dept: FAMILY MEDICINE CLINIC | Facility: CLINIC | Age: 66
End: 2021-02-16

## 2021-02-16 DIAGNOSIS — R00.2 PALPITATIONS: Primary | ICD-10-CM

## 2021-02-16 DIAGNOSIS — U07.1 COVID-19: ICD-10-CM

## 2021-02-16 PROCEDURE — 99213 OFFICE O/P EST LOW 20 MIN: CPT | Performed by: FAMILY MEDICINE

## 2021-02-16 NOTE — ASSESSMENT & PLAN NOTE
Symptom onset 1/20  -Patient reports she still has some productive cough, sore throat and fatigue  -She took a friend's Amoxicillin and reports feeling better  Advised to avoid abx at this point and recommended to use OTC Mucinex and will prescribe Magic mouthwash for throat discomfort   -Will follow up in the office in 2 weeks

## 2021-02-16 NOTE — ASSESSMENT & PLAN NOTE
Patient denies chest pain and mild intermittent palpitations through the course of a day  -she attributes these episodes to anxiety  -will order Holter monitor for evaluation of palpitations  -follow-up in the office in 2 weeks

## 2021-02-16 NOTE — PROGRESS NOTES
Virtual Brief Visit    Assessment/Plan:    Problem List Items Addressed This Visit        Other    COVID-19     Symptom onset 1/20  -Patient reports she still has some productive cough, sore throat and fatigue  -She took a friend's Amoxicillin and reports feeling better  Advised to avoid abx at this point and recommended to use OTC Mucinex and will prescribe Magic mouthwash for throat discomfort   -Will follow up in the office in 2 weeks  Relevant Medications    al mag oxide-diphenhydramine-lidocaine viscous (MAGIC MOUTHWASH) 1:1:1 suspension    Palpitations - Primary     Patient denies chest pain and mild intermittent palpitations through the course of a day  -she attributes these episodes to anxiety  -will order Holter monitor for evaluation of palpitations  -follow-up in the office in 2 weeks         Relevant Orders    Holter monitor - 48 hour                Reason for visit is   Chief Complaint   Patient presents with    Virtual Brief Visit        Encounter provider Azul Gregory MD    Provider located at [facility] [address]  [address] [facility] [address] [facility]   [phone]    Recent Visits  Date Type Provider Dept   02/15/21 Telephone MD Beatriz Grove recent visits within past 7 days and meeting all other requirements     Today's Visits  Date Type Provider Dept   02/16/21 MD Beatriz Parker today's visits and meeting all other requirements     Future Appointments  No visits were found meeting these conditions  Showing future appointments within next 150 days and meeting all other requirements        After connecting through telephone, the patient was identified by name and date of birth  Enid Suazo was informed that this is a telemedicine visit and that the visit is being conducted through telephone  My office door was closed  No one else was in the room    She acknowledged consent and understanding of privacy and security of the platform  The patient has agreed to participate and understands she can discontinue the visit at any time  Patient is aware this is a billable service  Rancho Zeng is a 61 y o  female being evaluated via telemedicine visit      HPI     Patient is a 60 yo F who is being evaluated today due to palpitations    Past Medical History:   Diagnosis Date    Abnormal glucose     last assessed 12/22/15    Acid reflux     Anxiety     Arthritis     osteoarthritis multiple sites    Benign paroxysmal positional vertigo     Carpal tunnel syndrome     Chronic kidney disease     nephrolithiasis    Depression     GERD (gastroesophageal reflux disease)     Insomnia     Patellofemoral dysfunction     last assessed  9/15/14       Past Surgical History:   Procedure Laterality Date    CARPAL TUNNEL RELEASE      COLONOSCOPY      KNEE SURGERY Right     [place] INCISE FINGER TENDON SHEATH Right 8/25/2016    Procedure: RELEASE LONG AND RING TRIGGER FINGERS;  Surgeon: Willsi Longoria MD;  [facility];  Service: Orthopedics    [place] INCISE FINGER TENDON SHEATH Left 9/15/2020    Procedure: RELEASE TRIGGER FINGER RING;  Surgeon: Willis Longoria MD;  [facility];  Service: Orthopedics    [place] WRIST Chyrl Frandy LIG Right 8/25/2016    Procedure: RELEASE CARPAL TUNNEL ENDOSCOPIC;  Surgeon: Willis Longoria MD;  [facility];  Service: Orthopedics    TUBAL LIGATION         Current Outpatient Medications   Medication Sig Dispense Refill    acetaminophen (Tylenol 8 Hour) 650 mg CR tablet Take 1 tablet (650 mg total) by mouth every 8 (eight) hours (Patient not taking: Reported on 1/28/2021) 15 tablet 0    al mag oxide-diphenhydramine-lidocaine viscous (MAGIC MOUTHWASH) 1:1:1 suspension Swish and spit 10 mL every 4 (four) hours as needed for mouth pain or discomfort 1 Bottle 0    atorvastatin (LIPITOR) 10 mg tablet Take 1 tablet (10 mg total) by mouth daily (Patient not taking: Reported on 1/28/2021) 90 tablet 1    benzonatate (TESSALON PERLES) 100 mg capsule Take 1 capsule (100 mg total) by mouth 3 (three) times a day as needed for cough 21 capsule 0    BIOTIN 5000 PO Take by mouth daily      Cyanocobalamin (VITAMIN B12 PO) Take by mouth daily      diclofenac sodium (VOLTAREN) 1 % Apply 2 g topically 4 (four) times a day (Patient not taking: Reported on 1/28/2021) 100 g 1    fluticasone (FLONASE) 50 mcg/act nasal spray 1 spray into each nostril daily (Patient not taking: Reported on 1/28/2021) 16 g 4    loratadine (CLARITIN) 10 mg tablet Take 10 mg by mouth daily      naproxen sodium (ALEVE) 220 MG tablet Take 1 tablet (220 mg total) by mouth 2 (two) times a day with meals for 5 days 10 tablet 0    ondansetron (ZOFRAN-ODT) 4 mg disintegrating tablet Take 1 tablet (4 mg total) by mouth every 8 (eight) hours as needed for nausea or vomiting 20 tablet 0    OneTouch Ultra test strip USE 1 STRIP DAILY AS NEEDED FOR HYPOGLYCEMIA AS  DIRECTED (Patient not taking: Reported on 1/28/2021) 100 each 3    TURMERIC PO Take by mouth daily       No current facility-administered medications for this visit  Allergies   Allergen Reactions    Bee Pollen Other (See Comments)     Other reaction(s): Sneezing    Pollen Extract Allergic Rhinitis and Sneezing       Review of Systems   Constitutional: Negative for chills and fever  Respiratory: Positive for cough  Cardiovascular: Positive for palpitations  Psychiatric/Behavioral: The patient is not nervous/anxious  There were no vitals filed for this visit  I spent 20 minutes directly with the patient during this visit    VIRTUAL VISIT [address]  Skye Mukherjee  acknowledges that she has consented to an online visit or consultation   She understands that the online visit is based solely on information provided by her, and that, in the absence of a face-to-face physical evaluation by the physician, the diagnosis she receives is both limited and provisional in terms of accuracy and completeness  This is not intended to replace a full medical face-to-face evaluation by the physician  Pretty Most understands and accepts these terms

## 2021-03-02 ENCOUNTER — OFFICE VISIT (OUTPATIENT)
Dept: FAMILY MEDICINE CLINIC | Facility: CLINIC | Age: 66
End: 2021-03-02

## 2021-03-02 DIAGNOSIS — E11.9 TYPE 2 DIABETES MELLITUS WITHOUT COMPLICATION, WITHOUT LONG-TERM CURRENT USE OF INSULIN (HCC): Primary | ICD-10-CM

## 2021-03-02 DIAGNOSIS — R00.2 PALPITATIONS: ICD-10-CM

## 2021-03-02 DIAGNOSIS — R09.82 POSTNASAL DRIP: ICD-10-CM

## 2021-03-02 DIAGNOSIS — J30.1 SEASONAL ALLERGIC RHINITIS DUE TO POLLEN: ICD-10-CM

## 2021-03-02 LAB — SL AMB POCT HEMOGLOBIN AIC: 6.2 (ref ?–6.5)

## 2021-03-02 PROCEDURE — 1036F TOBACCO NON-USER: CPT | Performed by: FAMILY MEDICINE

## 2021-03-02 PROCEDURE — 99213 OFFICE O/P EST LOW 20 MIN: CPT | Performed by: FAMILY MEDICINE

## 2021-03-02 PROCEDURE — 3044F HG A1C LEVEL LT 7.0%: CPT | Performed by: FAMILY MEDICINE

## 2021-03-02 PROCEDURE — 83036 HEMOGLOBIN GLYCOSYLATED A1C: CPT | Performed by: FAMILY MEDICINE

## 2021-03-02 RX ORDER — FLUTICASONE PROPIONATE 50 MCG
1 SPRAY, SUSPENSION (ML) NASAL DAILY
Qty: 16 G | Refills: 4 | Status: SHIPPED | OUTPATIENT
Start: 2021-03-02 | End: 2022-01-25 | Stop reason: SDUPTHER

## 2021-03-02 NOTE — PROGRESS NOTES
Assessment/Plan:    Palpitations  Improving  -Not at this visit  Deneis chest pain  -Patient was advised on previous visit to have Holter monitor scheduled  Paperwork reprinted and list with number and locations for cardiology imaging given today  -Follow up results closely    Postnasal drip  Patient reports sore throat and dryness post-COVID-19  She reports she feels like something is "stucked" in throat  -Mild erythema on pharinx noted on physical exam  -She tried OTC zyrtec w/o improvement of symptoms  Advised to use Flonase twice a day  Type 2 diabetes mellitus without complication, without long-term current use of insulin ([facility])    Lab Results   Component Value Date    HGBA1C 6 2 03/02/2021   Diet controlled  At goal  -Continue encouraging well balanced diet and aerobic exercise as tolerated  -Follow up in 3 months         Problem List Items Addressed This Visit        Endocrine    Type 2 diabetes mellitus without complication, without long-term current use of insulin ([facility] [address] ) - Primary       Lab Results   Component Value Date    HGBA1C 6 2 03/02/2021   Diet controlled  At goal  -Continue encouraging well balanced diet and aerobic exercise as tolerated  -Follow up in 3 months         Relevant Orders    POCT hemoglobin A1c (Completed)       Respiratory    Allergic rhinitis    Relevant Medications    fluticasone (FLONASE) 50 mcg/act nasal spray       Other    Palpitations     Improving  -Not at this visit  Deneis chest pain  -Patient was advised on previous visit to have Holter monitor scheduled  Paperwork reprinted and list with number and locations for cardiology imaging given today  -Follow up results closely         Postnasal drip     Patient reports sore throat and dryness post-COVID-19  She reports she feels like something is "stucked" in throat  -Mild erythema on pharinx noted on physical exam  -She tried OTC zyrtec w/o improvement of symptoms  Advised to use Flonase twice a day  Subjective:      Patient ID: Maris Stafford is a 61 y o  female  HPI     Patient is a 72-year-old female who presents to the office for follow-up diabetes and palpitations  Patient reports she thought she was supposed to come to the office to have Holter placed  After discussing with patient ordered for a Holter was reprinted and central scheduling and sites to have Holter placed given at this visit  The following portions of the patient's history were reviewed and updated as appropriate: She  has a past medical history of Abnormal glucose, Acid reflux, Anxiety, Arthritis, Benign paroxysmal positional vertigo, Carpal tunnel syndrome, Chronic kidney disease, Depression, GERD (gastroesophageal reflux disease), Insomnia, and Patellofemoral dysfunction  She  has a past surgical history that includes Colonoscopy; Tubal ligation; Knee surgery (Right); pr wrist arthroscop,release xvers lig (Right, 8/25/2016); pr incise finger tendon sheath (Right, 8/25/2016); Carpal tunnel release; and pr incise finger tendon sheath (Left, 9/15/2020)  She  reports that she has never smoked  She has never used smokeless tobacco  She reports that she does not drink alcohol or use drugs    Current Outpatient Medications   Medication Sig Dispense Refill    BIOTIN 5000 PO Take by mouth daily      Cyanocobalamin (VITAMIN B12 PO) Take by mouth daily      OneTouch Ultra test strip USE 1 STRIP DAILY AS NEEDED FOR HYPOGLYCEMIA AS  DIRECTED 100 each 3    TURMERIC PO Take by mouth daily      acetaminophen (Tylenol 8 Hour) 650 mg CR tablet Take 1 tablet (650 mg total) by mouth every 8 (eight) hours (Patient not taking: Reported on 1/28/2021) 15 tablet 0    al mag oxide-diphenhydramine-lidocaine viscous (MAGIC MOUTHWASH) 1:1:1 suspension Swish and spit 10 mL every 4 (four) hours as needed for mouth pain or discomfort (Patient not taking: Reported on 3/2/2021) 1 Bottle 0    atorvastatin (LIPITOR) 10 mg tablet Take 1 tablet (10 mg total) by mouth daily (Patient not taking: Reported on 1/28/2021) 90 tablet 1    benzonatate (TESSALON PERLES) 100 mg capsule Take 1 capsule (100 mg total) by mouth 3 (three) times a day as needed for cough (Patient not taking: Reported on 3/2/2021) 21 capsule 0    diclofenac sodium (VOLTAREN) 1 % Apply 2 g topically 4 (four) times a day (Patient not taking: Reported on 1/28/2021) 100 g 1    fluticasone (FLONASE) 50 mcg/act nasal spray 1 spray into each nostril daily 16 g 4    loratadine (CLARITIN) 10 mg tablet Take 10 mg by mouth daily      naproxen sodium (ALEVE) 220 MG tablet Take 1 tablet (220 mg total) by mouth 2 (two) times a day with meals for 5 days 10 tablet 0    ondansetron (ZOFRAN-ODT) 4 mg disintegrating tablet Take 1 tablet (4 mg total) by mouth every 8 (eight) hours as needed for nausea or vomiting (Patient not taking: Reported on 3/2/2021) 20 tablet 0     No current facility-administered medications for this visit  She is allergic to bee pollen and pollen extract       Review of Systems   Constitutional: Negative for fever  HENT: Positive for postnasal drip  Negative for rhinorrhea and sore throat  Respiratory: Negative for cough  Cardiovascular: Positive for palpitations  Negative for chest pain and leg swelling  Gastrointestinal: Negative for abdominal pain, constipation, diarrhea and nausea  Psychiatric/Behavioral: The patient is not nervous/anxious  Objective: There were no vitals taken for this visit  Physical Exam  Vitals signs reviewed  Constitutional:       General: She is not in acute distress  Appearance: She is well-developed  She is not diaphoretic  HENT:      Head: Normocephalic and atraumatic  Neck:      Musculoskeletal: Normal range of motion and neck supple  Thyroid: No thyromegaly  Cardiovascular:      Rate and Rhythm: Normal rate and regular rhythm  Heart sounds: Normal heart sounds  No murmur  No friction rub   No gallop  Pulmonary:      Effort: Pulmonary effort is normal  No respiratory distress  Breath sounds: Normal breath sounds  No wheezing or rales  Chest:      Chest wall: No tenderness  Abdominal:      General: Bowel sounds are normal  There is no distension  Palpations: Abdomen is soft  There is no mass  Tenderness: There is no abdominal tenderness  There is no guarding or rebound  Musculoskeletal: Normal range of motion  General: No tenderness or deformity  Lymphadenopathy:      Cervical: No cervical adenopathy  Skin:     General: Skin is warm and dry  Findings: No erythema or rash  Neurological:      Mental Status: She is alert and oriented to person, place, and time  Psychiatric:         Behavior: Behavior normal          Thought Content:  Thought content normal          Judgment: Judgment normal

## 2021-03-02 NOTE — ASSESSMENT & PLAN NOTE
Improving  -Not at this visit  Deneis chest pain  -Patient was advised on previous visit to have Holter monitor scheduled   Paperwork reprinted and list with number and locations for cardiology imaging given today  -Follow up results closely

## 2021-03-02 NOTE — ASSESSMENT & PLAN NOTE
Patient reports sore throat and dryness post-COVID-19  She reports she feels like something is "stucked" in throat  -Mild erythema on pharinx noted on physical exam  -She tried OTC zyrtec w/o improvement of symptoms  Advised to use Flonase twice a day

## 2021-03-02 NOTE — ASSESSMENT & PLAN NOTE
Lab Results   Component Value Date    HGBA1C 6 2 03/02/2021   Diet controlled  At goal  -Continue encouraging well balanced diet and aerobic exercise as tolerated     -Follow up in 3 months

## 2021-03-23 ENCOUNTER — HOSPITAL ENCOUNTER (OUTPATIENT)
Dept: NON INVASIVE DIAGNOSTICS | Facility: CLINIC | Age: 66
Discharge: HOME | End: 2021-03-23
Payer: COMMERCIAL

## 2021-03-23 DIAGNOSIS — R00.2 PALPITATIONS: ICD-10-CM

## 2021-03-23 PROCEDURE — 93225 XTRNL ECG REC<48 HRS REC: CPT

## 2021-03-23 PROCEDURE — 93226 XTRNL ECG REC<48 HR SCAN A/R: CPT

## 2021-04-01 PROCEDURE — 93227 XTRNL ECG REC<48 HR R&I: CPT | Performed by: INTERNAL MEDICINE

## 2021-04-13 ENCOUNTER — TELEPHONE (OUTPATIENT)
Dept: FAMILY MEDICINE CLINIC | Facility: CLINIC | Age: 66
End: 2021-04-13

## 2021-04-13 DIAGNOSIS — I49.3 SYMPTOMATIC PVCS: Primary | ICD-10-CM

## 2021-05-04 DIAGNOSIS — E11.9 TYPE 2 DIABETES MELLITUS WITHOUT COMPLICATION, WITHOUT LONG-TERM CURRENT USE OF INSULIN (HCC): Primary | ICD-10-CM

## 2021-05-04 RX ORDER — BLOOD-GLUCOSE METER
EACH MISCELLANEOUS DAILY
Qty: 1 KIT | Refills: 0 | Status: SHIPPED | OUTPATIENT
Start: 2021-05-04

## 2021-05-04 RX ORDER — LANCETS
EACH MISCELLANEOUS DAILY
Qty: 100 EACH | Refills: 3 | Status: SHIPPED | OUTPATIENT
Start: 2021-05-04

## 2021-05-04 RX ORDER — BLOOD SUGAR DIAGNOSTIC
1 STRIP MISCELLANEOUS DAILY
Qty: 100 EACH | Refills: 3 | Status: SHIPPED | OUTPATIENT
Start: 2021-05-04

## 2021-05-05 DIAGNOSIS — I49.3 SYMPTOMATIC PVCS: ICD-10-CM

## 2021-05-14 ENCOUNTER — TELEPHONE (OUTPATIENT)
Dept: FAMILY MEDICINE CLINIC | Facility: CLINIC | Age: 66
End: 2021-05-14

## 2021-05-17 ENCOUNTER — OFFICE VISIT (OUTPATIENT)
Dept: FAMILY MEDICINE CLINIC | Facility: CLINIC | Age: 66
End: 2021-05-17

## 2021-05-17 VITALS
DIASTOLIC BLOOD PRESSURE: 80 MMHG | HEIGHT: 61 IN | BODY MASS INDEX: 27.87 KG/M2 | HEART RATE: 65 BPM | RESPIRATION RATE: 16 BRPM | SYSTOLIC BLOOD PRESSURE: 110 MMHG | WEIGHT: 147.6 LBS | TEMPERATURE: 97.5 F | OXYGEN SATURATION: 98 %

## 2021-05-17 DIAGNOSIS — E11.9 TYPE 2 DIABETES MELLITUS WITHOUT COMPLICATION, WITHOUT LONG-TERM CURRENT USE OF INSULIN (HCC): ICD-10-CM

## 2021-05-17 DIAGNOSIS — R00.2 PALPITATIONS: Primary | ICD-10-CM

## 2021-05-17 DIAGNOSIS — R42 DIZZINESS: ICD-10-CM

## 2021-05-17 PROCEDURE — 99213 OFFICE O/P EST LOW 20 MIN: CPT | Performed by: FAMILY MEDICINE

## 2021-05-17 NOTE — ASSESSMENT & PLAN NOTE
Improving  - Holter monitor 48 hrs showed predominantly sinus rhythm  Average heart rate was  78 bpm   She had  4093 ventricular ectopic  beats noted with a  few interpolated PVCs :  1 8% PVC burden  All PVCs were isolated  Eighteen supraventricular ectopic beats  No significant pauses  No   Sustained arrhythmia were noted  Patient's symptoms correlated either with PVCs or with mild sinus tachycardia   -Patient started on metoprolol 25 mg BID at the time  She reports symptoms have significantly improved  -Close follow up for evaluation of dizziness   Recommended to check BP and return in 1 month

## 2021-05-17 NOTE — ASSESSMENT & PLAN NOTE
Lab Results   Component Value Date    HGBA1C 6 2 03/02/2021   Diet controlled  At goal for age  -Continue encouraging well balanced diet and aerobic exercise as tolerated    -Referral to podiatry given for yearly evaluation   -Follow up in 6 months

## 2021-05-17 NOTE — ASSESSMENT & PLAN NOTE
Chronic  Intermittent  -Patient reports episodes of positional vertigo in the past  She states lately has become frequent    -Sharmin Hallpike maneuver negative at this visit    -Suspect orthostatic hypotension vs BPPV vs meniere's disease (although less likely since patient denies N/V or hearing loss)  -Close follow up with BP diary in 1 month  -Encouraged to increase fluid intake and also slow changes in position  -If BP at home wnl, will consider referral to vestibular therapy on next visit

## 2021-05-17 NOTE — PROGRESS NOTES
Assessment/Plan:    Type 2 diabetes mellitus without complication, without long-term current use of insulin ([facility])    Lab Results   Component Value Date    HGBA1C 6 2 03/02/2021   Diet controlled  At goal for age  -Continue encouraging well balanced diet and aerobic exercise as tolerated  -Referral to podiatry given for yearly evaluation   -Follow up in 6 months    Palpitations  Improving  - Holter monitor 48 hrs showed predominantly sinus rhythm  Average heart rate was  78 bpm   She had  4093 ventricular ectopic  beats noted with a  few interpolated PVCs :  1 8% PVC burden  All PVCs were isolated  Eighteen supraventricular ectopic beats  No significant pauses  No   Sustained arrhythmia were noted  Patient's symptoms correlated either with PVCs or with mild sinus tachycardia   -Patient started on metoprolol 25 mg BID at the time  She reports symptoms have significantly improved  -Close follow up for evaluation of dizziness  Recommended to check BP and return in 1 month     Dizziness  Chronic  Intermittent  -Patient reports episodes of positional vertigo in the past  She states lately has become frequent  -Sharmin Hallpike maneuver negative at this visit    -Suspect orthostatic hypotension vs BPPV vs meniere's disease (although less likely since patient denies N/V or hearing loss)  -Close follow up with BP diary in 1 month  -Encouraged to increase fluid intake and also slow changes in position  -If BP at home wnl, will consider referral to vestibular therapy on next visit         Problem List Items Addressed This Visit        Endocrine    Type 2 diabetes mellitus without complication, without long-term current use of insulin ([facility])       Lab Results   Component Value Date    HGBA1C 6 2 03/02/2021   Diet controlled  At goal for age  -Continue encouraging well balanced diet and aerobic exercise as tolerated    -Referral to podiatry given for yearly evaluation   -Follow up in 6 months         Relevant Orders    Ambulatory referral to Podiatry       Other    Palpitations - Primary     Improving  - Holter monitor 48 hrs showed predominantly sinus rhythm  Average heart rate was  78 bpm   She had  4093 ventricular ectopic  beats noted with a  few interpolated PVCs :  1 8% PVC burden  All PVCs were isolated  Eighteen supraventricular ectopic beats  No significant pauses  No   Sustained arrhythmia were noted  Patient's symptoms correlated either with PVCs or with mild sinus tachycardia   -Patient started on metoprolol 25 mg BID at the time  She reports symptoms have significantly improved  -Close follow up for evaluation of dizziness  Recommended to check BP and return in 1 month          Dizziness     Chronic  Intermittent  -Patient reports episodes of positional vertigo in the past  She states lately has become frequent  -Valentine Hallpike maneuver negative at this visit    -Suspect orthostatic hypotension vs BPPV vs meniere's disease (although less likely since patient denies N/V or hearing loss)  -Close follow up with BP diary in 1 month  -Encouraged to increase fluid intake and also slow changes in position  -If BP at home wnl, will consider referral to vestibular therapy on next visit                 BMI Counseling: Body mass index is 27 89 kg/m²  The BMI is above normal  Nutrition recommendations include reducing portion sizes, 3-5 servings of fruits/vegetables daily, reducing fast food intake, consuming healthier snacks, decreasing soda and/or juice intake and moderation in carbohydrate intake  Exercise recommendations include exercising 3-5 times per week and strength training exercises  Subjective:      Patient ID: Megan Chaney is a 61 y o  female  HPI     Patient is a 62 yo F who presents to the office for palpitation follow up       The following portions of the patient's history were reviewed and updated as appropriate:   She  has a past medical history of Abnormal glucose, Acid reflux, Anxiety, Arthritis, Benign paroxysmal positional vertigo, Carpal tunnel syndrome, Chronic kidney disease, Depression, GERD (gastroesophageal reflux disease), Insomnia, and Patellofemoral dysfunction  She  has a past surgical history that includes Colonoscopy; Tubal ligation; Knee surgery (Right); pr wrist arthroscop,release xvers lig (Right, 8/25/2016); pr incise finger tendon sheath (Right, 8/25/2016); Carpal tunnel release; and pr incise finger tendon sheath (Left, 9/15/2020)  She  reports that she has never smoked  She has never used smokeless tobacco  She reports that she does not drink alcohol or use drugs    Current Outpatient Medications   Medication Sig Dispense Refill    atorvastatin (LIPITOR) 10 mg tablet Take 1 tablet (10 mg total) by mouth daily 90 tablet 1    BIOTIN 5000 PO Take by mouth daily      Blood Glucose Monitoring Suppl (Contour Next One) KIT Use daily 1 kit 0    Cyanocobalamin (VITAMIN B12 PO) Take by mouth daily      diclofenac sodium (VOLTAREN) 1 % Apply 2 g topically 4 (four) times a day 100 g 1    fluticasone (FLONASE) 50 mcg/act nasal spray 1 spray into each nostril daily 16 g 4    glucose blood (Contour Test) test strip Use 1 each daily Use as instructed 100 each 3    metoprolol tartrate (LOPRESSOR) 25 mg tablet TAKE 1 TABLET (25 MG TOTAL) BY MOUTH EVERY 12 (TWELVE) HOURS 60 tablet 0    Microlet Lancets MISC Use daily 100 each 3    naproxen sodium (ALEVE) 220 MG tablet Take 1 tablet (220 mg total) by mouth 2 (two) times a day with meals for 5 days 10 tablet 0    OneTouch Ultra test strip USE 1 STRIP DAILY AS NEEDED FOR HYPOGLYCEMIA AS  DIRECTED 100 each 3    TURMERIC PO Take by mouth daily      acetaminophen (Tylenol 8 Hour) 650 mg CR tablet Take 1 tablet (650 mg total) by mouth every 8 (eight) hours (Patient not taking: Reported on 1/28/2021) 15 tablet 0    al mag oxide-diphenhydramine-lidocaine viscous (MAGIC MOUTHWASH) 1:1:1 suspension Swish and spit 10 mL every 4 (four) hours as needed for mouth pain or discomfort (Patient not taking: Reported on 3/2/2021) 1 Bottle 0    benzonatate (TESSALON PERLES) 100 mg capsule Take 1 capsule (100 mg total) by mouth 3 (three) times a day as needed for cough (Patient not taking: Reported on 3/2/2021) 21 capsule 0    loratadine (CLARITIN) 10 mg tablet Take 10 mg by mouth daily      ondansetron (ZOFRAN-ODT) 4 mg disintegrating tablet Take 1 tablet (4 mg total) by mouth every 8 (eight) hours as needed for nausea or vomiting (Patient not taking: Reported on 3/2/2021) 20 tablet 0     No current facility-administered medications for this visit  She is allergic to bee pollen and pollen extract       Review of Systems   Constitutional: Negative for fever  HENT: Negative for rhinorrhea  Respiratory: Negative for cough and shortness of breath  Cardiovascular: Positive for palpitations  Gastrointestinal: Negative for abdominal pain, diarrhea, nausea and vomiting  Neurological: Positive for dizziness  Negative for light-headedness  Psychiatric/Behavioral: The patient is not nervous/anxious  Objective:      /80 (BP Location: Left arm, Patient Position: Sitting, Cuff Size: Standard)   Pulse 65   Temp 97 5 °F (36 4 °C) (Temporal)   Resp 16   Ht 5' 1" (1 549 m)   Wt 67 kg (147 lb 9 6 oz)   SpO2 98%   BMI 27 89 kg/m²          Physical Exam  Vitals signs reviewed  Constitutional:       General: She is not in acute distress  Appearance: She is well-developed  She is not diaphoretic  HENT:      Head: Normocephalic and atraumatic  Mouth/Throat:      Mouth: Mucous membranes are moist       Pharynx: Oropharynx is clear  No oropharyngeal exudate  Eyes:      Conjunctiva/sclera: Conjunctivae normal       Pupils: Pupils are equal, round, and reactive to light  Neck:      Musculoskeletal: Normal range of motion and neck supple  Thyroid: No thyromegaly  Cardiovascular:      Rate and Rhythm: Normal rate and regular rhythm        Heart sounds: Normal heart sounds  No murmur  No gallop  Pulmonary:      Effort: Pulmonary effort is normal  No respiratory distress  Breath sounds: Normal breath sounds  No wheezing  Abdominal:      General: Bowel sounds are normal  There is no distension  Palpations: Abdomen is soft  Tenderness: There is no abdominal tenderness  Musculoskeletal: Normal range of motion  Right lower leg: No edema  Left lower leg: No edema  Lymphadenopathy:      Cervical: No cervical adenopathy  Skin:     General: Skin is warm and dry  Capillary Refill: Capillary refill takes less than 2 seconds  Findings: No erythema or rash  Neurological:      Mental Status: She is alert and oriented to person, place, and time  Mental status is at baseline        Comments: Sharmin Hallpike maneuver negative   Psychiatric:         Mood and Affect: Mood normal          Behavior: Behavior normal

## 2021-05-29 DIAGNOSIS — I49.3 SYMPTOMATIC PVCS: ICD-10-CM

## 2021-06-08 ENCOUNTER — OFFICE VISIT (OUTPATIENT)
Dept: PODIATRY | Facility: CLINIC | Age: 66
End: 2021-06-08
Payer: COMMERCIAL

## 2021-06-08 VITALS
SYSTOLIC BLOOD PRESSURE: 109 MMHG | WEIGHT: 147.8 LBS | HEART RATE: 69 BPM | BODY MASS INDEX: 27.9 KG/M2 | DIASTOLIC BLOOD PRESSURE: 70 MMHG | HEIGHT: 61 IN

## 2021-06-08 DIAGNOSIS — E11.9 TYPE 2 DIABETES MELLITUS WITHOUT COMPLICATION, WITHOUT LONG-TERM CURRENT USE OF INSULIN (HCC): ICD-10-CM

## 2021-06-08 DIAGNOSIS — L60.1 ONYCHOLYSIS: Primary | ICD-10-CM

## 2021-06-08 DIAGNOSIS — L60.3 NAIL DYSTROPHY: ICD-10-CM

## 2021-06-08 DIAGNOSIS — M79.675 PAIN IN TOE OF LEFT FOOT: ICD-10-CM

## 2021-06-08 PROCEDURE — 11750 EXCISION NAIL&NAIL MATRIX: CPT | Performed by: PODIATRIST

## 2021-06-08 PROCEDURE — 1036F TOBACCO NON-USER: CPT | Performed by: PODIATRIST

## 2021-06-08 PROCEDURE — 99242 OFF/OP CONSLTJ NEW/EST SF 20: CPT | Performed by: PODIATRIST

## 2021-06-08 RX ORDER — LIDOCAINE HYDROCHLORIDE 10 MG/ML
1 INJECTION, SOLUTION EPIDURAL; INFILTRATION; INTRACAUDAL; PERINEURAL ONCE
Status: COMPLETED | OUTPATIENT
Start: 2021-06-08 | End: 2021-06-08

## 2021-06-08 RX ORDER — LIDOCAINE HYDROCHLORIDE AND EPINEPHRINE 10; 10 MG/ML; UG/ML
1 INJECTION, SOLUTION INFILTRATION; PERINEURAL ONCE
Status: COMPLETED | OUTPATIENT
Start: 2021-06-08 | End: 2021-06-08

## 2021-06-08 RX ADMIN — LIDOCAINE HYDROCHLORIDE AND EPINEPHRINE 1 ML: 10; 10 INJECTION, SOLUTION INFILTRATION; PERINEURAL at 15:39

## 2021-06-08 RX ADMIN — LIDOCAINE HYDROCHLORIDE 1 ML: 10 INJECTION, SOLUTION EPIDURAL; INFILTRATION; INTRACAUDAL; PERINEURAL at 15:39

## 2021-06-08 NOTE — PROGRESS NOTES
Assessment/Plan:    Discussed principles of diabetic foot care  Vascular status is within normal limits and sensorium is intact  Patient has a painful dystrophic left great toenail due to prior injury  Discussed treatment options recommending total matrixectomy  The goal of this procedure is permanent erradication of the offending nail  Procedure performed as follows: Anesthesia via 3 cc of a 1:1 mixture of 1 percent xylocaine with epinephrine and 1 percent xylocaine plain  A Betadine prep was performed  The left great toenail was avulsed to the eponychium and removed in its entirety  A total matrixectomy was performed utilizing phenol in a standard manner  A bacitracin dressing was applied  The patient is to soak in warm water twice a day in a more followed by a Neosporin dressing  No problem-specific Assessment & Plan notes found for this encounter  Diagnoses and all orders for this visit:    Onycholysis  -     lidocaine (PF) (XYLOCAINE-MPF) 1 % injection 1 mL  -     lidocaine-epinephrine (XYLOCAINE/EPINEPHRINE) 1 %-1:100,000 injection 1 mL    Type 2 diabetes mellitus without complication, without long-term current use of insulin [facility]  -     Ambulatory referral to Podiatry    Nail dystrophy    Pain in toe of left foot          Subjective:      Patient ID: Key Cooper is a 61 y o  female  HPI       Patient, a 60-year-old type 2 diabetic using diet for control presents for evaluation and care  Patient is taking no medication for diabetes and states that her blood sugar is under good control  Her chief complaint is a painful and loose left great toenail  This nail has been a problem for the past for years ever since she had an injury to the plate  She has tried antifungal topical medications to no avail  Last A1c dated 03/02/2021 was 6 2 and considered normal       I personally reviewed A1c dated 09/10/2020  It was 5 8      The following portions of the patient's history were reviewed and updated as appropriate: allergies, current medications, past family history, past medical history, past social history, past surgical history and problem list     Review of Systems   Respiratory: Negative  Cardiovascular: Negative  Gastrointestinal: Negative  Neurological: Negative for numbness  Psychiatric/Behavioral: Negative  Objective:      /70   Pulse 69   Ht 5' 1" (1 549 m)   Wt 67 kg (147 lb 12 8 oz)   BMI 27 93 kg/m²          Physical Exam  Cardiovascular:      Pulses: no weak pulses          Dorsalis pedis pulses are 2+ on the right side and 2+ on the left side  Posterior tibial pulses are 2+ on the right side and 2+ on the left side  Feet:      Right foot:      Skin integrity: No ulcer, skin breakdown, erythema, warmth, callus or dry skin  Left foot:      Skin integrity: No ulcer, skin breakdown, erythema, warmth, callus or dry skin  Diabetic Foot Exam    Patient's shoes and socks removed  Right Foot/Ankle   Right Foot Inspection  Skin Exam: skin normal and skin intact no dry skin, no warmth, no callus, no erythema, no maceration, no abnormal color, no pre-ulcer, no ulcer and no callus                          Toe Exam: ROM and strength within normal limits  Sensory   Vibration: intact  Proprioception: intact   Monofilament testing: intact  Vascular  Capillary refills: < 3 seconds  The right DP pulse is 2+  The right PT pulse is 2+  Right Toe  - Comprehensive Exam  Ecchymosis: none  Arch: normal  Hammertoes: absent  Swelling: none   Tenderness: none         Left Foot/Ankle  Left Foot Inspection  Skin Exam: skin normal and skin intactno dry skin, no warmth, no erythema, no maceration, normal color, no pre-ulcer, no ulcer and no callus                         Toe Exam: tenderness                   Sensory   Vibration: intact  Proprioception: intact  Monofilament: intact  Vascular  Capillary refills: < 3 seconds  The left DP pulse is 2+  The left PT pulse is 2+  Left Toe  - Comprehensive Exam  Ecchymosis: none  Arch: normal  Hammertoes: absent  Claw toes: absent  Swelling: none   Tenderness: none       Assign Risk Category:  No deformity present; No loss of protective sensation; No weak pulses       Risk: 0  Nail removal    Date/Time: 6/8/2021 4:02 PM  Performed by: Steff Steve DPM  Authorized by: Steff Steve DPM     Patient location:  ClinicUniversal Protocol:  Consent: Verbal consent obtained  Risks and benefits: risks, benefits and alternatives were discussed  Consent given by: patient  Patient understanding: patient states understanding of the procedure being performed  Patient identity confirmed: verbally with patient      Location:     Foot:  L big toe  Pre-procedure details:     Skin preparation:  Betadine    Preparation: Patient was prepped and draped in the usual sterile fashion    Anesthesia (see MAR for exact dosages): Anesthesia method:  Nerve block    Block needle gauge:  25 G    Block anesthetic:  Lidocaine 1% WITH epi and lidocaine 1% w/o epi    Block injection procedure:  Anatomic landmarks identified    Block outcome:  Anesthesia achieved  Nail Removal:     Nail removed:  Complete    Nail bed sutured: no    Ingrown nail:     Wedge excision of skin: no      Nail matrix removed or ablated:  Complete  Post-procedure details:     Dressing:  4x4 sterile gauze, antibiotic ointment and gauze roll    Patient tolerance of procedure:   Tolerated well, no immediate complications

## 2021-06-14 ENCOUNTER — OFFICE VISIT (OUTPATIENT)
Dept: FAMILY MEDICINE CLINIC | Facility: CLINIC | Age: 66
End: 2021-06-14

## 2021-06-14 VITALS
RESPIRATION RATE: 18 BRPM | HEIGHT: 61 IN | DIASTOLIC BLOOD PRESSURE: 70 MMHG | HEART RATE: 69 BPM | SYSTOLIC BLOOD PRESSURE: 100 MMHG | WEIGHT: 148.2 LBS | BODY MASS INDEX: 27.98 KG/M2 | OXYGEN SATURATION: 98 % | TEMPERATURE: 97 F

## 2021-06-14 DIAGNOSIS — E11.9 TYPE 2 DIABETES MELLITUS WITHOUT COMPLICATION, WITHOUT LONG-TERM CURRENT USE OF INSULIN (HCC): Primary | ICD-10-CM

## 2021-06-14 DIAGNOSIS — R42 DIZZINESS: ICD-10-CM

## 2021-06-14 DIAGNOSIS — I49.3 SYMPTOMATIC PVCS: ICD-10-CM

## 2021-06-14 LAB — SL AMB POCT HEMOGLOBIN AIC: 6.1 (ref ?–6.5)

## 2021-06-14 PROCEDURE — 1036F TOBACCO NON-USER: CPT | Performed by: FAMILY MEDICINE

## 2021-06-14 PROCEDURE — 83036 HEMOGLOBIN GLYCOSYLATED A1C: CPT | Performed by: FAMILY MEDICINE

## 2021-06-14 PROCEDURE — 3044F HG A1C LEVEL LT 7.0%: CPT | Performed by: FAMILY MEDICINE

## 2021-06-14 PROCEDURE — 99213 OFFICE O/P EST LOW 20 MIN: CPT | Performed by: FAMILY MEDICINE

## 2021-06-14 PROCEDURE — 3044F HG A1C LEVEL LT 7.0%: CPT | Performed by: PODIATRIST

## 2021-06-14 NOTE — ASSESSMENT & PLAN NOTE
Stable  -Patient's BP /57-78  Average 100/60  -Patient currently on metoprolol 25 mg BID  She stopped taking metoprolol around 10 days ago  She reports palpitations have decreased even without the medication    -Will decrease dose to 25 mg daily and recheck with BP in 1 month

## 2021-06-14 NOTE — ASSESSMENT & PLAN NOTE
Chronic  Intermittent  -Patient reports dizziness and lightheadedness mainly when she lays on the L side  Hasn't noted hearing loss  -Sharmin Hallpike maneuver negative   On review of BP diary episodes of low BP does not correspond to episodes of dizziness    -No wax or abnormal findings on ear exam  -Will give referral to ENT for further evaluation

## 2021-06-14 NOTE — ASSESSMENT & PLAN NOTE
Lab Results   Component Value Date    HGBA1C 6 1 06/14/2021   Well controlled  At goal  -Patient diet controlled  Continue encouraging healthy diet and exercise  -Follow up A1c in 6 months

## 2021-06-14 NOTE — PROGRESS NOTES
Assessment/Plan:    Type 2 diabetes mellitus without complication, without long-term current use of insulin ([facility])    Lab Results   Component Value Date    HGBA1C 6 1 06/14/2021   Well controlled  At goal  -Patient diet controlled  Continue encouraging healthy diet and exercise  -Follow up A1c in 6 months  Symptomatic PVCs  Stable  -Patient's BP /57-78  Average 100/60  -Patient currently on metoprolol 25 mg BID  She stopped taking metoprolol around 10 days ago  She reports palpitations have decreased even without the medication    -Will decrease dose to 25 mg daily and recheck with BP in 1 month  Dizziness  Chronic  Intermittent  -Patient reports dizziness and lightheadedness mainly when she lays on the L side  Hasn't noted hearing loss  -Sharmin Hallpike maneuver negative  On review of BP diary episodes of low BP does not correspond to episodes of dizziness    -No wax or abnormal findings on ear exam  -Will give referral to ENT for further evaluation          Problem List Items Addressed This Visit        Endocrine    Type 2 diabetes mellitus without complication, without long-term current use of insulin ([facility] ) - Primary       Lab Results   Component Value Date    HGBA1C 6 1 06/14/2021   Well controlled  At goal  -Patient diet controlled  Continue encouraging healthy diet and exercise  -Follow up A1c in 6 months  Relevant Orders    POCT hemoglobin A1c (Completed)       Cardiovascular and Mediastinum    Symptomatic PVCs     Stable  -Patient's BP /57-78  Average 100/60  -Patient currently on metoprolol 25 mg BID  She stopped taking metoprolol around 10 days ago  She reports palpitations have decreased even without the medication    -Will decrease dose to 25 mg daily and recheck with BP in 1 month  Relevant Medications    metoprolol tartrate (LOPRESSOR) 25 mg tablet       Other    Dizziness     Chronic   Intermittent  -Patient reports dizziness and lightheadedness mainly when she lays on the L side  Hasn't noted hearing loss  -Sharmin Hallpike maneuver negative  On review of BP diary episodes of low BP does not correspond to episodes of dizziness    -No wax or abnormal findings on ear exam  -Will give referral to ENT for further evaluation          Relevant Orders    Ambulatory Referral to Otolaryngology            Subjective:      Patient ID: Shlomo Mauricio is a 61 y o  female  HPI  Patient here for follow up dizziness and symptomatic PVCs  Patient had Holter evaluation on 3/23/21 where it was noted "Average heart rate was  78 bpm   She had  4093 ventricular ectopic  beats noted with a  few interpolated PVCs :  1 8% PVC burden  All PVCs were isolated  Eighteen supraventricular ectopic beats  No significant pauses  No   Sustained arrhythmia were noted  Patient's symptoms correlated either with PVCs or with mild sinus tachycardia" Patient reports BP soft during this past month  She stopped taking  Metoprolol 1 week ago when she noticed BP 87/61  She reports dizziness episodes do not correspond with low BP  The following portions of the patient's history were reviewed and updated as appropriate:   She  has a past medical history of Abnormal glucose, Acid reflux, Anxiety, Arthritis, Benign paroxysmal positional vertigo, Carpal tunnel syndrome, Chronic kidney disease, Depression, GERD (gastroesophageal reflux disease), Insomnia, and Patellofemoral dysfunction  She  has a past surgical history that includes Colonoscopy; Tubal ligation; Knee surgery (Right); pr wrist arthroscop,release xvers lig (Right, 8/25/2016); pr incise finger tendon sheath (Right, 8/25/2016); Carpal tunnel release; and pr incise finger tendon sheath (Left, 9/15/2020)  She  reports that she has never smoked  She has never used smokeless tobacco  She reports that she does not drink alcohol and does not use drugs    Current Outpatient Medications   Medication Sig Dispense Refill    atorvastatin (LIPITOR) 10 mg tablet Take 1 tablet (10 mg total) by mouth daily 90 tablet 1    BIOTIN 5000 PO Take by mouth daily      Cyanocobalamin (VITAMIN B12 PO) Take by mouth daily      TURMERIC PO Take by mouth daily      acetaminophen (Tylenol 8 Hour) 650 mg CR tablet Take 1 tablet (650 mg total) by mouth every 8 (eight) hours (Patient not taking: Reported on 1/28/2021) 15 tablet 0    al mag oxide-diphenhydramine-lidocaine viscous (MAGIC MOUTHWASH) 1:1:1 suspension Swish and spit 10 mL every 4 (four) hours as needed for mouth pain or discomfort (Patient not taking: Reported on 3/2/2021) 1 Bottle 0    benzonatate (TESSALON PERLES) 100 mg capsule Take 1 capsule (100 mg total) by mouth 3 (three) times a day as needed for cough (Patient not taking: Reported on 3/2/2021) 21 capsule 0    Blood Glucose Monitoring Suppl (Contour Next One) KIT Use daily 1 kit 0    diclofenac sodium (VOLTAREN) 1 % Apply 2 g topically 4 (four) times a day (Patient not taking: Reported on 6/14/2021) 100 g 1    fluticasone (FLONASE) 50 mcg/act nasal spray 1 spray into each nostril daily (Patient not taking: Reported on 6/14/2021) 16 g 4    glucose blood (Contour Test) test strip Use 1 each daily Use as instructed 100 each 3    loratadine (CLARITIN) 10 mg tablet Take 10 mg by mouth daily      metoprolol tartrate (LOPRESSOR) 25 mg tablet Take 1 tablet (25 mg total) by mouth daily 60 tablet 0    Microlet Lancets MISC Use daily 100 each 3    naproxen sodium (ALEVE) 220 MG tablet Take 1 tablet (220 mg total) by mouth 2 (two) times a day with meals for 5 days 10 tablet 0    ondansetron (ZOFRAN-ODT) 4 mg disintegrating tablet Take 1 tablet (4 mg total) by mouth every 8 (eight) hours as needed for nausea or vomiting (Patient not taking: Reported on 3/2/2021) 20 tablet 0    OneTouch Ultra test strip USE 1 STRIP DAILY AS NEEDED FOR HYPOGLYCEMIA AS  DIRECTED 100 each 3     No current facility-administered medications for this visit       She is allergic to bee pollen and pollen extract       Review of Systems   Constitutional: Negative for fatigue  HENT: Negative for hearing loss  Cardiovascular: Negative for chest pain and palpitations  Gastrointestinal: Negative for abdominal pain, constipation and diarrhea  Neurological: Positive for dizziness and light-headedness  Psychiatric/Behavioral: The patient is not nervous/anxious  Objective:      /70 (BP Location: Left arm, Patient Position: Sitting, Cuff Size: Standard)   Pulse 69   Temp (!) 97 °F (36 1 °C) (Tympanic)   Resp 18   Ht 5' 1" (1 549 m)   Wt 67 2 kg (148 lb 3 2 oz)   SpO2 98%   BMI 28 00 kg/m²          Physical Exam  Vitals reviewed  Constitutional:       General: She is not in acute distress  Appearance: She is well-developed  She is not diaphoretic  HENT:      Head: Normocephalic and atraumatic  Right Ear: Tympanic membrane, ear canal and external ear normal  There is no impacted cerumen  Left Ear: Tympanic membrane, ear canal and external ear normal  There is no impacted cerumen  Neck:      Thyroid: No thyromegaly  Cardiovascular:      Rate and Rhythm: Normal rate and regular rhythm  Heart sounds: Normal heart sounds  No murmur heard  Pulmonary:      Effort: Pulmonary effort is normal  No respiratory distress  Breath sounds: Normal breath sounds  No wheezing or rales  Abdominal:      General: Bowel sounds are normal  There is no distension  Palpations: Abdomen is soft  Tenderness: There is no abdominal tenderness  There is no guarding  Musculoskeletal:         General: No tenderness  Normal range of motion  Cervical back: Normal range of motion and neck supple  Right lower leg: No edema  Left lower leg: No edema  Lymphadenopathy:      Cervical: No cervical adenopathy  Skin:     General: Skin is warm and dry  Capillary Refill: Capillary refill takes less than 2 seconds     Neurological:      Mental Status: She is alert and oriented to person, place, and time  Mental status is at baseline     Psychiatric:         Behavior: Behavior normal

## 2021-06-15 ENCOUNTER — OFFICE VISIT (OUTPATIENT)
Dept: PODIATRY | Facility: CLINIC | Age: 66
End: 2021-06-15

## 2021-06-15 VITALS
WEIGHT: 147.4 LBS | SYSTOLIC BLOOD PRESSURE: 108 MMHG | HEART RATE: 67 BPM | DIASTOLIC BLOOD PRESSURE: 66 MMHG | BODY MASS INDEX: 27.83 KG/M2 | HEIGHT: 61 IN

## 2021-06-15 DIAGNOSIS — L60.3 NAIL DYSTROPHY: ICD-10-CM

## 2021-06-15 DIAGNOSIS — L60.1 ONYCHOLYSIS: Primary | ICD-10-CM

## 2021-06-15 PROCEDURE — 3008F BODY MASS INDEX DOCD: CPT | Performed by: FAMILY MEDICINE

## 2021-06-15 PROCEDURE — 3008F BODY MASS INDEX DOCD: CPT | Performed by: PODIATRIST

## 2021-06-15 PROCEDURE — 99024 POSTOP FOLLOW-UP VISIT: CPT | Performed by: PODIATRIST

## 2021-06-15 NOTE — PROGRESS NOTES
Patient presents 1 week post total matrixectomy left great toe  Surgical site healing uneventfully  No evidence of infection  Minimal pain related  Drainage is present within normal limits for procedure performed  Patient may discontinue soaks and Neosporin  She is rescheduled in 6 weeks  She should continue with dry sterile dressing until eschar forms

## 2021-07-27 ENCOUNTER — OFFICE VISIT (OUTPATIENT)
Dept: PODIATRY | Facility: CLINIC | Age: 66
End: 2021-07-27
Payer: COMMERCIAL

## 2021-07-27 VITALS
BODY MASS INDEX: 28.13 KG/M2 | WEIGHT: 149 LBS | DIASTOLIC BLOOD PRESSURE: 72 MMHG | SYSTOLIC BLOOD PRESSURE: 123 MMHG | HEART RATE: 74 BPM | HEIGHT: 61 IN

## 2021-07-27 DIAGNOSIS — L60.1 ONYCHOLYSIS: Primary | ICD-10-CM

## 2021-07-27 DIAGNOSIS — L60.3 NAIL DYSTROPHY: ICD-10-CM

## 2021-07-27 PROCEDURE — 1036F TOBACCO NON-USER: CPT | Performed by: PODIATRIST

## 2021-07-27 PROCEDURE — 99212 OFFICE O/P EST SF 10 MIN: CPT | Performed by: PODIATRIST

## 2021-07-27 NOTE — PROGRESS NOTES
Patient presents 7 weeks post total matrixectomy left great toenail  Surgical site has healed uneventfully and patient is comfortable  Eschar overlies nail bed and is not ready to be removed  Explained to patient how she should deal with this over the next few weeks as she will then be able to remove it effectively  No treatment needed at this time  Reappoint p r n

## 2021-08-05 ENCOUNTER — OFFICE VISIT (OUTPATIENT)
Dept: OTOLARYNGOLOGY | Facility: CLINIC | Age: 66
End: 2021-08-05
Payer: COMMERCIAL

## 2021-08-05 ENCOUNTER — OFFICE VISIT (OUTPATIENT)
Dept: AUDIOLOGY | Facility: CLINIC | Age: 66
End: 2021-08-05
Payer: COMMERCIAL

## 2021-08-05 VITALS
TEMPERATURE: 97.2 F | HEART RATE: 68 BPM | BODY MASS INDEX: 28.13 KG/M2 | DIASTOLIC BLOOD PRESSURE: 63 MMHG | HEIGHT: 61 IN | RESPIRATION RATE: 18 BRPM | WEIGHT: 149 LBS | SYSTOLIC BLOOD PRESSURE: 97 MMHG

## 2021-08-05 DIAGNOSIS — H91.13 PRESBYCUSIS OF BOTH EARS: ICD-10-CM

## 2021-08-05 DIAGNOSIS — R42 DIZZINESS: ICD-10-CM

## 2021-08-05 DIAGNOSIS — H81.12 BENIGN PAROXYSMAL POSITIONAL VERTIGO OF LEFT EAR: ICD-10-CM

## 2021-08-05 DIAGNOSIS — Z01.10 NORMAL HEARING TEST: ICD-10-CM

## 2021-08-05 DIAGNOSIS — R42 VERTIGO: Primary | ICD-10-CM

## 2021-08-05 DIAGNOSIS — H60.8X2 CHRONIC ECZEMATOUS OTITIS EXTERNA OF LEFT EAR: ICD-10-CM

## 2021-08-05 DIAGNOSIS — H93.12 TINNITUS OF LEFT EAR: Primary | ICD-10-CM

## 2021-08-05 PROCEDURE — 99204 OFFICE O/P NEW MOD 45 MIN: CPT | Performed by: SPECIALIST

## 2021-08-05 PROCEDURE — 92557 COMPREHENSIVE HEARING TEST: CPT | Performed by: AUDIOLOGIST

## 2021-08-05 PROCEDURE — 92567 TYMPANOMETRY: CPT | Performed by: AUDIOLOGIST

## 2021-08-05 RX ORDER — MOMETASONE FUROATE 1 MG/ML
SOLUTION TOPICAL
Qty: 60 ML | Refills: 0 | Status: SHIPPED | OUTPATIENT
Start: 2021-08-05

## 2021-08-05 NOTE — PROGRESS NOTES
Otolaryngology Head and Neck Surgery History and Physical    Chief complaint    Chief Complaint   Patient presents with    Dizziness        History of the Present Illness        Shailesh Saab is a 61 y o  who presents presents for evaluation vertigo over the last 2 months  Patient reported that this started 1 day when she lay down in bed and rolled to her left side she felt dizziness that lasted for short period time and then resolved  She reported that she had had similar problems many years ago but at that time was when she would lay back in her bed again she would get dizzy for short period time  She did not report any other symptoms such as weakness arms legs or any focal neurological problems  She did report that over the last 2 days she has noted that it has seemed to resolve  She had no nausea or vomiting no change in her hearing, no tinnitus and no change in consciousness  No history of any recent head trauma  Patient also complains of some chronic itchiness in her left ear  No complaints of any pain or drainage  Review of Systems   Neurological: Positive for dizziness             Past Medical History:   Diagnosis Date    Abnormal glucose     last assessed 12/22/15    Acid reflux     Anxiety     Arthritis     osteoarthritis multiple sites    Benign paroxysmal positional vertigo     Carpal tunnel syndrome     Chronic kidney disease     nephrolithiasis    Depression     GERD (gastroesophageal reflux disease)     Insomnia     Patellofemoral dysfunction     last assessed  9/15/14       Past Surgical History:   Procedure Laterality Date    CARPAL TUNNEL RELEASE      COLONOSCOPY      KNEE SURGERY Right     MI INCISE FINGER TENDON SHEATH Right 8/25/2016    Procedure: RELEASE LONG AND RING TRIGGER FINGERS;  Surgeon: Tomas Mcfadden MD;  Location:  MAIN OR;  Service: Orthopedics    MI INCISE FINGER TENDON SHEATH Left 9/15/2020    Procedure: RELEASE TRIGGER FINGER RING; Surgeon: Ilene Guevara MD;  [facility];  Service: Orthopedics    [place] WRIST Chalice Pastel LIG Right 8/25/2016    Procedure: RELEASE CARPAL TUNNEL ENDOSCOPIC;  Surgeon: Ilene Guevara MD;  Location:  MAIN OR;  Service: Orthopedics    TUBAL LIGATION         Social History     Socioeconomic History    Marital status: /Civil Union     Spouse name: Not on file    Number of children: Not on file    Years of education: Not on file    Highest education level: Not on file   Occupational History    Not on file   Tobacco Use    Smoking status: Never Smoker    Smokeless tobacco: Never Used   Vaping Use    Vaping Use: Never used   Substance and Sexual Activity    Alcohol use: No     Comment: rare/social as per Allscripts    Drug use: No    Sexual activity: Yes     Partners: Male   Other Topics Concern    Not on file   Social History Narrative    Not on file     Social Determinants of Health     Financial Resource Strain: Low Risk     Difficulty of Paying Living Expenses: Not hard at all   Food Insecurity: No Food Insecurity    Worried About Running Out of Food in the Last Year: Never true    Dion of Food in the Last Year: Never true   Transportation Needs: No Transportation Needs    Lack of Transportation (Medical): No    Lack of Transportation (Non-Medical):  No   Physical Activity:     Days of Exercise per Week:     Minutes of Exercise per Session:    Stress:     Feeling of Stress :    Social Connections:     Frequency of Communication with Friends and Family:     Frequency of Social Gatherings with Friends and Family:     Attends Restoration[employer] Services:     Active Member of Clubs or Organizations:     Attends Club or Organization Meetings:     Marital Status:    Intimate Partner Violence:     Fear of Current or Ex-Partner:     Emotionally Abused:     Physically Abused:     Sexually Abused:        Family History   Problem Relation Age of Onset    Diabetes Mother     Heart disease Mother     Hypertension Mother     Hypothyroidism Mother     Arthritis Father     Crohn's disease Family     Psoriasis Family     Rheum arthritis Family     Lupus Family         systemic- erythematosus    Ulcerative colitis Family     Breast cancer Cousin 46        paternal    No Known Problems Son     No Known Problems Son            BP 97/63 (BP Location: Right arm, Patient Position: Sitting, Cuff Size: Adult)   Pulse 68   Temp (!) 97 2 °F (36 2 °C)   Resp 18   Ht 5' 1" (1 549 m)   Wt 67 6 kg (149 lb)   BMI 28 15 kg/m²       Current Outpatient Medications:     BIOTIN 5000 PO, Take by mouth daily, Disp: , Rfl:     Blood Glucose Monitoring Suppl (Contour Next One) KIT, Use daily, Disp: 1 kit, Rfl: 0    Cyanocobalamin (VITAMIN B12 PO), Take by mouth daily, Disp: , Rfl:     glucose blood (Contour Test) test strip, Use 1 each daily Use as instructed, Disp: 100 each, Rfl: 3    loratadine (CLARITIN) 10 mg tablet, Take 10 mg by mouth daily, Disp: , Rfl:     Microlet Lancets MISC, Use daily, Disp: 100 each, Rfl: 3    OneTouch Ultra test strip, USE 1 STRIP DAILY AS NEEDED FOR HYPOGLYCEMIA AS  DIRECTED, Disp: 100 each, Rfl: 3    TURMERIC PO, Take by mouth daily, Disp: , Rfl:     acetaminophen (Tylenol 8 Hour) 650 mg CR tablet, Take 1 tablet (650 mg total) by mouth every 8 (eight) hours (Patient not taking: Reported on 1/28/2021), Disp: 15 tablet, Rfl: 0    al mag oxide-diphenhydramine-lidocaine viscous (MAGIC MOUTHWASH) 1:1:1 suspension, Swish and spit 10 mL every 4 (four) hours as needed for mouth pain or discomfort (Patient not taking: Reported on 3/2/2021), Disp: 1 Bottle, Rfl: 0    atorvastatin (LIPITOR) 10 mg tablet, Take 1 tablet (10 mg total) by mouth daily (Patient not taking: Reported on 8/5/2021), Disp: 90 tablet, Rfl: 1    benzonatate (TESSALON PERLES) 100 mg capsule, Take 1 capsule (100 mg total) by mouth 3 (three) times a day as needed for cough (Patient not taking: Reported on 3/2/2021), Disp: 21 capsule, Rfl: 0    diclofenac sodium (VOLTAREN) 1 %, Apply 2 g topically 4 (four) times a day (Patient not taking: Reported on 6/14/2021), Disp: 100 g, Rfl: 1    fluticasone (FLONASE) 50 mcg/act nasal spray, 1 spray into each nostril daily (Patient not taking: Reported on 6/14/2021), Disp: 16 g, Rfl: 4    metoprolol tartrate (LOPRESSOR) 25 mg tablet, Take 1 tablet (25 mg total) by mouth daily (Patient not taking: Reported on 8/5/2021), Disp: 60 tablet, Rfl: 0    mometasone (ELOCON) 0 1 % lotion, One drop affected ear canal daily at bedtime when necessary itching, Disp: 60 mL, Rfl: 0    naproxen sodium (ALEVE) 220 MG tablet, Take 1 tablet (220 mg total) by mouth 2 (two) times a day with meals for 5 days, Disp: 10 tablet, Rfl: 0    ondansetron (ZOFRAN-ODT) 4 mg disintegrating tablet, Take 1 tablet (4 mg total) by mouth every 8 (eight) hours as needed for nausea or vomiting (Patient not taking: Reported on 3/2/2021), Disp: 20 tablet, Rfl: 0     Physical Exam  Vitals reviewed  Constitutional:       Appearance: Normal appearance  She is well-developed  HENT:      Head: Normocephalic and atraumatic  Right Ear: Tympanic membrane, ear canal and external ear normal       Left Ear: Tympanic membrane, ear canal and external ear normal       Nose: Nose normal       Mouth/Throat:      Mouth: Mucous membranes are moist    Eyes:      Pupils: Pupils are equal, round, and reactive to light  Pulmonary:      Effort: Pulmonary effort is normal       Breath sounds: Normal breath sounds  Abdominal:      General: Abdomen is flat  Musculoskeletal:         General: Normal range of motion  Cervical back: Normal range of motion and neck supple  Skin:     General: Skin is warm  Neurological:      General: No focal deficit present  Mental Status: She is alert and oriented to person, place, and time  Cranial Nerves: No cranial nerve deficit     Psychiatric: Mood and Affect: Mood normal          Behavior: Behavior normal          Thought Content: Thought content normal          Judgment: Judgment normal            Procedure:          Pertinent Notes / Tests / Data reviewed        Data with independent Interpretation  Audiometric testing independently interpreted reviewed with the patient          Assessment and plan:    1  Vertigo     2  Dizziness  Ambulatory Referral to Otolaryngology   3  Benign paroxysmal positional vertigo of left ear     4  Chronic eczematous otitis externa of left ear  mometasone (ELOCON) 0 1 % lotion   5  Presbycusis of both ears         Patient with complaints of vertigo consistent with benign positional vertigo with left horizontal canal involvement  At this point patient has been asymptomatic for 2 days  I told her to monitor symptoms and if she starts to get increasing symptoms to contact me for referral to physical therapy  Patient with complaints of chronic eczematoid dermatitis the years  This time I started her on some mometasone drops to the left ear p r n         Audiometric testing showed bilateral mild sensorineural hearing loss symmetrical both sides  No indication for a retrocochlear testing

## 2021-08-09 ENCOUNTER — OFFICE VISIT (OUTPATIENT)
Dept: FAMILY MEDICINE CLINIC | Facility: CLINIC | Age: 66
End: 2021-08-09

## 2021-08-09 VITALS
SYSTOLIC BLOOD PRESSURE: 122 MMHG | TEMPERATURE: 97.4 F | WEIGHT: 148.6 LBS | DIASTOLIC BLOOD PRESSURE: 78 MMHG | BODY MASS INDEX: 28.05 KG/M2 | HEART RATE: 80 BPM | RESPIRATION RATE: 16 BRPM | HEIGHT: 61 IN

## 2021-08-09 DIAGNOSIS — Z71.3 NUTRITIONAL COUNSELING: ICD-10-CM

## 2021-08-09 DIAGNOSIS — M25.512 CHRONIC LEFT SHOULDER PAIN: ICD-10-CM

## 2021-08-09 DIAGNOSIS — Z00.00 HEALTHCARE MAINTENANCE: ICD-10-CM

## 2021-08-09 DIAGNOSIS — Z71.82 EXERCISE COUNSELING: ICD-10-CM

## 2021-08-09 DIAGNOSIS — Z00.00 ANNUAL PHYSICAL EXAM: Primary | ICD-10-CM

## 2021-08-09 DIAGNOSIS — Z11.59 ENCOUNTER FOR HEPATITIS C SCREENING TEST FOR LOW RISK PATIENT: ICD-10-CM

## 2021-08-09 DIAGNOSIS — Z13.6 SCREENING FOR CARDIOVASCULAR CONDITION: ICD-10-CM

## 2021-08-09 DIAGNOSIS — Z11.4 ENCOUNTER FOR SCREENING FOR HIV: ICD-10-CM

## 2021-08-09 DIAGNOSIS — Z13.31 SCREENING FOR DEPRESSION: ICD-10-CM

## 2021-08-09 DIAGNOSIS — Z12.11 COLON CANCER SCREENING: ICD-10-CM

## 2021-08-09 DIAGNOSIS — Z13.21 ENCOUNTER FOR VITAMIN DEFICIENCY SCREENING: ICD-10-CM

## 2021-08-09 DIAGNOSIS — G89.29 CHRONIC LEFT SHOULDER PAIN: ICD-10-CM

## 2021-08-09 DIAGNOSIS — E11.9 TYPE 2 DIABETES MELLITUS WITHOUT COMPLICATION, WITHOUT LONG-TERM CURRENT USE OF INSULIN (HCC): ICD-10-CM

## 2021-08-09 PROCEDURE — 3008F BODY MASS INDEX DOCD: CPT | Performed by: PODIATRIST

## 2021-08-09 PROCEDURE — 3008F BODY MASS INDEX DOCD: CPT | Performed by: FAMILY MEDICINE

## 2021-08-09 PROCEDURE — 99396 PREV VISIT EST AGE 40-64: CPT | Performed by: FAMILY MEDICINE

## 2021-08-09 RX ORDER — NAPROXEN 500 MG/1
500 TABLET ORAL 2 TIMES DAILY WITH MEALS
Qty: 20 TABLET | Refills: 0 | Status: SHIPPED | OUTPATIENT
Start: 2021-08-09 | End: 2021-08-19

## 2021-08-09 NOTE — PROGRESS NOTES
ADULT ANNUAL PHYSICAL  [facility]    NAME: Shailesh Saab  AGE: 61 y o  SEX: female  : 1955     DATE: 2021     Assessment and Plan:     Problem List Items Addressed This Visit        Endocrine    Type 2 diabetes mellitus without complication, without long-term current use of insulin (HCC)    Relevant Orders    Lipid panel    Microalbumin / creatinine urine ratio    Hemoglobin A1C    CBC    Basic metabolic panel       Other    Colon cancer screening    Healthcare maintenance     - Patient is a 61 y o  female with h/o T2DM here for Annual Physical  - BP at goal 122/78  - Screening for cardiovascular disease: yearly BMP and Lipid panel ordered today  - ASCVD risk score: 5% - currently on atorvastatin 10mg daily  - Screening for colorectal cancer: discussed with patient, she reports she had it done in  but unsure where and states she is due for next one in   Records of previous colonoscopy not found in chart  Will go with patient's report  - Screening for breast cancer: uptodate, mammo 21 negative  - Screening for depression: PHQ-2 score 0 today  - Screening for HIV and Hepatitis C: obtained and negative  - Screening for Osteoporosis: not indicated  - Fall prevention precautions discussed  - Discussed about healthy lifestyle recommendations including healthful diet, exercise, weight loss and tobacco cessation  - BMI Counseling: Body mass index is 28 08 kg/m²  The BMI is above normal  Nutrition recommendations include 3-5 servings of fruits/vegetables daily, reducing fast food intake and decreasing soda and/or juice intake  Exercise recommendations include moderate aerobic physical activity for 150 minutes/week             Other Visit Diagnoses     Annual physical exam    -  Primary    Exercise counseling        Nutritional counseling        Screening for depression        Encounter for vitamin deficiency screening Relevant Orders    Vitamin D 25 hydroxy    Vitamin B12    Encounter for hepatitis C screening test for low risk patient        Encounter for screening for HIV        Screening for cardiovascular condition        Chronic left shoulder pain        Relevant Medications    naproxen (NAPROSYN) 500 mg tablet          Immunizations and preventive care screenings were discussed with patient today  Appropriate education was printed on patient's after visit summary  Counseling:  Alcohol/drug use: discussed moderation in alcohol intake, the recommendations for healthy alcohol use, and avoidance of illicit drug use  Dental Health: discussed importance of regular tooth brushing, flossing, and dental visits  Injury prevention: discussed safety/seat belts, safety helmets, smoke detectors, carbon dioxide detectors, and smoking near bedding or upholstery  Sexual health: discussed sexually transmitted diseases, partner selection, use of condoms, avoidance of unintended pregnancy, and contraceptive alternatives  · Exercise: the importance of regular exercise/physical activity was discussed  Recommend exercise 3-5 times per week for at least 30 minutes  Return in about 7 weeks (around 9/27/2021)  Chief Complaint:     Chief Complaint   Patient presents with    Physical Exam      History of Present Illness:     Adult Annual Physical   Patient here for a comprehensive physical exam  The patient reports problems - left shoulder burning pain  Diet and Physical Activity  · Diet/Nutrition: well balanced diet, limited junk food and consuming 3-5 servings of fruits/vegetables daily  · Exercise: more than 2 hours on average        Depression Screening  PHQ-9 Depression Screening    PHQ-9:   Frequency of the following problems over the past two weeks:      Little interest or pleasure in doing things: 0 - not at all  Feeling down, depressed, or hopeless: 0 - not at all  PHQ-2 Score: 0       General Health  · Sleep: gets 7-8 hours of sleep on average and some difficulty falling asleep   · Hearing: normal - bilateral   · Vision: no vision problems, most recent eye exam >1 year ago and wears glasses  · Dental: regular dental visits, brushes teeth twice daily and flosses teeth occasionally  /GYN Health  · Patient is: postmenopausal  · Last menstrual period: 12 years ago  · Contraceptive method: none  Review of Systems:     Review of Systems   Constitutional: Negative for chills and fever  HENT: Negative for ear pain and sore throat  Eyes: Negative for pain and visual disturbance  Respiratory: Negative for cough and shortness of breath  Cardiovascular: Negative for chest pain and palpitations  Gastrointestinal: Negative for abdominal pain and vomiting  Genitourinary: Negative for dysuria and hematuria  Musculoskeletal: Positive for arthralgias (Left Shoulder pain ), neck pain and neck stiffness  Negative for back pain  Skin: Negative for color change and rash  Neurological: Negative for seizures and syncope  All other systems reviewed and are negative       Past Medical History:     Past Medical History:   Diagnosis Date    Abnormal glucose     last assessed 12/22/15    Acid reflux     Anxiety     Arthritis     osteoarthritis multiple sites    Benign paroxysmal positional vertigo     Carpal tunnel syndrome     Chronic kidney disease     nephrolithiasis    Depression     GERD (gastroesophageal reflux disease)     Insomnia     Patellofemoral dysfunction     last assessed  9/15/14      Past Surgical History:     Past Surgical History:   Procedure Laterality Date    CARPAL TUNNEL RELEASE      COLONOSCOPY      KNEE SURGERY Right     PA INCISE FINGER TENDON SHEATH Right 8/25/2016    Procedure: RELEASE LONG AND RING TRIGGER FINGERS;  Surgeon: Patricia Nagel MD;  [facility]: [facility] OR;  Service: Orthopedics    PA INCISE FINGER TENDON SHEATH Left 9/15/2020    Procedure: RELEASE TRIGGER FINGER RING;  Surgeon: Frandy Renae MD;  Location:  MAIN OR;  Service: Orthopedics    [place] WRIST Yuliet Gonzalse LIG Right 8/25/2016    Procedure: RELEASE CARPAL TUNNEL ENDOSCOPIC;  Surgeon: Frandy Renae MD;  Location:  MAIN OR;  Service: Orthopedics    TUBAL LIGATION        Social History:     Social History     Socioeconomic History    Marital status: /Civil Union     Spouse name: None    Number of children: None    Years of education: None    Highest education level: None   Occupational History    None   Tobacco Use    Smoking status: Never Smoker    Smokeless tobacco: Never Used   Vaping Use    Vaping Use: Never used   Substance and Sexual Activity    Alcohol use: No     Comment: rare/social as per Allscripts    Drug use: No    Sexual activity: Yes     Partners: Male   Other Topics Concern    None   Social History Narrative    None     Social Determinants of Health     Financial Resource Strain: Low Risk     Difficulty of Paying Living Expenses: Not hard at all   Food Insecurity: No Food Insecurity    Worried About Running Out of Food in the Last Year: Never true    Dion of Food in the Last Year: Never true   Transportation Needs: No Transportation Needs    Lack of Transportation (Medical): No    Lack of Transportation (Non-Medical):  No   Physical Activity:     Days of Exercise per Week:     Minutes of Exercise per Session:    Stress:     Feeling of Stress :    Social Connections:     Frequency of Communication with Friends and Family:     Frequency of Social Gatherings with Friends and Family:     Attends [employer] Services:     Active Member of Clubs or Organizations:     Attends Club or Organization Meetings:     Marital Status:    Intimate Partner Violence:     Fear of Current or Ex-Partner:     Emotionally Abused:     Physically Abused:     Sexually Abused:       Family History:     Family History   Problem Relation Age of Onset    Diabetes Mother     Heart disease Mother     Hypertension Mother     Hypothyroidism Mother     Arthritis Father     Crohn's disease Family     Psoriasis Family     Rheum arthritis Family     Lupus Family         systemic- erythematosus    Ulcerative colitis Family     Breast cancer Cousin 46        paternal    No Known Problems Son     No Known Problems Son       Current Medications:     Current Outpatient Medications   Medication Sig Dispense Refill    acetaminophen (Tylenol 8 Hour) 650 mg CR tablet Take 1 tablet (650 mg total) by mouth every 8 (eight) hours (Patient not taking: Reported on 1/28/2021) 15 tablet 0    al mag oxide-diphenhydramine-lidocaine viscous (MAGIC MOUTHWASH) 1:1:1 suspension Swish and spit 10 mL every 4 (four) hours as needed for mouth pain or discomfort (Patient not taking: Reported on 3/2/2021) 1 Bottle 0    atorvastatin (LIPITOR) 10 mg tablet Take 1 tablet (10 mg total) by mouth daily (Patient not taking: Reported on 8/5/2021) 90 tablet 1    benzonatate (TESSALON PERLES) 100 mg capsule Take 1 capsule (100 mg total) by mouth 3 (three) times a day as needed for cough (Patient not taking: Reported on 3/2/2021) 21 capsule 0    BIOTIN 5000 PO Take by mouth daily      Blood Glucose Monitoring Suppl (Contour Next One) KIT Use daily 1 kit 0    Cyanocobalamin (VITAMIN B12 PO) Take by mouth daily      diclofenac sodium (VOLTAREN) 1 % Apply 2 g topically 4 (four) times a day (Patient not taking: Reported on 6/14/2021) 100 g 1    fluticasone (FLONASE) 50 mcg/act nasal spray 1 spray into each nostril daily (Patient not taking: Reported on 6/14/2021) 16 g 4    glucose blood (Contour Test) test strip Use 1 each daily Use as instructed 100 each 3    loratadine (CLARITIN) 10 mg tablet Take 10 mg by mouth daily      metoprolol tartrate (LOPRESSOR) 25 mg tablet Take 1 tablet (25 mg total) by mouth daily (Patient not taking: Reported on 8/5/2021) 60 tablet 0    Microlet Lancets MISC Use daily 100 each 3    mometasone (ELOCON) 0 1 % lotion One drop affected ear canal daily at bedtime when necessary itching 60 mL 0    naproxen (NAPROSYN) 500 mg tablet Take 1 tablet (500 mg total) by mouth 2 (two) times a day with meals for 10 days 20 tablet 0    ondansetron (ZOFRAN-ODT) 4 mg disintegrating tablet Take 1 tablet (4 mg total) by mouth every 8 (eight) hours as needed for nausea or vomiting (Patient not taking: Reported on 3/2/2021) 20 tablet 0    OneTouch Ultra test strip USE 1 STRIP DAILY AS NEEDED FOR HYPOGLYCEMIA AS  DIRECTED 100 each 3    TURMERIC PO Take by mouth daily       No current facility-administered medications for this visit  Allergies: Allergies   Allergen Reactions    Bee Pollen Other (See Comments)     Other reaction(s): Sneezing    Pollen Extract Allergic Rhinitis and Sneezing      Physical Exam:     /78 (BP Location: Left arm, Patient Position: Sitting, Cuff Size: Large)   Pulse 80   Temp (!) 97 4 °F (36 3 °C) (Temporal)   Resp 16   Ht 5' 1" (1 549 m)   Wt 67 4 kg (148 lb 9 6 oz)   BMI 28 08 kg/m²     Physical Exam  Vitals reviewed  Constitutional:       General: She is not in acute distress  Appearance: Normal appearance  HENT:      Head: Normocephalic and atraumatic  Right Ear: Tympanic membrane, ear canal and external ear normal       Left Ear: Tympanic membrane, ear canal and external ear normal       Mouth/Throat:      Mouth: Mucous membranes are moist       Pharynx: Oropharynx is clear  Eyes:      Extraocular Movements: Extraocular movements intact  Conjunctiva/sclera: Conjunctivae normal       Pupils: Pupils are equal, round, and reactive to light  Cardiovascular:      Rate and Rhythm: Normal rate and regular rhythm  Pulses: Normal pulses  Heart sounds: Normal heart sounds  No murmur heard  No gallop  Pulmonary:      Effort: Pulmonary effort is normal  No respiratory distress  Breath sounds: Normal breath sounds   No wheezing or rales  Abdominal:      General: Bowel sounds are normal       Palpations: Abdomen is soft  Musculoskeletal:         General: Tenderness (Right shoulder) present  Cervical back: Normal range of motion  No tenderness  Skin:     General: Skin is warm and dry  Neurological:      General: No focal deficit present  Mental Status: She is alert and oriented to person, place, and time     Psychiatric:         Mood and Affect: Mood normal          Behavior: Behavior normal           Chris Bellamy MD  [id number]

## 2021-08-09 NOTE — ASSESSMENT & PLAN NOTE
- Patient is a 61 y o  female with h/o T2DM here for Annual Physical  - BP at goal 122/78  - Screening for cardiovascular disease: yearly BMP and Lipid panel ordered today  - ASCVD risk score: 5% - currently on atorvastatin 10mg daily  - Screening for colorectal cancer: discussed with patient, she reports she had it done in 2013 but unsure where and states she is due for next one in 2023  Records of previous colonoscopy not found in chart  Will go with patient's report  - Screening for breast cancer: stoney hood 1/13/21 negative  - Screening for depression: PHQ-2 score 0 today  - Screening for HIV and Hepatitis C: obtained and negative  - Screening for Osteoporosis: not indicated  - Fall prevention precautions discussed  - Discussed about healthy lifestyle recommendations including healthful diet, exercise, weight loss and tobacco cessation  - BMI Counseling: Body mass index is 28 08 kg/m²  The BMI is above normal  Nutrition recommendations include 3-5 servings of fruits/vegetables daily, reducing fast food intake and decreasing soda and/or juice intake  Exercise recommendations include moderate aerobic physical activity for 150 minutes/week

## 2021-08-11 NOTE — PROGRESS NOTES
ADULT ANNUAL PHYSICAL  [facility]    NAME: Jacob Mullen  AGE: 61 y o  SEX: female  : 1955     DATE: 2021     Assessment and Plan:     Problem List Items Addressed This Visit        Endocrine    Type 2 diabetes mellitus without complication, without long-term current use of insulin (HCC)    Relevant Orders    Lipid panel    Microalbumin / creatinine urine ratio    Hemoglobin A1C    CBC    Basic metabolic panel       Other    Colon cancer screening    Healthcare maintenance     - Patient is a 61 y o  female with h/o T2DM here for Annual Physical  - BP at goal 122/78  - Screening for cardiovascular disease: yearly BMP and Lipid panel ordered today  - ASCVD risk score: 5% - currently on atorvastatin 10mg daily  - Screening for colorectal cancer: discussed with patient, she reports she had it done in  but unsure where and states she is due for next one in   Records of previous colonoscopy not found in chart  Will go with patient's report  - Screening for breast cancer: uptodate, mammo 21 negative  - Screening for depression: PHQ-2 score 0 today  - Screening for HIV and Hepatitis C: obtained and negative  - Screening for Osteoporosis: not indicated  - Fall prevention precautions discussed  - Discussed about healthy lifestyle recommendations including healthful diet, exercise, weight loss and tobacco cessation  - BMI Counseling: Body mass index is 28 08 kg/m²  The BMI is above normal  Nutrition recommendations include 3-5 servings of fruits/vegetables daily, reducing fast food intake and decreasing soda and/or juice intake  Exercise recommendations include moderate aerobic physical activity for 150 minutes/week             Other Visit Diagnoses     Annual physical exam    -  Primary    Exercise counseling        Nutritional counseling        Screening for depression        Encounter for vitamin deficiency screening Relevant Orders    Vitamin D 25 hydroxy    Vitamin B12    Encounter for hepatitis C screening test for low risk patient        Encounter for screening for HIV        Screening for cardiovascular condition        Chronic left shoulder pain        Relevant Medications    naproxen (NAPROSYN) 500 mg tablet          Immunizations and preventive care screenings were discussed with patient today  Appropriate education was printed on patient's after visit summary  Counseling:  Alcohol/drug use: discussed moderation in alcohol intake, the recommendations for healthy alcohol use, and avoidance of illicit drug use  Dental Health: discussed importance of regular tooth brushing, flossing, and dental visits  Injury prevention: discussed safety/seat belts, safety helmets, smoke detectors, carbon dioxide detectors, and smoking near bedding or upholstery  Sexual health: discussed sexually transmitted diseases, partner selection, use of condoms, avoidance of unintended pregnancy, and contraceptive alternatives  · Exercise: the importance of regular exercise/physical activity was discussed  Recommend exercise 3-5 times per week for at least 30 minutes  Return in about 7 weeks (around 9/27/2021)  Chief Complaint:     Chief Complaint   Patient presents with    Physical Exam      History of Present Illness:     Adult Annual Physical   Patient here for a comprehensive physical exam  The patient reports problems - left shoulder burning pain  Diet and Physical Activity  · Diet/Nutrition: well balanced diet, limited junk food and consuming 3-5 servings of fruits/vegetables daily  · Exercise: more than 2 hours on average        Depression Screening  PHQ-9 Depression Screening    PHQ-9:   Frequency of the following problems over the past two weeks:      Little interest or pleasure in doing things: 0 - not at all  Feeling down, depressed, or hopeless: 0 - not at all  PHQ-2 Score: 0       General Health  · Sleep: gets 7-8 hours of sleep on average and some difficulty falling asleep   · Hearing: normal - bilateral   · Vision: no vision problems, most recent eye exam >1 year ago and wears glasses  · Dental: regular dental visits, brushes teeth twice daily and flosses teeth occasionally  /GYN Health  · Patient is: postmenopausal  · Last menstrual period: 12 years ago  · Contraceptive method: none  Review of Systems:     Review of Systems   Constitutional: Negative for chills and fever  HENT: Negative for ear pain and sore throat  Eyes: Negative for pain and visual disturbance  Respiratory: Negative for cough and shortness of breath  Cardiovascular: Negative for chest pain and palpitations  Gastrointestinal: Negative for abdominal pain and vomiting  Genitourinary: Negative for dysuria and hematuria  Musculoskeletal: Positive for arthralgias (Left Shoulder pain ), neck pain and neck stiffness  Negative for back pain  Skin: Negative for color change and rash  Neurological: Negative for seizures and syncope  All other systems reviewed and are negative       Past Medical History:     Past Medical History:   Diagnosis Date    Abnormal glucose     last assessed 12/22/15    Acid reflux     Anxiety     Arthritis     osteoarthritis multiple sites    Benign paroxysmal positional vertigo     Carpal tunnel syndrome     Chronic kidney disease     nephrolithiasis    Depression     GERD (gastroesophageal reflux disease)     Insomnia     Patellofemoral dysfunction     last assessed  9/15/14      Past Surgical History:     Past Surgical History:   Procedure Laterality Date    CARPAL TUNNEL RELEASE      COLONOSCOPY      KNEE SURGERY Right     SD INCISE FINGER TENDON SHEATH Right 8/25/2016    Procedure: RELEASE LONG AND RING TRIGGER FINGERS;  Surgeon: Troy Skaggs MD;  Location: [facility] OR;  Service: Orthopedics    SD INCISE FINGER TENDON SHEATH Left 9/15/2020    Procedure: RELEASE TRIGGER FINGER RING;  Surgeon: Fallon Velasquez MD;  Location:  MAIN OR;  Service: Orthopedics    [place] WRIST Kelsy Ohm LIG Right 8/25/2016    Procedure: RELEASE CARPAL TUNNEL ENDOSCOPIC;  Surgeon: Fallon Velasquez MD;  Location:  MAIN OR;  Service: Orthopedics    TUBAL LIGATION        Social History:     Social History     Socioeconomic History    Marital status: /Civil Union     Spouse name: None    Number of children: None    Years of education: None    Highest education level: None   Occupational History    None   Tobacco Use    Smoking status: Never Smoker    Smokeless tobacco: Never Used   Vaping Use    Vaping Use: Never used   Substance and Sexual Activity    Alcohol use: No     Comment: rare/social as per [employer]    Drug use: No    Sexual activity: Yes     Partners: Male   Other Topics Concern    None   Social History Narrative    None     Social Determinants of Health     Financial Resource Strain: Low Risk     Difficulty of Paying Living Expenses: Not hard at all   Food Insecurity: No Food Insecurity    Worried About Running Out of Food in the Last Year: Never true    Dion of Food in the Last Year: Never true   Transportation Needs: No Transportation Needs    Lack of Transportation (Medical): No    Lack of Transportation (Non-Medical):  No   Physical Activity:     Days of Exercise per Week:     Minutes of Exercise per Session:    Stress:     Feeling of Stress :    Social Connections:     Frequency of Communication with Friends and Family:     Frequency of Social Gatherings with Friends and Family:     Attends Islam Services:     Active Member of Clubs or Organizations:     Attends Club or Organization Meetings:     Marital Status:    Intimate Partner Violence:     Fear of Current or Ex-Partner:     Emotionally Abused:     Physically Abused:     Sexually Abused:       Family History:     Family History   Problem Relation Age of Onset    Diabetes Mother     Heart disease Mother     Hypertension Mother     Hypothyroidism Mother     Arthritis Father     Crohn's disease Family     Psoriasis Family     Rheum arthritis Family     Lupus Family         systemic- erythematosus    Ulcerative colitis Family     Breast cancer Cousin 46        paternal    No Known Problems Son     No Known Problems Son       Current Medications:     Current Outpatient Medications   Medication Sig Dispense Refill    acetaminophen (Tylenol 8 Hour) 650 mg CR tablet Take 1 tablet (650 mg total) by mouth every 8 (eight) hours (Patient not taking: Reported on 1/28/2021) 15 tablet 0    al mag oxide-diphenhydramine-lidocaine viscous (MAGIC MOUTHWASH) 1:1:1 suspension Swish and spit 10 mL every 4 (four) hours as needed for mouth pain or discomfort (Patient not taking: Reported on 3/2/2021) 1 Bottle 0    atorvastatin (LIPITOR) 10 mg tablet Take 1 tablet (10 mg total) by mouth daily (Patient not taking: Reported on 8/5/2021) 90 tablet 1    benzonatate (TESSALON PERLES) 100 mg capsule Take 1 capsule (100 mg total) by mouth 3 (three) times a day as needed for cough (Patient not taking: Reported on 3/2/2021) 21 capsule 0    BIOTIN 5000 PO Take by mouth daily      Blood Glucose Monitoring Suppl (Contour Next One) KIT Use daily 1 kit 0    Cyanocobalamin (VITAMIN B12 PO) Take by mouth daily      diclofenac sodium (VOLTAREN) 1 % Apply 2 g topically 4 (four) times a day (Patient not taking: Reported on 6/14/2021) 100 g 1    fluticasone (FLONASE) 50 mcg/act nasal spray 1 spray into each nostril daily (Patient not taking: Reported on 6/14/2021) 16 g 4    glucose blood (Contour Test) test strip Use 1 each daily Use as instructed 100 each 3    loratadine (CLARITIN) 10 mg tablet Take 10 mg by mouth daily      metoprolol tartrate (LOPRESSOR) 25 mg tablet Take 1 tablet (25 mg total) by mouth daily (Patient not taking: Reported on 8/5/2021) 60 tablet 0    Microlet Lancets MISC Use daily 100 each 3    mometasone (ELOCON) 0 1 % lotion One drop affected ear canal daily at bedtime when necessary itching 60 mL 0    naproxen (NAPROSYN) 500 mg tablet Take 1 tablet (500 mg total) by mouth 2 (two) times a day with meals for 10 days 20 tablet 0    ondansetron (ZOFRAN-ODT) 4 mg disintegrating tablet Take 1 tablet (4 mg total) by mouth every 8 (eight) hours as needed for nausea or vomiting (Patient not taking: Reported on 3/2/2021) 20 tablet 0    OneTouch Ultra test strip USE 1 STRIP DAILY AS NEEDED FOR HYPOGLYCEMIA AS  DIRECTED 100 each 3    TURMERIC PO Take by mouth daily       No current facility-administered medications for this visit  Allergies: Allergies   Allergen Reactions    Bee Pollen Other (See Comments)     Other reaction(s): Sneezing    Pollen Extract Allergic Rhinitis and Sneezing      Physical Exam:     /78 (BP Location: Left arm, Patient Position: Sitting, Cuff Size: Large)   Pulse 80   Temp (!) 97 4 °F (36 3 °C) (Temporal)   Resp 16   Ht 5' 1" (1 549 m)   Wt 67 4 kg (148 lb 9 6 oz)   BMI 28 08 kg/m²     Physical Exam  Vitals reviewed  Constitutional:       General: She is not in acute distress  Appearance: Normal appearance  HENT:      Head: Normocephalic and atraumatic  Right Ear: Tympanic membrane, ear canal and external ear normal       Left Ear: Tympanic membrane, ear canal and external ear normal       Mouth/Throat:      Mouth: Mucous membranes are moist       Pharynx: Oropharynx is clear  Eyes:      Extraocular Movements: Extraocular movements intact  Conjunctiva/sclera: Conjunctivae normal       Pupils: Pupils are equal, round, and reactive to light  Cardiovascular:      Rate and Rhythm: Normal rate and regular rhythm  Pulses: Normal pulses  Heart sounds: Normal heart sounds  No murmur heard  No gallop  Pulmonary:      Effort: Pulmonary effort is normal  No respiratory distress  Breath sounds: Normal breath sounds   No wheezing or rales  Abdominal:      General: Bowel sounds are normal       Palpations: Abdomen is soft  Musculoskeletal:         General: Tenderness (Right shoulder) present  Cervical back: Normal range of motion  No tenderness  Skin:     General: Skin is warm and dry  Neurological:      General: No focal deficit present  Mental Status: She is alert and oriented to person, place, and time     Psychiatric:         Mood and Affect: Mood normal          Behavior: Behavior normal           Mathieu Wilhelm MD  [address]

## 2021-09-08 LAB
25(OH)D3 SERPL-MCNC: 53 NG/ML (ref 30–100)
ALBUMIN/CREAT UR: 4 MCG/MG CREAT
BUN SERPL-MCNC: 18 MG/DL (ref 7–25)
BUN/CREAT SERPL: ABNORMAL (CALC) (ref 6–22)
CALCIUM SERPL-MCNC: 9.6 MG/DL (ref 8.6–10.4)
CHLORIDE SERPL-SCNC: 105 MMOL/L (ref 98–110)
CHOLEST SERPL-MCNC: 224 MG/DL
CHOLEST/HDLC SERPL: 3.6 (CALC)
CO2 SERPL-SCNC: 29 MMOL/L (ref 20–32)
CREAT SERPL-MCNC: 0.78 MG/DL (ref 0.5–0.99)
CREAT UR-MCNC: 94 MG/DL (ref 20–275)
ERYTHROCYTE [DISTWIDTH] IN BLOOD BY AUTOMATED COUNT: 12.6 % (ref 11–15)
GLUCOSE SERPL-MCNC: 104 MG/DL (ref 65–99)
HBA1C MFR BLD: 5.9 % OF TOTAL HGB
HCT VFR BLD AUTO: 42.6 % (ref 35–45)
HDLC SERPL-MCNC: 62 MG/DL
HGB BLD-MCNC: 14.4 G/DL (ref 11.7–15.5)
LDLC SERPL CALC-MCNC: 140 MG/DL (CALC)
MCH RBC QN AUTO: 30.5 PG (ref 27–33)
MCHC RBC AUTO-ENTMCNC: 33.8 G/DL (ref 32–36)
MCV RBC AUTO: 90.3 FL (ref 80–100)
MICROALBUMIN UR-MCNC: 0.4 MG/DL
NONHDLC SERPL-MCNC: 162 MG/DL (CALC)
PLATELET # BLD AUTO: 230 THOUSAND/UL (ref 140–400)
PMV BLD REES-ECKER: 11 FL (ref 7.5–12.5)
POTASSIUM SERPL-SCNC: 4.5 MMOL/L (ref 3.5–5.3)
RBC # BLD AUTO: 4.72 MILLION/UL (ref 3.8–5.1)
SL AMB EGFR AFRICAN AMERICAN: 93 ML/MIN/1.73M2
SL AMB EGFR NON AFRICAN AMERICAN: 80 ML/MIN/1.73M2
SODIUM SERPL-SCNC: 142 MMOL/L (ref 135–146)
TRIGL SERPL-MCNC: 108 MG/DL
VIT B12 SERPL-MCNC: 696 PG/ML (ref 200–1100)
WBC # BLD AUTO: 6.3 THOUSAND/UL (ref 3.8–10.8)

## 2021-09-08 PROCEDURE — 3061F NEG MICROALBUMINURIA REV: CPT | Performed by: FAMILY MEDICINE

## 2021-09-08 PROCEDURE — 3061F NEG MICROALBUMINURIA REV: CPT | Performed by: SPECIALIST

## 2021-09-08 PROCEDURE — 3044F HG A1C LEVEL LT 7.0%: CPT | Performed by: SPECIALIST

## 2021-09-08 PROCEDURE — 3044F HG A1C LEVEL LT 7.0%: CPT | Performed by: FAMILY MEDICINE

## 2021-09-29 ENCOUNTER — RA CDI HCC (OUTPATIENT)
Dept: OTHER | Facility: HOSPITAL | Age: 66
End: 2021-09-29

## 2021-09-29 NOTE — PROGRESS NOTES
[facility]  coding opportunities       Chart reviewed, no opportunity found: CHART REVIEWED, NO OPPORTUNITY FOUND                        Patients insurance company: [email or web address] (Medicare and Commercial for [employer] and [employer])

## 2021-10-08 ENCOUNTER — OFFICE VISIT (OUTPATIENT)
Dept: FAMILY MEDICINE CLINIC | Facility: CLINIC | Age: 66
End: 2021-10-08

## 2021-10-08 VITALS
HEART RATE: 82 BPM | HEIGHT: 61 IN | TEMPERATURE: 97.3 F | WEIGHT: 147 LBS | RESPIRATION RATE: 16 BRPM | SYSTOLIC BLOOD PRESSURE: 110 MMHG | DIASTOLIC BLOOD PRESSURE: 70 MMHG | BODY MASS INDEX: 27.75 KG/M2 | OXYGEN SATURATION: 98 %

## 2021-10-08 DIAGNOSIS — M15.9 GENERALIZED OSTEOARTHRITIS OF MULTIPLE SITES: ICD-10-CM

## 2021-10-08 DIAGNOSIS — E11.9 TYPE 2 DIABETES MELLITUS WITHOUT COMPLICATION, WITHOUT LONG-TERM CURRENT USE OF INSULIN (HCC): Primary | ICD-10-CM

## 2021-10-08 PROCEDURE — 1036F TOBACCO NON-USER: CPT | Performed by: FAMILY MEDICINE

## 2021-10-08 PROCEDURE — 99213 OFFICE O/P EST LOW 20 MIN: CPT | Performed by: FAMILY MEDICINE

## 2021-10-08 PROCEDURE — 3008F BODY MASS INDEX DOCD: CPT | Performed by: FAMILY MEDICINE

## 2021-10-08 PROCEDURE — 3078F DIAST BP <80 MM HG: CPT | Performed by: FAMILY MEDICINE

## 2021-10-08 PROCEDURE — 3074F SYST BP LT 130 MM HG: CPT | Performed by: FAMILY MEDICINE

## 2021-10-18 ENCOUNTER — PROBLEM (OUTPATIENT)
Dept: URBAN - METROPOLITAN AREA CLINIC 6 | Facility: CLINIC | Age: 66
End: 2021-10-18

## 2021-10-18 DIAGNOSIS — H34.211: ICD-10-CM

## 2021-10-18 PROCEDURE — 92014 COMPRE OPH EXAM EST PT 1/>: CPT

## 2021-10-18 PROCEDURE — 92020 GONIOSCOPY: CPT

## 2021-10-18 PROCEDURE — 92250 FUNDUS PHOTOGRAPHY W/I&R: CPT

## 2021-10-18 PROCEDURE — G8428 CUR MEDS NOT DOCUMENT: HCPCS

## 2021-10-18 PROCEDURE — 1036F TOBACCO NON-USER: CPT

## 2021-10-18 ASSESSMENT — TONOMETRY
OD_IOP_MMHG: 24
OS_IOP_MMHG: 31
OD_IOP_MMHG: 27
OS_IOP_MMHG: 29

## 2021-10-18 ASSESSMENT — VISUAL ACUITY
OU_CC: J1+
OS_CC: 20/40-1
OD_CC: 20/30
OS_PH: 20/30-2

## 2022-01-04 ENCOUNTER — TELEPHONE (OUTPATIENT)
Dept: FAMILY MEDICINE CLINIC | Facility: CLINIC | Age: 67
End: 2022-01-04

## 2022-01-25 ENCOUNTER — OFFICE VISIT (OUTPATIENT)
Dept: FAMILY MEDICINE CLINIC | Facility: CLINIC | Age: 67
End: 2022-01-25

## 2022-01-25 VITALS
OXYGEN SATURATION: 100 % | WEIGHT: 146 LBS | BODY MASS INDEX: 27.56 KG/M2 | HEIGHT: 61 IN | SYSTOLIC BLOOD PRESSURE: 108 MMHG | TEMPERATURE: 97.5 F | RESPIRATION RATE: 16 BRPM | HEART RATE: 75 BPM | DIASTOLIC BLOOD PRESSURE: 70 MMHG

## 2022-01-25 DIAGNOSIS — G89.29 CHRONIC RIGHT SHOULDER PAIN: ICD-10-CM

## 2022-01-25 DIAGNOSIS — J30.1 SEASONAL ALLERGIC RHINITIS DUE TO POLLEN: ICD-10-CM

## 2022-01-25 DIAGNOSIS — Z00.00 MEDICARE ANNUAL WELLNESS VISIT, INITIAL: Primary | ICD-10-CM

## 2022-01-25 DIAGNOSIS — Z78.0 ENCOUNTER FOR OSTEOPOROSIS SCREENING IN ASYMPTOMATIC POSTMENOPAUSAL PATIENT: ICD-10-CM

## 2022-01-25 DIAGNOSIS — Z13.820 ENCOUNTER FOR OSTEOPOROSIS SCREENING IN ASYMPTOMATIC POSTMENOPAUSAL PATIENT: ICD-10-CM

## 2022-01-25 DIAGNOSIS — E11.9 TYPE 2 DIABETES MELLITUS WITHOUT COMPLICATION, WITHOUT LONG-TERM CURRENT USE OF INSULIN (HCC): ICD-10-CM

## 2022-01-25 DIAGNOSIS — Z13.31 SCREENING FOR DEPRESSION: ICD-10-CM

## 2022-01-25 DIAGNOSIS — Z00.00 HEALTHCARE MAINTENANCE: ICD-10-CM

## 2022-01-25 DIAGNOSIS — M25.511 CHRONIC RIGHT SHOULDER PAIN: ICD-10-CM

## 2022-01-25 PROCEDURE — 1003F LEVEL OF ACTIVITY ASSESS: CPT | Performed by: FAMILY MEDICINE

## 2022-01-25 PROCEDURE — 3725F SCREEN DEPRESSION PERFORMED: CPT | Performed by: FAMILY MEDICINE

## 2022-01-25 PROCEDURE — G0438 PPPS, INITIAL VISIT: HCPCS | Performed by: FAMILY MEDICINE

## 2022-01-25 PROCEDURE — 1090F PRES/ABSN URINE INCON ASSESS: CPT | Performed by: FAMILY MEDICINE

## 2022-01-25 PROCEDURE — 3008F BODY MASS INDEX DOCD: CPT | Performed by: FAMILY MEDICINE

## 2022-01-25 PROCEDURE — 1036F TOBACCO NON-USER: CPT | Performed by: FAMILY MEDICINE

## 2022-01-25 RX ORDER — ATORVASTATIN CALCIUM 10 MG/1
10 TABLET, FILM COATED ORAL DAILY
Qty: 90 TABLET | Refills: 1 | Status: SHIPPED | OUTPATIENT
Start: 2022-01-25

## 2022-01-25 RX ORDER — FLUTICASONE PROPIONATE 50 MCG
1 SPRAY, SUSPENSION (ML) NASAL DAILY
Qty: 16 G | Refills: 4 | Status: SHIPPED | OUTPATIENT
Start: 2022-01-25

## 2022-01-25 NOTE — PROGRESS NOTES
Assessment and Plan:     Problem List Items Addressed This Visit        Endocrine    Type 2 diabetes mellitus without complication, without long-term current use of insulin (HCC)    Relevant Medications    atorvastatin (LIPITOR) 10 mg tablet    Other Relevant Orders    Ambulatory Referral to Ophthalmology       Respiratory    Allergic rhinitis    Relevant Medications    fluticasone (FLONASE) 50 mcg/act nasal spray       Other    Healthcare maintenance     - Patient is a 61 y o  female with h/o diet-controlled T2DM here for Initial Medicare Wellness visit  - BP at goal 108/70  - Screening for cardiovascular disease: recent BMP and Lipid panel reviewed today  - ASCVD risk score: 7 7% - currently on atorvastatin 10mg daily  - Screening for colorectal cancer: discussed with patient, she reports she had it done in 2013 but unsure where and states she is due for next one in 2023  Records of previous colonoscopy not found in chart  Will go with patient's report  - Screening for breast cancer: mammo 1/13/21 negative, scheduled for 2022  - Screening for depression: PHQ-9 score 0 today  - Screening for Osteoporosis: ordered DXA scan today  - Fall prevention precautions discussed  - Discussed about healthy lifestyle recommendations including healthful diet, and exercise  - BMI Counseling: Body mass index is 27 59 kg/m²  this is acceptable for her age  The BMI is above normal  Nutrition recommendations include 3-5 servings of fruits/vegetables daily and increasing intake of lean protein  Exercise recommendations include strength training exercises           Chronic right shoulder pain     Chronic for several years  Will want to try physical therapy at this time  Will reassess at next visit in 2 mo         Relevant Orders    Ambulatory Referral to Physical Therapy      Other Visit Diagnoses     Medicare annual wellness visit, initial    -  Primary    Screening for depression        Encounter for osteoporosis screening in asymptomatic postmenopausal patient        Relevant Orders    DXA bone density spine hip and pelvis           Preventive health issues were discussed with patient, and age appropriate screening tests were ordered as noted in patient's After Visit Summary  Personalized health advice and appropriate referrals for health education or preventive services given if needed, as noted in patient's After Visit Summary  History of Present Illness:     Patient presents for Medicare Annual Wellness visit    Patient Care Team:  Chris Joseph MD as PCP - General (Family Medicine)  DO Jaime Faustin Arm, DO     Problem List:     Patient Active Problem List   Diagnosis    Allergic rhinitis    Cataract    Depression with anxiety    Generalized osteoarthritis of multiple sites    Hemorrhoids    Insomnia    Plantar fasciitis, bilateral    Cough    Overweight (BMI 25 0-29  9)    Type 2 diabetes mellitus without complication, without long-term current use of insulin ([address] )    Right elbow tendinitis    Female cystocele    Left carpal tunnel syndrome    Trigger finger, left ring finger    COVID-19    Symptomatic PVCs    Postnasal drip    Dizziness    Healthcare maintenance    Chronic right shoulder pain      Past Medical and Surgical History:     Past Medical History:   Diagnosis Date    Abnormal glucose     last assessed 12/22/15    Acid reflux     Anxiety     Arthritis     osteoarthritis multiple sites    Benign paroxysmal positional vertigo     Carpal tunnel syndrome     Chronic kidney disease     nephrolithiasis    Depression     GERD (gastroesophageal reflux disease)     Insomnia     Patellofemoral dysfunction     last assessed  9/15/14     Past Surgical History:   Procedure Laterality Date    CARPAL TUNNEL RELEASE      COLONOSCOPY      KNEE SURGERY Right     HI INCISE FINGER TENDON SHEATH Right 8/25/2016    Procedure: RELEASE LONG AND RING TRIGGER FINGERS;  Surgeon: Toyin Martinez [facility], MD;  Location:  MAIN OR;  Service: Orthopedics    ID INCISE FINGER TENDON SHEATH Left 9/15/2020    Procedure: RELEASE TRIGGER FINGER RING;  Surgeon: Evita Mace MD;  Location:  MAIN OR;  Service: Orthopedics    ID WRIST Dl Enter LIG Right 8/25/2016    Procedure: RELEASE CARPAL TUNNEL ENDOSCOPIC;  Surgeon: Evita Mace MD;  Location: [facility];  Service: Orthopedics    TUBAL LIGATION        Family History:     Family History   Problem Relation Age of Onset    Diabetes Mother     Heart disease Mother     Hypertension Mother     Hypothyroidism Mother     Arthritis Father     Crohn's disease Family     Psoriasis Family     Rheum arthritis Family     Lupus Family         systemic- erythematosus    Ulcerative colitis Family     Breast cancer Cousin 46        paternal    No Known Problems Son     No Known Problems Son       Social History:     Social History     Socioeconomic History    Marital status: /Civil Union     Spouse name: None    Number of children: None    Years of education: None    Highest education level: None   Occupational History    None   Tobacco Use    Smoking status: Never Smoker    Smokeless tobacco: Never Used   Vaping Use    Vaping Use: Never used   Substance and Sexual Activity    Alcohol use: No     Comment: rare/social as per [employer]    Drug use: No    Sexual activity: Yes     Partners: Male     Birth control/protection: None   Other Topics Concern    None   Social History Narrative    None     Social Determinants of Health     Financial Resource Strain: Low Risk     Difficulty of Paying Living Expenses: Not hard at all   Food Insecurity: No Food Insecurity    Worried About Running Out of Food in the Last Year: Never true    Dion of Food in the Last Year: Never true   Transportation Needs: No Transportation Needs    Lack of Transportation (Medical): No    Lack of Transportation (Non-Medical):  No   Physical Activity: Not on file   Stress: Not on file   Social Connections: Not on file   Intimate Partner Violence: Not on file   Housing Stability: Not on file      Medications and Allergies:     Current Outpatient Medications   Medication Sig Dispense Refill    BIOTIN 5000 PO Take by mouth daily      Blood Glucose Monitoring Suppl (Contour Next One) KIT Use daily 1 kit 0    Cyanocobalamin (VITAMIN B12 PO) Take by mouth daily      glucose blood (Contour Test) test strip Use 1 each daily Use as instructed 100 each 3    Microlet Lancets MISC Use daily 100 each 3    TURMERIC PO Take by mouth daily      acetaminophen (Tylenol 8 Hour) 650 mg CR tablet Take 1 tablet (650 mg total) by mouth every 8 (eight) hours (Patient not taking: Reported on 1/28/2021) 15 tablet 0    al mag oxide-diphenhydramine-lidocaine viscous (MAGIC MOUTHWASH) 1:1:1 suspension Swish and spit 10 mL every 4 (four) hours as needed for mouth pain or discomfort (Patient not taking: Reported on 3/2/2021) 1 Bottle 0    atorvastatin (LIPITOR) 10 mg tablet Take 1 tablet (10 mg total) by mouth daily 90 tablet 1    benzonatate (TESSALON PERLES) 100 mg capsule Take 1 capsule (100 mg total) by mouth 3 (three) times a day as needed for cough (Patient not taking: Reported on 3/2/2021) 21 capsule 0    diclofenac sodium (VOLTAREN) 1 % Apply 2 g topically 4 (four) times a day (Patient not taking: Reported on 6/14/2021) 100 g 1    fluticasone (FLONASE) 50 mcg/act nasal spray 1 spray into each nostril daily 16 g 4    loratadine (CLARITIN) 10 mg tablet Take 10 mg by mouth daily (Patient not taking: Reported on 10/8/2021)      metoprolol tartrate (LOPRESSOR) 25 mg tablet Take 1 tablet (25 mg total) by mouth daily (Patient not taking: Reported on 8/5/2021) 60 tablet 0    mometasone (ELOCON) 0 1 % lotion One drop affected ear canal daily at bedtime when necessary itching (Patient not taking: Reported on 1/25/2022 ) 60 mL 0    naproxen (NAPROSYN) 500 mg tablet Take 1 tablet (500 mg total) by mouth 2 (two) times a day with meals for 10 days (Patient not taking: Reported on 10/8/2021) 20 tablet 0    ondansetron (ZOFRAN-ODT) 4 mg disintegrating tablet Take 1 tablet (4 mg total) by mouth every 8 (eight) hours as needed for nausea or vomiting (Patient not taking: Reported on 3/2/2021) 20 tablet 0    OneTouch Ultra test strip USE 1 STRIP DAILY AS NEEDED FOR HYPOGLYCEMIA AS  DIRECTED 100 each 3     No current facility-administered medications for this visit  Allergies   Allergen Reactions    Bee Pollen Other (See Comments)     Other reaction(s): Sneezing    Pollen Extract Allergic Rhinitis and Sneezing      Immunizations:     Immunization History   Administered Date(s) Administered    COVID-19 MODERNA VACC 0 5 ML IM 04/19/2021, 05/17/2021    Influenza, recombinant, quadrivalent,injectable, preservative free 09/10/2020    Tdap 09/10/2020      Health Maintenance:         Topic Date Due    Breast Cancer Screening: Mammogram  01/13/2022    Colorectal Cancer Screening  03/20/2023 (Originally 1/7/2007)    Cervical Cancer Screening  12/12/2024    HIV Screening  Completed    Hepatitis C Screening  Completed         Topic Date Due    Pneumococcal Vaccine: 65+ Years (1 of 2 - PPSV23) Never done    Influenza Vaccine (1) 09/01/2021    COVID-19 Vaccine (3 - Booster for Moderna series) 11/17/2021      Medicare Health Risk Assessment:     /70 (BP Location: Left arm, Patient Position: Sitting, Cuff Size: Standard)   Pulse 75   Temp 97 5 °F (36 4 °C) (Temporal)   Resp 16   Ht 5' 1" (1 549 m)   Wt 66 2 kg (146 lb)   SpO2 100%   BMI 27 59 kg/m²      Jennifer Christina is here for her Initial Wellness visit  Health Risk Assessment:   Patient rates overall health as good  Patient feels that their physical health rating is slightly better  Patient is very satisfied with their life  Eyesight was rated as slightly worse  Hearing was rated as same   Patient feels that their emotional and mental health rating is same  Patients states they are never, rarely angry  Patient states they are sometimes unusually tired/fatigued  Pain experienced in the last 7 days has been a lot  Patient's pain rating has been 7/10  Patient states that she has experienced no weight loss or gain in last 6 months  Depression Screening:   PHQ-9 Score: 0      Fall Risk Screening: In the past year, patient has experienced: history of falling in past year    Number of falls: 1  Injured during fall?: No    Feels unsteady when standing or walking?: No    Worried about falling?: Yes      Urinary Incontinence Screening:   Patient has not leaked urine accidently in the last six months  Home Safety:  Patient has trouble with stairs inside or outside of their home  Patient has working smoke alarms and has no working carbon monoxide detector  Nutrition:   Current diet is Regular  Medications:   Patient is not currently taking any over-the-counter supplements  Patient is able to manage medications  Activities of Daily Living (ADLs)/Instrumental Activities of Daily Living (IADLs):   Walk and transfer into and out of bed and chair?: Yes  Dress and groom yourself?: Yes    Bathe or shower yourself?: Yes    Feed yourself?  Yes  Do your laundry/housekeeping?: Yes  Manage your money, pay your bills and track your expenses?: Yes  Make your own meals?: Yes    Do your own shopping?: Yes    Previous Hospitalizations:   Any hospitalizations or ED visits within the last 12 months?: No      PREVENTIVE SCREENINGS      Cardiovascular Screening:    General: Screening Current      Diabetes Screening:     General: Screening Not Indicated and History Diabetes      Breast Cancer Screening:     General: Screening Current      Cervical Cancer Screening:    General: Screening Not Indicated      Lung Cancer Screening:     General: Screening Not Indicated      Hepatitis C Screening:    General: Screening Current    Screening, Brief Intervention, and Referral to Treatment (SBIRT)    Screening  Typical number of drinks in a day: 0  Typical number of drinks in a week: 0  Interpretation: Low risk drinking behavior      AUDIT-C Screenin) How often did you have a drink containing alcohol in the past year? never  2) How many drinks did you have on a typical day when you were drinking in the past year? 0  3) How often did you have 6 or more drinks on one occasion in the past year? never    AUDIT-C Score: 0  Interpretation: Score 0-2 (female): Negative screen for alcohol misuse      Gigi Palacio MD

## 2022-01-25 NOTE — ASSESSMENT & PLAN NOTE
- Patient is a 61 y o  female with h/o diet-controlled T2DM here for Initial Medicare Wellness visit  - BP at goal 108/70  - Screening for cardiovascular disease: recent BMP and Lipid panel reviewed today  - ASCVD risk score: 7 7% - currently on atorvastatin 10mg daily  - Screening for colorectal cancer: discussed with patient, she reports she had it done in 2013 but unsure where and states she is due for next one in 2023  Records of previous colonoscopy not found in chart  Will go with patient's report  - Screening for breast cancer: mammo 1/13/21 negative, scheduled for 2022  - Screening for depression: PHQ-9 score 0 today  - Screening for Osteoporosis: ordered DXA scan today  - Fall prevention precautions discussed  - Discussed about healthy lifestyle recommendations including healthful diet, and exercise  - BMI Counseling: Body mass index is 27 59 kg/m²  this is acceptable for her age  The BMI is above normal  Nutrition recommendations include 3-5 servings of fruits/vegetables daily and increasing intake of lean protein  Exercise recommendations include strength training exercises

## 2022-01-25 NOTE — PATIENT INSTRUCTIONS
Medicare Preventive Visit Patient Instructions  Thank you for completing your Welcome to Medicare Visit or Medicare Annual Wellness Visit today  Your next wellness visit will be due in one year (1/26/2023)  The screening/preventive services that you may require over the next 5-10 years are detailed below  Some tests may not apply to you based off risk factors and/or age  Screening tests ordered at today's visit but not completed yet may show as past due  Also, please note that scanned in results may not display below  Preventive Screenings:  Service Recommendations Previous Testing/Comments   Colorectal Cancer Screening  * Colonoscopy    * Fecal Occult Blood Test (FOBT)/Fecal Immunochemical Test (FIT)  * Fecal DNA/Cologuard Test  * Flexible Sigmoidoscopy Age: 54-65 years old   Colonoscopy: every 10 years (may be performed more frequently if at higher risk)  OR  FOBT/FIT: every 1 year  OR  Cologuard: every 3 years  OR  Sigmoidoscopy: every 5 years  Screening may be recommended earlier than age 48 if at higher risk for colorectal cancer  Also, an individualized decision between you and your healthcare provider will decide whether screening between the ages of 74-80 would be appropriate  Colonoscopy: Not on file  FOBT/FIT: Not on file  Cologuard: Not on file  Sigmoidoscopy: Not on file          Breast Cancer Screening Age: 36 years old  Frequency: every 1-2 years  Not required if history of left and right mastectomy Mammogram: 01/13/2021        Cervical Cancer Screening Between the ages of 21-29, pap smear recommended once every 3 years  Between the ages of 33-67, can perform pap smear with HPV co-testing every 5 years     Recommendations may differ for women with a history of total hysterectomy, cervical cancer, or abnormal pap smears in past  Pap Smear: 12/12/2019        Hepatitis C Screening Once for adults born between 1945 and 1965  More frequently in patients at high risk for Hepatitis C Hep C Antibody: 03/12/2020        Diabetes Screening 1-2 times per year if you're at risk for diabetes or have pre-diabetes Fasting glucose: 119 mg/dL   A1C: 5 9 % of total Hgb        Cholesterol Screening Once every 5 years if you don't have a lipid disorder  May order more often based on risk factors  Lipid panel: 09/07/2021          Other Preventive Screenings Covered by Medicare:  1  Abdominal Aortic Aneurysm (AAA) Screening: covered once if your at risk  You're considered to be at risk if you have a family history of AAA  2  Lung Cancer Screening: covers low dose CT scan once per year if you meet all of the following conditions: (1) Age 50-69; (2) No signs or symptoms of lung cancer; (3) Current smoker or have quit smoking within the last 15 years; (4) You have a tobacco smoking history of at least 30 pack years (packs per day multiplied by number of years you smoked); (5) You get a written order from a healthcare provider  3  Glaucoma Screening: covered annually if you're considered high risk: (1) You have diabetes OR (2) Family history of glaucoma OR (3)  aged 48 and older OR (3)  American aged 72 and older  3  Osteoporosis Screening: covered every 2 years if you meet one of the following conditions: (1) You're estrogen deficient and at risk for osteoporosis based off medical history and other findings; (2) Have a vertebral abnormality; (3) On glucocorticoid therapy for more than 3 months; (4) Have primary hyperparathyroidism; (5) On osteoporosis medications and need to assess response to drug therapy  · Last bone density test (DXA Scan): Not on file  5  HIV Screening: covered annually if you're between the age of 12-76  Also covered annually if you are younger than 13 and older than 72 with risk factors for HIV infection  For pregnant patients, it is covered up to 3 times per pregnancy      Immunizations:  Immunization Recommendations   Influenza Vaccine Annual influenza vaccination during flu season is recommended for all persons aged >= 6 months who do not have contraindications   Pneumococcal Vaccine (Prevnar and Pneumovax)  * Prevnar = PCV13  * Pneumovax = PPSV23   Adults 25-60 years old: 1-3 doses may be recommended based on certain risk factors  Adults 72 years old: Prevnar (PCV13) vaccine recommended followed by Pneumovax (PPSV23) vaccine  If already received PPSV23 since turning 65, then PCV13 recommended at least one year after PPSV23 dose  Hepatitis B Vaccine 3 dose series if at intermediate or high risk (ex: diabetes, end stage renal disease, liver disease)   Tetanus (Td) Vaccine - COST NOT COVERED BY MEDICARE PART B Following completion of primary series, a booster dose should be given every 10 years to maintain immunity against tetanus  Td may also be given as tetanus wound prophylaxis  Tdap Vaccine - COST NOT COVERED BY MEDICARE PART B Recommended at least once for all adults  For pregnant patients, recommended with each pregnancy  Shingles Vaccine (Shingrix) - COST NOT COVERED BY MEDICARE PART B  2 shot series recommended in those aged 48 and above     Health Maintenance Due:      Topic Date Due    Breast Cancer Screening: Mammogram  01/13/2022    Colorectal Cancer Screening  03/20/2023 (Originally 1/7/2007)    Cervical Cancer Screening  12/12/2024    HIV Screening  Completed    Hepatitis C Screening  Completed     Immunizations Due:      Topic Date Due    Pneumococcal Vaccine: 65+ Years (1 of 2 - PPSV23) Never done    Influenza Vaccine (1) 09/01/2021    COVID-19 Vaccine (3 - Booster for [facility] Means series) 11/17/2021     Advance Directives   What are advance directives? Advance directives are legal documents that state your wishes and plans for medical care  These plans are made ahead of time in case you lose your ability to make decisions for yourself  Advance directives can apply to any medical decision, such as the treatments you want, and if you want to donate organs  What are the types of advance directives? There are many types of advance directives, and each state has rules about how to use them  You may choose a combination of any of the following:  · Living will: This is a written record of the treatment you want  You can also choose which treatments you do not want, which to limit, and which to stop at a certain time  This includes surgery, medicine, IV fluid, and tube feedings  · Durable power of  for healthcare [facility]): This is a written record that states who you want to make healthcare choices for you when you are unable to make them for yourself  This person, called a proxy, is usually a family member or a friend  You may choose more than 1 proxy  · Do not resuscitate (DNR) order:  A DNR order is used in case your heart stops beating or you stop breathing  It is a request not to have certain forms of treatment, such as CPR  A DNR order may be included in other types of advance directives  · Medical directive: This covers the care that you want if you are in a coma, near death, or unable to make decisions for yourself  You can list the treatments you want for each condition  Treatment may include pain medicine, surgery, blood transfusions, dialysis, IV or tube feedings, and a ventilator (breathing machine)  · Values history: This document has questions about your views, beliefs, and how you feel and think about life  This information can help others choose the care that you would choose  Why are advance directives important? An advance directive helps you control your care  Although spoken wishes may be used, it is better to have your wishes written down  Spoken wishes can be misunderstood, or not followed  Treatments may be given even if you do not want them  An advance directive may make it easier for your family to make difficult choices about your care     Weight Management   Why it is important to manage your weight:  Being overweight increases your risk of health conditions such as heart disease, high blood pressure, type 2 diabetes, and certain types of cancer  It can also increase your risk for osteoarthritis, sleep apnea, and other respiratory problems  Aim for a slow, steady weight loss  Even a small amount of weight loss can lower your risk of health problems  How to lose weight safely:  A safe and healthy way to lose weight is to eat fewer calories and get regular exercise  You can lose up about 1 pound a week by decreasing the number of calories you eat by 500 calories each day  Healthy meal plan for weight management:  A healthy meal plan includes a variety of foods, contains fewer calories, and helps you stay healthy  A healthy meal plan includes the following:  · Eat whole-grain foods more often  A healthy meal plan should contain fiber  Fiber is the part of grains, fruits, and vegetables that is not broken down by your body  Whole-grain foods are healthy and provide extra fiber in your diet  Some examples of whole-grain foods are whole-wheat breads and pastas, oatmeal, brown rice, and bulgur  · Eat a variety of vegetables every day  Include dark, leafy greens such as spinach, kale, sonali greens, and mustard greens  Eat yellow and orange vegetables such as carrots, sweet potatoes, and winter squash  · Eat a variety of fruits every day  Choose fresh or canned fruit (canned in its own juice or light syrup) instead of juice  Fruit juice has very little or no fiber  · Eat low-fat dairy foods  Drink fat-free (skim) milk or 1% milk  Eat fat-free yogurt and low-fat cottage cheese  Try low-fat cheeses such as mozzarella and other reduced-fat cheeses  · Choose meat and other protein foods that are low in fat  Choose beans or other legumes such as split peas or lentils  Choose fish, skinless poultry (chicken or turkey), or lean cuts of red meat (beef or pork)  Before you cook meat or poultry, cut off any visible fat     · Use less fat and oil   Try baking foods instead of frying them  Add less fat, such as margarine, sour cream, regular salad dressing and mayonnaise to foods  Eat fewer high-fat foods  Some examples of high-fat foods include french fries, doughnuts, ice cream, and cakes  · Eat fewer sweets  Limit foods and drinks that are high in sugar  This includes candy, cookies, regular soda, and sweetened drinks  Exercise:  Exercise at least 30 minutes per day on most days of the week  Some examples of exercise include walking, biking, dancing, and swimming  You can also fit in more physical activity by taking the stairs instead of the elevator or parking farther away from stores  Ask your healthcare provider about the best exercise plan for you  © Copyright [employer] 2018 Information is for End User's use only and may not be sold, redistributed or otherwise used for commercial purposes   All illustrations and images included in CareNotes® are the copyrighted property of A D A M , Inc  or [address] [place] [address]

## 2022-01-25 NOTE — ASSESSMENT & PLAN NOTE
Chronic for several years  Will want to try physical therapy at this time  Will reassess at next visit in 2 mo

## 2022-01-28 NOTE — PROGRESS NOTES
PT Evaluation     Today's date: 2022  Patient name: Deysi Dunaway  : 1955  MRN: [id number]  Referring provider: Jesse Casillas MD  Dx:   Encounter Diagnosis     ICD-10-CM    1  Impingement syndrome of shoulder region, right  M75 41    2  Chronic right shoulder pain  M25 511 Ambulatory Referral to Physical Therapy    G89 [id number]        Start Time: 1600  Stop Time: 1700  Total time in clinic (min): 60 minutes  Assessment   Assessment details: Deysi Dnuaway is a pleasant 74 y o  female who presents with R shoulder pain  Pt demonstrates very poor posture with excessive thoracic kyphosis and rolled shoulders leading to thoracic hypomobility, tight pecs, and weak scapular musculature, therefore altering scapulo-humeral mechanics  Pt presents with anteriorly tipping scapula due to pec minor tightness as well as weakness of the serratus anterior  Pt also demonstrates potential supraspinatus pathology having weak and painful abduction along with positive test findings  Pt has some positive tests for labral/long head of biceps involvement, however high irritation level may be creating false positives  Pt had significantly less pain with flexion AROM with PT scapular assistance which makes stronger case for overarching issue being subacromial impingement  The patient's greatest concerns are the pain she is experiencing, worry over not knowing what's wrong, concern at no signs of improvement, wanting to avoid surgery and fear of not being able to keep active  No further referral appears necessary at this time based upon examination results  Primary movement impairment diagnosis of secondary subacromial impingement resulting in pathoanatomical symptoms of limited UE ROM, weakness and limiting her ability to carry, dress independently, perform household chores, perform yard work, reach overhead and wash behind the back      Primary Impairments:  1) poor posture  2) scapular muscle weakness    Etiologic Preprocedure diagnosis: Cyst of scalp       Post procedure diagnosis: Cyst of scalp    Procedure performed: Excision of cyst scalp, 1 cm, subcutaneous    Surgeon: Herman Chow M.D.    Specimens: cyst of scalp    Anesthesia: Local with 3 mL of 1% lidocaine with 1-100,000 epinephrine    Details: After patient verification and consent was obtained, the skin of the anterior was infiltrated with 1% lidocaine with 1 100,000 of epinephrine.  After approximately 15 minutes, the patient's skin was tested for anesthesia and this was deemed appropriate.  Skin was then cleaned cleansed with Hibiclens.  A fusiform incision was created using a 15 blade and the underlying cyst was identified and removed using curved iris scissors.  The skin was then undermined to allow tension-free closure.  Skin was then closed using a deep 4-0 undyed Vicryl suture followed by surgical staples.  Antibiotic ointment was placed to the incisions.  The patient tolerated the procedure without any complication.       factors include none recalled by the patient  Impairments: abnormal or restricted ROM, activity intolerance, impaired physical strength, lacks appropriate home exercise program, pain with function, scapular dyskinesis, poor posture and poor body mechanics    Symptom irritability: moderateUnderstanding of Dx/Px/POC: good   Prognosis: fair  Prognosis details: Positive prognostic indicators include positive attitude toward recovery, good understanding of diagnosis and treatment plan options and absence of observed red flags  Negative prognostic indicators include chronicity of symptoms, anxiety, hypertension, high symptom irritability, multiple prior failed treatments and obesity  Goals   Short Term Goals   Pt will improve UE AROM by 5-10 degrees in all deficient planes order to improve function with reaching and ADLs  Pt will improve UE strength by 1/2 grade in all deficient muscle groups in order to improve function  Pt will decrease pain to 4/10 at its worst  Pt will be independent with a basic HEP    Long Term Goals  Pt will achieve a FOTO score of 62  Pt will improve UE ROM to [facility] in order to maximize function  Pt will improve UE strength to 4+/5 in all deficient muscle groups in order to maximize function with ADLs  Pt will be able to manage symptoms independently  Pt will be independent with an extensive HEP    Plan   Patient would benefit from: skilled physical therapy  Referral necessary: No  Planned modality interventions: Modalities PRN    Planned therapy interventions: activity modification, manual therapy, neuromuscular re-education, patient education, therapeutic activities, therapeutic exercise, graded activity, home exercise program, behavior modification and self care  Frequency: 2x week  Duration in weeks: 12  Treatment plan discussed with: patient  Subjective Evaluation   History of Present Illness   Mechanism of injury: History of Current Injury: Pt reports she has pain in B/L shoulders with the R being worse than the L  Pt has had shoulder pain for around 2 years which may be attributed to a [place] backwards at the airport, as since then her R shoulder has been in pain  Pt went to [place] for physical therapy however this didn't help much  Pt went for a massage that she thinks helped a little bit  Pt reports hx of neck injury in 2017 and got an MRI that showed mild C5-6 disc degeneration  Pt also reports some instances of neck pain when waking up  Pt has difficulty with donning/doffing a shirt and putting a bra on  Cleaning the walls/windows also is difficult  Pain location/Descriptors: lateral shoulder pain that is achy and burning  Posterior shoulder/scapular pain that can radiate down into the lateral arm  Aggravating factors: shoulder exercises, reaching overhead/across her body, sleeping on her sides  Easing factors: massage, heating pad, tylenol  AM/PM pattern: no pattern, but worse at night when lying on her side  Imaging: none  Special Questions: Pt denies night pain that doesn't allow her to fall back asleep  Pt denies N/T  Patient concerns: Pt wants to be able to perform her ADLs without pain and doesn't want it to get worse     Occupation: retired         Recurrent probem    Quality of life: good    Pain   Current pain ratin  At best pain ratin  At worst pain ratin [facility] with: spouse  Employment status: not working  Hand dominance: right    Objective   Active Range of Motion   Cervical/Thoracic Spine     Normal active range of motion  Left Shoulder   External rotation BTH: T1   Internal rotation BTB: T10     Right Shoulder   Flexion: 135 degrees   External rotation BTH: T1   Internal rotation BTB: T10 with pain   Passive Range of Motion   Right Shoulder   Flexion: WFL and with pain   Adduction: 150 degrees with pain   External rotation 90°: 80 degrees with pain   Internal rotation 90°: 50 degrees with pain   Joint Play   Left Shoulder   Joints within functional limits are the anterior capsule, posterior capsule and inferior capsule  Right Shoulder   Joints within functional limits are the anterior capsule and inferior capsule  Hypomobile in the posterior capsule  Strength/Myotome Testing     Left Shoulder   Planes of Motion   Flexion: 5   Abduction: 4+   External rotation at 0°: 5   Internal rotation at 0°: 5     Right Shoulder   Planes of Motion   Flexion: 4+   Abduction: 4   External rotation at 0°: 5   Internal rotation at 0°: 5   Left Elbow   Flexion: 5  Extension: 5   Right Elbow   Flexion: 5  Extension: 5   Left Wrist/Hand   Wrist extension: 5  Wrist flexion: 5  Radial deviation: 5  Ulnar deviation: 5  Thumb extension: 5   Right Wrist/Hand   Wrist extension: 5  Wrist flexion: 5  Radial deviation: 5  Ulnar deviation: 5  Thumb extension: 4+ and 5   Tests   Cervical   Negative vertical compression  Right Shoulder   Positive active compression ([place]), anterior slide, crossover, empty can, full can, Neer's, painful arc, anterior slide, scapular assistance and bicep load   Negative drop arm and lift-off  Lumbar   Negative vertical compression       Additional Tests Details  Unable to test ULTT due to shoulder pain with ER/IR ROM getting into test position       Precautions: GERD, Hx of DM Type II       1/31            Manuals                                                                 Neuro Re-Ed             Scapular retraction 10x5" HEP            Prone scap retraction + shoulder extension                                                                              Ther Ex             Thoracic extension w towel roll 10x5" HEP            No moneys 2x10 HEP            Supine pec minor stretch w/ towel 3x1' HEP            pec doorway stretch             Open books             Prone I's             Prone T's                          Ther Activity                                       Gait Training                                       Modalities Assessment IE, POC, Prognosis            Education HEP, POC, Prognosis

## 2022-01-31 ENCOUNTER — EVALUATION (OUTPATIENT)
Dept: PHYSICAL THERAPY | Facility: CLINIC | Age: 67
End: 2022-01-31
Payer: COMMERCIAL

## 2022-01-31 DIAGNOSIS — M75.41 IMPINGEMENT SYNDROME OF SHOULDER REGION, RIGHT: Primary | ICD-10-CM

## 2022-01-31 DIAGNOSIS — M25.511 CHRONIC RIGHT SHOULDER PAIN: ICD-10-CM

## 2022-01-31 DIAGNOSIS — G89.29 CHRONIC RIGHT SHOULDER PAIN: ICD-10-CM

## 2022-01-31 PROCEDURE — 97112 NEUROMUSCULAR REEDUCATION: CPT

## 2022-01-31 PROCEDURE — 97161 PT EVAL LOW COMPLEX 20 MIN: CPT

## 2022-01-31 PROCEDURE — 97110 THERAPEUTIC EXERCISES: CPT

## 2022-02-02 ENCOUNTER — OFFICE VISIT (OUTPATIENT)
Dept: PHYSICAL THERAPY | Facility: CLINIC | Age: 67
End: 2022-02-02
Payer: COMMERCIAL

## 2022-02-02 ENCOUNTER — OFFICE VISIT (OUTPATIENT)
Dept: FAMILY MEDICINE CLINIC | Facility: CLINIC | Age: 67
End: 2022-02-02

## 2022-02-02 VITALS — WEIGHT: 148.4 LBS | TEMPERATURE: 97.2 F | HEIGHT: 61 IN | BODY MASS INDEX: 28.02 KG/M2

## 2022-02-02 DIAGNOSIS — G89.29 CHRONIC RIGHT SHOULDER PAIN: ICD-10-CM

## 2022-02-02 DIAGNOSIS — M25.511 CHRONIC RIGHT SHOULDER PAIN: Primary | ICD-10-CM

## 2022-02-02 DIAGNOSIS — M75.41 IMPINGEMENT SYNDROME OF SHOULDER REGION, RIGHT: Primary | ICD-10-CM

## 2022-02-02 DIAGNOSIS — G89.29 CHRONIC RIGHT SHOULDER PAIN: Primary | ICD-10-CM

## 2022-02-02 DIAGNOSIS — M25.511 CHRONIC RIGHT SHOULDER PAIN: ICD-10-CM

## 2022-02-02 PROCEDURE — 97140 MANUAL THERAPY 1/> REGIONS: CPT

## 2022-02-02 PROCEDURE — 97112 NEUROMUSCULAR REEDUCATION: CPT

## 2022-02-02 PROCEDURE — 3008F BODY MASS INDEX DOCD: CPT | Performed by: FAMILY MEDICINE

## 2022-02-02 PROCEDURE — 97110 THERAPEUTIC EXERCISES: CPT

## 2022-02-02 PROCEDURE — 99213 OFFICE O/P EST LOW 20 MIN: CPT | Performed by: FAMILY MEDICINE

## 2022-02-02 NOTE — ASSESSMENT & PLAN NOTE
- Pt c/o R shoulder pain, onset about 6 months ago  - C/o of falling on the right shoulder [place] on [place]  - Pt has no imaging done post fall   - History of T2DM   - Restricted in overhead motion   - Pt started seeing PT this week, (Monday and Wednesday weekly)   - Tried Naproxen with minimal relief  - Tolerated OMT well   - Trauma vs Adhesive capsulitis   Plan  - OMT q2week  - NSAIDs for analgesic  - Moist heat + hydration

## 2022-02-02 NOTE — PROGRESS NOTES
Daily Note     Today's date: 2022  Patient name: Myke Leahy  : 1955  MRN: [id number]  Referring provider: Meeta Lam MD  Dx:   Encounter Diagnosis     ICD-10-CM    1  Impingement syndrome of shoulder region, right  M75 41    2  Chronic right shoulder pain  M25 511     G89 29                   Subjective: Pt reports she feels the same and was having a lot of pain folding her laundry (arm across her body)  Objective: See treatment diary below      Assessment: Pt tolerated PROM well having minor pain at end range  Following manual therapy pt had less pain with active flexion  Pt reported feeling good following the session, and her HEP was updated  Patient demonstrated fatigue post treatment, exhibited good technique with therapeutic exercises and would benefit from continued PT      Plan: Continue per plan of care                      Manuals             PROM  EM 5'           Posterior [address] joint mobilization   EM Gr  II-III                                     Neuro Re-Ed             Scapular retraction 10x5" HEP 2x10x5"           Prone scap retraction + shoulder extension  20x3" HEP                                                                            Ther Ex             Thoracic extension w towel roll 10x5" HEP 2x10x5"           No moneys 2x10 HEP 3x10 YTB           Supine pec minor stretch w/ towel 3x1' HEP 2', 2' PT OP           pec doorway stretch  Low 3x30"           Open books             Prone I's             Prone T's             Straight arm extensions  3x15 RTB HEP           Ther Activity                                       Gait Training                                       Modalities                                       Assessment IE, POC, Prognosis            Education HEP, POC, Prognosis Rakesh Brice

## 2022-02-02 NOTE — PROGRESS NOTES
Assessment/Plan:    Chronic right shoulder pain  - Pt c/o R shoulder pain, onset about 6 months ago  - C/o of [place] the right [place]  - Pt has no imaging done post fall   - History of T2DM   - Restricted in overhead motion   - Pt started seeing PT this week, (Monday and Wednesday weekly)   - Tried Naproxen with minimal relief  - Tolerated OMT well   - Trauma vs Adhesive capsulitis   Plan  - OMT q2week  - NSAIDs for analgesic  - Moist heat + hydration   OMT Exam  Right Upper Extremity:  Somatic Dysfunction:  Tissue Texture Changes, Asymmetry , Restriction and Tenderness  Severity :  1  Osteopathic Findings: restricted in PROM, spefically in Extension of the R  shoulder  Treatment Method: ME  Response: improved       There are no diagnoses linked to this encounter  Subjective:      Patient ID: Edita Sampson is a 74 y o  female  HPI  74 y o f with pmhx of T2DM, chronic shoulder pain; came in for OMT treatment  Per pt she had shoulder pain for more than 6 months; mostly on the right side that restricted a lot of her daily activities  She reported of [place] airport [place] where she fell on her right shoulder  Pt had tried Naproxen and started PT this week  She had no apparent bruises, dislocation noticed  She tolerated OMT well today with mild improvement in shoulder ROM  The following portions of the patient's history were reviewed and updated as appropriate: allergies, current medications, past family history, past medical history, past social history, past surgical history and problem list     Review of Systems   Constitutional: Negative for activity change, appetite change, fatigue and unexpected weight change  Respiratory: Negative for shortness of breath, wheezing and stridor  Cardiovascular: Negative for chest pain, palpitations and leg swelling     Gastrointestinal: Negative for diarrhea, nausea and rectal pain  Musculoskeletal: Negative for arthralgias, back pain and joint swelling  Skin: Negative for rash  Neurological: Negative for dizziness, tremors, syncope, facial asymmetry, speech difficulty, weakness and light-headedness  Hematological: Negative for adenopathy  Does not bruise/bleed easily  Psychiatric/Behavioral: Negative for agitation, behavioral problems, confusion, decreased concentration, dysphoric mood and hallucinations  The patient is not hyperactive  Objective:      Temp (!) 97 2 °F (36 2 °C) (Temporal)   Ht 5' 1" (1 549 m)   Wt 67 3 kg (148 lb 6 4 oz)   BMI 28 04 kg/m²          Physical Exam  Constitutional:       Appearance: She is normal weight  HENT:      Head: Normocephalic  Right Ear: External ear normal       Left Ear: External ear normal       Nose: No congestion or rhinorrhea  Mouth/Throat:      Pharynx: No oropharyngeal exudate or posterior oropharyngeal erythema  Eyes:      General: No scleral icterus  Right eye: No discharge  Left eye: No discharge  Cardiovascular:      Rate and Rhythm: Normal rate and regular rhythm  Heart sounds: No murmur heard  No friction rub  Pulmonary:      Effort: No respiratory distress  Breath sounds: No stridor  No wheezing or rhonchi  Chest:      Chest wall: No tenderness  Abdominal:      General: Abdomen is flat  There is no distension  Tenderness: There is no abdominal tenderness  Musculoskeletal:         General: No swelling, tenderness or deformity  Cervical back: No rigidity or tenderness  Comments: Decrease PROM and AROM on the R side of her shoulder    Lymphadenopathy:      Cervical: No cervical adenopathy  Skin:     Capillary Refill: Capillary refill takes less than 2 seconds  Coloration: Skin is not jaundiced  Findings: No bruising  Neurological:      Mental Status: She is alert and oriented to person, place, and time   Mental status is at baseline  Sensory: No sensory deficit        Coordination: Coordination normal       Deep Tendon Reflexes: Reflexes normal    Psychiatric:         Mood and Affect: Mood normal          Behavior: Behavior normal

## 2022-02-06 ENCOUNTER — HOSPITAL ENCOUNTER (OUTPATIENT)
Dept: MAMMOGRAPHY | Facility: HOSPITAL | Age: 67
Discharge: HOME | End: 2022-02-06
Payer: COMMERCIAL

## 2022-02-06 DIAGNOSIS — Z12.31 ENCOUNTER FOR SCREENING MAMMOGRAM FOR MALIGNANT NEOPLASM OF BREAST: ICD-10-CM

## 2022-02-06 PROCEDURE — 77063 BREAST TOMOSYNTHESIS BI: CPT

## 2022-02-06 PROCEDURE — 77067 SCR MAMMO BI INCL CAD: CPT

## 2022-02-07 ENCOUNTER — OFFICE VISIT (OUTPATIENT)
Dept: PHYSICAL THERAPY | Facility: CLINIC | Age: 67
End: 2022-02-07
Payer: COMMERCIAL

## 2022-02-07 DIAGNOSIS — M75.41 IMPINGEMENT SYNDROME OF SHOULDER REGION, RIGHT: Primary | ICD-10-CM

## 2022-02-07 DIAGNOSIS — G89.29 CHRONIC RIGHT SHOULDER PAIN: ICD-10-CM

## 2022-02-07 DIAGNOSIS — M25.511 CHRONIC RIGHT SHOULDER PAIN: ICD-10-CM

## 2022-02-07 PROCEDURE — 97112 NEUROMUSCULAR REEDUCATION: CPT

## 2022-02-07 PROCEDURE — 97110 THERAPEUTIC EXERCISES: CPT

## 2022-02-07 PROCEDURE — 97140 MANUAL THERAPY 1/> REGIONS: CPT

## 2022-02-07 NOTE — PROGRESS NOTES
Daily Note     Today's date: 2022  Patient name: Carol Ann Garcia  : 1955  MRN: [id number]  Referring provider: Cynthia Albright MD  Dx:   Encounter Diagnosis     ICD-10-CM    1  Impingement syndrome of shoulder region, right  M75 41    2  Chronic right shoulder pain  M25 511     G89 29        Start Time: 0853  Stop Time: 931  Total time in clinic (min): 38 minutes    Subjective: Pt reports that she has more motion raising her arms up and it isn't painful anymore, however reaching out to the side and backwards is still bothersome  Objective: See treatment diary below      Assessment: Pt demonstrates improvement in PROM, IR/ER still slightly limited as well as the posterior GH capsule  Pt continues to demonstrate rolled shoulers and scapular mechanical deficiency leading to impingement with abduction and cross body motions  Pt was able to add some interventions to her POC without pain provocation  Functional IR was slightly uncomfortable with minor improvement following cueing for upright posture and decreased anterior shoulder roll  Patient demonstrated fatigue post treatment, exhibited good technique with therapeutic exercises and would benefit from continued PT      Plan: Continue per plan of care            Manuals             PROM  EM 5' EM 6'          Posterior GH joint mobilization   EM Gr  II-III EM Gr   III                                    Neuro Re-Ed             Scapular retraction 10x5" HEP 2x10x5"           Prone scap retraction + shoulder extension  20x3" HEP 20x3"           Scapular punches supine   20x5" 1#                                                              Ther Ex             Thoracic extension w towel roll 10x5" HEP 2x10x5" 2x10x5"          No moneys 2x10 HEP 3x10 YTB 3x10 RTB          Supine pec minor stretch w/ towel 3x1' HEP 2', 2' PT OP 1'x2 PT OP          pec doorway stretch  Low 3x30"           Open books             Prone I's Prone T's             Straight arm extensions  3x15 RTB HEP 3x15 RTB           rows   3x15 RTB  GTB         Functional IR stretch w towel    4x15"          Ther Activity                                       Gait Training                                       Modalities                                       Assessment IE, POC, Prognosis            Education HEP, POC, Prognosis Ivon Level

## 2022-02-09 ENCOUNTER — OFFICE VISIT (OUTPATIENT)
Dept: PHYSICAL THERAPY | Facility: CLINIC | Age: 67
End: 2022-02-09
Payer: COMMERCIAL

## 2022-02-09 DIAGNOSIS — G89.29 CHRONIC RIGHT SHOULDER PAIN: ICD-10-CM

## 2022-02-09 DIAGNOSIS — M25.511 CHRONIC RIGHT SHOULDER PAIN: ICD-10-CM

## 2022-02-09 DIAGNOSIS — M75.41 IMPINGEMENT SYNDROME OF SHOULDER REGION, RIGHT: Primary | ICD-10-CM

## 2022-02-09 PROCEDURE — 97110 THERAPEUTIC EXERCISES: CPT

## 2022-02-09 PROCEDURE — 97112 NEUROMUSCULAR REEDUCATION: CPT

## 2022-02-09 NOTE — PROGRESS NOTES
Daily Note     Today's date: 2022  Patient name: Parag Longo  : 1955  MRN: [id number]  Referring provider: Steffi Almonte MD  Dx:   Encounter Diagnosis     ICD-10-CM    1  Impingement syndrome of shoulder region, right  M75 41    2  Chronic right shoulder pain  M25 511     G89 29                   Subjective: Pt reports increased right shoulder soreness after using her arm to clean yesterday  Objective: See treatment diary below      Assessment: Pt demonstrated difficulty limiting UT compensation with scapular strengthening  Pt required frequent cueing for postural correction and technique with HEP  Plan: Continue per plan of care           Manuals             PROM  EM 5' EM 6' 8 min         Posterior GH joint mobilization   EM Gr  II-III EM Gr   III np                                   Neuro Re-Ed             Scapular retraction 10x5" HEP 2x10x5"           Prone scap retraction + shoulder extension  20x3" HEP 20x3"  20x3"         Scapular punches supine   20x5" 1# 20x5" 1#                                                             Ther Ex             Thoracic extension w towel roll 10x5" HEP 2x10x5" 2x10x5" 2x10/  5"         No moneys 2x10 HEP 3x10 YTB 3x10 RTB 3x10  RTB         Supine pec minor stretch w/ towel 3x1' HEP 2', 2' PT OP 1'x2 PT OP 1'x2  PT OP         pec doorway stretch  Low 3x30"           Open books             Prone I's             Prone T's             Straight arm extensions  3x15 RTB HEP 3x15 RTB  3x15  RTB         rows   3x15 RTB  GTB  3x15         Functional IR stretch w towel    4x15" 4x15"         Ther Activity                                       Gait Training                                       Modalities                                       Assessment IE, POC, Prognosis            Education HEP, POC, Prognosis UHEP

## 2022-02-14 ENCOUNTER — OFFICE VISIT (OUTPATIENT)
Dept: PHYSICAL THERAPY | Facility: CLINIC | Age: 67
End: 2022-02-14
Payer: COMMERCIAL

## 2022-02-14 DIAGNOSIS — M75.41 IMPINGEMENT SYNDROME OF SHOULDER REGION, RIGHT: Primary | ICD-10-CM

## 2022-02-14 DIAGNOSIS — M25.511 CHRONIC RIGHT SHOULDER PAIN: ICD-10-CM

## 2022-02-14 DIAGNOSIS — G89.29 CHRONIC RIGHT SHOULDER PAIN: ICD-10-CM

## 2022-02-14 PROCEDURE — 97110 THERAPEUTIC EXERCISES: CPT

## 2022-02-14 PROCEDURE — 97140 MANUAL THERAPY 1/> REGIONS: CPT

## 2022-02-14 NOTE — PROGRESS NOTES
Daily Note     Today's date: 2022  Patient name: Brayden Tucker  : 1955  MRN: [id number]  Referring provider: Ron León MD  Dx:   Encounter Diagnosis     ICD-10-CM    1  Impingement syndrome of shoulder region, right  M75 41    2  Chronic right shoulder pain  M25 511     G89 29        Start Time: 0900  Stop Time: 09  Total time in clinic (min): 30 minutes    Subjective: Pt reports she is seeing small improvements however is still having some discomfort  Pt is now able to reach up for dishes in her cabinets without pain, but reaching to the side is still difficult  Objective: See treatment diary below      Assessment: Pt still requires repeated cueing for shoulder positioning and scapular retraction  Pt did demonstrate improved ease with flexion and abduction, having less pain at end range as well  Pt is slowly improving in strength and endurance  Prone I's were trialed as a progression from prone retraction with shoulder extension  Pt did well following initial cueing  Pt had more difficulty with prone T's reporting that she didn't understand how to retract her shoulder blade in this way  Following cueing she did better however will need more training on this  Patient demonstrated fatigue post treatment, exhibited good technique with therapeutic exercises and would benefit from continued PT      Plan: Continue per plan of care          Manuals             PROM  EM 5' EM 6' 8 min 6'        Posterior [address] joint mobilization   EM Gr  II-III EM Gr  III np EM Gr   III 2x30                                  Neuro Re-Ed             Scapular retraction 10x5" HEP 2x10x5"           Prone scap retraction + shoulder extension  20x3" HEP 20x3"  20x3"         Scapular punches supine   20x5" 1# 20x5" 1# 20x5" 2#                                                            Ther Ex             Thoracic extension w towel roll 10x5" HEP 2x10x5" 2x10x5" 2x10/  5" 2x10x5"        No moneys 2x10 HEP 3x10 YTB 3x10 RTB 3x10  RTB 3x15 RTB        Supine pec minor stretch w/ towel 3x1' HEP 2', 2' PT OP 1'x2 PT OP 1'x2  PT OP         pec doorway stretch  Low 3x30"           Open books             Prone I's     20x3"        Prone T's     20x        Straight arm extensions  3x15 RTB HEP 3x15 RTB  3x15  RTB 3x15 RTB        rows   3x15 RTB  GTB  3x15  3x15 GTB        Functional IR stretch w towel    4x15" 4x15"  3x30"        Ther Activity                                       Gait Training                                       Modalities                                       Assessment IE, POC, Prognosis            Education HEP, POC, Prognosis UHEP

## 2022-02-15 ENCOUNTER — OFFICE VISIT (OUTPATIENT)
Dept: FAMILY MEDICINE CLINIC | Facility: CLINIC | Age: 67
End: 2022-02-15

## 2022-02-15 VITALS — TEMPERATURE: 97.8 F

## 2022-02-15 DIAGNOSIS — M99.01 SOMATIC DYSFUNCTION OF CERVICAL REGION: ICD-10-CM

## 2022-02-15 DIAGNOSIS — M99.02 SOMATIC DYSFUNCTION OF THORACIC REGION: ICD-10-CM

## 2022-02-15 DIAGNOSIS — G89.29 CHRONIC RIGHT SHOULDER PAIN: Primary | ICD-10-CM

## 2022-02-15 DIAGNOSIS — M25.511 CHRONIC RIGHT SHOULDER PAIN: Primary | ICD-10-CM

## 2022-02-15 PROCEDURE — 1036F TOBACCO NON-USER: CPT | Performed by: FAMILY MEDICINE

## 2022-02-15 PROCEDURE — 99213 OFFICE O/P EST LOW 20 MIN: CPT | Performed by: FAMILY MEDICINE

## 2022-02-15 NOTE — PROGRESS NOTES
Assessment/Plan:    Somatic dysfunction of cervical region  - Findings = Tissue texture change b/l trapezius  - Pt tolerated OMT well: soft tissue    Somatic dysfunction of thoracic region  - Findings = R-sided tenderpoint at level of T4  - Pt tolerated OMT well: CS  - Pt reported improvement    Chronic right shoulder pain  Pt presents for OMT treatment c/o R shoulder pain that is chronic, improved ROM with PT but no pain relief  Pt requesting MRI  - Findings: Dec ROM b/l extremities  - Pt did not tolerate Bola technique  - Plan: XR, follow up results with PCP       Diagnoses and all orders for this visit:    Chronic right shoulder pain  -     XR shoulder 2+ vw right; Future    Somatic dysfunction of thoracic region    Somatic dysfunction of cervical region        Subjective:      Patient ID: Smith Lopes is a 74 y o  female  Pt is a 74yo female presenting c/o R-sided shoulder pain that is chronic but was exacerbated this past Thanksgiving  She tripped backwards onto her bottom and used her hands to break her fall  Pain is a burning sensation especially when crossing her arm over her chest  Pain is also present when lifting her arms above her head  She would like an MRI of her R shoulder since her pain has not improved  She began PT 3 weeks ago, has had 5-6 sessions so far, reports improved ROM but pain has not improved  She reports h/o steroid injections a long time ago, which were helpful, but she wants to use them as a last resort for now  She reports sleeping on her shoulder at night since this is most comfortable for her  She was previously a health aide, 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  Her pain has been controlled with Tylenol, motrin, massage therapy, stretches        The following portions of the patient's history were reviewed and updated as appropriate: allergies, current medications, past family history, past medical history, past social history, past surgical history and problem list     Review of Systems   Constitutional: Negative for chills and fever  Respiratory: Negative for shortness of breath  Cardiovascular: Negative for chest pain  Genitourinary: Negative  Objective:      Temp 97 8 °F (36 6 °C) (Temporal)            Physical Exam  Constitutional:       Appearance: Normal appearance  HENT:      Head: Normocephalic and atraumatic  Cardiovascular:      Rate and Rhythm: Normal rate and regular rhythm  Pulses: Normal pulses  Heart sounds: Normal heart sounds  Pulmonary:      Effort: Pulmonary effort is normal       Breath sounds: Normal breath sounds  Musculoskeletal:         General: Tenderness present  Cervical back: No tenderness  Comments: Dec ROM - b/l extremities - abduction, extension, internal rotation  R sided tenderpoint at level of T4  Tissue texture change b/l trapezius   Skin:     General: Skin is warm and dry  Neurological:      Mental Status: She is alert  Psychiatric:         Mood and Affect: Mood normal          Behavior: Behavior normal          OMT  Performed by: Kev Denson DO  Authorized by: Kev Denson DO     Verbal consent obtained?: Yes    Consent given by:  Patient  Patient states understanding of procedure being performed: Yes    Procedure Details:     Region evaluated and treated:  Cervical, Thoracic T1 - T4, Right Upper Extremity and Left Upper Extremity    Cervical Details:     Examination Method:  Tissue Texture Change, Stability, Laxity, Effusions, Tone    Severity:  Moderate    Osteopathic Findings:  Ropiness of b/l trapezius  Knot in R trapezius    Treatment Method:  Soft Tissue Treatment    Response:  Improved - The somatic dysfunction is improved but not completely resolved      Thoracic T1 - T4 details:     Examination Method:  Tenderness, Pain    Osteopathic Findings:  R-sided tenderpoint at level of T4    Treatment Method:  Counterstrain Treatment Response:  Improved    Right Upper Extremity details:     Examination Method:  Range of Motion, Contracture and Tenderness, Pain    Osteopathic Findings:  Decreased ROM - abduction, internal rotation, extension  Pain with these movements as well    Treatment Method:  Articulatory Treatment    Response:  Unchanged - The somatic dysfunction is unchanged or the same after treatment  Left Upper Extremity details:     Examination Method:  Range of Motion, Contracture and Tenderness, Pain    Osteopathic Findings:  Decreased ROM - abduction, internal rotation, extension  Pain with these movements as well    Treatment Method:  Articulatory Treatment    Response:  Unchanged - The somatic dysfunction is unchanged or the same after treatment      Total Regions Treated:  4  Attending provider present in exam room for procedure: Mary Elizabeth DO PGY-1

## 2022-02-16 ENCOUNTER — OFFICE VISIT (OUTPATIENT)
Dept: PHYSICAL THERAPY | Facility: CLINIC | Age: 67
End: 2022-02-16
Payer: COMMERCIAL

## 2022-02-16 ENCOUNTER — HOSPITAL ENCOUNTER (OUTPATIENT)
Dept: RADIOLOGY | Facility: HOSPITAL | Age: 67
Discharge: HOME | End: 2022-02-16
Payer: COMMERCIAL

## 2022-02-16 DIAGNOSIS — M25.511 CHRONIC RIGHT SHOULDER PAIN: ICD-10-CM

## 2022-02-16 DIAGNOSIS — G89.29 CHRONIC RIGHT SHOULDER PAIN: ICD-10-CM

## 2022-02-16 DIAGNOSIS — M75.41 IMPINGEMENT SYNDROME OF SHOULDER REGION, RIGHT: Primary | ICD-10-CM

## 2022-02-16 PROCEDURE — 73030 X-RAY EXAM OF SHOULDER: CPT

## 2022-02-16 PROCEDURE — 97110 THERAPEUTIC EXERCISES: CPT

## 2022-02-16 PROCEDURE — 97140 MANUAL THERAPY 1/> REGIONS: CPT

## 2022-02-16 NOTE — PROGRESS NOTES
Daily Note     Today's date: 2022  Patient name: Juan Mahmood  : 1955  MRN: [id number]  Referring provider: Yulia Hamilton MD  Dx:   Encounter Diagnosis     ICD-10-CM    1  Impingement syndrome of shoulder region, right  M75 41    2  Chronic right shoulder pain  M25 511     G89 29        Start Time: 1000  Stop Time: 1030  Total time in clinic (min): 30 minutes    Subjective: Pt reports her motion continues to improve however she is still having pain  Objective: See treatment diary below      Assessment: Pt had improved IR and ER motion  Resisted ER was added to her HEP to work on RTC strength as well as to try and provide more ER for position of the [address] joint  Pt did well with resisted horizontal abduction exercises having no discomfort and good scapular motion  Pt had improved pain with motion however it still bothered her a little  Patient demonstrated fatigue post treatment, exhibited good technique with therapeutic exercises and would benefit from continued PT      Plan: Continue per plan of care         Manuals             PROM  EM 5' EM 6' 8 min 6' 6'       Posterior GH joint mobilization   EM Gr  II-III EM Gr  III np EM Gr  III 2x30 EM Gr   III 3x30                                 Neuro Re-Ed             Scapular retraction 10x5" HEP 2x10x5"           Prone scap retraction + shoulder extension  20x3" HEP 20x3"  20x3"         Scapular punches supine   20x5" 1# 20x5" 1# 20x5" 2#                                                            Ther Ex             Thoracic extension w towel roll 10x5" HEP 2x10x5" 2x10x5" 2x10/  5" 2x10x5"        No moneys 2x10 HEP 3x10 YTB 3x10 RTB 3x10  RTB 3x15 RTB 3x15 RTB       Supine pec minor stretch w/ towel 3x1' HEP 2', 2' PT OP 1'x2 PT OP 1'x2  PT OP         pec doorway stretch  Low 3x30"           Open books             Prone I's     20x3"        Prone T's     20x        Straight arm extensions  3x15 RTB HEP 3x15 RTB 3x15  RTB 3x15 RTB 3x15 GTB       rows   3x15 RTB  GTB  3x15  3x15 GTB  3x15 GTB       Functional IR stretch w towel    4x15" 4x15"  3x30"         Resisted ER      3x10 RTB HEP       Resisted IR       3x10 RTB       B/L horizontal abduction      YTB 2x10       Ther Activity                                       Gait Training                                       Modalities                                       Assessment IE, POC, Prognosis            Education HEP, POC, Prognosis UHEP

## 2022-02-16 NOTE — ASSESSMENT & PLAN NOTE
Pt presents for OMT treatment c/o R shoulder pain that is chronic, improved ROM with PT but no pain relief   Pt requesting MRI  - Findings: Dec ROM b/l extremities  - Pt did not tolerate Bola technique  - Plan: XR, follow up results with PCP

## 2022-02-16 NOTE — ASSESSMENT & PLAN NOTE
- Findings = R-sided tenderpoint at level of T4  - Pt tolerated OMT well: CS  - Pt reported improvement

## 2022-02-21 ENCOUNTER — OFFICE VISIT (OUTPATIENT)
Dept: PHYSICAL THERAPY | Facility: CLINIC | Age: 67
End: 2022-02-21
Payer: COMMERCIAL

## 2022-02-21 DIAGNOSIS — G89.29 CHRONIC RIGHT SHOULDER PAIN: ICD-10-CM

## 2022-02-21 DIAGNOSIS — M25.511 CHRONIC RIGHT SHOULDER PAIN: ICD-10-CM

## 2022-02-21 DIAGNOSIS — M75.41 IMPINGEMENT SYNDROME OF SHOULDER REGION, RIGHT: Primary | ICD-10-CM

## 2022-02-21 PROCEDURE — 97110 THERAPEUTIC EXERCISES: CPT

## 2022-02-21 NOTE — PROGRESS NOTES
Daily Note     Today's date: 2022  Patient name: Ventura Armstrong  : 1955  MRN: [id number]  Referring provider: Lars Braun MD  Dx:   Encounter Diagnosis     ICD-10-CM    1  Impingement syndrome of shoulder region, right  M75 41    2  Chronic right shoulder pain  M25 511     G89 29                   Subjective: Pt reports she no longer has posterior shoulder pain however she still has the anterolateral pain  Pt went for a massage which she thinks helped  Objective: See treatment diary below      Assessment: Pt still complained of pain down her lateral arm into her thumb therefore was given radial nerve glides to add to her HEP  Pt still has significant pec minor tightness/hypertonicity  Pt is doing well with strengthening making improvements in endurance however still needs cueing for form  Patient demonstrated fatigue post treatment, exhibited good technique with therapeutic exercises and would benefit from continued PT      Plan: Continue per plan of care        Manuals             PROM  EM 5' EM 6' 8 min 6' 6' 2'      Posterior GH joint mobilization   EM Gr  II-III EM Gr  III np EM Gr  III 2x30 EM Gr  III 3x30 EM Gr   III 30x      pec minor release       EM 3'                   Neuro Re-Ed             Scapular retraction 10x5" HEP 2x10x5"           Prone scap retraction + shoulder extension  20x3" HEP 20x3"  20x3"         Scapular punches supine   20x5" 1# 20x5" 1# 20x5" 2#        Radial nerve glides       2x15 HEP                                             Ther Ex             Thoracic extension w towel roll 10x5" HEP 2x10x5" 2x10x5" 2x10/  5" 2x10x5"        No moneys 2x10 HEP 3x10 YTB 3x10 RTB 3x10  RTB 3x15 RTB 3x15 RTB 3x15 RTB      Supine pec minor stretch w/ towel 3x1' HEP 2', 2' PT OP 1'x2 PT OP 1'x2  PT OP         pec doorway stretch  Low 3x30"           Open books             Prone I's     20x3"        Prone T's     20x        Straight arm extensions  3x15 RTB HEP 3x15 RTB  3x15  RTB 3x15 RTB 3x15 GTB 3x15 GTB      rows   3x15 RTB  GTB  3x15  3x15 GTB  3x15 GTB 3x15 GTB      Functional IR stretch w towel    4x15" 4x15"  3x30"         Resisted ER      3x10 RTB HEP  3x15 RTB      Resisted IR       3x10 RTB 3x15 RTB      B/L horizontal abduction      YTB 2x10  YTB 2x10      Standing pec doorway stretch          R UE only 20"x5                   Ther Activity                                       Gait Training                                       Modalities                                       Assessment IE, POC, Prognosis            Education HEP, POC, Prognosis UHEP

## 2022-02-23 ENCOUNTER — OFFICE VISIT (OUTPATIENT)
Dept: PHYSICAL THERAPY | Facility: CLINIC | Age: 67
End: 2022-02-23
Payer: COMMERCIAL

## 2022-02-23 DIAGNOSIS — M75.41 IMPINGEMENT SYNDROME OF SHOULDER REGION, RIGHT: Primary | ICD-10-CM

## 2022-02-23 DIAGNOSIS — M25.511 CHRONIC RIGHT SHOULDER PAIN: ICD-10-CM

## 2022-02-23 DIAGNOSIS — G89.29 CHRONIC RIGHT SHOULDER PAIN: ICD-10-CM

## 2022-02-23 PROCEDURE — 97110 THERAPEUTIC EXERCISES: CPT

## 2022-02-23 PROCEDURE — 97112 NEUROMUSCULAR REEDUCATION: CPT

## 2022-02-23 NOTE — PROGRESS NOTES
Daily Note     Today's date: 2022  Patient name: Ashleigh Fritz  : 1955  MRN: [id number]  Referring provider: Vivienne Abbott MD  Dx:   Encounter Diagnosis     ICD-10-CM    1  Impingement syndrome of shoulder region, right  M75 41    2  Chronic right shoulder pain  M25 511     G89 29                   Subjective: Pt reports she hasn't had the pain in the back of the shoulder and pain has gone down a little since the last visit  Pt thinks it's getting a little better, but still thinks she may need an injection in her shoulder  Pt reports her anterior chest/shouder feels a little more loose than the last visit  Objective: See treatment diary below      Assessment: Pt did better with pec minor release however still had palpable hypertonicity  Pt is able to tolerate increased sets of interventions with decreased reported discomfort, but still gets sore  Pt had no discomfort with radial nerve glides today  Pt still requires cueing for shoulder positioning having rolled shoulder with most interventions  Pt initially had pain with D2 flexion however following PT facilitation for GH posterior glide during movement pain was abolished  Pt was able to perform them without PT assistance during second set with cueing  Patient demonstrated fatigue post treatment, exhibited good technique with therapeutic exercises and would benefit from continued PT      Plan: Continue per plan of care       Manuals             PROM  EM 5' EM 6' 8 min 6' 6' 2'      Posterior GH joint mobilization   EM Gr  II-III EM Gr  III np EM Gr  III 2x30 EM Gr  III 3x30 EM Gr   III 30x      pec minor release       EM 3' EM 4'                  Neuro Re-Ed             Scapular retraction 10x5" HEP 2x10x5"           Prone scap retraction + shoulder extension  20x3" HEP 20x3"  20x3"         Scapular punches supine   20x5" 1# 20x5" 1# 20x5" 2#        Radial nerve glides       2x15 HEP 2x10 D/C D2 flexion        2x10 YTB PT facilitaiton post GH glide                               Ther Ex             Thoracic extension w towel roll 10x5" HEP 2x10x5" 2x10x5" 2x10/  5" 2x10x5"        No moneys 2x10 HEP 3x10 YTB 3x10 RTB 3x10  RTB 3x15 RTB 3x15 RTB 3x15 RTB      Supine pec minor stretch w/ towel 3x1' HEP 2', 2' PT OP 1'x2 PT OP 1'x2  PT OP    2', PT OP 1'     pec doorway stretch  Low 3x30"           Open books             Prone I's     20x3"        Prone T's     20x        Straight arm extensions  3x15 RTB HEP 3x15 RTB  3x15  RTB 3x15 RTB 3x15 GTB 3x15 GTB      rows   3x15 RTB  GTB  3x15  3x15 GTB  3x15 GTB 3x15 GTB      Functional IR stretch w towel    4x15" 4x15"  3x30"         Resisted ER      3x10 RTB HEP  3x15 RTB  3x15 RTB     Resisted IR       3x10 RTB 3x15 RTB  3x15 RTB     B/L horizontal abduction      YTB 2x10  YTB 2x10 YTB 3x10     Standing pec doorway stretch          R UE only 20"x5  R UE only 3x30"     Prone T's, I's             Ther Activity                                       Gait Training                                       Modalities                                       Assessment IE, POC, Prognosis            Education HEP, POC, Prognosis UHEP

## 2022-02-28 ENCOUNTER — OFFICE VISIT (OUTPATIENT)
Dept: PHYSICAL THERAPY | Facility: CLINIC | Age: 67
End: 2022-02-28
Payer: COMMERCIAL

## 2022-02-28 DIAGNOSIS — G89.29 CHRONIC RIGHT SHOULDER PAIN: ICD-10-CM

## 2022-02-28 DIAGNOSIS — M75.41 IMPINGEMENT SYNDROME OF SHOULDER REGION, RIGHT: Primary | ICD-10-CM

## 2022-02-28 DIAGNOSIS — M25.511 CHRONIC RIGHT SHOULDER PAIN: ICD-10-CM

## 2022-02-28 PROCEDURE — 97110 THERAPEUTIC EXERCISES: CPT

## 2022-02-28 PROCEDURE — 97140 MANUAL THERAPY 1/> REGIONS: CPT

## 2022-02-28 NOTE — PROGRESS NOTES
Daily Note     Today's date: 2022  Patient name: Brayden Tucker  : 1955  MRN: [id number]  Referring provider: Ron León MD  Dx:   Encounter Diagnosis     ICD-10-CM    1  Impingement syndrome of shoulder region, right  M75 41    2  Chronic right shoulder pain  M25 511     G89 29        Start Time: 0900  Stop Time: 09  Total time in clinic (min): 38 minutes    Subjective: Pt reports she is seeing small improvements  Objective: See treatment diary below      Assessment: Pt required facilitation for prone I's and T's for appropriate scapular mechanics, as she had difficulty with retraction and depression  Pt continues to demonstrates difficulty with this in all exercises however following cueing shows improvement  Carry over between sessions is beginning to improve however overall recall of mechanics is still minimal  Patient demonstrated fatigue post treatment, exhibited good technique with therapeutic exercises and would benefit from continued PT      Plan: Continue per plan of care      Manuals             PROM  EM 5' EM 6' 8 min 6' 6' 2'      Posterior GH joint mobilization   EM Gr  II-III EM Gr  III np EM Gr  III 2x30 EM Gr  III 3x30 EM Gr   III 30x      pec minor release       EM 3' EM 4' EM 4'                 Neuro Re-Ed             Scapular retraction 10x5" HEP 2x10x5"           Prone scap retraction + shoulder extension  20x3" HEP 20x3"  20x3"         Scapular punches supine   20x5" 1# 20x5" 1# 20x5" 2#        Radial nerve glides       2x15 HEP 2x10 D/C     D2 flexion        2x10 YTB PT facilitaiton post GH glide 2x10 YTB PT facilitation post [facility] glide 1st set only                              Ther Ex             Thoracic extension w towel roll 10x5" HEP 2x10x5" 2x10x5" 2x10/  5" 2x10x5"        No moneys 2x10 HEP 3x10 YTB 3x10 RTB 3x10  RTB 3x15 RTB 3x15 RTB 3x15 RTB      Supine pec minor stretch w/ towel 3x1' HEP 2', 2' PT OP 1'x2 PT OP 1'x2  PT OP    2', PT OP 1' R UE OP w/ retraction 2x1'    pec doorway stretch  Low 3x30"           Open books             Prone I's     20x3"        Prone T's     20x        Straight arm extensions  3x15 RTB HEP 3x15 RTB  3x15  RTB 3x15 RTB 3x15 GTB 3x15 GTB      rows   3x15 RTB  GTB  3x15  3x15 GTB  3x15 GTB 3x15 GTB   3x15 BTB    Functional IR stretch w towel    4x15" 4x15"  3x30"         Resisted ER      3x10 RTB HEP  3x15 RTB  3x15 RTB  3x15 RTB    Resisted IR       3x10 RTB 3x15 RTB  3x15 RTB      B/L horizontal abduction      YTB 2x10  YTB 2x10 YTB 3x10  YTB 3x10    Standing pec doorway stretch          R UE only 20"x5  R UE only 3x30"  R UE only 3x30"    Prone T's, I's          2x10x3" ea HEP    Ther Activity                                       Gait Training                                       Modalities                                       Assessment IE, POC, Prognosis            Education HEP, POC, Prognosis UHEP

## 2022-03-02 ENCOUNTER — OFFICE VISIT (OUTPATIENT)
Dept: PHYSICAL THERAPY | Facility: CLINIC | Age: 67
End: 2022-03-02
Payer: COMMERCIAL

## 2022-03-02 DIAGNOSIS — M25.511 CHRONIC RIGHT SHOULDER PAIN: ICD-10-CM

## 2022-03-02 DIAGNOSIS — M75.41 IMPINGEMENT SYNDROME OF SHOULDER REGION, RIGHT: Primary | ICD-10-CM

## 2022-03-02 DIAGNOSIS — G89.29 CHRONIC RIGHT SHOULDER PAIN: ICD-10-CM

## 2022-03-02 PROCEDURE — 97110 THERAPEUTIC EXERCISES: CPT

## 2022-03-02 NOTE — PROGRESS NOTES
Daily Note     Today's date: 3/2/2022  Patient name: Fernanda Sun  : 1955  MRN: [id number]  Referring provider: Vicente Phillips MD  Dx:   Encounter Diagnosis     ICD-10-CM    1  Impingement syndrome of shoulder region, right  M75 41    2  Chronic right shoulder pain  M25 511     G89 29                   Subjective: Pt reports she is seeing small improvements  Objective: See treatment diary below      Assessment: Pt required facilitation for prone I's and T's for appropriate scapular mechanics, as she had difficulty with retraction and depression  Pt continues to demonstrates difficulty with this in all exercises however following cueing shows improvement  Carry over between sessions is beginning to improve however overall recall of mechanics is still minimal  Patient demonstrated fatigue post treatment, exhibited good technique with therapeutic exercises and would benefit from continued PT      Plan: Continue per plan of care      Manuals             PROM  EM 5' EM 6' 8 min 6' 6' 2'      Posterior GH joint mobilization   EM Gr  II-III EM Gr  III np EM Gr  III 2x30 EM Gr  III 3x30 EM Gr   III 30x      pec minor release       EM 3' EM 4' EM 4'                 Neuro Re-Ed             Scapular retraction 10x5" HEP 2x10x5"           Prone scap retraction + shoulder extension  20x3" HEP 20x3"  20x3"         Scapular punches supine   20x5" 1# 20x5" 1# 20x5" 2#        Radial nerve glides       2x15 HEP 2x10 D/C     D2 flexion        2x10 YTB PT facilitaiton post GH glide 2x10 YTB PT facilitation post [address] glide 1st set only                              Ther Ex             Thoracic extension w towel roll 10x5" HEP 2x10x5" 2x10x5" 2x10/  5" 2x10x5"        No moneys 2x10 HEP 3x10 YTB 3x10 RTB 3x10  RTB 3x15 RTB 3x15 RTB 3x15 RTB      Supine pec minor stretch w/ towel 3x1' HEP 2', 2' PT OP 1'x2 PT OP 1'x2  PT OP    2', PT OP 1' R UE OP w/ retraction 2x1'    pec doorway stretch  Low 3x30"           Open books             Prone I's     20x3"        Prone T's     20x        Straight arm extensions  3x15 RTB HEP 3x15 RTB  3x15  RTB 3x15 RTB 3x15 GTB 3x15 GTB      rows   3x15 RTB  GTB  3x15  3x15 GTB  3x15 GTB 3x15 GTB   3x15 BTB    Functional IR stretch w towel    4x15" 4x15"  3x30"         Resisted ER      3x10 RTB HEP  3x15 RTB  3x15 RTB  3x15 RTB    Resisted IR       3x10 RTB 3x15 RTB  3x15 RTB      B/L horizontal abduction      YTB 2x10  YTB 2x10 YTB 3x10  YTB 3x10    Standing pec doorway stretch          R UE only 20"x5  R UE only 3x30"  R UE only 3x30"    Prone T's, I's          2x10x3" ea HEP    Ther Activity                                       Gait Training                                       Modalities                                       Assessment IE, POC, Prognosis            Education HEP, POC, Prognosis UHEP                             Daily Note     Today's date: 3/2/2022  Patient name: Lawson Doran  : 1955  MRN: [id number]  Referring provider: Nehal Morse MD  Dx:   Encounter Diagnosis     ICD-10-CM    1  Impingement syndrome of shoulder region, right  M75 41    2  Chronic right shoulder pain  M25 511     G89 29                   Subjective: Pt reports slowly improving right shoulder pain  Objective: See treatment diary below      Assessment: Pt demonstrated fatigue with resisted shoulder ER strengthening  Pt required moderate cueing for correct technique with prone scapular strengthening  Plan: Continue per plan of care   2 3   Manuals             PROM  EM 5' EM 6' 8 min 6' 6' 2'      Posterior GH joint mobilization   EM Gr  II-III EM Gr  III np EM Gr  III 2x30 EM Gr  III 3x30 EM Gr   III 30x      pec minor release       EM 3' EM 4' EM 4' 4 min                Neuro Re-Ed             Scapular retraction 10x5" HEP 2x10x5"           Prone scap retraction + shoulder extension  20x3" HEP 20x3"  20x3"         Scapular punches supine   20x5" 1# 20x5" 1# 20x5" 2#        Radial nerve glides       2x15 HEP 2x10 D/C     D2 flexion        2x10 YTB PT facilitaiton post GH glide 2x10 YTB PT facilitation post [address] glide 1st set only 2x10 YTB                              Ther Ex             Thoracic extension w towel roll 10x5" HEP 2x10x5" 2x10x5" 2x10/  5" 2x10x5"        No moneys 2x10 HEP 3x10 YTB 3x10 RTB 3x10  RTB 3x15 RTB 3x15 RTB 3x15 RTB      Supine pec minor stretch w/ towel 3x1' HEP 2', 2' PT OP 1'x2 PT OP 1'x2  PT OP    2', PT OP 1' R UE OP w/ retraction 2x1' R UE OP w/ retraction 2x1'   pec doorway stretch  Low 3x30"           Open books             Prone I's     20x3"        Prone T's     20x        Straight arm extensions  3x15 RTB HEP 3x15 RTB  3x15  RTB 3x15 RTB 3x15 GTB 3x15 GTB      rows   3x15 RTB  GTB  3x15  3x15 GTB  3x15 GTB 3x15 GTB   3x15 BTB 3x15  BTB   Functional IR stretch w towel    4x15" 4x15"  3x30"         Resisted ER      3x10 RTB HEP  3x15 RTB  3x15 RTB  3x15 RTB 3x15  RTB   Resisted IR       3x10 RTB 3x15 RTB  3x15 RTB      B/L horizontal abduction      YTB 2x10  YTB 2x10 YTB 3x10  YTB 3x10 YTB  3x12   Standing pec doorway stretch          R UE only 20"x5  R UE only 3x30"  R UE only 3x30" R UE only 3x30"   Prone T's, I's          2x10x3" ea HEP 2x10x3" ea   Ther Activity                                       Gait Training                                       Modalities                                       Assessment IE, POC, Prognosis            Education HEP, POC, Prognosis UHEP

## 2022-03-04 ENCOUNTER — OFFICE VISIT (OUTPATIENT)
Dept: FAMILY MEDICINE CLINIC | Facility: CLINIC | Age: 67
End: 2022-03-04

## 2022-03-04 VITALS
OXYGEN SATURATION: 98 % | TEMPERATURE: 97.3 F | DIASTOLIC BLOOD PRESSURE: 77 MMHG | BODY MASS INDEX: 28.28 KG/M2 | HEART RATE: 74 BPM | HEIGHT: 61 IN | RESPIRATION RATE: 16 BRPM | WEIGHT: 149.8 LBS | SYSTOLIC BLOOD PRESSURE: 122 MMHG

## 2022-03-04 DIAGNOSIS — M15.9 GENERALIZED OSTEOARTHRITIS OF MULTIPLE SITES: ICD-10-CM

## 2022-03-04 DIAGNOSIS — E11.9 TYPE 2 DIABETES MELLITUS WITHOUT COMPLICATION, WITHOUT LONG-TERM CURRENT USE OF INSULIN (HCC): Primary | ICD-10-CM

## 2022-03-04 LAB — SL AMB POCT HEMOGLOBIN AIC: 6 (ref ?–6.5)

## 2022-03-04 PROCEDURE — 3044F HG A1C LEVEL LT 7.0%: CPT | Performed by: FAMILY MEDICINE

## 2022-03-04 PROCEDURE — 83036 HEMOGLOBIN GLYCOSYLATED A1C: CPT | Performed by: FAMILY MEDICINE

## 2022-03-04 PROCEDURE — 3008F BODY MASS INDEX DOCD: CPT | Performed by: FAMILY MEDICINE

## 2022-03-04 PROCEDURE — 1036F TOBACCO NON-USER: CPT | Performed by: FAMILY MEDICINE

## 2022-03-04 PROCEDURE — 99214 OFFICE O/P EST MOD 30 MIN: CPT | Performed by: FAMILY MEDICINE

## 2022-03-04 PROCEDURE — 20610 DRAIN/INJ JOINT/BURSA W/O US: CPT | Performed by: FAMILY MEDICINE

## 2022-03-04 PROCEDURE — 3725F SCREEN DEPRESSION PERFORMED: CPT | Performed by: FAMILY MEDICINE

## 2022-03-04 RX ORDER — TRIAMCINOLONE ACETONIDE 40 MG/ML
40 INJECTION, SUSPENSION INTRA-ARTICULAR; INTRAMUSCULAR
Status: COMPLETED | OUTPATIENT
Start: 2022-03-04 | End: 2022-03-04

## 2022-03-04 RX ORDER — LIDOCAINE HYDROCHLORIDE 20 MG/ML
4 INJECTION, SOLUTION INFILTRATION; PERINEURAL
Status: COMPLETED | OUTPATIENT
Start: 2022-03-04 | End: 2022-03-04

## 2022-03-04 RX ADMIN — TRIAMCINOLONE ACETONIDE 40 MG: 40 INJECTION, SUSPENSION INTRA-ARTICULAR; INTRAMUSCULAR at 10:10

## 2022-03-04 RX ADMIN — LIDOCAINE HYDROCHLORIDE 4 ML: 20 INJECTION, SOLUTION INFILTRATION; PERINEURAL at 16:27

## 2022-03-04 NOTE — PROGRESS NOTES
OFFICE VISIT NOTE - Ebenezer 74 y o  (:1955) female MRN: [id number]  Unit/Bed#:  Encounter: [id number]      Date: 22    Assessment and Plan     Type 2 diabetes mellitus without complication, without long-term current use of insulin ([facility] )    Lab Results   Component Value Date    HGBA1C 6 0 2022     Diet controlled, no oral hypoglycemic in over 2 years  Patient reports she is staying physically active  Tolerating statin atorvastatin 10 mg daily  BP at goal 122/77  Will get updated blood work at next visit, not due at this time  No microalbuminuria or other known complications  Up to date on foot exam, will schedule with ophtho for eye exam   F/u in 6 mo    Generalized osteoarthritis of multiple sites  More prominent pain on the right shoulder  She has had up to 10 sessions of PT with minimal relief  Recent shoulder XR showing osteoarthritis in the [facility] and [address] joints  Cortisone injection given today into subacromial space via posterior approach, tolerated well  Diagnoses and all orders for this visit:    Type 2 diabetes mellitus without complication, without long-term current use of insulin ([facility])  -     POCT hemoglobin A1c    Generalized osteoarthritis of multiple sites  -     Large joint arthrocentesis: R subacromial bursa        Subjective:  Chief Complaint   Patient presents with    Diabetes     6mo f/u       History of Present Illness     Myke Leahy is a 74 y o  female here for follow up her well-controlled type 2 diabetes and osteoarthritis which is more prominent in her right shoulder  She reports she is doing well overall  Reports no new symptoms since last visit  She has not needed any oral hypoglycemics in about 2 years  She has been doing physical therapy for her chronic right shoulder pain due to osteoarthritis   However, She is interested in having right shoulder injection today because she continues to persistent dull achy pain and is going on a trip soon and will like to get mild significant relief  POC A1c today is 6 0, about the same as previous 5 9 done 6 months ago  Cortisone injection was given today into right shoulder joint and patient tolerated procedure well without any immediate complications  The following portions of the patient's history were reviewed and updated as appropriate: allergies, current medications, past family history, past medical history, past social history, past surgical history and problem list     Review of Systems     Review of Systems   Constitutional: Negative for fatigue and fever  HENT: Negative for sore throat  Respiratory: Negative for cough, shortness of breath and wheezing  Cardiovascular: Negative for chest pain, palpitations and leg swelling  Gastrointestinal: Negative for abdominal pain, diarrhea, nausea and vomiting  Genitourinary: Negative for dysuria and pelvic pain  Skin: Negative for rash  Neurological: Negative for weakness and headaches         Current Medications     Current Outpatient Medications on File Prior to Visit   Medication Sig Dispense Refill    atorvastatin (LIPITOR) 10 mg tablet Take 1 tablet (10 mg total) by mouth daily 90 tablet 1    BIOTIN 5000 PO Take by mouth daily      Blood Glucose Monitoring Suppl (Contour Next One) KIT Use daily 1 kit 0    Cyanocobalamin (VITAMIN B12 PO) Take by mouth daily      fluticasone (FLONASE) 50 mcg/act nasal spray 1 spray into each nostril daily 16 g 4    glucose blood (Contour Test) test strip Use 1 each daily Use as instructed 100 each 3    Microlet Lancets MISC Use daily 100 each 3    OneTouch Ultra test strip USE 1 STRIP DAILY AS NEEDED FOR HYPOGLYCEMIA AS  DIRECTED 100 each 3    TURMERIC PO Take by mouth daily      acetaminophen (Tylenol 8 Hour) 650 mg CR tablet Take 1 tablet (650 mg total) by mouth every 8 (eight) hours (Patient not taking: Reported on 1/28/2021) 15 tablet 0    al [employer] oxide-diphenhydramine-lidocaine viscous (MAGIC MOUTHWASH) 1:1:1 suspension Swish and spit 10 mL every 4 (four) hours as needed for mouth pain or discomfort (Patient not taking: Reported on 3/2/2021) 1 Bottle 0    benzonatate (TESSALON PERLES) 100 mg capsule Take 1 capsule (100 mg total) by mouth 3 (three) times a day as needed for cough (Patient not taking: Reported on 3/2/2021) 21 capsule 0    diclofenac sodium (VOLTAREN) 1 % Apply 2 g topically 4 (four) times a day (Patient not taking: Reported on 6/14/2021) 100 g 1    loratadine (CLARITIN) 10 mg tablet Take 10 mg by mouth daily (Patient not taking: Reported on 10/8/2021)      metoprolol tartrate (LOPRESSOR) 25 mg tablet Take 1 tablet (25 mg total) by mouth daily (Patient not taking: Reported on 8/5/2021) 60 tablet 0    mometasone (ELOCON) 0 1 % lotion One drop affected ear canal daily at bedtime when necessary itching (Patient not taking: Reported on 1/25/2022 ) 60 mL 0    naproxen (NAPROSYN) 500 mg tablet Take 1 tablet (500 mg total) by mouth 2 (two) times a day with meals for 10 days (Patient not taking: Reported on 10/8/2021) 20 tablet 0    ondansetron (ZOFRAN-ODT) 4 mg disintegrating tablet Take 1 tablet (4 mg total) by mouth every 8 (eight) hours as needed for nausea or vomiting (Patient not taking: Reported on 3/2/2021) 20 tablet 0     No current facility-administered medications on file prior to visit  Objective     Vitals: /77 (BP Location: Left arm, Patient Position: Sitting, Cuff Size: Standard)   Pulse 74   Temp (!) 97 3 °F (36 3 °C) (Temporal)   Resp 16   Ht 5' 1" (1 549 m)   Wt 67 9 kg (149 lb 12 8 oz)   SpO2 98%   BMI 28 30 kg/m²     Recent Labs & Imaging  Recent Results (from the past 24 hour(s))   POCT hemoglobin A1c    Collection Time: 03/04/22  8:47 AM   Result Value Ref Range    Hemoglobin A1C 6 0 6 5       XR shoulder 2+ vw right    Result Date: 2/19/2022  Impression: No acute osseous abnormality   Workstation performed: [id number]       Physical Exam  Constitutional:       General: She is not in acute distress  Appearance: She is not ill-appearing  HENT:      Head: Normocephalic and atraumatic  Right Ear: External ear normal       Left Ear: External ear normal       Nose: Nose normal  No congestion or rhinorrhea  Eyes:      Extraocular Movements: Extraocular movements intact  Conjunctiva/sclera: Conjunctivae normal    Cardiovascular:      Rate and Rhythm: Normal rate and regular rhythm  Heart sounds: Normal heart sounds  Pulmonary:      Effort: No respiratory distress  Breath sounds: Normal breath sounds  No wheezing  Abdominal:      General: Bowel sounds are normal  There is no distension  Palpations: Abdomen is soft  There is no mass  Tenderness: There is no abdominal tenderness  Musculoskeletal:         General: No swelling, tenderness or deformity  Normal range of motion  Right lower leg: No edema  Left lower leg: No edema  Skin:     General: Skin is warm  Findings: No rash  Neurological:      Mental Status: She is alert and oriented to person, place, and time  Psychiatric:         Behavior: Behavior normal            Large joint arthrocentesis: R subacromial bursa  Steedman Protocol:  Procedure performed by: (Joshua Nayak)  Consent: Verbal consent obtained  Risks and benefits: risks, benefits and alternatives were discussed  Consent given by: patient  Time out: Immediately prior to procedure a "time out" was called to verify the correct patient, procedure, equipment, support staff and site/side marked as required    Patient understanding: patient states understanding of the procedure being performed  Patient identity confirmed: verbally with patient    Supporting Documentation  Indications: pain   Procedure Details  Location: shoulder - R subacromial bursa  Preparation: Patient was prepped and draped in the usual sterile fashion  Needle size: 22 G  Ultrasound guidance: no  Approach: posterior  Medications administered: 40 mg triamcinolone acetonide 40 mg/mL; 4 mL lidocaine 2 %    Patient tolerance: patient tolerated the procedure well with no immediate complications  Dressing:  Sterile dressing applied    patient was advised to keep joint mobile and apply cold compresses          Leigha Alvarez MD  Family Medicine PGY-2  03/04/22

## 2022-03-04 NOTE — ASSESSMENT & PLAN NOTE
More prominent pain on the right shoulder  She has had up to 10 sessions of PT with minimal relief  Recent shoulder XR showing osteoarthritis in the [facility] and GH joints  Cortisone injection given today into subacromial space via posterior approach, tolerated well

## 2022-03-04 NOTE — ASSESSMENT & PLAN NOTE
Lab Results   Component Value Date    HGBA1C 6 0 03/04/2022     Diet controlled, no oral hypoglycemic in over 2 years  Patient reports she is staying physically active  Tolerating statin atorvastatin 10 mg daily  BP at goal 122/77  Will get updated blood work at next visit, not due at this time  No microalbuminuria or other known complications  Up to date on foot exam, will schedule with ophtho for eye exam   F/u in 6 mo

## 2022-03-07 ENCOUNTER — OFFICE VISIT (OUTPATIENT)
Dept: PHYSICAL THERAPY | Facility: CLINIC | Age: 67
End: 2022-03-07
Payer: COMMERCIAL

## 2022-03-07 DIAGNOSIS — M25.511 CHRONIC RIGHT SHOULDER PAIN: ICD-10-CM

## 2022-03-07 DIAGNOSIS — M75.41 IMPINGEMENT SYNDROME OF SHOULDER REGION, RIGHT: Primary | ICD-10-CM

## 2022-03-07 DIAGNOSIS — G89.29 CHRONIC RIGHT SHOULDER PAIN: ICD-10-CM

## 2022-03-07 PROCEDURE — 97112 NEUROMUSCULAR REEDUCATION: CPT

## 2022-03-07 PROCEDURE — 97110 THERAPEUTIC EXERCISES: CPT

## 2022-03-07 NOTE — PROGRESS NOTES
Daily Note     Today's date: 3/7/2022  Patient name: Mae Dillon  : 1955  MRN: [id number]  Referring provider: Faye Buckley MD  Dx:   Encounter Diagnosis     ICD-10-CM    1  Impingement syndrome of shoulder region, right  M75 41    2  Chronic right shoulder pain  M25 511     G89 29                   Subjective: Pt reports she is still seeing small improvements in symptoms with most discomfort during abduction + ER  Pt got an injection on Friday and doesn't feel any change thus far  Objective: See treatment diary below      Assessment: Pt required less cueing for prone exercises however still needed guidance for scapular retraction/depression when trialing Y's  Pt required maximal cueing and facilitation during D2 flexion for correct form  Pt had quick faitigue with serratus wall walks  Pt should continue to be challenged with scapular stabilizing/strengthening exercises  Patient demonstrated fatigue post treatment, exhibited good technique with therapeutic exercises and would benefit from continued PT      Plan: Continue per plan of care  3/7     2/16 2/21 2/23 2/28 3   Manuals             PROM      6' 2'      Posterior GH joint mobilization       EM Gr  III 3x30 EM Gr   III 30x      pec minor release       EM 3' EM 4' EM 4' 4 min                Neuro Re-Ed             Scapular retraction             Prone scap retraction + shoulder extension             Scapular punches supine             Radial nerve glides       2x15 HEP 2x10 D/C     D2 flexion 3x10 YTB PT facilitation       2x10 YTB PT facilitaiton post GH glide 2x10 YTB PT facilitation post GH glide 1st set only 2x10 YTB    Serratus wall walk 3x5 YTB                         Ther Ex             Thoracic extension w towel roll             No moneys      3x15 RTB 3x15 RTB      Supine pec minor stretch w/ towel Half roll 2x1'       2', PT OP 1' R UE OP w/ retraction 2x1' R UE OP w/ retraction 2x1'   pec doorway stretch Open books             Straight arm extensions      3x15 GTB 3x15 GTB      rows 3x20 BTB      3x15 GTB 3x15 GTB   3x15 BTB 3x15  BTB   Functional IR stretch w towel              Resisted ER 3x15 RTB     3x10 RTB HEP  3x15 RTB  3x15 RTB  3x15 RTB 3x15  RTB   Resisted IR      3x10 RTB 3x15 RTB  3x15 RTB      B/L horizontal abduction YTB 3x12     YTB 2x10  YTB 2x10 YTB 3x10  YTB 3x10 YTB  3x12   Standing pec doorway stretch          R UE only 20"x5  R UE only 3x30"  R UE only 3x30" R UE only 3x30"   Prone T's, I's + Y's 2x10 ea 3x10        2x10x3" ea HEP 2x10x3" ea   Ther Activity                                       Gait Training                                       Modalities                                       Assessment             Education

## 2022-03-09 ENCOUNTER — EVALUATION (OUTPATIENT)
Dept: PHYSICAL THERAPY | Facility: CLINIC | Age: 67
End: 2022-03-09
Payer: COMMERCIAL

## 2022-03-09 DIAGNOSIS — M25.511 CHRONIC RIGHT SHOULDER PAIN: ICD-10-CM

## 2022-03-09 DIAGNOSIS — G89.29 CHRONIC RIGHT SHOULDER PAIN: ICD-10-CM

## 2022-03-09 DIAGNOSIS — M75.41 IMPINGEMENT SYNDROME OF SHOULDER REGION, RIGHT: Primary | ICD-10-CM

## 2022-03-09 PROCEDURE — 97110 THERAPEUTIC EXERCISES: CPT

## 2022-03-09 NOTE — PROGRESS NOTES
PT Re-evaluation     Today's date: 3/9/2022  Patient name: Pressley Harada  : 1955  MRN: [id number]  Referring provider: Kinza Viveros MD  Dx: No diagnosis found  Subjective: Pt reports that she still sees more improvement and has minimal pain with taking off her shirt  The patient reports improvement in symptoms since previous session  The patient reports 3/10 pain at it's worst over the past 24 hours, and reports 50% improvement in overall condition since beginning formal PT care  Pt reports she still has pain with abduction/extension/ER, and this seems to be the only motion that bothers her  Pt is able to perform all functional activities but notices pain when she is in bed at times when putting her arm above her head  Objective: See treatment diary below    Objective   Active Range of Motion   Cervical/Thoracic Spine     Normal active range of motion  Left Shoulder   External rotation BTH: T1   Internal rotation BTB: T10     Right Shoulder   Flexion: 155 degrees   Abduction: 125 degrees  External rotation BTH: T1   Internal rotation BTB: T7 with pain   Passive Range of Motion   Right Shoulder   Flexion: WFL and with pain   Adduction: 150 degrees with pain   External rotation 90°: 80 degrees with pain   Internal rotation 90°: 80 degrees   Joint Play   Left Shoulder   Joints within functional limits are the anterior capsule, posterior capsule and inferior capsule  Right Shoulder   Joints within functional limits are the anterior capsule and inferior capsule  Hypomobile in the posterior capsule     Strength/Myotome Testing     Left Shoulder   Planes of Motion   Flexion: 5   Abduction: 5   External rotation at 0°: 5   Internal rotation at 0°: 5     Right Shoulder   Planes of Motion   Flexion: 5   Abduction: 5   External rotation at 0°: 5   Internal rotation at 0°: 5   Left Elbow   Flexion: 5  Extension: 5   Right Elbow   Flexion: 5  Extension: 5   Left Wrist/Hand   Wrist extension: 5  Wrist flexion: 5  Radial deviation: 5  Ulnar deviation: 5  Thumb extension: 5   Right Wrist/Hand   Wrist extension: 5  Wrist flexion: 5  Radial deviation: 5  Ulnar deviation: 5  Thumb extension: 5   Tests     Cervical   Negative vertical compression  Right Shoulder   Positive active compression ([place]), anterior slide, crossover, empty can, full can, Neer's, painful arc, anterior slide, scapular assistance and bicep load (All tests stronger and less painful but pain is still present)   Negative drop arm and lift-off  Lumbar   Negative vertical compression  Additional Tests Details  Unable to test ULTT due to shoulder pain with ER/IR ROM getting into test position       Assessment: Lorri Miranda is a pleasant 74 y o  female who has been receiving PT intervention for shoulder pain  Pt demonstrates improvements in ROM and strength however abduction is still painful and most limited  Pt still demonstrates lack of scapular motor control with minimal notable improvements without cueing  Pt demonstrates poor posture with active movements and quiet sitting, as well as during exercise despite consistent cueing during every treatment session  Pt's overall function has improved however she still has minor pain with end range and overhead movements  Pt is leaving for [facility] for 3 months therefore is to be discharged from PT care  Pt would benefit from continued PT however and was recommended to continue to strengthen on her own while away as well as return to therapy if she feels she needs to when returning upon seeing her physician         Primary remaining impairments include:    1)  Poor scapular motor control    2)  Limited AROM       Goals   Short Term Goals   Pt will improve UE AROM by 5-10 degrees in all deficient planes order to improve function with reaching and ADLs - MET  Pt will improve UE strength by 1/2 grade in all deficient muscle groups in order to improve function - MET  Pt will decrease pain to 4/10 at its worst - MET  Pt will be independent with a basic HEP - MET    Long Term Goals  Pt will achieve a FOTO score of 62  Pt will improve UE ROM to [facility] in order to maximize function - no met  Pt will improve UE strength to 4+/5 in all deficient muscle groups in order to maximize function with ADLs - MET  Pt will be able to manage symptoms independently - MET  Pt will be independent with an extensive HEP - MET      Plan: Pt did not meet all of her goals however is to be formerly discharged from PT care at this time due to leaving for 3 months to another country  3/7 3/9    2/16 2/21 2/23 2/28 3/2   Manuals             PROM      6' 2'      Posterior GH joint mobilization       EM Gr  III 3x30 EM Gr   III 30x      pec minor release       EM 3' EM 4' EM 4' 4 min                Neuro Re-Ed             Scapular retraction             Prone scap retraction + shoulder extension             Scapular punches supine             Radial nerve glides       2x15 HEP 2x10 D/C     D2 flexion 3x10 YTB PT facilitation       2x10 YTB PT facilitaiton post GH glide 2x10 YTB PT facilitation post GH glide 1st set only 2x10 YTB    Serratus wall walk 3x5 YTB            Serratus alphabet  1x 1#           Ther Ex             Thoracic extension w towel roll             No moneys      3x15 RTB 3x15 RTB      Supine pec minor stretch w/ towel Half roll 2x1'       2', PT OP 1' R UE OP w/ retraction 2x1' R UE OP w/ retraction 2x1'   pec doorway stretch             Open books             Straight arm extensions      3x15 GTB 3x15 GTB      rows 3x20 BTB      3x15 GTB 3x15 GTB   3x15 BTB 3x15  BTB   Functional IR stretch w towel              Resisted ER 3x15 RTB     3x10 RTB HEP  3x15 RTB  3x15 RTB  3x15 RTB 3x15  RTB   Resisted IR      3x10 RTB 3x15 RTB  3x15 RTB      B/L horizontal abduction YTB 3x12     YTB 2x10  YTB 2x10 YTB 3x10  YTB 3x10 YTB  3x12   Standing pec doorway stretch          R UE only 20"x5  R UE only 3x30"  R UE only 3x30" R UE only 3x30"   Prone T's, I's + Y's 2x10 ea 3x10 ea 1#, 3x10 Y's no weight         2x10x3" ea HEP 2x10x3" ea   Ther Activity                                       Gait Training                                       Modalities                                       Assessment             Education  Re-evaluation

## 2022-08-16 ENCOUNTER — OFFICE VISIT (OUTPATIENT)
Dept: FAMILY MEDICINE CLINIC | Facility: CLINIC | Age: 67
End: 2022-08-16

## 2022-08-16 VITALS
TEMPERATURE: 97.3 F | RESPIRATION RATE: 18 BRPM | OXYGEN SATURATION: 98 % | SYSTOLIC BLOOD PRESSURE: 120 MMHG | WEIGHT: 153.6 LBS | HEART RATE: 85 BPM | HEIGHT: 61 IN | BODY MASS INDEX: 29 KG/M2 | DIASTOLIC BLOOD PRESSURE: 72 MMHG

## 2022-08-16 DIAGNOSIS — E11.9 TYPE 2 DIABETES MELLITUS WITHOUT COMPLICATION, WITHOUT LONG-TERM CURRENT USE OF INSULIN (HCC): Primary | ICD-10-CM

## 2022-08-16 DIAGNOSIS — M15.9 GENERALIZED OSTEOARTHRITIS OF MULTIPLE SITES: ICD-10-CM

## 2022-08-16 LAB — SL AMB POCT HEMOGLOBIN AIC: 6.7 (ref ?–6.5)

## 2022-08-16 PROCEDURE — 83036 HEMOGLOBIN GLYCOSYLATED A1C: CPT | Performed by: FAMILY MEDICINE

## 2022-08-16 PROCEDURE — 99213 OFFICE O/P EST LOW 20 MIN: CPT | Performed by: FAMILY MEDICINE

## 2022-08-16 PROCEDURE — 3044F HG A1C LEVEL LT 7.0%: CPT | Performed by: FAMILY MEDICINE

## 2022-08-16 NOTE — ASSESSMENT & PLAN NOTE
Lab Results   Component Value Date    HGBA1C 6 7 (A) 08/16/2022     Diet controlled, a little worse from prior but still at goal  no oral hypoglycemic in over 2 years  Admits to diet noncompliance while on vacation but now dressing diet  Tolerating statin  No acute concerns and continues stay active  Will follow-up in 6 months

## 2022-08-16 NOTE — PROGRESS NOTES
OFFICE VISIT NOTE - Ebenezer 74 y o  (:1955) female MRN: [id number]  Unit/Bed#:  Encounter: [id number]      Assessment/Plan:  Type 2 diabetes mellitus without complication, without long-term current use of insulin ([facility] )    Lab Results   Component Value Date    HGBA1C 6 7 (A) 2022     Diet controlled, a little worse from prior but still at goal  no oral hypoglycemic in over 2 years  Admits to diet noncompliance while on vacation but now dressing diet  Tolerating statin  No acute concerns and continues stay active  Will follow-up in 6 months       Diagnoses and all orders for this visit:    Type 2 diabetes mellitus without complication, without long-term current use of insulin ([facility])  -     POCT hemoglobin A1c    Generalized osteoarthritis of multiple sites  -     Ambulatory Referral to Orthopedic Surgery; Future  -     Diclofenac Sodium (VOLTAREN) 1 %; Apply 2 g topically 4 (four) times a day          Subjective:      Patient ID: Brayden Tucker is a 74 y o  female  Precious Aguilar is a 74year old female presenting for f/u of her T2DM  Prior to this appt  Her HBA1c has been well controlled and hovering around 6 0  At today's appt  , her HBA1c increased to 6 7  Precious Aguilar continues to endorse stable diet but went a little off while on vacation which she is now getting back to her healthy baseline  She continues with PT sessions for her shoulder impingement syndrome  Precious Aguilar denies any numbness/tingling/weakness, polyuria/polydipsia, or vision changes  Review of Systems   Constitutional: Negative for fatigue and fever  HENT: Negative for sore throat  Respiratory: Negative for cough, shortness of breath and wheezing  Cardiovascular: Negative for chest pain, palpitations and leg swelling  Gastrointestinal: Negative for abdominal pain, diarrhea, nausea and vomiting  Genitourinary: Negative for dysuria and pelvic pain     Musculoskeletal: Positive for arthralgias (bilateral shoulders 2/2 OA)  Skin: Negative for rash  Neurological: Negative for weakness and headaches  Objective:    /72 (BP Location: Left arm, Patient Position: Sitting, Cuff Size: Standard)   Pulse 85   Temp (!) 97 3 °F (36 3 °C) (Temporal)   Resp 18   Ht 5' 1" (1 549 m)   Wt 69 7 kg (153 lb 9 6 oz)   SpO2 98%   BMI 29 02 kg/m²        Physical Exam  Constitutional:       General: She is not in acute distress  Appearance: She is not ill-appearing  HENT:      Head: Normocephalic and atraumatic  Right Ear: External ear normal       Left Ear: External ear normal       Nose: Nose normal    Eyes:      Conjunctiva/sclera: Conjunctivae normal    Cardiovascular:      Rate and Rhythm: Normal rate and regular rhythm  Heart sounds: Normal heart sounds  Pulmonary:      Effort: Pulmonary effort is normal  No respiratory distress  Breath sounds: Normal breath sounds  No wheezing  Abdominal:      Palpations: Abdomen is soft  Tenderness: There is no abdominal tenderness  Musculoskeletal:         General: No swelling, tenderness or deformity  Normal range of motion  Right lower leg: No edema  Left lower leg: No edema  Skin:     General: Skin is warm  Findings: No rash  Neurological:      Mental Status: She is alert             Joe Zaidi MD  PGY-3 Family Medicine  08/16/22

## 2022-08-30 ENCOUNTER — OFFICE VISIT (OUTPATIENT)
Dept: OBGYN CLINIC | Facility: OTHER | Age: 67
End: 2022-08-30
Payer: COMMERCIAL

## 2022-08-30 ENCOUNTER — APPOINTMENT (OUTPATIENT)
Dept: RADIOLOGY | Facility: OTHER | Age: 67
End: 2022-08-30
Payer: COMMERCIAL

## 2022-08-30 VITALS
SYSTOLIC BLOOD PRESSURE: 120 MMHG | BODY MASS INDEX: 28.13 KG/M2 | WEIGHT: 149 LBS | HEIGHT: 61 IN | HEART RATE: 81 BPM | DIASTOLIC BLOOD PRESSURE: 77 MMHG

## 2022-08-30 DIAGNOSIS — G89.29 CHRONIC PAIN OF LEFT KNEE: ICD-10-CM

## 2022-08-30 DIAGNOSIS — G89.29 CHRONIC PAIN OF RIGHT KNEE: ICD-10-CM

## 2022-08-30 DIAGNOSIS — M25.561 CHRONIC PAIN OF RIGHT KNEE: ICD-10-CM

## 2022-08-30 DIAGNOSIS — M25.562 CHRONIC PAIN OF LEFT KNEE: ICD-10-CM

## 2022-08-30 DIAGNOSIS — G89.29 CHRONIC PAIN OF LEFT KNEE: Primary | ICD-10-CM

## 2022-08-30 DIAGNOSIS — M17.0 BILATERAL PRIMARY OSTEOARTHRITIS OF KNEE: ICD-10-CM

## 2022-08-30 DIAGNOSIS — M25.562 CHRONIC PAIN OF LEFT KNEE: Primary | ICD-10-CM

## 2022-08-30 DIAGNOSIS — M15.9 GENERALIZED OSTEOARTHRITIS OF MULTIPLE SITES: ICD-10-CM

## 2022-08-30 PROCEDURE — 99203 OFFICE O/P NEW LOW 30 MIN: CPT | Performed by: ORTHOPAEDIC SURGERY

## 2022-08-30 PROCEDURE — 73562 X-RAY EXAM OF KNEE 3: CPT

## 2022-08-30 NOTE — PROGRESS NOTES
Assessment:       1  Chronic pain of left knee    2  Generalized osteoarthritis of multiple sites    3  Chronic pain of right knee    4  Bilateral primary osteoarthritis of knee          Plan:        Explained my current clinical findings and reviewed radiological findings with Chana Dillon  Bilateral knee pain is likely secondary to tricompartmental osteoarthritis of both knees  I did explain that the definitive management would be a total knee replacement arthroplasty  However, patient currently wishes to pursue nonsurgical management  A trial of bilateral knee viscosupplementation injection with ultrasound guidance may help provide symptomatic relief  The patient is agreeable to proceed in this regard and an insurance approval will be sought for the same  I will see the patient back in the office following the insurance approval for bilateral knee viscosupplementation injections  Subjective:     Patient ID: Geofm Lam is a 74 y o  female  Chief Complaint:  Bilateral knee pain    HPI  Chana Dillon has been referred by her primary care provider Dr Rosalia Anderson MD for generalized osteoarthritis of multiple sites  Patient's main concern today is the right knee pain and she also mentions left knee pain  Symptoms have been flared up over the last several months  However, patient does mention previous right knee arthroscopic surgery for meniscal injury about 8-10 years ago with intermittent flare up of her bilateral knee pain since then  Denies any recent trauma prior to symptom flare up  Pain intensity is moderate to severe on the right and mild-to-moderate on the left  She also notices clicking and popping sensation of bilateral knee joints with ambulation  She does mention some occasional swelling in the right posterior knee      Patient Active Problem List   Diagnosis    Allergic rhinitis    Cataract    Depression with anxiety    Generalized osteoarthritis of multiple sites    Hemorrhoids    Insomnia    Plantar fasciitis, bilateral    Cough    Overweight (BMI 25 0-29  9)    Type 2 diabetes mellitus without complication, without long-term current use of insulin (HCC)    Right elbow tendinitis    Female cystocele    Left carpal tunnel syndrome    Trigger finger, left ring finger    COVID-19    Symptomatic PVCs    Postnasal drip    Dizziness    Healthcare maintenance    Chronic right shoulder pain    Somatic dysfunction of thoracic region    Somatic dysfunction of cervical region          Social History     Occupational History    Not on file   Tobacco Use    Smoking status: Never Smoker    Smokeless tobacco: Never Used   Vaping Use    Vaping Use: Never used   Substance and Sexual Activity    Alcohol use: No     Comment: rare/social as per [employer]    Drug use: No    Sexual activity: Yes     Partners: Male     Birth control/protection: None      Review of Systems        Objective:     Right Knee Exam     Tenderness   The patient is experiencing tenderness in the medial joint line (Also has patellofemoral crepitus with tenderness)  Range of Motion   Extension: normal   Flexion: 100     Tests   Veronica:  Medial - negative Lateral - negative  Varus: negative Valgus: negative    Other   Erythema: absent  Sensation: normal  Swelling: none  Effusion: no effusion present    Comments:  Joint hypertrophy noted  There is a palpable soft fluctuant nonpulsatile and non expansile swelling in the popliteal fossa measuring about 2 x 2 cm which is clinically consistent with a Baker cyst      Left Knee Exam     Tenderness   The patient is experiencing tenderness in the medial joint line (Also has patellofemoral crepitus with tenderness)      Range of Motion   Extension: normal   Flexion: 120     Tests   Veronica:  Medial - negative Lateral - negative  Varus: negative Valgus: negative    Other   Erythema: absent  Sensation: normal  Swelling: none  Effusion: no effusion present    Comments:  Joint hypertrophy noted          Observations   Left Knee   Negative for effusion  Right Knee   Negative for effusion  Physical Exam  Vitals and nursing note reviewed  Constitutional:       Appearance: She is well-developed  HENT:      Head: Normocephalic and atraumatic  Eyes:      Conjunctiva/sclera: Conjunctivae normal       Pupils: Pupils are equal, round, and reactive to light  Pulmonary:      Effort: Pulmonary effort is normal  No respiratory distress  Musculoskeletal:      Right knee: No effusion  Instability Tests: Medial Veronica test negative and lateral Veronica test negative  Left knee: No effusion  Instability Tests: Medial Veronica test negative and lateral Veronica test negative  Skin:     General: Skin is warm and dry  Findings: No erythema  Neurological:      Mental Status: She is alert and oriented to person, place, and time  Cranial Nerves: No cranial nerve deficit  Psychiatric:         Behavior: Behavior normal          Thought Content: Thought content normal          Judgment: Judgment normal            I have personally reviewed pertinent films in PACS and my interpretation is As needed below:  Plain radiograph of bilateral knees performed today reveal tricompartmental osteoarthritis of both knees  No acute osseous injuries noted

## 2022-09-04 PROBLEM — M17.0 BILATERAL PRIMARY OSTEOARTHRITIS OF KNEE: Status: ACTIVE | Noted: 2022-09-04

## 2022-09-04 PROBLEM — M25.562 CHRONIC PAIN OF LEFT KNEE: Status: ACTIVE | Noted: 2022-09-04

## 2022-09-04 PROBLEM — G89.29 CHRONIC PAIN OF LEFT KNEE: Status: ACTIVE | Noted: 2022-09-04

## 2022-09-04 PROBLEM — M25.561 CHRONIC PAIN OF RIGHT KNEE: Status: ACTIVE | Noted: 2022-09-04

## 2022-09-06 ENCOUNTER — TELEPHONE (OUTPATIENT)
Dept: OBGYN CLINIC | Facility: HOSPITAL | Age: 67
End: 2022-09-06

## 2022-09-06 NOTE — TELEPHONE ENCOUNTER
Patient called stating that she received a letter stating referral for orthovisc has been authorized  Verify that our referral still shows pending and she will receive call when completed

## 2022-09-15 ENCOUNTER — ESTABLISHED COMPREHENSIVE EXAM (OUTPATIENT)
Dept: URBAN - METROPOLITAN AREA CLINIC 6 | Facility: CLINIC | Age: 67
End: 2022-09-15

## 2022-09-15 DIAGNOSIS — E11.9: ICD-10-CM

## 2022-09-15 PROCEDURE — 92250 FUNDUS PHOTOGRAPHY W/I&R: CPT

## 2022-09-15 PROCEDURE — 92014 COMPRE OPH EXAM EST PT 1/>: CPT

## 2022-09-15 ASSESSMENT — VISUAL ACUITY
OS_CC: 20/30-1
OU_CC: J1+
OD_CC: 20/25-2

## 2022-09-15 ASSESSMENT — TONOMETRY
OS_IOP_MMHG: 19 POST DILATION
OS_IOP_MMHG: 23
OD_IOP_MMHG: 21

## 2022-09-22 NOTE — PROGRESS NOTES
Large joint arthrocentesis: bilateral supra patellar bursa  Universal Protocol:  Consent: Verbal consent obtained  Risks and benefits: risks, benefits and alternatives were discussed  Consent given by: patient  Patient understanding: patient states understanding of the procedure being performed  Site marked: the operative site was marked  Radiology Images displayed and confirmed   If images not available, report reviewed: imaging studies available  Required items: required blood products, implants, devices, and special equipment available  Patient identity confirmed: verbally with patient    Supporting Documentation  Indications: pain   Procedure Details  Location: knee - bilateral supra patellar bursa  Preparation: Patient was prepped and draped in the usual sterile fashion  Needle size: 18 G  Ultrasound guidance: yes  Approach: anterolateral    Medications (Right): 16 mg hylan 16 MG/2MLMedications (Left): 16 mg hylan 16 MG/2ML   Patient tolerance: patient tolerated the procedure well with no immediate complications  Dressing:  Sterile dressing applied

## 2022-09-23 ENCOUNTER — PROCEDURE VISIT (OUTPATIENT)
Dept: OBGYN CLINIC | Facility: OTHER | Age: 67
End: 2022-09-23
Payer: COMMERCIAL

## 2022-09-23 VITALS
BODY MASS INDEX: 28.1 KG/M2 | HEIGHT: 61 IN | DIASTOLIC BLOOD PRESSURE: 67 MMHG | WEIGHT: 148.81 LBS | SYSTOLIC BLOOD PRESSURE: 96 MMHG | HEART RATE: 84 BPM

## 2022-09-23 DIAGNOSIS — G89.29 CHRONIC PAIN OF LEFT KNEE: ICD-10-CM

## 2022-09-23 DIAGNOSIS — M25.561 CHRONIC PAIN OF RIGHT KNEE: ICD-10-CM

## 2022-09-23 DIAGNOSIS — G89.29 CHRONIC PAIN OF RIGHT KNEE: ICD-10-CM

## 2022-09-23 DIAGNOSIS — M17.0 BILATERAL PRIMARY OSTEOARTHRITIS OF KNEE: Primary | ICD-10-CM

## 2022-09-23 DIAGNOSIS — M25.562 CHRONIC PAIN OF LEFT KNEE: ICD-10-CM

## 2022-09-23 DIAGNOSIS — M15.9 GENERALIZED OSTEOARTHRITIS OF MULTIPLE SITES: ICD-10-CM

## 2022-09-23 PROCEDURE — 20611 DRAIN/INJ JOINT/BURSA W/US: CPT | Performed by: STUDENT IN AN ORGANIZED HEALTH CARE EDUCATION/TRAINING PROGRAM

## 2022-10-12 PROBLEM — Z00.00 HEALTHCARE MAINTENANCE: Status: RESOLVED | Noted: 2021-08-09 | Resolved: 2022-10-12

## 2022-11-07 ENCOUNTER — TELEPHONE (OUTPATIENT)
Dept: FAMILY MEDICINE CLINIC | Facility: CLINIC | Age: 67
End: 2022-11-07

## 2022-11-08 ENCOUNTER — OFFICE VISIT (OUTPATIENT)
Dept: FAMILY MEDICINE CLINIC | Facility: CLINIC | Age: 67
End: 2022-11-08

## 2022-11-08 VITALS
DIASTOLIC BLOOD PRESSURE: 80 MMHG | WEIGHT: 150.2 LBS | SYSTOLIC BLOOD PRESSURE: 120 MMHG | BODY MASS INDEX: 28.36 KG/M2 | HEART RATE: 80 BPM | HEIGHT: 61 IN

## 2022-11-08 DIAGNOSIS — M79.602 LEFT ARM PAIN: Primary | ICD-10-CM

## 2022-11-08 RX ORDER — LIDOCAINE 40 MG/G
CREAM TOPICAL AS NEEDED
Qty: 30 G | Refills: 0 | Status: SHIPPED | OUTPATIENT
Start: 2022-11-08

## 2022-11-08 NOTE — PROGRESS NOTES
Name: Chey Haywood      : 1955      MRN: [id number]  Encounter Provider: Katharina Delcid MD  Encounter Date: 2022   Encounter department: [address] [place] [address]  Left arm pain  Assessment & Plan:  Noted 3 wks ago after strength training session with 2lb bilateral dumbbells  No pain on contralateral arm  Pain feels dull, worse with supination but arm is soft, symmetric in size wt right side, normal ROM, strength, no weakness, numbness or tingling  Suspect tendonitis, will get US of left arm  Pt reassured, use diclofenac gel or lidocaine cream and continue arm stretching exercises  RTC if sx do not improve  Otherwise, F/u in 3 mo  Orders:  -     lidocaine (LMX) 4 % cream; Apply topically as needed for mild pain  -     US MSK limited; Future; Expected date: 2022  -     Diclofenac Sodium (VOLTAREN) 1 %; Apply 2 g topically 4 (four) times a day       Subjective      Lacey Seals is a 74year old female with history significant for OA of multiple sites p/w left arm pain which she noted 3 wks ago after a strength training session with 2lb bilateral dumbbells  No pain on contralateral arm  She reports no actual injury during workout and felt like the weight was appropriate as she did not feel it was too heavy for her  Pain feels dull, worse with supination, more on the medial elbow and wrist, not amenable to tylenol or diclofenac gel  She reports no limitations with movement, strength or sensation  No arm swelling, rash, redness, fever, weakness, numbness or tingling  She is traveling to [place] on Saturday and returning 12/15  Review of Systems   Constitutional: Negative for fatigue and fever  Respiratory: Negative for cough and shortness of breath  Gastrointestinal: Negative for nausea and vomiting  Musculoskeletal: Positive for myalgias  Negative for gait problem, joint swelling, neck pain and neck stiffness  Neurological: Negative for dizziness, tremors, weakness, numbness and headaches         Current Outpatient Medications on File Prior to Visit   Medication Sig   • atorvastatin (LIPITOR) 10 mg tablet Take 1 tablet (10 mg total) by mouth daily   • benzonatate (TESSALON PERLES) 100 mg capsule Take 1 capsule (100 mg total) by mouth 3 (three) times a day as needed for cough (Patient not taking: No sig reported)   • BIOTIN 5000 PO Take by mouth daily   • Blood Glucose Monitoring Suppl (Contour Next One) KIT Use daily   • Cyanocobalamin (VITAMIN B12 PO) Take by mouth daily   • fluticasone (FLONASE) 50 mcg/act nasal spray 1 spray into each nostril daily   • glucose blood (Contour Test) test strip Use 1 each daily Use as instructed   • loratadine (CLARITIN) 10 mg tablet Take 10 mg by mouth daily   • Microlet Lancets MISC Use daily   • OneTouch Ultra test strip USE 1 STRIP DAILY AS NEEDED FOR HYPOGLYCEMIA AS  DIRECTED   • TURMERIC PO Take by mouth daily   • [DISCONTINUED] acetaminophen (Tylenol 8 Hour) 650 mg CR tablet Take 1 tablet (650 mg total) by mouth every 8 (eight) hours (Patient not taking: No sig reported)   • [DISCONTINUED] al mag oxide-diphenhydramine-lidocaine viscous (MAGIC MOUTHWASH) 1:1:1 suspension Swish and spit 10 mL every 4 (four) hours as needed for mouth pain or discomfort (Patient not taking: No sig reported)   • [DISCONTINUED] Diclofenac Sodium (VOLTAREN) 1 % Apply 2 g topically 4 (four) times a day   • [DISCONTINUED] metoprolol tartrate (LOPRESSOR) 25 mg tablet Take 1 tablet (25 mg total) by mouth daily (Patient not taking: No sig reported)   • [DISCONTINUED] mometasone (ELOCON) 0 1 % lotion One drop affected ear canal daily at bedtime when necessary itching (Patient not taking: No sig reported)   • [DISCONTINUED] naproxen (NAPROSYN) 500 mg tablet Take 1 tablet (500 mg total) by mouth 2 (two) times a day with meals for 10 days (Patient not taking: No sig reported)   • [DISCONTINUED] ondansetron (ZOFRAN-ODT) 4 mg disintegrating tablet Take 1 tablet (4 mg total) by mouth every 8 (eight) hours as needed for nausea or vomiting (Patient not taking: No sig reported)       Objective     /80   Pulse 80   Ht 5' 1" (1 549 m)   Wt 68 1 kg (150 lb 3 2 oz)   BMI 28 38 kg/m²     Physical Exam  Constitutional:       General: She is not in acute distress  Appearance: She is not ill-appearing  HENT:      Head: Normocephalic and atraumatic  Right Ear: External ear normal       Left Ear: External ear normal       Nose: Nose normal    Eyes:      Conjunctiva/sclera: Conjunctivae normal    Cardiovascular:      Rate and Rhythm: Normal rate  Pulmonary:      Effort: Pulmonary effort is normal  No respiratory distress  Musculoskeletal:         General: No swelling, deformity or signs of injury  Normal range of motion  Right upper arm: Normal       Left upper arm: Tenderness (poorly localized in left arm, no erythema, change in size or wounds ) present  No swelling, edema, deformity, lacerations or bony tenderness  Right lower leg: No edema  Left lower leg: No edema  Skin:     General: Skin is warm  Findings: No rash  Neurological:      Mental Status: She is alert and oriented to person, place, and time     Psychiatric:         Behavior: Behavior normal        Hector Jimenes MD

## 2022-11-08 NOTE — ASSESSMENT & PLAN NOTE
Noted 3 wks ago after strength training session with 2lb bilateral dumbbells  No pain on contralateral arm  Pain feels dull, worse with supination but arm is soft, symmetric in size wt right side, normal ROM, strength, no weakness, numbness or tingling  Suspect tendonitis, will get US of left arm  Pt reassured, use diclofenac gel or lidocaine cream and continue arm stretching exercises  RTC if sx do not improve  Otherwise, F/u in 3 mo

## 2022-11-10 ENCOUNTER — TELEPHONE (OUTPATIENT)
Dept: FAMILY MEDICINE CLINIC | Facility: CLINIC | Age: 67
End: 2022-11-10

## 2022-11-10 ENCOUNTER — HOSPITAL ENCOUNTER (OUTPATIENT)
Dept: RADIOLOGY | Age: 67
Discharge: HOME | End: 2022-11-10

## 2022-11-10 DIAGNOSIS — M81.0 OSTEOPOROSIS OF LUMBAR SPINE: Primary | ICD-10-CM

## 2022-11-10 DIAGNOSIS — Z78.0 ENCOUNTER FOR OSTEOPOROSIS SCREENING IN ASYMPTOMATIC POSTMENOPAUSAL PATIENT: ICD-10-CM

## 2022-11-10 DIAGNOSIS — Z13.820 ENCOUNTER FOR OSTEOPOROSIS SCREENING IN ASYMPTOMATIC POSTMENOPAUSAL PATIENT: ICD-10-CM

## 2022-11-10 RX ORDER — ALENDRONATE SODIUM 70 MG/1
70 TABLET ORAL
Qty: 12 TABLET | Refills: 3 | Status: SHIPPED | OUTPATIENT
Start: 2022-11-10

## 2022-11-10 NOTE — TELEPHONE ENCOUNTER
Informed pt about DXA scan results showing osteoporosis in lumbar spine, and  osteopenia in left femoral neck but normal bone density in left hip  Will start treatment with weekly Fosamax and plan to repeat DEXA in 2 yrs  Discussed possible SEs including osteonecrosis of the jaw  Pt has no major dental procedure planned  She was also advised to take medication upright with enough water to avoid esophagitis  Also counseled abt calcium and Vit D supp along with weight bearing exercises like brisk walking and strength training as tolerated

## 2022-11-11 ENCOUNTER — HOSPITAL ENCOUNTER (OUTPATIENT)
Dept: RADIOLOGY | Facility: HOSPITAL | Age: 67
Discharge: HOME | End: 2022-11-11

## 2022-11-11 DIAGNOSIS — M79.602 LEFT ARM PAIN: ICD-10-CM

## 2023-01-25 ENCOUNTER — TELEPHONE (OUTPATIENT)
Dept: FAMILY MEDICINE CLINIC | Facility: CLINIC | Age: 68
End: 2023-01-25

## 2023-01-25 DIAGNOSIS — Z12.31 ENCOUNTER FOR SCREENING MAMMOGRAM FOR MALIGNANT NEOPLASM OF BREAST: Primary | ICD-10-CM

## 2023-02-10 DIAGNOSIS — M81.0 OSTEOPOROSIS OF LUMBAR SPINE: ICD-10-CM

## 2023-02-10 RX ORDER — ALENDRONATE SODIUM 70 MG/1
70 TABLET ORAL
Qty: 12 TABLET | Refills: 3 | Status: SHIPPED | OUTPATIENT
Start: 2023-02-10 | End: 2023-02-14 | Stop reason: SDUPTHER

## 2023-02-13 ENCOUNTER — RA CDI HCC (OUTPATIENT)
Dept: OTHER | Facility: HOSPITAL | Age: 68
End: 2023-02-13

## 2023-02-13 NOTE — PROGRESS NOTES
Kevan [facility]  coding opportunities       Chart reviewed, no opportunity found:   [address]        Patients Insurance     Medicare Insurance: [employer]

## 2023-02-14 ENCOUNTER — OFFICE VISIT (OUTPATIENT)
Dept: FAMILY MEDICINE CLINIC | Facility: CLINIC | Age: 68
End: 2023-02-14

## 2023-02-14 VITALS
SYSTOLIC BLOOD PRESSURE: 120 MMHG | BODY MASS INDEX: 28.02 KG/M2 | HEIGHT: 61 IN | DIASTOLIC BLOOD PRESSURE: 77 MMHG | WEIGHT: 148.4 LBS | TEMPERATURE: 97.9 F | RESPIRATION RATE: 16 BRPM | HEART RATE: 77 BPM | OXYGEN SATURATION: 97 %

## 2023-02-14 DIAGNOSIS — E11.9 TYPE 2 DIABETES MELLITUS WITHOUT COMPLICATION, WITHOUT LONG-TERM CURRENT USE OF INSULIN (HCC): Primary | ICD-10-CM

## 2023-02-14 DIAGNOSIS — M81.0 AGE-RELATED OSTEOPOROSIS WITHOUT CURRENT PATHOLOGICAL FRACTURE: ICD-10-CM

## 2023-02-14 DIAGNOSIS — Z23 ENCOUNTER FOR IMMUNIZATION: ICD-10-CM

## 2023-02-14 DIAGNOSIS — M81.0 OSTEOPOROSIS OF LUMBAR SPINE: ICD-10-CM

## 2023-02-14 DIAGNOSIS — E11.9 ENCOUNTER FOR DIABETIC FOOT EXAM (HCC): ICD-10-CM

## 2023-02-14 LAB
LEFT EYE DIABETIC RETINOPATHY: NORMAL
LEFT EYE IMAGE QUALITY: NORMAL
LEFT EYE MACULAR EDEMA: NORMAL
LEFT EYE OTHER RETINOPATHY: NORMAL
RIGHT EYE DIABETIC RETINOPATHY: NORMAL
RIGHT EYE IMAGE QUALITY: NORMAL
RIGHT EYE MACULAR EDEMA: NORMAL
RIGHT EYE OTHER RETINOPATHY: NORMAL
SEVERITY (EYE EXAM): NORMAL
SL AMB POCT HEMOGLOBIN AIC: 6.2 (ref ?–6.5)

## 2023-02-14 RX ORDER — ALENDRONATE SODIUM 70 MG/1
70 TABLET ORAL
Qty: 12 TABLET | Refills: 3 | Status: SHIPPED | OUTPATIENT
Start: 2023-02-14

## 2023-02-14 NOTE — ASSESSMENT & PLAN NOTE
DXA scan 11/10/2022 results showing osteoporosis in lumbar spine, and osteopenia in left femoral neck but normal bone density in left hip  Tolerating weekly Fosamax and plan to repeat DEXA in 2 yrs  Discussed possible SEs including osteonecrosis of the jaw  Pt has no major dental procedure planned  She was also advised to take medication upright with enough water to avoid esophagitis  Also counseled abt calcium and Vit D supp along with weight bearing exercises like brisk walking and strength training as tolerated

## 2023-02-14 NOTE — PROGRESS NOTES
Name: Dalton Aguilar      : 1955      MRN: [id number]  Encounter Provider: Tova Encinas MD  Encounter Date: 2023   Encounter department: [address] [place] [address]  Type 2 diabetes mellitus without complication, without long-term current use of insulin [facility])  Assessment & Plan:    Lab Results   Component Value Date    HGBA1C 6 2 2023     Diet controlled and well-controlled, at goal  No oral hypoglycemic in over 2 years  Tolerating statin  Foot exam done today, protective sensation intact  Iris diabetic eye exam done today  No acute concerns and continues stay active  Will follow-up in 3 months for MAW    Orders:  -     POCT hemoglobin A1c  -     IRIS Diabetic eye exam  -     Basic metabolic panel; Future  -     Diabetic foot exam; Future    2  Age-related osteoporosis without current pathological fracture  Assessment & Plan:  DXA scan 11/10/2022 results showing osteoporosis in lumbar spine, and osteopenia in left femoral neck but normal bone density in left hip  Tolerating weekly Fosamax and plan to repeat DEXA in 2 yrs  Discussed possible SEs including osteonecrosis of the jaw  Pt has no major dental procedure planned  She was also advised to take medication upright with enough water to avoid esophagitis  Also counseled abt calcium and Vit D supp along with weight bearing exercises like brisk walking and strength training as tolerated  3  Encounter for immunization    4  Osteoporosis of lumbar spine  -     alendronate (Fosamax) 70 mg tablet; Take 1 tablet (70 mg total) by mouth every 7 days    5  Encounter for diabetic foot exam ([facility] [address] )           Subjective      Monique Escamilla is a 72-year-old female presenting for follow up  HBA1c has been well controlled and hovering around 6 0  It is 6 2 today which is diet controlled, at goal  Taras Pak continues to endorse stable diet and exercises regularly at the gym   She denies any acute problems today  Review of Systems   Constitutional: Negative for activity change, appetite change, fatigue and fever  HENT: Negative for sore throat  Respiratory: Negative for cough, shortness of breath and wheezing  Cardiovascular: Negative for chest pain, palpitations and leg swelling  Gastrointestinal: Negative for abdominal pain, diarrhea, nausea and vomiting  Genitourinary: Negative for dysuria and pelvic pain  Skin: Negative for rash  Neurological: Negative for dizziness, weakness and headaches         Current Outpatient Medications on File Prior to Visit   Medication Sig   • BIOTIN 5000 PO Take by mouth daily   • Blood Glucose Monitoring Suppl (Contour Next One) KIT Use daily   • Cyanocobalamin (VITAMIN B12 PO) Take by mouth daily   • fluticasone (FLONASE) 50 mcg/act nasal spray 1 spray into each nostril daily   • glucose blood (Contour Test) test strip Use 1 each daily Use as instructed   • loratadine (CLARITIN) 10 mg tablet Take 10 mg by mouth daily   • Microlet Lancets MISC Use daily   • Omega-3 Fatty Acids (OMEGA 3 PO) Take by mouth   • OneTouch Ultra test strip USE 1 STRIP DAILY AS NEEDED FOR HYPOGLYCEMIA AS  DIRECTED   • TURMERIC PO Take by mouth daily   • atorvastatin (LIPITOR) 10 mg tablet Take 1 tablet (10 mg total) by mouth daily (Patient not taking: Reported on 2/14/2023)   • Diclofenac Sodium (VOLTAREN) 1 % Apply 2 g topically 4 (four) times a day (Patient not taking: Reported on 2/14/2023)   • lidocaine (LMX) 4 % cream Apply topically as needed for mild pain (Patient not taking: Reported on 2/14/2023)   • [DISCONTINUED] alendronate (Fosamax) 70 mg tablet Take 1 tablet (70 mg total) by mouth every 7 days (Patient not taking: Reported on 2/14/2023)       Objective     /77 (BP Location: Right arm, Patient Position: Sitting, Cuff Size: Large)   Pulse 77   Temp 97 9 °F (36 6 °C) (Temporal)   Resp 16   Ht 5' 1" (1 549 m)   Wt 67 3 kg (148 lb 6 4 oz)   SpO2 97%   BMI 28 04 kg/m²     Physical Exam  Constitutional:       General: She is not in acute distress  Appearance: She is not ill-appearing  Eyes:      Extraocular Movements: Extraocular movements intact  Conjunctiva/sclera: Conjunctivae normal    Cardiovascular:      Rate and Rhythm: Normal rate and regular rhythm  Pulses: no weak pulses          Dorsalis pedis pulses are 2+ on the right side and 2+ on the left side  Posterior tibial pulses are 2+ on the right side and 2+ on the left side  Heart sounds: Normal heart sounds  Pulmonary:      Effort: Pulmonary effort is normal  No respiratory distress  Musculoskeletal:         General: No swelling, tenderness or deformity  Normal range of motion  Right lower leg: No edema  Left lower leg: No edema  Feet:      Right foot:      Skin integrity: No ulcer, skin breakdown, erythema, warmth, callus or dry skin  Left foot:      Skin integrity: No ulcer, skin breakdown, erythema, warmth, callus or dry skin  Skin:     General: Skin is warm  Findings: No rash  Neurological:      General: No focal deficit present  Mental Status: She is alert and oriented to person, place, and time  Psychiatric:         Mood and Affect: Mood normal          Behavior: Behavior normal        Patient's shoes and socks removed  Right Foot/Ankle   Right Foot Inspection  Skin Exam: skin normal  Skin not intact, no dry skin, no warmth, no callus, no erythema, no maceration, no abnormal color, no pre-ulcer, no ulcer and no callus  Toe Exam: ROM and strength within normal limits  Sensory   Monofilament testing: intact    Vascular  The right DP pulse is 2+  The right PT pulse is 2+  Left Foot/Ankle  Left Foot Inspection  Skin Exam: skin normal  Skin not intact, no dry skin, no warmth, no erythema, no maceration, normal color, no pre-ulcer, no ulcer and no callus  Toe Exam: ROM and strength within normal limits       Sensory   Monofilament testing: intact    Vascular  The left DP pulse is 2+  The left PT pulse is 2+       Assign Risk Category  No deformity present  No loss of protective sensation  No weak pulses  Risk: 0      Diamond Martinez MD

## 2023-02-14 NOTE — ASSESSMENT & PLAN NOTE
Lab Results   Component Value Date    HGBA1C 6 2 02/14/2023     Diet controlled and well-controlled, at goal  No oral hypoglycemic in over 2 years  Tolerating statin  Foot exam done today, protective sensation intact  Iris diabetic eye exam done today    No acute concerns and continues stay active  Will follow-up in 3 months for ANDRIA

## 2023-02-15 ENCOUNTER — TELEPHONE (OUTPATIENT)
Dept: FAMILY MEDICINE CLINIC | Facility: CLINIC | Age: 68
End: 2023-02-15

## 2023-02-16 ENCOUNTER — TELEPHONE (OUTPATIENT)
Dept: ADMINISTRATIVE | Facility: OTHER | Age: 68
End: 2023-02-16

## 2023-02-16 NOTE — LETTER
Vaccination Request Form: GEAVO-75 aka SARS-CoV-2 (Balinda Cera or Haney Peter or J & J)      Date Requested: 23  Patient: Smith Lopes  Patient : 1955   Referring Provider: Grace Pena MD       The above patient has informed us that they have had their   most recent COVID-19 aka SARS-CoV-2 (Balinda Cera or Haney Peter or [employer] J) administered at your facility  Please   complete this form and attach all corresponding documentation  Date of Vaccine(s) Given  ______________________________    Lot Number(s) _______________________________________    Manufacture(s) ______________________________________    Dose Amount (s) _____________________________________    Expiration Date(s) ____________________________________    Comments __________________________________________________________  ____________________________________________________________________  ____________________________________________________________________  ____________________________________________________________________    Administering Facility  ________________________________________________    Vaccine Administered By (print name) ___________________________________      Form Completed By (print name) _______________________________________      Signature ___________________________________________________________      These reports are needed for  compliance  Please fax this completed form and a copy of the Vaccine Document(s) to our office located at [place] [address] as soon as possible to Fax [phone] darinel Chin: Phone [phone]    We thank you for your assistance in treating our mutual patient

## 2023-02-16 NOTE — TELEPHONE ENCOUNTER
Upon review of the In Basket request and the patient's chart, initial outreach has been made via fax to facility  Please see Contacts section for details       Thank you  Will Crystal MA

## 2023-02-16 NOTE — TELEPHONE ENCOUNTER
----- Message from [facility] sent at 2/16/2023  9:14 AM EST -----  Regarding: COVID-19 Vaccine  02/16/23 9:15 AM    Hello, our patient Bernadine Gipson has had the COVID-19 vaccine completed/performed  Please assist in updating the patient chart by making an External outreach to [facility] located in [place], [address] [place] [address]       Thank you,  [facility]  ANJALI Gong

## 2023-02-20 NOTE — TELEPHONE ENCOUNTER
Upon review of the In Basket request we resulted flu shot that [facility] sent - did not send Covid Imm    Any additional questions or concerns should be emailed to the Practice Liaisons via the appropriate education email address, please do not reply via In Basket      Thank you  Kg Almonte MA

## 2023-03-03 ENCOUNTER — OFFICE VISIT (OUTPATIENT)
Dept: OBGYN CLINIC | Facility: OTHER | Age: 68
End: 2023-03-03

## 2023-03-03 VITALS
BODY MASS INDEX: 27.9 KG/M2 | WEIGHT: 147.8 LBS | HEART RATE: 72 BPM | DIASTOLIC BLOOD PRESSURE: 66 MMHG | HEIGHT: 61 IN | SYSTOLIC BLOOD PRESSURE: 101 MMHG

## 2023-03-03 DIAGNOSIS — G89.29 CHRONIC PAIN OF RIGHT KNEE: ICD-10-CM

## 2023-03-03 DIAGNOSIS — M17.0 BILATERAL PRIMARY OSTEOARTHRITIS OF KNEE: Primary | ICD-10-CM

## 2023-03-03 DIAGNOSIS — M25.561 CHRONIC PAIN OF RIGHT KNEE: ICD-10-CM

## 2023-03-03 RX ORDER — KETOROLAC TROMETHAMINE 30 MG/ML
60 INJECTION, SOLUTION INTRAMUSCULAR; INTRAVENOUS
Status: COMPLETED | OUTPATIENT
Start: 2023-03-03 | End: 2023-03-03

## 2023-03-03 RX ORDER — BUPIVACAINE HYDROCHLORIDE 5 MG/ML
3.5 INJECTION, SOLUTION PERINEURAL
Status: COMPLETED | OUTPATIENT
Start: 2023-03-03 | End: 2023-03-03

## 2023-03-03 RX ADMIN — BUPIVACAINE HYDROCHLORIDE 3.5 ML: 5 INJECTION, SOLUTION PERINEURAL at 10:53

## 2023-03-03 RX ADMIN — KETOROLAC TROMETHAMINE 60 MG: 30 INJECTION, SOLUTION INTRAMUSCULAR; INTRAVENOUS at 10:53

## 2023-03-03 NOTE — PROGRESS NOTES
Chief Complaint: Bilateral knee pain    HPI:    Richard Rivera is a 79year old Female who presents today for follow up of bilateral knee pain  Description of symptoms: Patient presents today for follow-up  Last visit in this regard 9/23/2022, wherein patient received bilateral viscosupplementation injections  She tells me that after her injections, she noted improvement in her posterior knee pain but very little overall improvement was noted with her diffuse anterior knee pain  Patient continues to report pain with bending her knees and after activity  Pain is worse on the right compared to the left  Endorses intermittent swelling without erythema or ecchymosis  Denies obstructive meniscal symptoms  Denies any new injuries  Has some relief with occasional Tylenol or NSAIDs  Patient would prefer not to receive further steroid injections as she endorses a poor glycemic response  Summary of treatment to-date: As outlined above and in previous notes  I have personally reviewed pertinent films in PACS  Patient Active Problem List   Diagnosis   • Allergic rhinitis   • Cataract   • Depression with anxiety   • Generalized osteoarthritis of multiple sites   • Hemorrhoids   • Insomnia   • Plantar fasciitis, bilateral   • Cough   • Overweight (BMI 25 0-29  9)   • Type 2 diabetes mellitus without complication, without long-term current use of insulin (HCC)   • Right elbow tendinitis   • Female cystocele   • Left carpal tunnel syndrome   • Trigger finger, left ring finger   • COVID-19   • Symptomatic PVCs   • Postnasal drip   • Dizziness   • Chronic right shoulder pain   • Somatic dysfunction of thoracic region   • Somatic dysfunction of cervical region   • Chronic pain of right knee   • Bilateral primary osteoarthritis of knee   • Left arm pain   • Osteoporosis without current pathological fracture        Current Outpatient Medications on File Prior to Visit   Medication Sig Dispense Refill   • alendronate (Fosamax) 70 mg tablet Take 1 tablet (70 mg total) by mouth every 7 days 12 tablet 3   • BIOTIN 5000 PO Take by mouth daily     • Blood Glucose Monitoring Suppl (Contour Next One) KIT Use daily 1 kit 0   • Cyanocobalamin (VITAMIN B12 PO) Take by mouth daily     • fluticasone (FLONASE) 50 mcg/act nasal spray 1 spray into each nostril daily 16 g 4   • glucose blood (Contour Test) test strip Use 1 each daily Use as instructed 100 each 3   • lidocaine (LMX) 4 % cream Apply topically as needed for mild pain 30 g 0   • loratadine (CLARITIN) 10 mg tablet Take 10 mg by mouth daily     • Microlet Lancets MISC Use daily 100 each 3   • Omega-3 Fatty Acids (OMEGA 3 PO) Take by mouth     • OneTouch Ultra test strip USE 1 STRIP DAILY AS NEEDED FOR HYPOGLYCEMIA AS  DIRECTED 100 each 3   • TURMERIC PO Take by mouth daily     • atorvastatin (LIPITOR) 10 mg tablet Take 1 tablet (10 mg total) by mouth daily (Patient not taking: Reported on 2/14/2023) 90 tablet 1   • Diclofenac Sodium (VOLTAREN) 1 % Apply 2 g topically 4 (four) times a day (Patient not taking: Reported on 2/14/2023) 100 g 1     No current facility-administered medications on file prior to visit          Allergies   Allergen Reactions   • Bee Pollen Other (See Comments)     Other reaction(s): Sneezing   • Pollen Extract Allergic Rhinitis and Sneezing        Tobacco Use: Low Risk    • Smoking Tobacco Use: Never   • Smokeless Tobacco Use: Never   • Passive Exposure: Not on file        Social Determinants of Health     Tobacco Use: Low Risk    • Smoking Tobacco Use: Never   • Smokeless Tobacco Use: Never   • Passive Exposure: Not on file   Alcohol Use: Not At Risk   • Frequency of Alcohol Consumption: Never   • Average Number of Drinks: Not on file   • Frequency of Binge Drinking: Never   Financial Resource Strain: Low Risk    • Difficulty of Paying Living Expenses: Not hard at all   Food Insecurity: No Food Insecurity   • Worried About Running Out of Food in the Last Year: Never true   • Ran Out of Food in the Last Year: Never true   Transportation Needs: No Transportation Needs   • Lack of Transportation (Medical): No   • Lack of Transportation (Non-Medical): No   Physical Activity: Not on file   Stress: Not on file   Social Connections: Not on file   Intimate Partner Violence: Not At Risk   • Fear of Current or Ex-Partner: No   • Emotionally Abused: No   • Physically Abused: No   • Sexually Abused: No   Depression: Not at risk   • PHQ-2 Score: 0   Housing Stability: Low Risk    • Unable to Pay for Housing in the Last Year: No   • Number of Places Lived in the Last Year: 1   • Unstable Housing in the Last Year: No        Review of Systems     Body mass index is 27 93 kg/m²  Physical Exam  Vitals and nursing note reviewed  Constitutional:       General: She is not in acute distress  Appearance: Normal appearance  She is normal weight  She is not ill-appearing, toxic-appearing or diaphoretic  HENT:      Head: Normocephalic and atraumatic  Eyes:      General: No scleral icterus  Conjunctiva/sclera: Conjunctivae normal    Cardiovascular:      Pulses: Normal pulses  Pulmonary:      Effort: Pulmonary effort is normal  No respiratory distress  Skin:     General: Skin is warm and dry  Capillary Refill: Capillary refill takes less than 2 seconds  Neurological:      Mental Status: She is alert and oriented to person, place, and time            Ortho Exam:  RIGHT KNEE:  Erythema: no  Swelling: no  Increased Warmth: no  Tenderness: + MJL, LJL  Flexion: intact  Extension: intact with palpable patellar crepitus  Patellar Displacement:  Patellar Tilt:  Patellar Apprehension: negative  Patellar Grind Palmer's: +  Lachman's: negative  Drawer: negative  Varus laxity: negative  Valgus laxity: negative  [facility]: negative   Thessaly Test:  Dial Test:    LEFT KNEE:  Erythema: no  Swelling: no  Increased Warmth: no  Tenderness: + MJL, LJL  Flexion: intact  Extension: intact with palpable patellar crepitus  Patellar Displacement:  Patellar Tilt:  Patellar Apprehension: negative  Patellar Grind Palmer's: +  Lachman's: negative  Drawer: negative  Varus laxity: negative  Valgus laxity: negative  [facility]: negative   Thessaly Test:  Dial Cynthia      Large joint arthrocentesis: R knee  Universal Protocol:  Consent: Verbal consent obtained  Risks and benefits: risks, benefits and alternatives were discussed  Consent given by: patient  Patient understanding: patient states understanding of the procedure being performed  Site marked: the operative site was marked  Radiology Images displayed and confirmed  If images not available, report reviewed: imaging studies available  Required items: required blood products, implants, devices, and special equipment available  Patient identity confirmed: verbally with patient    Supporting Documentation  Indications: pain   Procedure Details  Location: knee - R knee  Preparation: Patient was prepped and draped in the usual sterile fashion  Needle size: 22 G  Ultrasound guidance: no  Approach: anterolateral  Medications administered: 3 5 mL bupivacaine 0 5 %; 60 mg ketorolac 60 mg/2 mL    Patient tolerance: patient tolerated the procedure well with no immediate complications  Dressing:  Sterile dressing applied             Assessment:     Diagnosis ICD-10-CM Associated Orders   1  Bilateral primary osteoarthritis of knee  M17 0 Brace     Medial  Knee Brace     Large joint arthrocentesis: R knee      2  Chronic pain of right knee  M25 561 Large joint arthrocentesis: R knee    G89 29            Plan: We reviewed our current clinical assessment with Katina Landeros  We reviewed her imaging, which includes bilateral knee x-rays and reveals mild to moderate medial compartment osteoarthritis bilaterally  We discussed management options, reviewed natural history, and engaged in shared decision-making    Given overall poor response to viscosupplementation injections, we will not pursue this further at this time but can consider this in future if needed  Given poor glycemic response in the past, will defer corticosteroid injection at this time  Patient received landmark guided Toradol injection for the right knee in anticipation of an upcoming trip to [place] for 5 weeks  Her most recent BMP was found to have acceptable kidney function  For her discomfort with activity, we will prescribe medial  braces  She was recommended to continue with her topical analgesic medications as provided by her PCPs office  She was advised to contact our office to further discuss treatment options if warranted following her upcoming trip to [place] as she currently does not feel ready to consider total knee arthroplasty  She was counseled on the potential risks and benefits of PRP injections  Patient endorsed understanding and acceptance of the above plan  Follow-Up:    As needed        Portions of the record may have been created with voice recognition software  Occasional wrong word or "sound alike" substitutions may have occurred due to the inherent limitations of voice recognition software  Please review the chart carefully and recognize, using context, where substitutions/typographical errors may have occurred

## 2023-03-03 NOTE — PATIENT INSTRUCTIONS
Trial bracing  Injection today  Call us back after your trip    Red flags and risks of injection include but are not limited to infection <0 072% as referenced in some sources, nerve or artery penetration, and if steroids are used-skin dimpling <1%, hypo-pigmentation <1%,  or even damage to the bone as referenced in some sources  contraindications include but are not limited to active infection, prosthetic joint, fracture, allergy to steroid if used or anesthetics such as lidocaine, and uncontrolled blood thinner medications such as coumadin/warfarin  Educated if any symptoms including fevers, chills, swelling, or worsening symptoms occur then to call office or go to hospital for immediate care if physician unavailable due to possible infection or other complication which is a serious medical problem  Patient expressed understanding and agreed to proceed with procedure  PRP stands for Plasma Rich Protein and is a Injectate solution prepared from a patient's own blood that has a high concentration of platelets  Blood is drawn from the patient in the same way that occurs during a routine blood draw  A special machine then extracts platelet growth factors and other natural chemicals from the patient's own blood that are hypothesized to help healing tissue  PRP has been used to help healing tissues since approximately 1999, and specifically, for arthritis and cartilage problems since 2003  Injection of PRP is recommended under ultrasound to attain visuzlization of needle and injectate into target site  Once injected, PRP begins to clot and within 10 minutes of clotting, the platelets begin to secrete their PDGFs (Platelet derived growth factors) which help to aid healing  Lab studies in petri dishes have shown that these healing factors aid in growth of tissue   There have been a number of studies to show evidence that PRP is better in humans when injected compared to placebo, but there is no definitive evidence  Specifically, PRP injection does not result in relief consistently 100% of the time and is considered experimental  As such, PRP should not be viewed as curative but as a treatment modality that may offer relief

## 2023-03-08 ENCOUNTER — TELEPHONE (OUTPATIENT)
Dept: OBGYN CLINIC | Facility: HOSPITAL | Age: 68
End: 2023-03-08

## 2023-03-08 NOTE — TELEPHONE ENCOUNTER
Caller: patient  Doctor: Amanda Blevins     Reason for call: needs instruction on how to get her brace  I asked if she needed the script faxed to a particular pharmacy, she seems to need information on where to get this brace      Call back#: [phone]

## 2023-03-09 NOTE — TELEPHONE ENCOUNTER
Pari (GINA fitter) was made aware that patient called  She will contact patient today regarding her knee braces

## 2023-05-08 ENCOUNTER — HOSPITAL ENCOUNTER (OUTPATIENT)
Dept: MAMMOGRAPHY | Facility: HOSPITAL | Age: 68
Discharge: HOME | End: 2023-05-08

## 2023-05-08 VITALS — HEIGHT: 61 IN | WEIGHT: 147 LBS | BODY MASS INDEX: 27.75 KG/M2

## 2023-05-08 DIAGNOSIS — Z12.31 ENCOUNTER FOR SCREENING MAMMOGRAM FOR MALIGNANT NEOPLASM OF BREAST: ICD-10-CM

## 2023-05-16 ENCOUNTER — OFFICE VISIT (OUTPATIENT)
Dept: FAMILY MEDICINE CLINIC | Facility: CLINIC | Age: 68
End: 2023-05-16

## 2023-05-16 VITALS
HEIGHT: 61 IN | TEMPERATURE: 97.6 F | RESPIRATION RATE: 16 BRPM | SYSTOLIC BLOOD PRESSURE: 125 MMHG | HEART RATE: 79 BPM | DIASTOLIC BLOOD PRESSURE: 66 MMHG | BODY MASS INDEX: 28.7 KG/M2 | OXYGEN SATURATION: 98 % | WEIGHT: 152 LBS

## 2023-05-16 DIAGNOSIS — M17.0 BILATERAL PRIMARY OSTEOARTHRITIS OF KNEE: ICD-10-CM

## 2023-05-16 DIAGNOSIS — Z12.11 COLON CANCER SCREENING: ICD-10-CM

## 2023-05-16 DIAGNOSIS — E11.9 TYPE 2 DIABETES MELLITUS WITHOUT COMPLICATION, WITHOUT LONG-TERM CURRENT USE OF INSULIN (HCC): ICD-10-CM

## 2023-05-16 DIAGNOSIS — Z00.00 MEDICARE ANNUAL WELLNESS VISIT, SUBSEQUENT: Primary | ICD-10-CM

## 2023-05-16 NOTE — PATIENT INSTRUCTIONS
Medicare Preventive Visit Patient Instructions  Thank you for completing your Welcome to Medicare Visit or Medicare Annual Wellness Visit today  Your next wellness visit will be due in one year (5/16/2024)  The screening/preventive services that you may require over the next 5-10 years are detailed below  Some tests may not apply to you based off risk factors and/or age  Screening tests ordered at today's visit but not completed yet may show as past due  Also, please note that scanned in results may not display below  Preventive Screenings:  Service Recommendations Previous Testing/Comments   Colorectal Cancer Screening  * Colonoscopy    * Fecal Occult Blood Test (FOBT)/Fecal Immunochemical Test (FIT)  * Fecal DNA/Cologuard Test  * Flexible Sigmoidoscopy Age: 39-70 years old   Colonoscopy: every 10 years (may be performed more frequently if at higher risk)  OR  FOBT/FIT: every 1 year  OR  Cologuard: every 3 years  OR  Sigmoidoscopy: every 5 years  Screening may be recommended earlier than age 39 if at higher risk for colorectal cancer  Also, an individualized decision between you and your healthcare provider will decide whether screening between the ages of 74-80 would be appropriate  Colonoscopy: Not on file  FOBT/FIT: Not on file  Cologuard: Not on file  Sigmoidoscopy: Not on file          Breast Cancer Screening Age: 36 years old  Frequency: every 1-2 years  Not required if history of left and right mastectomy Mammogram: 05/08/2023    Screening Current   Cervical Cancer Screening Between the ages of 21-29, pap smear recommended once every 3 years  Between the ages of 33-67, can perform pap smear with HPV co-testing every 5 years     Recommendations may differ for women with a history of total hysterectomy, cervical cancer, or abnormal pap smears in past  Pap Smear: 12/12/2019    Screening Not Indicated   Hepatitis C Screening Once for adults born between 1945 and 1965  More frequently in patients at high risk for Hepatitis C Hep C Antibody: 03/12/2020    Screening Current   Diabetes Screening 1-2 times per year if you're at risk for diabetes or have pre-diabetes Fasting glucose: 119 mg/dL (3/2/2019)  A1C: 6 2 (2/14/2023)  Screening Not Indicated  History Diabetes   Cholesterol Screening Once every 5 years if you don't have a lipid disorder  May order more often based on risk factors  Lipid panel: 09/07/2021    Screening Current     Other Preventive Screenings Covered by Medicare:  1  Abdominal Aortic Aneurysm (AAA) Screening: covered once if your at risk  You're considered to be at risk if you have a family history of AAA  2  Lung Cancer Screening: covers low dose CT scan once per year if you meet all of the following conditions: (1) Age 50-69; (2) No signs or symptoms of lung cancer; (3) Current smoker or have quit smoking within the last 15 years; (4) You have a tobacco smoking history of at least 20 pack years (packs per day multiplied by number of years you smoked); (5) You get a written order from a healthcare provider  3  Glaucoma Screening: covered annually if you're considered high risk: (1) You have diabetes OR (2) Family history of glaucoma OR (3)  aged 48 and older OR (3)  American aged 72 and older  3  Osteoporosis Screening: covered every 2 years if you meet one of the following conditions: (1) You're estrogen deficient and at risk for osteoporosis based off medical history and other findings; (2) Have a vertebral abnormality; (3) On glucocorticoid therapy for more than 3 months; (4) Have primary hyperparathyroidism; (5) On osteoporosis medications and need to assess response to drug therapy  · Last bone density test (DXA Scan): 11/10/2022   5  HIV Screening: covered annually if you're between the age of 15-65  Also covered annually if you are younger than 13 and older than 72 with risk factors for HIV infection   For pregnant patients, it is covered up to 3 times per pregnancy  Immunizations:  Immunization Recommendations   Influenza Vaccine Annual influenza vaccination during flu season is recommended for all persons aged >= 6 months who do not have contraindications   Pneumococcal Vaccine   * Pneumococcal conjugate vaccine = PCV13 (Prevnar 13), PCV15 (Vaxneuvance), PCV20 (Prevnar 20)  * Pneumococcal polysaccharide vaccine = PPSV23 (Pneumovax) Adults 25-60 years old: 1-3 doses may be recommended based on certain risk factors  Adults 72 years old: 1-2 doses may be recommended based off what pneumonia vaccine you previously received   Hepatitis B Vaccine 3 dose series if at intermediate or high risk (ex: diabetes, end stage renal disease, liver disease)   Tetanus (Td) Vaccine - COST NOT COVERED BY MEDICARE PART B Following completion of primary series, a booster dose should be given every 10 years to maintain immunity against tetanus  Td may also be given as tetanus wound prophylaxis  Tdap Vaccine - COST NOT COVERED BY MEDICARE PART B Recommended at least once for all adults  For pregnant patients, recommended with each pregnancy  Shingles Vaccine (Shingrix) - COST NOT COVERED BY MEDICARE PART B  2 shot series recommended in those aged 48 and above     Health Maintenance Due:      Topic Date Due   • Colorectal Cancer Screening  Never done   • Breast Cancer Screening: Mammogram  05/08/2024   • Cervical Cancer Screening  12/12/2024   • Hepatitis C Screening  Completed     Immunizations Due:      Topic Date Due   • Pneumococcal Vaccine: 65+ Years (1 - PCV) Never done   • COVID-19 Vaccine (4 - Booster for Rica Rick series) 03/04/2022     Advance Directives   What are advance directives? Advance directives are legal documents that state your wishes and plans for medical care  These plans are made ahead of time in case you lose your ability to make decisions for yourself   Advance directives can apply to any medical decision, such as the treatments you want, and if you want to donate organs  What are the types of advance directives? There are many types of advance directives, and each state has rules about how to use them  You may choose a combination of any of the following:  · Living will: This is a written record of the treatment you want  You can also choose which treatments you do not want, which to limit, and which to stop at a certain time  This includes surgery, medicine, IV fluid, and tube feedings  · Durable power of  for healthcare [facility]): This is a written record that states who you want to make healthcare choices for you when you are unable to make them for yourself  This person, called a proxy, is usually a family member or a friend  You may choose more than 1 proxy  · Do not resuscitate (DNR) order:  A DNR order is used in case your heart stops beating or you stop breathing  It is a request not to have certain forms of treatment, such as CPR  A DNR order may be included in other types of advance directives  · Medical directive: This covers the care that you want if you are in a coma, near death, or unable to make decisions for yourself  You can list the treatments you want for each condition  Treatment may include pain medicine, surgery, blood transfusions, dialysis, IV or tube feedings, and a ventilator (breathing machine)  · Values history: This document has questions about your views, beliefs, and how you feel and think about life  This information can help others choose the care that you would choose  Why are advance directives important? An advance directive helps you control your care  Although spoken wishes may be used, it is better to have your wishes written down  Spoken wishes can be misunderstood, or not followed  Treatments may be given even if you do not want them  An advance directive may make it easier for your family to make difficult choices about your care     Weight Management   Why it is important to manage your weight:  Being overweight increases your risk of health conditions such as heart disease, high blood pressure, type 2 diabetes, and certain types of cancer  It can also increase your risk for osteoarthritis, sleep apnea, and other respiratory problems  Aim for a slow, steady weight loss  Even a small amount of weight loss can lower your risk of health problems  How to lose weight safely:  A safe and healthy way to lose weight is to eat fewer calories and get regular exercise  You can lose up about 1 pound a week by decreasing the number of calories you eat by 500 calories each day  Healthy meal plan for weight management:  A healthy meal plan includes a variety of foods, contains fewer calories, and helps you stay healthy  A healthy meal plan includes the following:  · Eat whole-grain foods more often  A healthy meal plan should contain fiber  Fiber is the part of grains, fruits, and vegetables that is not broken down by your body  Whole-grain foods are healthy and provide extra fiber in your diet  Some examples of whole-grain foods are whole-wheat breads and pastas, oatmeal, brown rice, and bulgur  · Eat a variety of vegetables every day  Include dark, leafy greens such as spinach, kale, sonali greens, and mustard greens  Eat yellow and orange vegetables such as carrots, sweet potatoes, and winter squash  · Eat a variety of fruits every day  Choose fresh or canned fruit (canned in its own juice or light syrup) instead of juice  Fruit juice has very little or no fiber  · Eat low-fat dairy foods  Drink fat-free (skim) milk or 1% milk  Eat fat-free yogurt and low-fat cottage cheese  Try low-fat cheeses such as mozzarella and other reduced-fat cheeses  · Choose meat and other protein foods that are low in fat  Choose beans or other legumes such as split peas or lentils  Choose fish, skinless poultry (chicken or turkey), or lean cuts of red meat (beef or pork)  Before you cook meat or poultry, cut off any visible fat  · Use less fat and oil  Try baking foods instead of frying them  Add less fat, such as margarine, sour cream, regular salad dressing and mayonnaise to foods  Eat fewer high-fat foods  Some examples of high-fat foods include french fries, doughnuts, ice cream, and cakes  · Eat fewer sweets  Limit foods and drinks that are high in sugar  This includes candy, cookies, regular soda, and sweetened drinks  Exercise:  Exercise at least 30 minutes per day on most days of the week  Some examples of exercise include walking, biking, dancing, and swimming  You can also fit in more physical activity by taking the stairs instead of the elevator or parking farther away from stores  Ask your healthcare provider about the best exercise plan for you  © Copyright [address] 2018 Information is for End User's use only and may not be sold, redistributed or otherwise used for commercial purposes   All illustrations and images included in CareNotes® are the copyrighted property of [employer]  or [address] [place] [address]

## 2023-05-16 NOTE — PROGRESS NOTES
Assessment and Plan:     Problem List Items Addressed This Visit        Endocrine    Type 2 diabetes mellitus without complication, without long-term current use of insulin ([address] )    Relevant Orders    Lipid panel    Hemoglobin A1C    CBC    Basic metabolic panel   Other Visit Diagnoses     Medicare annual wellness visit, subsequent    -  Primary    Relevant Orders    CBC    Basic metabolic panel        BMI Counseling: Body mass index is 28 72 kg/m²  The BMI is above normal  Nutrition recommendations include encouraging healthy choices of fruits and vegetables and decreasing fast food intake  Exercise recommendations include moderate physical activity 150 minutes/week  Rationale for BMI follow-up plan is due to patient being overweight or obese  Preventive health issues were discussed with patient, and age appropriate screening tests were ordered as noted in patient's After Visit Summary  Personalized health advice and appropriate referrals for health education or preventive services given if needed, as noted in patient's After Visit Summary  History of Present Illness:     Patient presents for a Medicare Wellness Visit  Patient reports no acute concerns but does have a  h/o bilateral knee osteoarthritis for which she request physical therapy script  Patient Care Team:  Serafin Olsen MD as PCP - General (Family Medicine)  DO Jackie Gamble DO     Review of Systems:     Review of Systems   Constitutional: Negative for fatigue and fever  HENT: Negative for sore throat  Respiratory: Negative for cough, shortness of breath and wheezing  Cardiovascular: Negative for chest pain, palpitations and leg swelling  Gastrointestinal: Negative for abdominal pain, diarrhea, nausea and vomiting  Genitourinary: Negative for dysuria and pelvic pain  Skin: Negative for rash  Neurological: Negative for weakness and headaches          Problem List:     Patient Active Problem List Diagnosis   • Allergic rhinitis   • Cataract   • Depression with anxiety   • Generalized osteoarthritis of multiple sites   • Hemorrhoids   • Insomnia   • Plantar fasciitis, bilateral   • Cough   • Overweight (BMI 25 0-29  9)   • Type 2 diabetes mellitus without complication, without long-term current use of insulin ([facility])   • Right elbow tendinitis   • Female cystocele   • Left carpal tunnel syndrome   • Trigger finger, left ring finger   • COVID-19   • Symptomatic PVCs   • Postnasal drip   • Dizziness   • Chronic right shoulder pain   • Somatic dysfunction of thoracic region   • Somatic dysfunction of cervical region   • Chronic pain of right knee   • Bilateral primary osteoarthritis of knee   • Left arm pain   • Osteoporosis without current pathological fracture      Past Medical and Surgical History:     Past Medical History:   Diagnosis Date   • Abnormal glucose     last assessed 12/22/15   • Acid reflux    • Anxiety    • Arthritis     osteoarthritis multiple sites   • Benign paroxysmal positional vertigo    • Carpal tunnel syndrome    • Chronic kidney disease     nephrolithiasis   • Depression    • Diabetes mellitus ([facility] [address] )    • GERD (gastroesophageal reflux disease)    • Insomnia    • Patellofemoral dysfunction     last assessed  9/15/14     Past Surgical History:   Procedure Laterality Date   • BREAST CYST EXCISION Left     abscess removed   50yrs ago   • CARPAL TUNNEL RELEASE     • COLONOSCOPY     • KNEE SURGERY Right    • MD NDSC WRST SURG W/RLS TRANSVRS CARPL LIGM Right 08/25/2016    Procedure: RELEASE CARPAL TUNNEL ENDOSCOPIC;  Surgeon: Tanner Chamorro MD;  Location: [facility];  Service: Orthopedics   • MD TENDON SHEATH INCISION Right 08/25/2016    Procedure: RELEASE LONG AND RING TRIGGER FINGERS;  Surgeon: Tanner Chamorro MD;  Location: [facility];  Service: Orthopedics   • MD TENDON SHEATH INCISION Left 09/15/2020    Procedure: RELEASE TRIGGER FINGER [address];  Surgeon: Tanner Chamorro MD; [facility];  Service: Orthopedics   • TUBAL LIGATION        Family History:     Family History   Problem Relation Age of Onset   • Diabetes Mother    • Heart disease Mother    • Hypertension Mother    • Hypothyroidism Mother    • Arthritis Father    • Crohn's disease Family    • Psoriasis Family    • Rheum arthritis Family    • Lupus Family         systemic- erythematosus   • Ulcerative colitis Family    • Breast cancer Cousin 46        paternal   • No Known Problems Sister    • No Known Problems Maternal Grandmother    • No Known Problems Maternal Grandfather    • No Known Problems Paternal Grandmother    • No Known Problems Paternal Grandfather    • No Known Problems Son    • No Known Problems Son    • No Known Problems Sister    • No Known Problems Sister    • No Known Problems Brother    • No Known Problems Brother    • No Known Problems Brother       Social History:     Social History     Socioeconomic History   • Marital status: /Civil Union     Spouse name: None   • Number of children: None   • Years of education: None   • Highest education level: None   Occupational History   • None   Tobacco Use   • Smoking status: Never   • Smokeless tobacco: Never   Vaping Use   • Vaping Use: Never used   Substance and Sexual Activity   • Alcohol use: No     Comment: rare/social as per [employer]   • Drug use: No   • Sexual activity: Yes     Partners: Male     Birth control/protection: None   Other Topics Concern   • None   Social History Narrative   • None     Social Determinants of Health     Financial Resource Strain: Low Risk    • Difficulty of Paying Living Expenses: Not hard at all   Food Insecurity: No Food Insecurity   • Worried About Running Out of Food in the Last Year: Never true   • Ran Out of Food in the Last Year: Never true   Transportation Needs: No Transportation Needs   • Lack of Transportation (Medical): No   • Lack of Transportation (Non-Medical):  No   Physical Activity: Not on file Stress: Not on file   Social Connections: Not on file   Intimate Partner Violence: Not At Risk   • Fear of Current or Ex-Partner: No   • Emotionally Abused: No   • Physically Abused: No   • Sexually Abused: No   Housing Stability: Low Risk    • Unable to Pay for Housing in the Last Year: No   • Number of Places Lived in the Last Year: 1   • Unstable Housing in the Last Year: No      Medications and Allergies:     Current Outpatient Medications   Medication Sig Dispense Refill   • alendronate (Fosamax) 70 mg tablet Take 1 tablet (70 mg total) by mouth every 7 days 12 tablet 3   • atorvastatin (LIPITOR) 10 mg tablet Take 1 tablet (10 mg total) by mouth daily 90 tablet 1   • BIOTIN 5000 PO Take by mouth daily     • Blood Glucose Monitoring Suppl (Contour Next One) KIT Use daily 1 kit 0   • Cyanocobalamin (VITAMIN B12 PO) Take by mouth daily     • Diclofenac Sodium (VOLTAREN) 1 % Apply 2 g topically 4 (four) times a day 100 g 1   • fluticasone (FLONASE) 50 mcg/act nasal spray 1 spray into each nostril daily 16 g 4   • glucose blood (Contour Test) test strip Use 1 each daily Use as instructed 100 each 3   • lidocaine (LMX) 4 % cream Apply topically as needed for mild pain 30 g 0   • loratadine (CLARITIN) 10 mg tablet Take 10 mg by mouth daily     • Microlet Lancets MISC Use daily 100 each 3   • Omega-3 Fatty Acids (OMEGA 3 PO) Take by mouth     • OneTouch Ultra test strip USE 1 STRIP DAILY AS NEEDED FOR HYPOGLYCEMIA AS  DIRECTED 100 each 3   • TURMERIC PO Take by mouth daily       No current facility-administered medications for this visit       Allergies   Allergen Reactions   • Bee Pollen Other (See Comments)     Other reaction(s): Sneezing   • Pollen Extract Allergic Rhinitis and Sneezing      Immunizations:     Immunization History   Administered Date(s) Administered   • COVID-19 MODERNA VACC 0 5 ML IM 04/19/2021, 05/17/2021   • COVID-19 PFIZER VACCINE 0 3 ML IM 01/07/2022   • INFLUENZA 09/28/2022   • Influenza, recombinant, quadrivalent,injectable, preservative free 09/10/2020   • Tdap 09/10/2020      Health Maintenance:         Topic Date Due   • Colorectal Cancer Screening  Never done   • Breast Cancer Screening: Mammogram  05/08/2024   • Cervical Cancer Screening  12/12/2024   • Hepatitis C Screening  Completed         Topic Date Due   • Pneumococcal Vaccine: 65+ Years (1 - PCV) Never done   • COVID-19 Vaccine (4 - Booster for Lucilla Bird series) 03/04/2022      Medicare Screening Tests and Risk Assessments:     Guilherme Velazquez is here for her Subsequent Wellness visit  Health Risk Assessment:   Patient rates overall health as good  Patient feels that their physical health rating is same  Patient is very satisfied with their life  Eyesight was rated as same  Hearing was rated as same  Patient feels that their emotional and mental health rating is much better  Patients states they are never, rarely angry  Patient states they are never, rarely unusually tired/fatigued  Pain experienced in the last 7 days has been some  Patient's pain rating has been 4/10  Patient states that she has experienced no weight loss or gain in last 6 months  Right knee    Fall Risk Screening: In the past year, patient has experienced: no history of falling in past year      Urinary Incontinence Screening:   Patient has not leaked urine accidently in the last six months  Home Safety:  Patient does not have trouble with stairs inside or outside of their home  Patient has working smoke alarms and has working carbon monoxide detector  Home safety hazards include: none  Nutrition:   Current diet is Regular  Medications:   Patient is not currently taking any over-the-counter supplements  Patient is able to manage medications       Activities of Daily Living (ADLs)/Instrumental Activities of Daily Living (IADLs):   Walk and transfer into and out of bed and chair?: Yes  Dress and groom yourself?: Yes    Bathe or shower yourself?: Yes    Feed "yourself? Yes  Do your laundry/housekeeping?: Yes  Manage your money, pay your bills and track your expenses?: Yes  Make your own meals?: Yes    Do your own shopping?: Yes    Previous Hospitalizations:   Any hospitalizations or ED visits within the last 12 months?: No      Advance Care Planning:   Living will: No    Advanced directive: Yes    Five wishes given: Yes      PREVENTIVE SCREENINGS      Cardiovascular Screening:    General: Screening Current      Diabetes Screening:     General: Screening Not Indicated and History Diabetes      Breast Cancer Screening:     General: Screening Current      Cervical Cancer Screening:    General: Screening Not Indicated      Osteoporosis Screening:    General: Screening Not Indicated and History Osteoporosis      Lung Cancer Screening:     General: Screening Not Indicated      Hepatitis C Screening:    General: Screening Current    Screening, Brief Intervention, and Referral to Treatment (SBIRT)    Screening  Typical number of drinks in a day: 0  Typical number of drinks in a week: 0  Interpretation: Low risk drinking behavior  AUDIT-C Screenin) How often did you have a drink containing alcohol in the past year? never  2) How many drinks did you have on a typical day when you were drinking in the past year? 0  3) How often did you have 6 or more drinks on one occasion in the past year? never    AUDIT-C Score: 0  Interpretation: Score 0-2 (female): Negative screen for alcohol misuse    No results found  Physical Exam:     /66 (BP Location: Left arm, Patient Position: Sitting, Cuff Size: Standard)   Pulse 79   Temp 97 6 °F (36 4 °C) (Temporal)   Resp 16   Ht 5' 1\" (1 549 m)   Wt 68 9 kg (152 lb)   SpO2 98%   BMI 28 72 kg/m²     Physical Exam  Vitals reviewed  Constitutional:       General: She is not in acute distress  Appearance: Normal appearance  She is normal weight  She is not ill-appearing  HENT:      Head: Normocephalic and atraumatic       " Right Ear: Tympanic membrane, ear canal and external ear normal  There is no impacted cerumen  Left Ear: Tympanic membrane, ear canal and external ear normal  There is no impacted cerumen  Nose: Nose normal  No congestion  Mouth/Throat:      Mouth: Mucous membranes are moist    Eyes:      General:         Right eye: No discharge  Left eye: No discharge  Conjunctiva/sclera: Conjunctivae normal       Pupils: Pupils are equal, round, and reactive to light  Cardiovascular:      Rate and Rhythm: Normal rate and regular rhythm  Heart sounds: Normal heart sounds  Pulmonary:      Effort: Pulmonary effort is normal  No respiratory distress  Breath sounds: Normal breath sounds  Chest:      Chest wall: No tenderness  Abdominal:      General: Abdomen is flat  Bowel sounds are normal  There is no distension  Palpations: Abdomen is soft  There is no mass  Tenderness: There is no abdominal tenderness  Musculoskeletal:         General: No deformity  Cervical back: Normal range of motion  Right lower leg: No edema  Left lower leg: No edema  Skin:     General: Skin is warm  Capillary Refill: Capillary refill takes less than 2 seconds  Findings: No rash  Neurological:      General: No focal deficit present  Mental Status: She is alert and oriented to person, place, and time     Psychiatric:         Mood and Affect: Mood normal          Behavior: Behavior normal           Ludy Campos MD

## 2023-05-18 ENCOUNTER — EVALUATION (OUTPATIENT)
Dept: PHYSICAL THERAPY | Facility: CLINIC | Age: 68
End: 2023-05-18

## 2023-05-18 DIAGNOSIS — M17.0 BILATERAL PRIMARY OSTEOARTHRITIS OF KNEE: ICD-10-CM

## 2023-05-18 NOTE — PROGRESS NOTES
PT Evaluation     Today's date: 2023  Patient name: Shonda Almeida  : 1955  MRN: [id number]  Referring provider: Steve Suarez MD  Dx:   Encounter Diagnosis     ICD-10-CM    1  Bilateral primary osteoarthritis of knee  M17 0 Ambulatory Referral to Physical Therapy                     Assessment  Assessment details: Pt presents with s/s consistent with B knee OA  She will benefit from skilled PT services to address her impairments in order to decrease pain and restore function  Impairments: abnormal gait, abnormal or restricted ROM, abnormal movement, activity intolerance, impaired physical strength, lacks appropriate home exercise program, pain with function, weight-bearing intolerance and poor body mechanics  Understanding of Dx/Px/POC: good   Prognosis: good    Goals  STG - 2-4 wks  1) pt will demonstrate 0-135 deg PROM  2) pt will improve gait abnormalities 50%  3) pt will improve pain 25%    LTG - 4-8 wks  1) pt will demonstrate 4/5 LE strength  2) pt will demonstrate normalized gait  3) pt will improve pain 50%    Plan  Planned modality interventions: low level laser therapy  Planned therapy interventions: joint mobilization, manual therapy, neuromuscular re-education, patient education, strengthening, therapeutic exercise, therapeutic activities, stretching, flexibility, functional ROM exercises, gait training, body mechanics training and home exercise program  Frequency: 2x week  Duration in weeks: 8  Treatment plan discussed with: patient        Subjective Evaluation    History of Present Illness  Mechanism of injury: Pt is a 79 y o  female with PMHx significant for DM II, PVCs, depression, anxiety, R CTR, R knee scope '16  She reports chronic R>L knee pain that worsened 1 5 yrs ago  She denies mechanical symptoms  She reports pain with walking improves with use of medial  brace but is uncomfortable to wear for any length of time      Pain  Current pain ratin  At best pain ratin  At worst pain ratin  Location: posterior R knee, anterior R>L knee  Quality: sharp, pressure, pulling, grinding and knife-like  Relieving factors: change in position, rest and medications  Aggravating factors: sitting, standing, walking and stair climbing  Progression: worsening      Diagnostic Tests  X-ray: abnormal (Aug '22: B tricompartmental OA)  Treatments  No previous or current treatments  Patient Goals  Patient goals for therapy: decreased pain          Objective     Observations     Right Knee   Positive for edema (1+)  Tenderness   Left Knee   Tenderness in the lateral joint line and medial joint line  Right Knee   Tenderness in the lateral joint line, medial joint line and popliteal fossa  Passive Range of Motion   Left Knee   Flexion: 128 degrees   Extension: -4 degrees     Right Knee   Flexion: 125 degrees   Extension: -1 degrees     Mobility   Patellar Mobility:   Left Knee   Hypomobile: left medial, left lateral, left superior and left inferior    Right Knee   Hypomobile: medial, lateral, superior and inferior     Strength/Myotome Testing     Left Hip   Planes of Motion   Flexion: 3+  Extension: 3+  Abduction: 3  Adduction: 3+    Right Hip   Planes of Motion   Flexion: 3+  Extension: 3+  Abduction: 3  Adduction: 3+    Left Knee   Flexion: 4-  Extension: 3+  Quadriceps contraction: good    Right Knee   Flexion: 4-  Extension: 3+  Quadriceps contraction: good    Tests     Left Knee   Negative valgus stress test at 0 degrees, valgus stress test at 30 degrees, varus stress test at 0 degrees and varus stress test at 30 degrees  Right Knee   Negative valgus stress test at 0 degrees, valgus stress test at 30 degrees, varus stress test at 0 degrees and varus stress test at 30 degrees  General Comments:      Knee Comments  Gait: no AD: pt lacks R>L TKE at midstance, decreased R stance time             Precautions:  PMHx: DM II, PVCs, depression, anxiety, R CTR, R knee scope "'16  Dx: B knee OA    Manuals 5/18            B knee flexion PROM  B/ 5\"x5 ea           Gr IV inf, sup PF mobs  1x40 ea           Laser B anterior knee  3000J ea                        Neuro Re-Ed                                                                                                        Ther Ex             bike             Heel slides B/ 5\"x10            Standing GA stretch             SLR B/  1x10            SL hip ABD B/  1x10                                                   Ther Activity                                       Gait Training             AlterG  60% BW           Step-ups/  downs  4\"/                        Modalities                                          "

## 2023-05-19 ENCOUNTER — APPOINTMENT (OUTPATIENT)
Dept: LAB | Facility: CLINIC | Age: 68
End: 2023-05-19

## 2023-05-19 DIAGNOSIS — E11.9 TYPE 2 DIABETES MELLITUS WITHOUT COMPLICATION, WITHOUT LONG-TERM CURRENT USE OF INSULIN (HCC): ICD-10-CM

## 2023-05-19 DIAGNOSIS — Z00.00 MEDICARE ANNUAL WELLNESS VISIT, SUBSEQUENT: ICD-10-CM

## 2023-05-19 LAB
ANION GAP SERPL CALCULATED.3IONS-SCNC: 6 MMOL/L (ref 4–13)
BUN SERPL-MCNC: 20 MG/DL (ref 5–25)
CALCIUM SERPL-MCNC: 9.7 MG/DL (ref 8.4–10.2)
CHLORIDE SERPL-SCNC: 104 MMOL/L (ref 96–108)
CHOLEST SERPL-MCNC: 223 MG/DL
CO2 SERPL-SCNC: 31 MMOL/L (ref 21–32)
CREAT SERPL-MCNC: 0.78 MG/DL (ref 0.6–1.3)
ERYTHROCYTE [DISTWIDTH] IN BLOOD BY AUTOMATED COUNT: 12.7 % (ref 11.6–15.1)
EST. AVERAGE GLUCOSE BLD GHB EST-MCNC: 140 MG/DL
GFR SERPL CREATININE-BSD FRML MDRD: 79 ML/MIN/1.73SQ M
GLUCOSE P FAST SERPL-MCNC: 134 MG/DL (ref 65–99)
HBA1C MFR BLD: 6.5 %
HCT VFR BLD AUTO: 45.2 % (ref 34.8–46.1)
HDLC SERPL-MCNC: 55 MG/DL
HGB BLD-MCNC: 15.2 G/DL (ref 11.5–15.4)
LDLC SERPL CALC-MCNC: 141 MG/DL (ref 0–100)
MCH RBC QN AUTO: 30.8 PG (ref 26.8–34.3)
MCHC RBC AUTO-ENTMCNC: 33.6 G/DL (ref 31.4–37.4)
MCV RBC AUTO: 92 FL (ref 82–98)
NONHDLC SERPL-MCNC: 168 MG/DL
PLATELET # BLD AUTO: 245 THOUSANDS/UL (ref 149–390)
PMV BLD AUTO: 10.5 FL (ref 8.9–12.7)
POTASSIUM SERPL-SCNC: 4.5 MMOL/L (ref 3.5–5.3)
RBC # BLD AUTO: 4.93 MILLION/UL (ref 3.81–5.12)
SODIUM SERPL-SCNC: 141 MMOL/L (ref 135–147)
TRIGL SERPL-MCNC: 135 MG/DL
WBC # BLD AUTO: 7.91 THOUSAND/UL (ref 4.31–10.16)

## 2023-05-22 ENCOUNTER — OFFICE VISIT (OUTPATIENT)
Dept: PHYSICAL THERAPY | Facility: CLINIC | Age: 68
End: 2023-05-22

## 2023-05-22 DIAGNOSIS — M17.0 BILATERAL PRIMARY OSTEOARTHRITIS OF KNEE: Primary | ICD-10-CM

## 2023-05-22 NOTE — PROGRESS NOTES
"Daily Note     Today's date: 2023  Patient name: Anshu Orlando  : 1955  MRN: [id number]  Referring provider: Marylene Blonder, MD  Dx:   Encounter Diagnosis     ICD-10-CM    1  Bilateral primary osteoarthritis of knee  M17 0                      Subjective: Pt reports good compliance with HEP, R > L knee pain  Objective: See treatment diary below      Assessment: Pt demonstrated right knee pain with step downs, pain-free tolerance to gait training at 60% BW with good heel-toe pattern  Plan: Continue poc as per PT  Precautions:  PMHx: DM II, PVCs, depression, anxiety, R CTR, R knee scope '  Dx: B knee OA    Manuals            B knee flexion PROM  \"x5 ea           Gr IV inf, sup PF mobs  1x40 ea           Laser B anterior knee  3000J ea                        Neuro Re-Ed                                                                                                        Ther Ex             bike             Heel slides \"x10 B/ 5\"x10           Standing GA stretch  3x15\" ea           SLR B/  1x10 B/   2x10           SL hip ABD B/  1x10 B/   2x10                                                  Ther Activity                                       Gait Training             AlterG  60% BW  1 mph 8 min           Step-ups/  downs  4\"/ 1x10 ea                        Modalities                                            "

## 2023-05-24 ENCOUNTER — OFFICE VISIT (OUTPATIENT)
Dept: PHYSICAL THERAPY | Facility: CLINIC | Age: 68
End: 2023-05-24

## 2023-05-24 DIAGNOSIS — M17.0 BILATERAL PRIMARY OSTEOARTHRITIS OF KNEE: Primary | ICD-10-CM

## 2023-05-24 NOTE — PROGRESS NOTES
"Daily Note     Today's date: 2023  Patient name: Arcadio Rodas  : 1955  MRN: [id number]  Referring provider: Konrad Shay MD  Dx:   Encounter Diagnosis     ICD-10-CM    1  Bilateral primary osteoarthritis of knee  M17 0                      Subjective: Pt reports a slight increase in knee motion  Objective: See treatment diary below      Assessment: Pt demonstrated continued difficulty with step downs secondary to pain, increased pain-free ambulation tolerance  Plan: Continue poc as per PT  Precautions:  PMHx: DM II, PVCs, depression, anxiety, R CTR, R knee scope   Dx: B knee OA    Manuals           B knee flexion PROM  \"x5 ea \"x5 ea          Gr IV inf, sup PF mobs  1x40 ea           Laser B anterior knee  3000J ea 3000J ea                       Neuro Re-Ed                                                                                                        Ther Ex             bike             Heel slides \"x10 B/ 5\"x10 B/  5\"x10          Standing GA stretch  3x15\" ea 3x15\" ea          SLR B/  1x10 B/   2x10 B/   2x10          SL hip ABD B/  1x10 B/   2x10 B/   2x10                                                 Ther Activity                                       Gait Training             AlterG  60% BW  1 mph 8 min 60% BW  1 mph 10 min          Step-ups/  downs  4\"/ 1x10 ea 4\"/ 1x10 ea                       Modalities                                            "

## 2023-05-30 ENCOUNTER — OFFICE VISIT (OUTPATIENT)
Dept: PHYSICAL THERAPY | Facility: CLINIC | Age: 68
End: 2023-05-30

## 2023-05-30 DIAGNOSIS — M17.0 BILATERAL PRIMARY OSTEOARTHRITIS OF KNEE: Primary | ICD-10-CM

## 2023-05-30 NOTE — PROGRESS NOTES
"Daily Note     Today's date: 2023  Patient name: Sebastien Horton  : 1955  MRN: [id number]  Referring provider: Kody Urbano MD  Dx:   Encounter Diagnosis     ICD-10-CM    1  Bilateral primary osteoarthritis of knee  M17 0                      Subjective: Pt reports good compliance with HEP  Objective: See treatment diary below    Assessment: Pt demonstrated improved stair tolerance on the L  Plan: Cont  Precautions:  PMHx: DM II, PVCs, depression, anxiety, R CTR, R knee scope   Dx: B knee OA    Manuals          B knee flexion PROM  \"x5 ea B/ 5\"x5 ea B/  5\"x5         Gr IV inf, sup PF mobs  1x40 ea           Laser B anterior knee  3000J ea 3000J ea B/  3000J                      Neuro Re-Ed                                                                                                        Ther Ex             bike    L1  5 min         Heel slides B/  5\"x10 B/ 5\"x10 B/  5\"x10 B/  5\"x10         Standing GA stretch  3x15\" ea 3x15\" ea B/  15\"x3         SLR B/  1x10 B/   2x10 B/   2x10 B/  3x8         SL hip ABD B/  1x10 B/   2x10 B/   2x10 B/  3x8                                                Ther Activity                                       Gait Training             AlterG  60% BW  1 mph 8 min 60% BW  1 mph 10 min 70% BW  1 2 mph  10 min         Step-ups/  downs  4\"/ 1x10 ea 4\"/ 1x10 ea B/  6\"/  1x10                      Modalities                                            "

## 2023-05-31 ENCOUNTER — APPOINTMENT (OUTPATIENT)
Dept: PHYSICAL THERAPY | Facility: CLINIC | Age: 68
End: 2023-05-31
Payer: COMMERCIAL

## 2023-06-01 ENCOUNTER — OFFICE VISIT (OUTPATIENT)
Dept: PHYSICAL THERAPY | Facility: CLINIC | Age: 68
End: 2023-06-01

## 2023-06-01 ENCOUNTER — APPOINTMENT (OUTPATIENT)
Dept: PHYSICAL THERAPY | Facility: CLINIC | Age: 68
End: 2023-06-01
Payer: COMMERCIAL

## 2023-06-01 DIAGNOSIS — M17.0 BILATERAL PRIMARY OSTEOARTHRITIS OF KNEE: Primary | ICD-10-CM

## 2023-06-01 NOTE — PROGRESS NOTES
"Daily Note     Today's date: 2023  Patient name: Jory Leyden  : 1955  MRN: [id number]  Referring provider: Dillon Martinez MD  Dx:   Encounter Diagnosis     ICD-10-CM    1  Bilateral primary osteoarthritis of knee  M17 0                      Subjective: Pt reports no significant changes in pain since last visit  Objective: See treatment diary below    Assessment: Pt demonstrated improved stair tolerance when cued to increase hip flexion  Plan: Cont  POC  Precautions:  PMHx: DM II, PVCs, depression, anxiety, R CTR, R knee scope   Dx: B knee OA    Manuals         B knee flexion PROM  B/  5\"x5 ea B/ 5\"x5 ea B/  5\"x5 B/  5\"x5        Gr IV inf, sup PF mobs  1x40 ea           Laser B anterior knee  3000J ea 3000J ea B/  3000J B/  3000J                     Neuro Re-Ed                                                                                                        Ther Ex             bike    L1  5 min L1  5 min        Heel slides B/  5\"x10 B/ 5\"x10 B/  5\"x10 B/  5\"x10 B/  5\"x10        Standing GA stretch  3x15\" ea 3x15\" ea B/  15\"x3 B/  15\"x3        SLR B/  1x10 B/   2x10 B/   2x10 B/  3x8 B/  3x10 B/  3x12       SL hip ABD B/  1x10 B/   2x10 B/   2x10 B/  3x8 B/  3x10 B/  3x12                                              Ther Activity                                       Gait Training             AlterG  60% BW  1 mph 8 min 60% BW  1 mph 10 min 70% BW  1 2 mph  10 min 70% BW  1 5 mph  10 min        Step-ups/  downs  4\"/ 1x10 ea 4\"/ 1x10 ea B/  6\"/  1x10 B/  6\"/  1x10                     Modalities                                            "

## 2023-06-05 ENCOUNTER — OFFICE VISIT (OUTPATIENT)
Dept: PHYSICAL THERAPY | Facility: CLINIC | Age: 68
End: 2023-06-05
Payer: COMMERCIAL

## 2023-06-05 DIAGNOSIS — M17.0 BILATERAL PRIMARY OSTEOARTHRITIS OF KNEE: Primary | ICD-10-CM

## 2023-06-05 PROCEDURE — 97110 THERAPEUTIC EXERCISES: CPT | Performed by: PHYSICAL THERAPIST

## 2023-06-05 PROCEDURE — 97116 GAIT TRAINING THERAPY: CPT | Performed by: PHYSICAL THERAPIST

## 2023-06-05 NOTE — PROGRESS NOTES
"Daily Note     Today's date: 2023  Patient name: Kathee Ormond  : 1955  MRN: [id number]  Referring provider: Ofelia Zamarripa MD  Dx:   Encounter Diagnosis     ICD-10-CM    1  Bilateral primary osteoarthritis of knee  M17 0                      Subjective: Pt reports intermittent B knee pain with WB activities  Objective: See treatment diary below    Assessment: Pt demonstrated improved L>R stair tolerance  Plan: Cont  POC  Precautions:  PMHx: DM II, PVCs, depression, anxiety, R CTR, R knee scope '16  Dx: B knee OA    Manuals         B knee flexion PROM  B/  5\"x5 ea B/ 5\"x5 ea B/  5\"x5 B/  5\"x5        Gr IV inf, sup PF mobs  1x40 ea           Laser B anterior knee  3000J ea 3000J ea B/  3000J B/  3000J                     Neuro Re-Ed                                                                                                        Ther Ex             bike    L1  5 min L1  5 min        Heel slides B/  5\"x10 B/ 5\"x10 B/  5\"x10 B/  5\"x10 B/  5\"x10 B/  5\"x10       Standing GA stretch  3x15\" ea 3x15\" ea B/  15\"x3 B/  15\"x3 B/  15\"x3       SLR B/  1x10 B/   2x10 B/   2x10 B/  3x8 B/  3x10 B/  3x12       SL hip ABD B/  1x10 B/   2x10 B/   2x10 B/  3x8 B/  3x10 B/  3x12                                              Ther Activity                                       Gait Training             AlterG  60% BW  1 mph 8 min 60% BW  1 mph 10 min 70% BW  1 2 mph  10 min 70% BW  1 5 mph  10 min 75% BW  1 5 mph  10 min       Step-ups/  downs  4\"/ 1x10 ea 4\"/ 1x10 ea B/  6\"/  1x10 B/  6\"/  1x10 B/  6\"/  1x10                    Modalities                                            "

## 2023-06-07 ENCOUNTER — OFFICE VISIT (OUTPATIENT)
Dept: PHYSICAL THERAPY | Facility: CLINIC | Age: 68
End: 2023-06-07
Payer: COMMERCIAL

## 2023-06-07 DIAGNOSIS — M17.0 BILATERAL PRIMARY OSTEOARTHRITIS OF KNEE: Primary | ICD-10-CM

## 2023-06-07 PROCEDURE — 97110 THERAPEUTIC EXERCISES: CPT

## 2023-06-07 PROCEDURE — 97116 GAIT TRAINING THERAPY: CPT

## 2023-06-07 PROCEDURE — 97140 MANUAL THERAPY 1/> REGIONS: CPT

## 2023-06-07 NOTE — PROGRESS NOTES
"Daily Note     Today's date: 2023  Patient name: Gwen Wooten  : 1955  MRN: [id number]  Referring provider: Martir Santos MD  Dx:   Encounter Diagnosis     ICD-10-CM    1  Bilateral primary osteoarthritis of knee  M17 0                      Subjective: Pt reports intermittent B knee pain which is worse with walking up or downs inclines  Objective: See treatment diary below    Assessment: Pt demonstrated pain with 6\" step downs but pain-free tolerance to gait training at 75% BW and good heel-toe pattern  Plan: Cont  POC  Precautions:  PMHx: DM II, PVCs, depression, anxiety, R CTR, R knee scope '  Dx: B knee OA    Manuals       B knee flexion PROM  B/  5\"x5 ea B/ 5\"x5 ea B/  5\"x5 B/  5\"x5  B/   5\"x5      Gr IV inf, sup PF mobs  1x40 ea           Laser B anterior knee  3000J ea 3000J ea B/  3000J B/  3000J  B/  3000J                   Neuro Re-Ed                                                                                                        Ther Ex             bike    L1  5 min L1  5 min  L1 5 min      Heel slides B/  5\"x10 B/ 5\"x10 B/  5\"x10 B/  5\"x10 B/  5\"x10 B/  5\"x10 B/  5\"x10      Standing GA stretch  3x15\" ea 3x15\" ea B/  15\"x3 B/  15\"x3 B/  15\"x3 B/   15\"x3      SLR B/  1x10 B/   2x10 B/   2x10 B/  3x8 B/  3x10 B/  3x12 B/  3x12      SL hip ABD B/  1x10 B/   2x10 B/   2x10 B/  3x8 B/  3x10 B/  3x12 B/  3x12                                             Ther Activity                                       Gait Training             AlterG  60% BW  1 mph 8 min 60% BW  1 mph 10 min 70% BW  1 2 mph  10 min 70% BW  1 5 mph  10 min 75% BW  1 5 mph  10 min 75% BW  1 5 mph  10 min      Step-ups/  downs  4\"/ 1x10 ea 4\"/ 1x10 ea B/  6\"/  1x10 B/  6\"/  1x10 B/  6\"/  1x10 B/  6\"/  1x10                   Modalities                                            "

## 2023-06-12 ENCOUNTER — APPOINTMENT (OUTPATIENT)
Dept: PHYSICAL THERAPY | Facility: CLINIC | Age: 68
End: 2023-06-12
Payer: COMMERCIAL

## 2023-06-12 NOTE — PROGRESS NOTES
Chief Complaint: Follow up of B/L knee pain     HPI:    Mary Ann Brambila is a 79y o  year old female  who presents today for follow up of lateral knee pain      Description of symptoms:       3/3/2023 for bilateral knee pain  Patient declined corticosteroid injection at that time due to glycemic control  Patient opted for ketorolac bilateral knee injection for upcoming [place] trip  Patient also deferred at that time surgical management was given information about PRP injection and medial  brace  Today's visit: Pain present for many years  Right knee pain is worse than left knee  Patient has been compliant with medial  brace which helps her ambulate  Patient had difficulty with recent flight to [place] after a long period of sitting developed stiffness  Denies any new trauma to the bilateral knees  Patient received a ketorolac injection back in March  With only 1 day of relief  Patient with CSI in the past with adverse effect of increase serum glucose  Trial of viscosupplementation with minimal relief  Trial of formal physical therapy  Severity 8/10 daily with activity  Patient feels limited in activity; difficulty with steps into home, difficulty carrying groceries     Patient with history of diabetes last A1c completed May/19/2023 with a A1c of 6 5      I have personally reviewed pertinent films in PACS  Patient Active Problem List   Diagnosis   • Allergic rhinitis   • Cataract   • Depression with anxiety   • Generalized osteoarthritis of multiple sites   • Hemorrhoids   • Insomnia   • Plantar fasciitis, bilateral   • Cough   • Overweight (BMI 25 0-29  9)   • Type 2 diabetes mellitus without complication, without long-term current use of insulin (HCC)   • Right elbow tendinitis   • Female cystocele   • Left carpal tunnel syndrome   • Trigger finger, left ring finger   • COVID-19   • Symptomatic PVCs   • Postnasal drip   • Dizziness   • Chronic right shoulder pain   • Somatic dysfunction of thoracic region   • Somatic dysfunction of cervical region   • Chronic pain of right knee   • Bilateral primary osteoarthritis of knee   • Left arm pain   • Osteoporosis without current pathological fracture        Current Outpatient Medications on File Prior to Visit   Medication Sig Dispense Refill   • alendronate (Fosamax) 70 mg tablet Take 1 tablet (70 mg total) by mouth every 7 days 12 tablet 3   • atorvastatin (LIPITOR) 10 mg tablet Take 1 tablet (10 mg total) by mouth daily 90 tablet 1   • BIOTIN 5000 PO Take by mouth daily     • Blood Glucose Monitoring Suppl (Contour Next One) KIT Use daily 1 kit 0   • Cyanocobalamin (VITAMIN B12 PO) Take by mouth daily     • Diclofenac Sodium (VOLTAREN) 1 % Apply 2 g topically 4 (four) times a day 100 g 1   • fluticasone (FLONASE) 50 mcg/act nasal spray 1 spray into each nostril daily 16 g 4   • glucose blood (Contour Test) test strip Use 1 each daily Use as instructed 100 each 3   • lidocaine (LMX) 4 % cream Apply topically as needed for mild pain 30 g 0   • loratadine (CLARITIN) 10 mg tablet Take 10 mg by mouth daily     • Microlet Lancets MISC Use daily 100 each 3   • Omega-3 Fatty Acids (OMEGA 3 PO) Take by mouth     • OneTouch Ultra test strip USE 1 STRIP DAILY AS NEEDED FOR HYPOGLYCEMIA AS  DIRECTED 100 each 3   • TURMERIC PO Take by mouth daily       No current facility-administered medications on file prior to visit          Allergies   Allergen Reactions   • Bee Pollen Other (See Comments)     Other reaction(s): Sneezing   • Pollen Extract Allergic Rhinitis and Sneezing        Tobacco Use: Low Risk  (6/13/2023)    Patient History    • Smoking Tobacco Use: Never    • Smokeless Tobacco Use: Never    • Passive Exposure: Not on file        Social Determinants of Health     Tobacco Use: Low Risk  (6/13/2023)    Patient History    • Smoking Tobacco Use: Never    • Smokeless Tobacco Use: Never    • Passive Exposure: Not on file   Alcohol Use: Not At Risk (5/16/2023)    AUDIT-C    • Frequency of Alcohol Consumption: Never    • Average Number of Drinks: Patient does not drink    • Frequency of Binge Drinking: Never   Financial Resource Strain: Low Risk  (5/16/2023)    Overall Financial Resource Strain (CARDIA)    • Difficulty of Paying Living Expenses: Not hard at all   Food Insecurity: No Food Insecurity (8/16/2022)    Hunger Vital Sign    • Worried About Running Out of Food in the Last Year: Never true    • Ran Out of Food in the Last Year: Never true   Transportation Needs: No Transportation Needs (5/16/2023)    PRAPARE - Transportation    • Lack of Transportation (Medical): No    • Lack of Transportation (Non-Medical): No   Physical Activity: Not on file   Stress: Not on file   Social Connections: Not on file   Intimate Partner Violence: Not At Risk (8/16/2022)    Humiliation, Afraid, Rape, and Kick questionnaire    • Fear of Current or Ex-Partner: No    • Emotionally Abused: No    • Physically Abused: No    • Sexually Abused: No   Depression: Not at risk (3/4/2022)    PHQ-2    • PHQ-2 Score: 0   Housing Stability: Low Risk  (8/16/2022)    Housing Stability Vital Sign    • Unable to Pay for Housing in the Last Year: No    • Number of Places Lived in the Last Year: 1    • Unstable Housing in the Last Year: No               Review of Systems     Body mass index is 29 1 kg/m²  Physical Exam  Vitals and nursing note reviewed  Constitutional:       Appearance: Normal appearance  HENT:      Head: Atraumatic  Eyes:      Conjunctiva/sclera: Conjunctivae normal    Pulmonary:      Effort: Pulmonary effort is normal    Neurological:      Mental Status: She is oriented to person, place, and time     Psychiatric:         Mood and Affect: Mood normal          Behavior: Behavior normal           Ortho Exam:    Body part: right knee    Inspection: no gross deformity, no erythema, no warmth, no effusion     Palpation: (+) tenderness: Pes anserine bursa    Range of motion:   Knee flexion intact  Knee extension intact      Special Tests:  Valgus testing with mild laxity  Negative Lachman  Negative Veronica testing      Distal Neurovascular Status: Intact, Yes     Large joint arthrocentesis: R anserine bursa  Universal Protocol:  Consent: Verbal consent obtained  Risks and benefits: risks, benefits and alternatives were discussed  Consent given by: patient  Patient understanding: patient states understanding of the procedure being performed  Patient consent: the patient's understanding of the procedure matches consent given  Site marked: the operative site was marked  Radiology Images displayed and confirmed  If images not available, report reviewed: imaging studies available  Required items: required blood products, implants, devices, and special equipment available  Patient identity confirmed: verbally with patient    Supporting Documentation  Indications: pain   Procedure Details  Location: knee - R anserine bursa  Preparation: Patient was prepped and draped in the usual sterile fashion  Needle size: 25 G  Ultrasound guidance: no  Approach: anterolateral (Inferior lateral patella)  Medications administered: 1 mL bupivacaine 0 25 %; 60 mg ketorolac 60 mg/2 mL    Patient tolerance: patient tolerated the procedure well with no immediate complications  Dressing:  Sterile dressing applied             Assessment:     Diagnosis ICD-10-CM Associated Orders   1  Primary osteoarthritis of right knee  M17 11       2  Pes anserinus bursitis of right knee  M70 51            Plan:    We discussed clinical examination and findings with Von 45 previous right knee x-ray imaging  Clinical presentation and findings consistent with primary right knee osteoarthritis as well as Pez anserine bursitis  We reviewed anatomical and biomechanics of affected area  Discussed with patient trial of ketorolac injection to Pes anserine bursa  Reviewed benefits and risk    Patient "opted for diagnostic/therapeutic injection of Pez anserine bursa  Patient tolerated procedure well  We will follow-up with patient within 2 weeks  If patient has no significant improvement from medial knee pain we will consider obtaining advanced imaging  Shared decision making, patient agreeable to plan  Follow-Up:    Return in about 2 weeks (around 6/27/2023) for Recheck  Portions of the record may have been created with voice recognition software  Occasional wrong word or \"sound alike\" substitutions may have occurred due to the inherent limitations of voice recognition software  Please review the chart carefully and recognize, using context, where substitutions/typographical errors may have occurred           "

## 2023-06-13 ENCOUNTER — OFFICE VISIT (OUTPATIENT)
Dept: OBGYN CLINIC | Facility: OTHER | Age: 68
End: 2023-06-13

## 2023-06-13 VITALS
HEART RATE: 74 BPM | HEIGHT: 61 IN | WEIGHT: 154 LBS | BODY MASS INDEX: 29.07 KG/M2 | SYSTOLIC BLOOD PRESSURE: 95 MMHG | DIASTOLIC BLOOD PRESSURE: 58 MMHG

## 2023-06-13 DIAGNOSIS — M70.51 PES ANSERINUS BURSITIS OF RIGHT KNEE: ICD-10-CM

## 2023-06-13 DIAGNOSIS — M17.11 PRIMARY OSTEOARTHRITIS OF RIGHT KNEE: Primary | ICD-10-CM

## 2023-06-13 RX ORDER — BUPIVACAINE HYDROCHLORIDE 2.5 MG/ML
1 INJECTION, SOLUTION INFILTRATION; PERINEURAL
Status: COMPLETED | OUTPATIENT
Start: 2023-06-13 | End: 2023-06-13

## 2023-06-13 RX ORDER — KETOROLAC TROMETHAMINE 30 MG/ML
60 INJECTION, SOLUTION INTRAMUSCULAR; INTRAVENOUS
Status: COMPLETED | OUTPATIENT
Start: 2023-06-13 | End: 2023-06-13

## 2023-06-13 RX ADMIN — KETOROLAC TROMETHAMINE 60 MG: 30 INJECTION, SOLUTION INTRAMUSCULAR; INTRAVENOUS at 10:30

## 2023-06-13 RX ADMIN — BUPIVACAINE HYDROCHLORIDE 1 ML: 2.5 INJECTION, SOLUTION INFILTRATION; PERINEURAL at 10:30

## 2023-06-14 ENCOUNTER — OFFICE VISIT (OUTPATIENT)
Dept: PHYSICAL THERAPY | Facility: CLINIC | Age: 68
End: 2023-06-14
Payer: COMMERCIAL

## 2023-06-14 DIAGNOSIS — M17.0 BILATERAL PRIMARY OSTEOARTHRITIS OF KNEE: Primary | ICD-10-CM

## 2023-06-14 PROCEDURE — 97110 THERAPEUTIC EXERCISES: CPT | Performed by: PHYSICAL THERAPIST

## 2023-06-14 PROCEDURE — 97116 GAIT TRAINING THERAPY: CPT | Performed by: PHYSICAL THERAPIST

## 2023-06-14 NOTE — PROGRESS NOTES
"Daily Note     Today's date: 2023  Patient name: Ijeoma Drummond  : 1955  MRN: [id number]  Referring provider: Elvia Sampson MD  Dx:   Encounter Diagnosis     ICD-10-CM    1  Bilateral primary osteoarthritis of knee  M17 0                      Subjective: Pt reports getting a R pes anserine injection yesterday  Objective: See treatment diary below  Held PROM, bike, heel slides secondary to s/p CSI    Assessment: Pt demonstrated improved WB tolerance on AlterG  Plan: Cont  POC  Precautions:  PMHx: DM II, PVCs, depression, anxiety, R CTR, R knee scope '16  Dx: B knee OA    Manuals      B knee flexion PROM  B/  5\"x5 ea B/ 5\"x5 ea B/  5\"x5 B/  5\"x5  B/   5\"x5 nv     Gr IV inf, sup PF mobs  1x40 ea           Laser B anterior knee  3000J ea 3000J ea B/  3000J B/  3000J  B/  3000J B/  3000J ea                  Neuro Re-Ed                                                                                                        Ther Ex             bike    L1  5 min L1  5 min  L1 5 min nv     Heel slides B/  5\"x10 B/ 5\"x10 B/  5\"x10 B/  5\"x10 B/  5\"x10 B/  5\"x10 B/  5\"x10 nv     Standing GA stretch  3x15\" ea 3x15\" ea B/  15\"x3 B/  15\"x3 B/  15\"x3 B/   15\"x3 B/  15\"x3     SLR B/  1x10 B/   2x10 B/   2x10 B/  3x8 B/  3x10 B/  3x12 B/  3x12 B/  3x15     SL hip ABD B/  1x10 B/   2x10 B/   2x10 B/  3x8 B/  3x10 B/  3x12 B/  3x12 B/  3x15                                            Ther Activity                                       Gait Training             AlterG  60% BW  1 mph 8 min 60% BW  1 mph 10 min 70% BW  1 2 mph  10 min 70% BW  1 5 mph  10 min 75% BW  1 5 mph  10 min 75% BW  1 5 mph  10 min 75% BW  1 5 mph  10 min 80% BW  1 5 mph  10 min    Step-ups/  downs  4\"/ 1x10 ea 4\"/ 1x10 ea B/  6\"/  1x10 B/  6\"/  1x10 B/  6\"/  1x10 B/  6\"/  1x10 B/  6\"/  1x10                  Modalities                                            "

## 2023-06-15 ENCOUNTER — OFFICE VISIT (OUTPATIENT)
Dept: PHYSICAL THERAPY | Facility: CLINIC | Age: 68
End: 2023-06-15
Payer: COMMERCIAL

## 2023-06-15 DIAGNOSIS — M17.0 BILATERAL PRIMARY OSTEOARTHRITIS OF KNEE: Primary | ICD-10-CM

## 2023-06-15 PROCEDURE — 97116 GAIT TRAINING THERAPY: CPT

## 2023-06-15 PROCEDURE — 97110 THERAPEUTIC EXERCISES: CPT

## 2023-06-15 NOTE — PROGRESS NOTES
"Daily Note     Today's date: 6/15/2023  Patient name: Todd Cordon  : 1955  MRN: [id number]  Referring provider: Dilcia Sun MD  Dx:   Encounter Diagnosis     ICD-10-CM    1  Bilateral primary osteoarthritis of knee  M17 0                      Subjective: Pt reports a little better motion in her knees but no change in pain levels  Objective: See treatment diary below    Assessment: Pt demonstrated right posterior knee pain with gait training at 75% BW, fair eccentric control with step downs  Plan: Cont  POC  Precautions:  PMHx: DM II, PVCs, depression, anxiety, R CTR, R knee scope   Dx: B knee OA    Manuals 5/18 5/22 5/24 5/30 6/1  6/7 6/14 6/15    B knee flexion PROM  B/  5\"x5 ea B/ 5\"x5 ea B/  5\"x5 B/  5\"x5  B/   5\"x5 nv B/  5\"x5    Gr IV inf, sup PF mobs  1x40 ea           Laser B anterior knee  3000J ea 3000J ea B/  3000J B/  3000J  B/  3000J B/  3000J ea B/  3000J ea                 Neuro Re-Ed                                                                                                        Ther Ex             bike    L1  5 min L1  5 min  L1 5 min nv L1 5 min    Heel slides B/  5\"x10 B/ 5\"x10 B/  5\"x10 B/  5\"x10 B/  5\"x10 B/  5\"x10 B/  5\"x10 nv B/  5\"x10    Standing GA stretch  3x15\" ea 3x15\" ea B/  15\"x3 B/  15\"x3 B/  15\"x3 B/   15\"x3 B/  15\"x3 B/   15\"x3    SLR B/  1x10 B/   2x10 B/   2x10 B/  3x8 B/  3x10 B/  3x12 B/  3x12 B/  3x15 B/  3x15    SL hip ABD B/  1x10 B/   2x10 B/   2x10 B/  3x8 B/  3x10 B/  3x12 B/  3x12 B/  3x15 B/  3x15                                           Ther Activity                                       Gait Training             AlterG  60% BW  1 mph 8 min 60% BW  1 mph 10 min 70% BW  1 2 mph  10 min 70% BW  1 5 mph  10 min 75% BW  1 5 mph  10 min 75% BW  1 5 mph  10 min 75% BW  1 5 mph  10 min 75% BW  1 8 mph  10 min    Step-ups/  downs  4\"/ 1x10 ea 4\"/ 1x10 ea B/  6\"/  1x10 B/  6\"/  1x10 B/  6\"/  1x10 B/  6\"/  1x10 B/  6\"/  1x10 B/  6\"/  1x10  " Modalities

## 2023-06-19 ENCOUNTER — OFFICE VISIT (OUTPATIENT)
Dept: PHYSICAL THERAPY | Facility: CLINIC | Age: 68
End: 2023-06-19
Payer: COMMERCIAL

## 2023-06-19 DIAGNOSIS — M17.0 BILATERAL PRIMARY OSTEOARTHRITIS OF KNEE: Primary | ICD-10-CM

## 2023-06-19 PROCEDURE — 97110 THERAPEUTIC EXERCISES: CPT

## 2023-06-19 PROCEDURE — 97140 MANUAL THERAPY 1/> REGIONS: CPT

## 2023-06-19 PROCEDURE — 97116 GAIT TRAINING THERAPY: CPT

## 2023-06-19 NOTE — PROGRESS NOTES
"Daily Note     Today's date: 2023  Patient name: Melanie Landry  : 1955  MRN: [id number]  Referring provider: Trisha Garay MD  Dx:   Encounter Diagnosis     ICD-10-CM    1  Bilateral primary osteoarthritis of knee  M17 0                      Subjective: Pt reports a little decrease in her pain levels since last treatment session  Objective: See treatment diary below    Assessment: Pt demonstrated fatigue with hip strengthening, continued difficulty with 6\" step downs secondary to knee pain  Plan: Cont  POC  Precautions:  PMHx: DM II, PVCs, depression, anxiety, R CTR, R knee scope '16  Dx: B knee OA    Manuals 5/18 5/22 5/24 5/30 6/1  6/7 6/14 6/15 6/19   B knee flexion PROM  B/  5\"x5 ea B/ 5\"x5 ea B/  5\"x5 B/  5\"x5  B/   5\"x5 nv B/  5\"x5 B/ 5\"x5   Gr IV inf, sup PF mobs  1x40 ea           Laser B anterior knee  3000J ea 3000J ea B/  3000J B/  3000J  B/  3000J B/  3000J ea B/  3000J ea B/ 3000J ea                Neuro Re-Ed                                                                                                        Ther Ex             bike    L1  5 min L1  5 min  L1 5 min nv L1 5 min L1 5 min   Heel slides B/  5\"x10 B/ 5\"x10 B/  5\"x10 B/  5\"x10 B/  5\"x10 B/  5\"x10 B/  5\"x10 nv B/  5\"x10 B/ 5\"x10   Standing GA stretch  3x15\" ea 3x15\" ea B/  15\"x3 B/  15\"x3 B/  15\"x3 B/   15\"x3 B/  15\"x3 B/   15\"x3 B/  15\"x3   SLR B/  1x10 B/   2x10 B/   2x10 B/  3x8 B/  3x10 B/  3x12 B/  3x12 B/  3x15 B/  3x15 B/  3x15   SL hip ABD B/  1x10 B/   2x10 B/   2x10 B/  3x8 B/  3x10 B/  3x12 B/  3x12 B/  3x15 B/  3x15 B/  3x15                                          Ther Activity                                       Gait Training             AlterG  60% BW  1 mph 8 min 60% BW  1 mph 10 min 70% BW  1 2 mph  10 min 70% BW  1 5 mph  10 min 75% BW  1 5 mph  10 min 75% BW  1 5 mph  10 min 75% BW  1 5 mph  10 min 75% BW  1 8 mph  10 min 75% BW  1 8 mph  10 min   Step-ups/  downs  4\"/ 1x10 ea 4\"/ 1x10 " "ea B/  6\"/  1x10 B/  6\"/  1x10 B/  6\"/  1x10 B/  6\"/  1x10 B/  6\"/  1x10 B/  6\"/  1x10 B/ 6\"/ 1x12                Modalities                                            "

## 2023-06-21 ENCOUNTER — OFFICE VISIT (OUTPATIENT)
Dept: PHYSICAL THERAPY | Facility: CLINIC | Age: 68
End: 2023-06-21
Payer: COMMERCIAL

## 2023-06-21 DIAGNOSIS — M17.0 BILATERAL PRIMARY OSTEOARTHRITIS OF KNEE: Primary | ICD-10-CM

## 2023-06-21 PROCEDURE — 97116 GAIT TRAINING THERAPY: CPT

## 2023-06-21 PROCEDURE — 97110 THERAPEUTIC EXERCISES: CPT

## 2023-06-21 PROCEDURE — 97140 MANUAL THERAPY 1/> REGIONS: CPT

## 2023-06-21 NOTE — PROGRESS NOTES
"Daily Note     Today's date: 2023  Patient name: Dexter Nicholas  : 1955  MRN: [id number]  Referring provider: Neo Plascencia MD  Dx:   Encounter Diagnosis     ICD-10-CM    1  Bilateral primary osteoarthritis of knee  M17 0                      Subjective: Pt reports decreased intensity of knee pain since last treatment session  Objective: See treatment diary below    Assessment: Pt continued to demonstrate right knee pain with step downs and difficulty with eccentric lowering  Hip and quad strengthening remains challenging but knee PROM is less painful  Plan: Cont  POC  Precautions:  PMHx: DM II, PVCs, depression, anxiety, R CTR, R knee scope   Dx: B knee OA    Manuals 6/21      6/7 6/14 6/15 6/19   B knee flexion PROM B/ 5\"x5      B/   5\"x5 nv B/  5\"x5 B/ 5\"x5   Gr IV inf, sup PF mobs             Laser B anterior knee B/  3000J ea      B/  3000J B/  3000J ea B/  3000J ea B/ 3000J ea                Neuro Re-Ed                                                                                                        Ther Ex             bike L1 5 min      L1 5 min nv L1 5 min L1 5 min   Heel slides B/  5\"x10      B/  5\"x10 nv B/  5\"x10 B/ 5\"x10   Standing GA stretch B/  15\"x3      B/   15\"x3 B/  15\"x3 B/   15\"x3 B/  15\"x3   SLR B/  3x15      B/  3x12 B/  3x15 B/  3x15 B/  3x15   SL hip ABD B/  3x15      B/  3x12 B/  3x15 B/  3x15 B/  3x15                                          Ther Activity                                       Gait Training             AlterG 75% BW  1 7 mph  10 min      75% BW  1 5 mph  10 min 75% BW  1 5 mph  10 min 75% BW  1 8 mph  10 min 75% BW  1 8 mph  10 min   Step-ups/  downs B/ 6\"/ 1x12 ea      B/  6\"/  1x10 B/  6\"/  1x10 B/  6\"/  1x10 B/ 6\"/ 1x12                Modalities                                            "

## 2023-06-26 ENCOUNTER — OFFICE VISIT (OUTPATIENT)
Dept: PHYSICAL THERAPY | Facility: CLINIC | Age: 68
End: 2023-06-26
Payer: COMMERCIAL

## 2023-06-26 DIAGNOSIS — M17.0 BILATERAL PRIMARY OSTEOARTHRITIS OF KNEE: Primary | ICD-10-CM

## 2023-06-26 PROCEDURE — 97110 THERAPEUTIC EXERCISES: CPT

## 2023-06-26 PROCEDURE — 97140 MANUAL THERAPY 1/> REGIONS: CPT

## 2023-06-26 PROCEDURE — 97116 GAIT TRAINING THERAPY: CPT

## 2023-06-26 NOTE — PROGRESS NOTES
"Daily Note     Today's date: 2023  Patient name: Claudia Harris  : 1955  MRN: [id number]  Referring provider: Josselin Flores MD  Dx:   Encounter Diagnosis     ICD-10-CM    1  Bilateral primary osteoarthritis of knee  M17 0           Start Time: 5403  Stop Time: 7433  Total time in clinic (min): 57 minutes    Subjective: Patient reports that she has continued pain along lateral Right knee with transfer from sitting to standing and with stairs  Objective: See treatment diary below      Assessment: Tolerated treatment well  Patient demonstrated fatigue post treatment, exhibited good technique with therapeutic exercises and would benefit from continued PT  Muscular fatigue noted after sidelying hip abduction  She had soreness into Right knee throughout session but able to complete TE as instructed  Decreased eccentric quad control noted during step down  Plan: Continue per plan of care  Progress treatment as tolerated  Precautions:  PMHx: DM II, PVCs, depression, anxiety, R CTR, R knee scope   Dx: B knee OA    Manuals 6/21 6/26     6/7 6/14 6/15 6/19   B knee flexion PROM B/ 5\"x5 B 10\"x5 ea     B/   5\"x5 nv B/  5\"x5 B/ 5\"x5   Gr IV inf, sup PF mobs             Laser B anterior knee B/  3000J ea B 3000J ea     B/  3000J B/  3000J ea B/  3000J ea B/ 3000J ea                Neuro Re-Ed                                                                                                        Ther Ex             bike L1 5 min L1 5min     L1 5 min nv L1 5 min L1 5 min   Heel slides B/  5\"x10 B 5\"x10 ea     B/  5\"x10 nv B/  5\"x10 B/ 5\"x10   Standing GA stretch B/  15\"x3 Slant B 30\"x3 ea     B/   15\"x3 B/  15\"x3 B/   15\"x3 B/  15\"x3   SLR B/  3x15 B 3x15 ea     B/  3x12 B/  3x15 B/  3x15 B/  3x15   SL hip ABD B/  3x15 B 3x15 ea     B/  3x12 B/  3x15 B/  3x15 B/  3x15                                          Ther Activity                                       Gait Training             AlterG " "75% BW  1 7 mph  10 min 80% BW 1 8 mph 10 min     75% BW  1 5 mph  10 min 75% BW  1 5 mph  10 min 75% BW  1 8 mph  10 min 75% BW  1 8 mph  10 min   Step-ups/  downs B/ 6\"/ 1x12 ea B 6\" 1x12 ea     B/  6\"/  1x10 B/  6\"/  1x10 B/  6\"/  1x10 B/ 6\"/ 1x12                Modalities                                            "

## 2023-06-28 ENCOUNTER — OFFICE VISIT (OUTPATIENT)
Dept: PHYSICAL THERAPY | Facility: CLINIC | Age: 68
End: 2023-06-28
Payer: COMMERCIAL

## 2023-06-28 DIAGNOSIS — M17.0 BILATERAL PRIMARY OSTEOARTHRITIS OF KNEE: Primary | ICD-10-CM

## 2023-06-28 PROCEDURE — 97116 GAIT TRAINING THERAPY: CPT

## 2023-06-28 PROCEDURE — 97140 MANUAL THERAPY 1/> REGIONS: CPT

## 2023-06-28 PROCEDURE — 97110 THERAPEUTIC EXERCISES: CPT

## 2023-06-28 NOTE — PROGRESS NOTES
"Daily Note     Today's date: 2023  Patient name: Sinan Preciado  : 1955  MRN: [id number]  Referring provider: Abdelrahman Nagy MD  Dx:   Encounter Diagnosis     ICD-10-CM    1  Bilateral primary osteoarthritis of knee  M17 0           Start Time: 1430  Stop Time: 1520  Total time in clinic (min): 50 minutes    Subjective: Patient offers no new complaints prior to therapy  Notes her R knee has more pain than her L when completing STS and stairs  Objective: See treatment diary below    Assessment: Tolerated treatment well  Patient demonstrated fatigue post treatment, exhibited good technique with therapeutic exercises and would benefit from continued PT  Patient challenged with SL hip abd  Plan: Continue per plan of care  Progress treatment as tolerated  Precautions:  PMHx: DM II, PVCs, depression, anxiety, R CTR, R knee scope   Dx: B knee OA    Manuals 6/21 6/26 6/28    6/7 6/14 6/15 6/19   B knee flexion PROM B/ 5\"x5 B 10\"x5 ea B 10\"x5 ea    B/   5\"x5 nv B/  5\"x5 B/ 5\"x5   Gr IV inf, sup PF mobs             Laser B anterior knee B/  3000J ea B 3000J ea B 3000J ea    B/  3000J B/  3000J ea B/  3000J ea B/ 3000J ea                Neuro Re-Ed                                                                                                        Ther Ex             bike L1 5 min L1 5 min L1 5 min    L1 5 min nv L1 5 min L1 5 min   Heel slides B/  5\"x10 B 5\"x10 ea B 5\"x10 ea    B/  5\"x10 nv B/  5\"x10 B/ 5\"x10   Standing GA stretch B/  15\"x3 Slant B 30\"x3 ea Slant B 30\"x3 ea    B/   15\"x3 B/  15\"x3 B/   15\"x3 B/  15\"x3   SLR B/  3x15 B 3x15 ea B 3x15 ea    B/  3x12 B/  3x15 B/  3x15 B/  3x15   SL hip ABD B/  3x15 B 3x15 ea B 3x15 ea    B/  3x12 B/  3x15 B/  3x15 B/  3x15                                          Ther Activity                                       Gait Training             AlterG 75% BW  1 7 mph  10 min 80% BW 1 8 mph 10 min 80% BW 1 8 mph 10 min    75% BW  1 5 mph  10 min " "75% BW  1 5 mph  10 min 75% BW  1 8 mph  10 min 75% BW  1 8 mph  10 min   Step-ups/  downs B/ 6\"/ 1x12 ea B 6\" 1x12 ea B 6\" 1x12 ea    B/  6\"/  1x10 B/  6\"/  1x10 B/  6\"/  1x10 B/ 6\"/ 1x12                Modalities                                          "

## 2023-07-03 ENCOUNTER — OFFICE VISIT (OUTPATIENT)
Dept: PHYSICAL THERAPY | Facility: CLINIC | Age: 68
End: 2023-07-03
Payer: COMMERCIAL

## 2023-07-03 DIAGNOSIS — M17.0 BILATERAL PRIMARY OSTEOARTHRITIS OF KNEE: Primary | ICD-10-CM

## 2023-07-03 PROCEDURE — 97110 THERAPEUTIC EXERCISES: CPT

## 2023-07-03 PROCEDURE — 97116 GAIT TRAINING THERAPY: CPT

## 2023-07-03 NOTE — PROGRESS NOTES
Daily Note     Today's date: 7/3/2023  Patient name: Mitra Purvis  : 1955  MRN: [id number]  Referring provider: María Mcclure MD  Dx:   Encounter Diagnosis     ICD-10-CM    1. Bilateral primary osteoarthritis of knee  M17.0                      Subjective: Patient reports right knee pain with weight bearing on her right leg while performing stairs. Objective: See treatment diary below    Assessment: Pt was able to progress hip and quad strength. She continues to demonstrate difficulty with step downs R > L. Plan: Continue per plan of care. Progress treatment as tolerated. Precautions:  PMHx: DM II, PVCs, depression, anxiety, R CTR, R knee scope '16  Dx: B knee OA    Manuals 6/21 6/26 6/28 7/3   6/7 6/14 6/15 6/19   B knee flexion PROM B/ 5"x5 B 10"x5 ea B 10"x5 ea B 10"x5 ea   B/   5"x5 nv B/  5"x5 B/ 5"x5   Gr IV inf, sup PF mobs             Laser B anterior knee B/  3000J ea B 3000J ea B 3000J ea B  3000J ea   B/  3000J B/  3000J ea B/  3000J ea B/ 3000J ea                Neuro Re-Ed                                                                                                        Ther Ex             bike L1 5 min L1 5 min L1 5 min L1 5 min   L1 5 min nv L1 5 min L1 5 min   Heel slides B/  5"x10 B 5"x10 ea B 5"x10 ea B/ 5"x10 ea   B/  5"x10 nv B/  5"x10 B/ 5"x10   Standing GA stretch B/  15"x3 Slant B 30"x3 ea Slant B 30"x3 ea Slant B 30"x3 ea   B/   15"x3 B/  15"x3 B/   15"x3 B/  15"x3   SLR B/  3x15 B 3x15 ea B 3x15 ea B   3x18 ea   B/  3x12 B/  3x15 B/  3x15 B/  3x15   SL hip ABD B/  3x15 B 3x15 ea B 3x15 ea B/   3x18 ea   B/  3x12 B/  3x15 B/  3x15 B/  3x15                                          Ther Activity                                       Gait Training             AlterG 75% BW  1.7 mph  10 min 80% BW 1.8 mph 10 min 80% BW 1.8 mph 10 min 80% BW 1.8 mph 10 min   75% BW  1.5 mph  10 min 75% BW  1.5 mph  10 min 75% BW  1.8 mph  10 min 75% BW  1.8 mph  10 min Step-ups/  downs B/ 6"/ 1x12 ea B 6" 1x12 ea B 6" 1x12 ea B/ 6"/ 1x12 ea   B/  6"/  1x10 B/  6"/  1x10 B/  6"/  1x10 B/ 6"/ 1x12                Modalities

## 2023-07-05 ENCOUNTER — OFFICE VISIT (OUTPATIENT)
Dept: PHYSICAL THERAPY | Facility: CLINIC | Age: 68
End: 2023-07-05
Payer: COMMERCIAL

## 2023-07-05 DIAGNOSIS — M17.0 BILATERAL PRIMARY OSTEOARTHRITIS OF KNEE: Primary | ICD-10-CM

## 2023-07-05 PROCEDURE — 97110 THERAPEUTIC EXERCISES: CPT

## 2023-07-05 PROCEDURE — 97116 GAIT TRAINING THERAPY: CPT

## 2023-07-05 PROCEDURE — 97140 MANUAL THERAPY 1/> REGIONS: CPT

## 2023-07-05 NOTE — PROGRESS NOTES
Daily Note     Today's date: 2023  Patient name: Anastasiia Gaytan  : 1955  MRN: [id number]  Referring provider: Thor Siegel MD  Dx:   Encounter Diagnosis     ICD-10-CM    1. Bilateral primary osteoarthritis of knee  M17.0                      Subjective: Patient reports better range of motion in her knees but no change in pain levels. Objective: See treatment diary below    Assessment: Pt was demonstrated sufficient fatigue with hip strengthening, continued knee pain with step downs. Plan: Continue per plan of care. Progress treatment as tolerated. Precautions:  PMHx: DM II, PVCs, depression, anxiety, R CTR, R knee scope   Dx: B knee OA    Manuals 6/21 6/26 6/28 7/3 7/5  6/7 6/14 6/15 6/19   B knee flexion PROM B/ 5"x5 B 10"x5 ea B 10"x5 ea B 10"x5 ea B  10"x5 ea  B/   5"x5 nv B/  5"x5 B/ 5"x5   Gr IV inf, sup PF mobs             Laser B anterior knee B/  3000J ea B 3000J ea B 3000J ea B  3000J ea B/  3000J ea  B/  3000J B/  3000J ea B/  3000J ea B/ 3000J ea                Neuro Re-Ed                                                                                                        Ther Ex             bike L1 5 min L1 5 min L1 5 min L1 5 min L1 5 min  L1 5 min nv L1 5 min L1 5 min   Heel slides B/  5"x10 B 5"x10 ea B 5"x10 ea B/ 5"x10 ea B/  5"x10 ea  B/  5"x10 nv B/  5"x10 B/ 5"x10   Standing GA stretch B/  15"x3 Slant B 30"x3 ea Slant B 30"x3 ea Slant B 30"x3 ea Slant B 30"x3 ea  B/   15"x3 B/  15"x3 B/   15"x3 B/  15"x3   SLR B/  3x15 B 3x15 ea B 3x15 ea B   3x18 ea B  3x18 ea  B/  3x12 B/  3x15 B/  3x15 B/  3x15   SL hip ABD B/  3x15 B 3x15 ea B 3x15 ea B/   3x18 ea B  3x18 ea  B/  3x12 B/  3x15 B/  3x15 B/  3x15                                          Ther Activity                                       Gait Training             AlterG 75% BW  1.7 mph  10 min 80% BW 1.8 mph 10 min 80% BW 1.8 mph 10 min 80% BW 1.8 mph 10 min 80% BW 1.8 mph 10 min  75% BW  1.5 mph  10 min 75% BW  1.5 mph  10 min 75% BW  1.8 mph  10 min 75% BW  1.8 mph  10 min   Step-ups/  downs B/ 6"/ 1x12 ea B 6" 1x12 ea B 6" 1x12 ea B/ 6"/ 1x12 ea B/ 6"/ 1x12 ea  B/  6"/  1x10 B/  6"/  1x10 B/  6"/  1x10 B/ 6"/ 1x12                Modalities

## 2023-07-10 ENCOUNTER — OFFICE VISIT (OUTPATIENT)
Dept: PHYSICAL THERAPY | Facility: CLINIC | Age: 68
End: 2023-07-10
Payer: COMMERCIAL

## 2023-07-10 DIAGNOSIS — M17.0 BILATERAL PRIMARY OSTEOARTHRITIS OF KNEE: Primary | ICD-10-CM

## 2023-07-10 PROCEDURE — 97116 GAIT TRAINING THERAPY: CPT | Performed by: PHYSICAL THERAPIST

## 2023-07-10 PROCEDURE — 97110 THERAPEUTIC EXERCISES: CPT | Performed by: PHYSICAL THERAPIST

## 2023-07-10 NOTE — PROGRESS NOTES
Daily Note     Today's date: 7/10/2023  Patient name: Kristopher Jim  : 1955  MRN: [id number]  Referring provider: Alfonso Peng MD  Dx:   Encounter Diagnosis     ICD-10-CM    1. Bilateral primary osteoarthritis of knee  M17.0                      Subjective: Pt reports intermittent B knee pain with WB activities. She especially notes pain with stairs and walking on inclines. Objective: See treatment diary below    Assessment: Pt demonstrated B quad pattern on stairs, B hip ER during GT. Plan: Cont. POC. Precautions:  PMHx: DM II, PVCs, depression, anxiety, R CTR, R knee scope '  Dx: B knee OA    Manuals 6/21 6/26 6/28 7/3 7/5 7/10       B knee flexion PROM B/ 5"x5 B 10"x5 ea B 10"x5 ea B 10"x5 ea B  10"x5 ea        Gr IV inf, sup PF mobs             Laser B anterior knee B/  3000J ea B 3000J ea B 3000J ea B  3000J ea B/  3000J ea B/  3000J ea                    Neuro Re-Ed                                                                                                        Ther Ex             bike L1 5 min L1 5 min L1 5 min L1 5 min L1 5 min L1  5 min       Heel slides B/  5"x10 B 5"x10 ea B 5"x10 ea B/ 5"x10 ea B/  5"x10 ea B/  5"x15       Standing GA stretch B/  15"x3 Slant B 30"x3 ea Slant B 30"x3 ea Slant B 30"x3 ea Slant B 30"x3 ea B/  30"x3       SLR B/  3x15 B 3x15 ea B 3x15 ea B   3x18 ea B  3x18 ea B/  1#  3x10       SL hip ABD B/  3x15 B 3x15 ea B 3x15 ea B/   3x18 ea B  3x18 ea B/  1#  3x10       Standing hip BAD                                       Ther Activity                                       Gait Training             AlterG 75% BW  1.7 mph  10 min 80% BW 1.8 mph 10 min 80% BW 1.8 mph 10 min 80% BW 1.8 mph 10 min 80% BW 1.8 mph 10 min 85% BW  2 mph  0% incline  5 min  70% BW  3% incline  5 min       Step-ups/  downs B/ 6"/ 1x12 ea B 6" 1x12 ea B 6" 1x12 ea B/ 6"/ 1x12 ea B/ 6"/ 1x12 ea B/  8"/  1x8                    Modalities

## 2023-07-12 ENCOUNTER — OFFICE VISIT (OUTPATIENT)
Dept: OBGYN CLINIC | Facility: OTHER | Age: 68
End: 2023-07-12
Payer: COMMERCIAL

## 2023-07-12 ENCOUNTER — OFFICE VISIT (OUTPATIENT)
Dept: PHYSICAL THERAPY | Facility: CLINIC | Age: 68
End: 2023-07-12
Payer: COMMERCIAL

## 2023-07-12 VITALS
BODY MASS INDEX: 28.13 KG/M2 | SYSTOLIC BLOOD PRESSURE: 110 MMHG | DIASTOLIC BLOOD PRESSURE: 74 MMHG | HEIGHT: 61 IN | WEIGHT: 149 LBS | HEART RATE: 75 BPM

## 2023-07-12 DIAGNOSIS — R29.898 KNEE CLICKING: ICD-10-CM

## 2023-07-12 DIAGNOSIS — M17.11 PRIMARY OSTEOARTHRITIS OF RIGHT KNEE: Primary | ICD-10-CM

## 2023-07-12 DIAGNOSIS — M25.561 CHRONIC PAIN OF RIGHT KNEE: ICD-10-CM

## 2023-07-12 DIAGNOSIS — G89.29 CHRONIC PAIN OF RIGHT KNEE: ICD-10-CM

## 2023-07-12 DIAGNOSIS — M81.0 AGE-RELATED OSTEOPOROSIS WITHOUT CURRENT PATHOLOGICAL FRACTURE: ICD-10-CM

## 2023-07-12 DIAGNOSIS — M17.0 BILATERAL PRIMARY OSTEOARTHRITIS OF KNEE: Primary | ICD-10-CM

## 2023-07-12 PROCEDURE — 97116 GAIT TRAINING THERAPY: CPT

## 2023-07-12 PROCEDURE — 97110 THERAPEUTIC EXERCISES: CPT

## 2023-07-12 PROCEDURE — 99214 OFFICE O/P EST MOD 30 MIN: CPT | Performed by: ORTHOPAEDIC SURGERY

## 2023-07-12 RX ORDER — LIDOCAINE 40 MG/G
CREAM TOPICAL AS NEEDED
Qty: 30 G | Refills: 2 | Status: SHIPPED | OUTPATIENT
Start: 2023-07-12

## 2023-07-12 NOTE — PROGRESS NOTES
Assessment:       1. Primary osteoarthritis of right knee    2. Chronic pain of right knee    3. Knee clicking    4. Age-related osteoporosis without current pathological fracture    5. Left arm pain          Plan:        Explained my current clinical findings to the patient today. Unfortunately, she is not having much relief from medical management including intra-articular as well as pes anserine bursa ketorolac injections. He is also done physical therapy in this regard. Hence at this time I will request an MRI of the right knee for further evaluation for any underlying medial meniscus tear +/- potential medial tibial plateau stress reaction/fracture due to underlying history of osteoporosis. Patient has expressed understanding and was in agreement with the treatment plan. In the interim, I will provide her with lidocaine cream that she may apply for symptomatic relief. Follow-up after right knee MRI. Subjective:     Patient ID: Margie Fabian is a 79 y.o. female. Chief Complaint:    MARILEE Tucker is a 53-year-old lady for follow-up of bilateral knee pain. She has a known history of bilateral knee osteoarthritis. In this regard she has previously underwent bilateral knee ketorolac injection in March 2023 due to poor glycemic control. Thereafter, she followed up with me on 6/13/2023 with some pain localized to the right pes anserine bursal region/medial tibial plateau. Hence, ketorolac injection of the right pes anserine bursa was also performed. Today, the patient reports that her left knee pain has improved but she continues to experience the right knee pain. This is primarily located on the right medial knee but sometimes radiates to the lateral knee. Symptoms are present even at rest but gets worse when she weightbears and ambulates.      Social History     Occupational History   • Not on file   Tobacco Use   • Smoking status: Never   • Smokeless tobacco: Never   Vaping Use   • Vaping Use: Never used   Substance and Sexual Activity   • Alcohol use: No     Comment: rare/social as per [employer]   • Drug use: No   • Sexual activity: Yes     Partners: Male     Birth control/protection: None      Review of Systems        Objective:     Right Knee Exam     Tenderness   The patient is experiencing tenderness in the medial joint line (Also has tenderness over the medial tibial plateau). Range of Motion   Extension: normal   Flexion: normal     Tests   Veronica:  Medial - positive Lateral - negative  Varus: negative Valgus: negative  Lachman:  Anterior - negative      Drawer:  Anterior - negative    Posterior - negative    Other   Erythema: absent  Sensation: normal  Pulse: present  Swelling: none  Effusion: no effusion present          Observations     Right Knee   Negative for effusion. Physical Exam  Vitals and nursing note reviewed. Constitutional:       Appearance: She is well-developed. HENT:      Head: Normocephalic and atraumatic. Eyes:      Conjunctiva/sclera: Conjunctivae normal.      Pupils: Pupils are equal, round, and reactive to light. Cardiovascular:      Rate and Rhythm: Normal rate and regular rhythm. Pulmonary:      Effort: Pulmonary effort is normal. No respiratory distress. Musculoskeletal:      Right knee: No effusion. Instability Tests: Medial Veronica test positive. Lateral Veronica test negative. Skin:     General: Skin is warm and dry. Findings: No erythema. Neurological:      Mental Status: She is alert and oriented to person, place, and time. Cranial Nerves: No cranial nerve deficit. Psychiatric:         Behavior: Behavior normal.         Thought Content: Thought content normal.         Judgment: Judgment normal.           I have personally reviewed pertinent films in PACS.

## 2023-07-12 NOTE — PROGRESS NOTES
Daily Note     Today's date: 2023  Patient name: Margie Fabian  : 1955  MRN: [id number]  Referring provider: Sunny Gallagher MD  Dx:   Encounter Diagnosis     ICD-10-CM    1. Bilateral primary osteoarthritis of knee  M17.0                      Subjective: Pt reports no change in her knee pain. Pt's doctor has referred her for an MRI. Objective: See treatment diary below    Assessment: Pt demonstrated continued pain with step downs, difficulty with hip strengthening. Pt was challenged with ambulating at an incline but had no increased pain. Plan: Cont. POC. Precautions:  PMHx: DM II, PVCs, depression, anxiety, R CTR, R knee scope   Dx: B knee OA    Manuals 6/21 6/26 6/28 7/3 7/5 7/10 7/12      B knee flexion PROM B/ 5"x5 B 10"x5 ea B 10"x5 ea B 10"x5 ea B  10"x5 ea        Gr IV inf, sup PF mobs             Laser B anterior knee B/  3000J ea B 3000J ea B 3000J ea B  3000J ea B/  3000J ea B/  3000J ea B/  3000J ea                   Neuro Re-Ed                                                                                                        Ther Ex             bike L1 5 min L1 5 min L1 5 min L1 5 min L1 5 min L1  5 min L1 5 min      Heel slides B/  5"x10 B 5"x10 ea B 5"x10 ea B/ 5"x10 ea B/  5"x10 ea B/  5"x15 B/  5"x15      Standing GA stretch B/  15"x3 Slant B 30"x3 ea Slant B 30"x3 ea Slant B 30"x3 ea Slant B 30"x3 ea B/  30"x3 Slant B 30"x3 ea      SLR B/  3x15 B 3x15 ea B 3x15 ea B   3x18 ea B  3x18 ea B/  1#  3x10 B/  1#  3x10      SL hip ABD B/  3x15 B 3x15 ea B 3x15 ea B/   3x18 ea B  3x18 ea B/  1#  3x10 B/  1#  3x10      Standing hip BAD                                       Ther Activity                                       Gait Training             AlterG 75% BW  1.7 mph  10 min 80% BW 1.8 mph 10 min 80% BW 1.8 mph 10 min 80% BW 1.8 mph 10 min 80% BW 1.8 mph 10 min 85% BW  2 mph  0% incline  5 min  70% BW  3% incline  5 min 85% BW  2 mph  0% incline  5 min  70% BW  3% incline  5 min      Step-ups/  downs B/ 6"/ 1x12 ea B 6" 1x12 ea B 6" 1x12 ea B/ 6"/ 1x12 ea B/ 6"/ 1x12 ea B/  8"/  1x8 B/  8"/   1x8                   Modalities

## 2023-07-21 ENCOUNTER — HOSPITAL ENCOUNTER (OUTPATIENT)
Dept: MRI IMAGING | Facility: HOSPITAL | Age: 68
Discharge: HOME | End: 2023-07-21
Attending: ORTHOPAEDIC SURGERY
Payer: COMMERCIAL

## 2023-07-21 DIAGNOSIS — M25.561 CHRONIC PAIN OF RIGHT KNEE: ICD-10-CM

## 2023-07-21 DIAGNOSIS — M17.11 PRIMARY OSTEOARTHRITIS OF RIGHT KNEE: ICD-10-CM

## 2023-07-21 DIAGNOSIS — R29.898 KNEE CLICKING: ICD-10-CM

## 2023-07-21 DIAGNOSIS — G89.29 CHRONIC PAIN OF RIGHT KNEE: ICD-10-CM

## 2023-07-21 DIAGNOSIS — M81.0 AGE-RELATED OSTEOPOROSIS WITHOUT CURRENT PATHOLOGICAL FRACTURE: ICD-10-CM

## 2023-07-21 PROCEDURE — 73721 MRI JNT OF LWR EXTRE W/O DYE: CPT

## 2023-07-21 PROCEDURE — G1004 CDSM NDSC: HCPCS

## 2023-08-16 ENCOUNTER — OFFICE VISIT (OUTPATIENT)
Dept: GASTROENTEROLOGY | Facility: AMBULARY SURGERY CENTER | Age: 68
End: 2023-08-16
Payer: COMMERCIAL

## 2023-08-16 VITALS
HEART RATE: 73 BPM | WEIGHT: 146 LBS | DIASTOLIC BLOOD PRESSURE: 72 MMHG | BODY MASS INDEX: 27.56 KG/M2 | OXYGEN SATURATION: 97 % | HEIGHT: 61 IN | SYSTOLIC BLOOD PRESSURE: 112 MMHG

## 2023-08-16 DIAGNOSIS — K21.9 GASTROESOPHAGEAL REFLUX DISEASE WITHOUT ESOPHAGITIS: ICD-10-CM

## 2023-08-16 DIAGNOSIS — Z12.11 COLON CANCER SCREENING: Primary | ICD-10-CM

## 2023-08-16 PROCEDURE — 99204 OFFICE O/P NEW MOD 45 MIN: CPT | Performed by: INTERNAL MEDICINE

## 2023-08-16 RX ORDER — SODIUM PICOSULFATE, MAGNESIUM OXIDE, AND ANHYDROUS CITRIC ACID 12; 3.5; 1 G/175ML; G/175ML; MG/175ML
LIQUID ORAL
Qty: 350 ML | Refills: 0 | Status: SHIPPED | OUTPATIENT
Start: 2023-08-16

## 2023-08-16 RX ORDER — GLUCOSAMINE SULFATE 500 MG
500 CAPSULE ORAL
COMMUNITY

## 2023-08-16 NOTE — ASSESSMENT & PLAN NOTE
Gastroesophageal reflux disease - Patient has the symptoms of chronic acid reflux for a long time. Possible hiatal hernia or LES weakness. Should rule out Bonilla's esophagus because of chronic symptoms.    -Patient was explained about the lifestyle and dietary modifications. Advised to avoid fatty foods, chocolates, caffeine, alcohol and any other triggering foods.   Avoid eating for at least 3 hours before going to bed.    -May continue to take OTC medication    -Schedule EGD

## 2023-08-16 NOTE — PATIENT INSTRUCTIONS
Scheduled date of EGD/colonoscopy (as of today): 9/22/2023  Physician performing EGD/colonoscopy: Brice Luna  Location of EGD/colonoscopy: [facility]  Desired bowel prep reviewed with patient: Clenpiq  Instructions reviewed with patient by: Luz Moore  Clearances: None

## 2023-08-16 NOTE — PROGRESS NOTES
Consultation - [address] [place] [address] [facility]  Kavita Barone 1955 female         Chief Complaint: For colonoscopy    HPI: 57-year-old female with no significant past medical history was referred for colonoscopy she had a colonoscopy 10 years ago. She reports having problems with acid reflux at times when she takes antacids. Denies any difficulty swallowing. Good appetite, no recent weight loss. Denies any abdominal pain, nausea or vomiting. Regular bowel movements and denies any blood or mucus in the stool. Chaperon: Ms. Earnest Carreon: Review of Systems   Constitutional: Negative for activity change, appetite change, chills, diaphoresis, fatigue, fever and unexpected weight change. HENT: Negative for ear discharge, ear pain, facial swelling, hearing loss, nosebleeds, sore throat, tinnitus and voice change. Eyes: Negative for pain, discharge, redness, itching and visual disturbance. Respiratory: Negative for apnea, cough, chest tightness, shortness of breath and wheezing. Cardiovascular: Negative for chest pain and palpitations. Gastrointestinal:        As noted in HPI   Endocrine: Negative for cold intolerance, heat intolerance and polyuria. Genitourinary: Negative for difficulty urinating, dysuria, flank pain, hematuria and urgency. Musculoskeletal: Positive for arthralgias. Negative for back pain, gait problem, joint swelling and myalgias. Skin: Negative for rash and wound. Neurological: Negative for dizziness, tremors, seizures, speech difficulty, light-headedness, numbness and headaches. Hematological: Negative for adenopathy. Does not bruise/bleed easily. Psychiatric/Behavioral: Negative for agitation, behavioral problems and confusion. The patient is not nervous/anxious.          Past Medical History:   Diagnosis Date   • Abnormal glucose     last assessed 12/22/15   • Acid reflux    • Anxiety    • Arthritis     osteoarthritis multiple sites   • Benign paroxysmal positional vertigo    • Carpal tunnel syndrome    • Chronic kidney disease     nephrolithiasis   • Depression    • Diabetes mellitus (HCC)    • GERD (gastroesophageal reflux disease)    • Insomnia    • Patellofemoral dysfunction     last assessed  9/15/14      Past Surgical History:   Procedure Laterality Date   • BREAST CYST EXCISION Left     abscess removed   50yrs ago   • CARPAL TUNNEL RELEASE     • COLONOSCOPY     • KNEE SURGERY Right    • TN NDSC WRST SURG W/RLS TRANSVRS CARPL LIGM Right 08/25/2016    Procedure: RELEASE CARPAL TUNNEL ENDOSCOPIC;  Surgeon: Madeleine Portillo MD;  Location:  MAIN OR;  Service: Orthopedics   • TN TENDON SHEATH INCISION Right 08/25/2016    Procedure: RELEASE LONG AND RING TRIGGER FINGERS;  Surgeon: Madeleine Portillo MD;  Location: [facility];  Service: Orthopedics   • TN TENDON SHEATH INCISION Left 09/15/2020    Procedure: RELEASE TRIGGER FINGER [address] [place] [address];  Surgeon: Madeleine Portillo MD;  Location:  MAIN OR;  Service: Orthopedics   • TUBAL LIGATION       Social History     Socioeconomic History   • Marital status: /Civil Union     Spouse name: Not on file   • Number of children: Not on file   • Years of education: Not on file   • Highest education level: Not on file   Occupational History   • Not on file   Tobacco Use   • Smoking status: Never   • Smokeless tobacco: Never   Vaping Use   • Vaping Use: Never used   Substance and Sexual Activity   • Alcohol use: No     Comment: rare/social as per [employer]   • Drug use: No   • Sexual activity: Yes     Partners: Male     Birth control/protection: None   Other Topics Concern   • Not on file   Social History Narrative   • Not on file     Social Determinants of Health     Financial Resource Strain: Low Risk  (5/16/2023)    Overall Financial Resource Strain (CARDIA)    • Difficulty of Paying Living Expenses: Not hard at all   Food Insecurity: No Food Insecurity (8/16/2022)    Hunger Vital Sign    • Worried About Running Out of Food in the Last Year: Never true    • Ran Out of Food in the Last Year: Never true   Transportation Needs: No Transportation Needs (5/16/2023)    PRAPARE - Transportation    • Lack of Transportation (Medical): No    • Lack of Transportation (Non-Medical):  No   Physical Activity: Not on file   Stress: Not on file   Social Connections: Not on file   Intimate Partner Violence: Not At Risk (8/16/2022)    Humiliation, Afraid, Rape, and Kick questionnaire    • Fear of Current or Ex-Partner: No    • Emotionally Abused: No    • Physically Abused: No    • Sexually Abused: No   Housing Stability: Low Risk  (8/16/2022)    Housing Stability Vital Sign    • Unable to Pay for Housing in the Last Year: No    • Number of Places Lived in the Last Year: 1    • Unstable Housing in the Last Year: No     Family History   Problem Relation Age of Onset   • Diabetes Mother    • Heart disease Mother    • Hypertension Mother    • Hypothyroidism Mother    • Arthritis Father    • Crohn's disease Family    • Psoriasis Family    • Rheum arthritis Family    • Lupus Family         systemic- erythematosus   • Ulcerative colitis Family    • Breast cancer Cousin 46        paternal   • No Known Problems Sister    • No Known Problems Maternal Grandmother    • No Known Problems Maternal Grandfather    • No Known Problems Paternal Grandmother    • No Known Problems Paternal Grandfather    • No Known Problems Son    • No Known Problems Son    • No Known Problems Sister    • No Known Problems Sister    • No Known Problems Brother    • No Known Problems Brother    • No Known Problems Brother      Bee pollen and Pollen extract  Current Outpatient Medications   Medication Sig Dispense Refill   • alendronate (Fosamax) 70 mg tablet Take 1 tablet (70 mg total) by mouth every 7 days 12 tablet 3   • atorvastatin (LIPITOR) 10 mg tablet Take 1 tablet (10 mg total) by mouth daily 90 tablet 1   • BIOTIN 5000 PO Take by mouth daily     • Blood Glucose Monitoring Suppl (Contour Next One) KIT Use daily 1 kit 0   • Cyanocobalamin (VITAMIN B12 PO) Take by mouth daily     • Diclofenac Sodium (VOLTAREN) 1 % Apply 2 g topically 4 (four) times a day 100 g 1   • fluticasone (FLONASE) 50 mcg/act nasal spray 1 spray into each nostril daily 16 g 4   • glucosamine 500 MG CAPS capsule Take 500 mg by mouth     • glucose blood (Contour Test) test strip Use 1 each daily Use as instructed 100 each 3   • lidocaine (LMX) 4 % cream Apply topically as needed for mild pain 30 g 2   • loratadine (CLARITIN) 10 mg tablet Take 10 mg by mouth daily     • Microlet Lancets MISC Use daily 100 each 3   • Omega-3 Fatty Acids (OMEGA 3 PO) Take by mouth     • OneTouch Ultra test strip USE 1 STRIP DAILY AS NEEDED FOR HYPOGLYCEMIA AS  DIRECTED 100 each 3   • sodium picosulfate, magnesium oxide, citric acid (Clenpiq) oral solution Take 175 mL (1 bottle) the evening before the colonoscopy, between 5 PM and 9 PM, followed by a second 175 mL bottle 5 hours before the colonoscopy. 350 mL 0   • TURMERIC PO Take by mouth daily       No current facility-administered medications for this visit. Blood pressure 112/72, pulse 73, height 5' 1" (1.549 m), weight 66.2 kg (146 lb), SpO2 97 %. PHYSICAL EXAM: Physical Exam  Constitutional:       Appearance: Normal appearance. She is well-developed. HENT:      Head: Normocephalic and atraumatic. Nose: Nose normal.   Eyes:      Conjunctiva/sclera: Conjunctivae normal.   Neck:      Thyroid: No thyromegaly. Vascular: No JVD. Trachea: No tracheal deviation. Cardiovascular:      Rate and Rhythm: Normal rate and regular rhythm. Heart sounds: Normal heart sounds. No murmur heard. No friction rub. No gallop. Pulmonary:      Effort: Pulmonary effort is normal. No respiratory distress. Breath sounds: Normal breath sounds. No wheezing or rales. Abdominal:      General: Bowel sounds are normal. There is no distension.       Palpations: Abdomen is soft. There is no mass. Tenderness: There is no abdominal tenderness. There is no guarding. Hernia: No hernia is present. Musculoskeletal:         General: No tenderness or deformity. Cervical back: Neck supple. Right lower leg: No edema. Left lower leg: No edema. Lymphadenopathy:      Cervical: No cervical adenopathy. Skin:     General: Skin is warm and dry. Findings: No erythema or rash. Neurological:      Mental Status: She is alert and oriented to person, place, and time. Psychiatric:         Mood and Affect: Mood normal.         Behavior: Behavior normal.         Thought Content: Thought content normal.          Lab Results   Component Value Date    WBC 7.91 05/19/2023    HGB 15.2 05/19/2023    HCT 45.2 05/19/2023    MCV 92 05/19/2023     05/19/2023     Lab Results   Component Value Date    CALCIUM 9.7 05/19/2023     12/07/2017    K 4.5 05/19/2023    CO2 31 05/19/2023     05/19/2023    BUN 20 05/19/2023    CREATININE 0.78 05/19/2023     Lab Results   Component Value Date    ALT 15 03/12/2020    AST 13 03/12/2020    ALKPHOS 69 03/12/2020    BILITOT 0.6 12/07/2017     No results found for: "INR", "PROTIME"    MRI knee right  wo contrast    Result Date: 7/25/2023  Impression: Moderately advanced osteoarthrosis of the medial femoral tibial compartment. Mild loss of articular cartilage along the patellofemoral compartment. Complex tear/maceration of the posterior horn of the medial meniscus with moderate medial extrusion. Trace joint effusion and small Baker's cyst. Workstation performed: [id number]       ASSESSMENT & PLAN:    Gastroesophageal reflux disease without esophagitis  Gastroesophageal reflux disease - Patient has the symptoms of chronic acid reflux for a long time. Possible hiatal hernia or LES weakness.   Should rule out Bonilla's esophagus because of chronic symptoms.    -Patient was explained about the lifestyle and dietary modifications. Advised to avoid fatty foods, chocolates, caffeine, alcohol and any other triggering foods. Avoid eating for at least 3 hours before going to bed.    -May continue to take OTC medication    -Schedule EGD    Colon cancer screening  Screening for colon cancer - patient is at average risk for colon cancer screening. Rule out colorectal lesions including polyps or malignancy.    -Schedule for colonoscopy. -High-fiber diet.    -Patient was given instructions about the colonoscopy prep.    -Patient was explained about the risks and benefits of the procedure. Risks including but not limited to bleeding, infection, perforation were explained in detail. Also explained about less than 100% sensitivity with the exam and other alternatives.

## 2023-08-22 ENCOUNTER — OFFICE VISIT (OUTPATIENT)
Dept: OBGYN CLINIC | Facility: OTHER | Age: 68
End: 2023-08-22
Payer: COMMERCIAL

## 2023-08-22 VITALS
BODY MASS INDEX: 27.56 KG/M2 | SYSTOLIC BLOOD PRESSURE: 105 MMHG | HEIGHT: 61 IN | DIASTOLIC BLOOD PRESSURE: 58 MMHG | HEART RATE: 73 BPM | WEIGHT: 146 LBS

## 2023-08-22 DIAGNOSIS — S83.241A OTHER TEAR OF MEDIAL MENISCUS, CURRENT INJURY, RIGHT KNEE, INITIAL ENCOUNTER: ICD-10-CM

## 2023-08-22 DIAGNOSIS — M17.11 PRIMARY OSTEOARTHRITIS OF RIGHT KNEE: Primary | ICD-10-CM

## 2023-08-22 PROCEDURE — 99213 OFFICE O/P EST LOW 20 MIN: CPT | Performed by: ORTHOPAEDIC SURGERY

## 2023-08-25 ENCOUNTER — OFFICE VISIT (OUTPATIENT)
Dept: FAMILY MEDICINE CLINIC | Facility: CLINIC | Age: 68
End: 2023-08-25

## 2023-08-25 VITALS
DIASTOLIC BLOOD PRESSURE: 71 MMHG | RESPIRATION RATE: 18 BRPM | HEIGHT: 61 IN | TEMPERATURE: 97.3 F | HEART RATE: 71 BPM | OXYGEN SATURATION: 97 % | SYSTOLIC BLOOD PRESSURE: 106 MMHG | WEIGHT: 147.6 LBS | BODY MASS INDEX: 27.87 KG/M2

## 2023-08-25 DIAGNOSIS — E11.9 TYPE 2 DIABETES MELLITUS WITHOUT COMPLICATION, WITHOUT LONG-TERM CURRENT USE OF INSULIN (HCC): Primary | ICD-10-CM

## 2023-08-25 PROCEDURE — 99213 OFFICE O/P EST LOW 20 MIN: CPT | Performed by: FAMILY MEDICINE

## 2023-08-27 NOTE — PROGRESS NOTES
Name: Elda Harrison      : 1955      MRN: [id number]  Encounter Provider: Paul Walker MD  Encounter Date: 2023   Encounter department: [address]     1. Type 2 diabetes mellitus without complication, without long-term current use of insulin [facility])  Assessment & Plan:    Lab Results   Component Value Date    HGBA1C 6.5 (H) 2023     Last A1C 6.5%, May 2023 (increased from 6.2 previously)  Current medication: glucosamine 500 mg daily  Interested in lifestyle modification with diet and exercise  Diabetic foot exam today: normal  · Mediterranean diet for patient education  · Recommend lifestyle modification  · A1C  · Alb/Cr ratio      Orders:  -     HEMOGLOBIN A1C W/ EAG ESTIMATION; Future  -     Albumin / creatinine urine ratio; Future         Subjective      82-year-old female past medical history of prediabetes presents for diabetes check. No current complaints. States she has made lifestyle modifications since last A1c of 6.5 in May 2023, including reducing sugary drinks, and incorporating more vegetables in her diet. Denies fevers, chest pain, shortness of breath, rashes, nausea, vomiting, tingling/numbness in feet. Review of Systems   Constitutional: Negative for chills and fever. HENT: Negative for ear pain and sore throat. Eyes: Negative for pain and visual disturbance. Respiratory: Negative for cough and shortness of breath. Cardiovascular: Negative for chest pain and palpitations. Gastrointestinal: Negative for abdominal pain and vomiting. Genitourinary: Negative for dysuria and hematuria. Musculoskeletal: Negative for arthralgias and back pain. Skin: Negative for color change and rash. Neurological: Negative for seizures and syncope. All other systems reviewed and are negative.       Current Outpatient Medications on File Prior to Visit   Medication Sig   • alendronate (Fosamax) 70 mg tablet Take 1 tablet (70 mg total) by mouth every 7 days   • atorvastatin (LIPITOR) 10 mg tablet Take 1 tablet (10 mg total) by mouth daily   • BIOTIN 5000 PO Take by mouth daily   • Cyanocobalamin (VITAMIN B12 PO) Take by mouth daily   • Diclofenac Sodium (VOLTAREN) 1 % Apply 2 g topically 4 (four) times a day   • fluticasone (FLONASE) 50 mcg/act nasal spray 1 spray into each nostril daily   • glucosamine 500 MG CAPS capsule Take 500 mg by mouth   • lidocaine (LMX) 4 % cream Apply topically as needed for mild pain   • loratadine (CLARITIN) 10 mg tablet Take 10 mg by mouth daily   • Omega-3 Fatty Acids (OMEGA 3 PO) Take by mouth   • sodium picosulfate, magnesium oxide, citric acid (Clenpiq) oral solution Take 175 mL (1 bottle) the evening before the colonoscopy, between 5 PM and 9 PM, followed by a second 175 mL bottle 5 hours before the colonoscopy. • TURMERIC PO Take by mouth daily   • Blood Glucose Monitoring Suppl (Contour Next One) KIT Use daily (Patient not taking: Reported on 8/25/2023)   • glucose blood (Contour Test) test strip Use 1 each daily Use as instructed (Patient not taking: Reported on 8/25/2023)   • Microlet Lancets MISC Use daily (Patient not taking: Reported on 8/25/2023)   • OneTouch Ultra test strip USE 1 STRIP DAILY AS NEEDED FOR HYPOGLYCEMIA AS  DIRECTED (Patient not taking: Reported on 8/25/2023)       Objective     /71 (BP Location: Left arm, Patient Position: Sitting, Cuff Size: Large)   Pulse 71   Temp (!) 97.3 °F (36.3 °C) (Temporal)   Resp 18   Ht 5' 1" (1.549 m)   Wt 67 kg (147 lb 9.6 oz)   SpO2 97%   BMI 27.89 kg/m²   Patient's shoes and socks removed. Right Foot/Ankle   Right Foot Inspection  Skin Exam: skin normal and skin intact. No dry skin, no warmth, no callus, no erythema, no maceration, no abnormal color, no pre-ulcer, no ulcer and no callus. Toe Exam: ROM and strength within normal limits.  No swelling, no tenderness, erythema and  no right toe deformity    Sensory   Vibration: intact  Proprioception: intact  Monofilament testing: intact    Vascular  Capillary refills: < 3 seconds  The right DP pulse is 2+. The right PT pulse is 2+. Left Foot/Ankle  Left Foot Inspection  Skin Exam: skin normal and skin intact. No dry skin, no warmth, no erythema, no maceration, normal color, no pre-ulcer, no ulcer and no callus. Toe Exam: ROM and strength within normal limits. No swelling, no tenderness, no erythema and no left toe deformity. Sensory   Vibration: intact  Proprioception: intact  Monofilament testing: intact    Vascular  Capillary refills: < 3 seconds  The left DP pulse is 2+. The left PT pulse is 2+. Assign Risk Category  No deformity present  No loss of protective sensation  No weak pulses  Risk: 0      Physical Exam  Vitals reviewed. Constitutional:       General: She is awake. She is not in acute distress. Appearance: Normal appearance. She is not ill-appearing. HENT:      Head: Normocephalic and atraumatic. Right Ear: External ear normal.      Left Ear: External ear normal.      Nose: Nose normal.      Mouth/Throat:      Mouth: Mucous membranes are moist.      Tongue: Tongue does not deviate from midline. Pharynx: Oropharynx is clear. Eyes:      Extraocular Movements: Extraocular movements intact. Cardiovascular:      Rate and Rhythm: Normal rate and regular rhythm. Pulses: no weak pulses          Dorsalis pedis pulses are 2+ on the right side and 2+ on the left side. Posterior tibial pulses are 2+ on the right side and 2+ on the left side. Heart sounds: Normal heart sounds. No murmur heard. Pulmonary:      Effort: Pulmonary effort is normal. No respiratory distress. Breath sounds: Normal breath sounds and air entry. No wheezing or rales. Abdominal:      General: Bowel sounds are normal. There is no distension. Palpations: Abdomen is soft. There is no mass. Tenderness:  There is no abdominal tenderness. There is no guarding or rebound. Musculoskeletal:         General: No swelling, deformity or signs of injury. Normal range of motion. Cervical back: Normal range of motion and neck supple. Feet:      Right foot:      Skin integrity: No ulcer, skin breakdown, erythema, warmth, callus or dry skin. Left foot:      Skin integrity: No ulcer, skin breakdown, erythema, warmth, callus or dry skin. Skin:     General: Skin is warm and dry. Capillary Refill: Capillary refill takes less than 2 seconds. Findings: No bruising or rash. Neurological:      General: No focal deficit present. Mental Status: She is alert and oriented to person, place, and time. Mental status is at baseline. Psychiatric:         Mood and Affect: Mood normal.         Behavior: Behavior normal. Behavior is cooperative. Thought Content:  Thought content normal.       Chiki Soliman MD

## 2023-08-27 NOTE — ASSESSMENT & PLAN NOTE
Lab Results   Component Value Date    HGBA1C 6.5 (H) 05/19/2023     Last A1C 6.5%, May 2023 (increased from 6.2 previously)  Current medication: glucosamine 500 mg daily  Interested in lifestyle modification with diet and exercise  Diabetic foot exam today: normal  · Mediterranean diet for patient education  · Recommend lifestyle modification  · A1C  · Alb/Cr ratio

## 2023-08-31 ENCOUNTER — APPOINTMENT (OUTPATIENT)
Dept: LAB | Facility: CLINIC | Age: 68
End: 2023-08-31
Payer: COMMERCIAL

## 2023-08-31 DIAGNOSIS — E11.9 TYPE 2 DIABETES MELLITUS WITHOUT COMPLICATION, WITHOUT LONG-TERM CURRENT USE OF INSULIN (HCC): ICD-10-CM

## 2023-08-31 LAB
CREAT UR-MCNC: 93.6 MG/DL
EST. AVERAGE GLUCOSE BLD GHB EST-MCNC: 148 MG/DL
HBA1C MFR BLD: 6.8 %
MICROALBUMIN UR-MCNC: 7.7 MG/L
MICROALBUMIN/CREAT 24H UR: 8 MG/G CREATININE (ref 0–30)

## 2023-08-31 PROCEDURE — 36415 COLL VENOUS BLD VENIPUNCTURE: CPT

## 2023-08-31 PROCEDURE — 83036 HEMOGLOBIN GLYCOSYLATED A1C: CPT

## 2023-08-31 PROCEDURE — 3044F HG A1C LEVEL LT 7.0%: CPT | Performed by: INTERNAL MEDICINE

## 2023-08-31 PROCEDURE — 82043 UR ALBUMIN QUANTITATIVE: CPT

## 2023-08-31 PROCEDURE — 3044F HG A1C LEVEL LT 7.0%: CPT | Performed by: FAMILY MEDICINE

## 2023-08-31 PROCEDURE — 82570 ASSAY OF URINE CREATININE: CPT

## 2023-09-13 NOTE — PROGRESS NOTES
Chief Complaint: Right knee pain    HPI:    Bonny Banks is a 74year old Female who presents today for follow-up of right knee pain    Last office visit was on 7/12/2023. Patient had continued pain of the right knee despite intra-articular as well as pes anserine bursal cortisone injections. There was a clinical suspicion of underlying medial meniscus tear versus stress fracture of the medial tibial plateau. Hence an MRI of the right knee was requested. This was performed on 7/21/2023 and reveals moderately advanced osteoarthrosis of the medial femoral-tibial compartment. There is also an associated complex tearing/maceration of the posterior horn of the medial meniscus with moderate medial extrusion. Patient continues to experience right knee pain with intermittent clicking sensation. This does get worse with ambulation and so far has not had much relief with conservative management. I have personally reviewed pertinent films in PACS and my interpretation is As noted above in the HPI. Patient Active Problem List   Diagnosis   • Allergic rhinitis   • Cataract   • Depression with anxiety   • Generalized osteoarthritis of multiple sites   • Hemorrhoids   • Insomnia   • Plantar fasciitis, bilateral   • Cough   • Overweight (BMI 25.0-29. 9)   • Type 2 diabetes mellitus without complication, without long-term current use of insulin ([facility])   • Right elbow tendinitis   • Female cystocele   • Left carpal tunnel syndrome   • Trigger finger, left ring finger   • COVID-19   • Symptomatic PVCs   • Postnasal drip   • Dizziness   • Chronic right shoulder pain   • Somatic dysfunction of thoracic region   • Somatic dysfunction of cervical region   • Chronic pain of right knee   • Bilateral primary osteoarthritis of knee   • Left arm pain   • Osteoporosis without current pathological fracture   • Colon cancer screening   • Gastroesophageal reflux disease without esophagitis        Current Outpatient Medications on File Prior to Visit   Medication Sig Dispense Refill   • alendronate (Fosamax) 70 mg tablet Take 1 tablet (70 mg total) by mouth every 7 days 12 tablet 3   • atorvastatin (LIPITOR) 10 mg tablet Take 1 tablet (10 mg total) by mouth daily 90 tablet 1   • BIOTIN 5000 PO Take by mouth daily     • Blood Glucose Monitoring Suppl (Contour Next One) KIT Use daily (Patient not taking: Reported on 8/25/2023) 1 kit 0   • Cyanocobalamin (VITAMIN B12 PO) Take by mouth daily     • Diclofenac Sodium (VOLTAREN) 1 % Apply 2 g topically 4 (four) times a day 100 g 1   • fluticasone (FLONASE) 50 mcg/act nasal spray 1 spray into each nostril daily 16 g 4   • glucosamine 500 MG CAPS capsule Take 500 mg by mouth     • glucose blood (Contour Test) test strip Use 1 each daily Use as instructed (Patient not taking: Reported on 8/25/2023) 100 each 3   • lidocaine (LMX) 4 % cream Apply topically as needed for mild pain 30 g 2   • loratadine (CLARITIN) 10 mg tablet Take 10 mg by mouth daily     • Microlet Lancets MISC Use daily (Patient not taking: Reported on 8/25/2023) 100 each 3   • Omega-3 Fatty Acids (OMEGA 3 PO) Take by mouth     • OneTouch Ultra test strip USE 1 STRIP DAILY AS NEEDED FOR HYPOGLYCEMIA AS  DIRECTED (Patient not taking: Reported on 8/25/2023) 100 each 3   • sodium picosulfate, magnesium oxide, citric acid (Clenpiq) oral solution Take 175 mL (1 bottle) the evening before the colonoscopy, between 5 PM and 9 PM, followed by a second 175 mL bottle 5 hours before the colonoscopy. 350 mL 0   • TURMERIC PO Take by mouth daily       No current facility-administered medications on file prior to visit.         Allergies   Allergen Reactions   • Bee Pollen Other (See Comments)     Other reaction(s): Sneezing   • Pollen Extract Allergic Rhinitis and Sneezing        Tobacco Use: Low Risk  (8/25/2023)    Patient History    • Smoking Tobacco Use: Never    • Smokeless Tobacco Use: Never    • Passive Exposure: Not on file        Social Determinants of Health     Tobacco Use: Low Risk  (8/25/2023)    Patient History    • Smoking Tobacco Use: Never    • Smokeless Tobacco Use: Never    • Passive Exposure: Not on file   Alcohol Use: Not At Risk (5/16/2023)    AUDIT-C    • Frequency of Alcohol Consumption: Never    • Average Number of Drinks: Patient does not drink    • Frequency of Binge Drinking: Never   Financial Resource Strain: Low Risk  (5/16/2023)    Overall Financial Resource Strain (CARDIA)    • Difficulty of Paying Living Expenses: Not hard at all   Food Insecurity: No Food Insecurity (8/16/2022)    Hunger Vital Sign    • Worried About Running Out of Food in the Last Year: Never true    • Ran Out of Food in the Last Year: Never true   Transportation Needs: No Transportation Needs (5/16/2023)    PRAPARE - Transportation    • Lack of Transportation (Medical): No    • Lack of Transportation (Non-Medical): No   Physical Activity: Inactive (8/25/2023)    Exercise Vital Sign    • Days of Exercise per Week: 0 days    • Minutes of Exercise per Session: 0 min   Stress: No Stress Concern Present (8/25/2023)    [address] [place] [address]    • Feeling of Stress : Not at all   Social Connections:  Moderately Integrated (8/25/2023)    Social Connection and Isolation Panel [NHANES]    • Frequency of Communication with Friends and Family: Twice a week    • Frequency of Social Gatherings with Friends and Family: Twice a week    • Attends Worship Services: 1 to 4 times per year    • Active Member of Clubs or Organizations: No    • Attends Club or Organization Meetings: Never    • Marital Status:    Intimate Partner Violence: Not At Risk (8/16/2022)    Humiliation, Afraid, Rape, and Kick questionnaire    • Fear of Current or Ex-Partner: No    • Emotionally Abused: No    • Physically Abused: No    • Sexually Abused: No   Depression: Not at risk (8/25/2023)    PHQ-2    • PHQ-2 Score: 0   Housing Stability: Low Risk  (8/16/2022)    Housing Stability Vital Sign    • Unable to Pay for Housing in the Last Year: No    • Number of Places Lived in the Last Year: 1    • Unstable Housing in the Last Year: No   Utilities: Not on file               Review of Systems     Body mass index is 27.59 kg/m². Physical Exam  Vitals and nursing note reviewed. HENT:      Head: Atraumatic. Eyes:      Conjunctiva/sclera: Conjunctivae normal.   Cardiovascular:      Rate and Rhythm: Normal rate. Pulses: Normal pulses. Pulmonary:      Effort: Pulmonary effort is normal. No respiratory distress. Neurological:      Mental Status: She is alert and oriented to person, place, and time. Psychiatric:         Mood and Affect: Mood normal.         Behavior: Behavior normal.          Ortho Exam:    Body part: right knee    Inspection: No effusion or overlying erythema    Palpation: Tender to palpation over the medial joint line as well as the pes anserine. Range of motion: Full extension and flexion is up to 130 degrees    Special Tests: Negative valgus and varus stress test.  Negative Lachman. Positive medial Veronica's and negative lateral Veronica's. Distal Neurovascular Status: Intact, Yes    Procedures       Assessment:     Diagnosis ICD-10-CM Associated Orders   1. Primary osteoarthritis of right knee  M17.11 Ambulatory Referral to Orthopedic Surgery      2. Other tear of medial meniscus, current injury, right knee, initial encounter  S83.241A Ambulatory Referral to Orthopedic Surgery           Plan:    Explained my current clinical findings and reviewed the radiological findings with the patient today. Unfortunately, she continues to experience significant right knee pain despite conservative management. Hence, I recommend her to seek a surgical opinion in this regard for which a referral has been made to Dr. Nora Anna.   I did explain that the definitive management for osteoarthritis would be a total knee replacement arthroplasty. Portions of the record may have been created with voice recognition software. Occasional wrong word or "sound alike" substitutions may have occurred due to the inherent limitations of voice recognition software. Please review the chart carefully and recognize, using context, where substitutions/typographical errors may have occurred.

## 2023-09-14 ENCOUNTER — TELEPHONE (OUTPATIENT)
Dept: FAMILY MEDICINE CLINIC | Facility: CLINIC | Age: 68
End: 2023-09-14

## 2023-09-14 DIAGNOSIS — E11.9 TYPE 2 DIABETES MELLITUS WITHOUT COMPLICATION, WITHOUT LONG-TERM CURRENT USE OF INSULIN (HCC): Primary | ICD-10-CM

## 2023-09-14 RX ORDER — METFORMIN HYDROCHLORIDE 500 MG/1
1000 TABLET, EXTENDED RELEASE ORAL
Qty: 180 TABLET | Refills: 1 | Status: SHIPPED | OUTPATIENT
Start: 2023-09-14 | End: 2024-03-12

## 2023-09-14 RX ORDER — ATORVASTATIN CALCIUM 40 MG/1
40 TABLET, FILM COATED ORAL EVERY EVENING
Qty: 90 TABLET | Refills: 1 | Status: SHIPPED | OUTPATIENT
Start: 2023-09-14 | End: 2024-03-12

## 2023-09-14 NOTE — RESULT ENCOUNTER NOTE
Patient A1C at 6.8, qualifies as diabetic. Patient's lipid panel shows cholesterol and LDL remain high. Patient started on metformin 500 mg BID and atorvastatin 40 mg daily. Patient notified.

## 2023-09-14 NOTE — PATIENT INSTRUCTIONS
Control de la diabetes tipo 2 en los adultos   CUIDADO AMBULATORIO:   La diabetes tipo 2 es josiane enfermedad que afecta la forma en que el cuerpo utiliza la glucosa (azúcar). El cuerpo no puede producir suficiente insulina o es incapaz de usarla adecuadamente. Es importante controlar la diabetes para evitar el daño al corazón, los vasos sanguíneos y otros órganos. El control lo ayudará a sentirse angelica y a disfrutar de kaylie actividades diarias. Kaylie médicos del equipo de cuidado de la diabetes pueden ayudarlo a hacer un plan para que el cuidado de la diabetes encaje en sanchez horario. Sanchez plan puede cambiar con el tiempo para adaptarse a kaylie necesidades y a las de [address] [place] a alguien que llame al [place] de emergencias local (911 en los [place]) si:  • No es posible despertarlo. • Tiene signos de cetoacidosis diabética:     ? confusión, fatiga    ? vómitos    ? latidos cardíacos rápidos    ? aliento con [employer] a frutas    ? sed extrema    ? sequedad en la boca y la piel    • Tiene alguno de los siguientes signos de un ataque cardíaco:      ? Estrujamiento, presión o tensión en sanchez pecho    ? Usted también podría presentar alguno de los siguientes:     - Malestar o dolor en sanchez espalda, damaris, mandíbula, abdomen, o brazo    - Falta de aliento    - [employer] o vómitos    - Desvanecimiento o sudor frío repentino    • Usted tiene alguno de los siguientes signos de derrame cerebral:      ? Adormecimiento o caída de un lado de sanchez manas    ? Debilidad en un Sally More o josiane pierna    ? Confusión o debilidad para hablar    ? Mareos o dolor de donna intenso, o pérdida de la visión. Llame a sanchez médico o al equipo de cuidado de la diabetes si:  • Tiene josiane llaga o josiane herida que no cicatrizan. • Tiene un cambio en la cantidad de orina. • Kaylie niveles de azúcar en la domenico son superiores a las metas fijadas. • Usted a menudo tiene niveles de azúcar en la domenioc más bajos que kaylie metas fijadas.     • Sanchez piel está enrojecida, seca, caliente al tacto o inflamada. • Usted tiene problemas para sobrellevar sanchez diabetes o se siente ansioso o deprimido. • Usted tiene preguntas o inquietudes acerca de sanchez condición o cuidado. Lo que usted necesita saber sobre los niveles altos de azúcar en la domenico: El azúcar alto en la domenico puede no causar ningún síntoma. Puede sentir más sed u orinar con más frecuencia de lo habitual. Con el tiempo, los niveles altos de azúcar en la domenico pueden dañar joao nervios, vasos sanguíneos, tejidos y órganos. Lo siguiente aumenta los niveles de azúcar en la domenico:  • Comidas copiosas o grandes cantidades de carbohidratos de josiane christ vez    • Menos actividad física    • Estrés    • Enfermedad    • Josiane dosis heather de medicamento para la diabetes o insulina, o josiane dosis tardía    Lo que usted necesita saber sobre los niveles bajos de azúcar en la domenico: Los síntomas incluyen sensación de temblores, Jusam, irritabilidad o confusión. Puedes prevenir los síntomas evitando que los niveles de azúcar en domenico [place]. • Trate los niveles bajos de azúcar en la domenico inmediatamente:     ? Marge 4 onzas de jugo o 1 tubo de gel de glucosa. ? Revise nuevamente sanchez nivel de azúcar en la domenico en 10 a 15 minutos. ? Cuando el nivel regrese a la normalidad, coma un alimento o refrigerio para prevenir otro bajón. • Lleve siempre consigo gel de glucosa, uvas pasas o caramelos duros para tratar los niveles bajos de azúcar en la domenico. • Sanchez azúcar en la domenico puede bajar demasiado si no un medicamento para la diabetes o la insulina y no consume suficientes alimentos. • Si [place] insulina, verifique sanchez nivel de azúcar en la domenico antes de hacer ejercicio. ? Si sanchez nivel de azúcar en la domenico es inferior a 100 mg/dL, coma 4 galletas, 2 onzas de uvas pasas o marge 4 onzas de jugo. ? Compruebe sanchez nivel cada 30 minutos si hace ejercicio reggie más de 1 hora.     ? Puede que necesite josiane merienda reggie o después de hacer ejercicio. Qué puede hacer para manejar joao niveles de azúcar en la domenico:  • Revise joao niveles de azúcar en la domenico según las indicaciones y según sea necesario. Hay varios elementos disponibles que puede utilizar para comprobar joao [place]. Puede que tenga que comprobarlo probando josiane gota de Jose C Barbosa en un medidor de glucosa. En sanchez lugar, es posible que le den un monitor continuo de glucosa (MCG). El dispositivo se lleva puesto en todo momento. El MCG revisa sanchez nivel de azúcar en la domenico cada 5 minutos. [employer] a un dispositivo electrónico, partha un teléfono inteligente. Un MCG puede utilizarse con o sin josiane bomba de insulina. Usted y los médicos de sanchez equipo de atención de la diabetes decidirán cuál es el mejor método para usted. El objetivo de los niveles de azúcar en la domenico antes de las comidas es entre 80 y 130 mg/dL y 2 horas después de comer  es inferior a 180 mg/dL. • Elija opciones de alimentos saludables. Consulte con sanchez dietista para crear un plan de comidas que funcione para usted y joao horarios. Un dietista puede ayudarlo para que aprenda cómo alimentarse angelica con la cantidad [place] de carbohidratos reggie las comidas y [place]. Los carbohidratos pueden subir sanchez azúcar en la domenico si usted come demasiado a la vez. Algunos ejemplos de alimentos que contienen carbohidratos son panes, cereales, arroz, pasta y dulces. • Coma alimentos altos en fibras, según las indicaciones. La fibra ayuda a mejorar los niveles de azúcar en la domenico. La fibra también reduce el riesgo de padecer enfermedades cardíacas y otros problemas que puede causar la diabetes. Por ejemplo, los alimentos ricos en fibra verduras, pan integral y frijoles, partha los frijoles pintos. Sanchez dietista puede indicarle cuánta fibra debe consumir cada día. • Realice actividad física regularmente.  La actividad física puede ayudarlo a alcanzar sanchez objetivo de nivel de azúcar en domenico y a controlar sanchez peso. Onur al menos 150 minutos de [place] física [place] de moderada a vigorosa cada semana. No deje de realizarla reggie más de 2 días seguidos. No permanezca sentada por más de 30 minutos cada vez. Sanchez médico puede ayudarle a crear un plan de actividades. El plan puede incluir las mejores actividades para usted y puede ayudarlo a desarrollar sanchez fuerza y [place]. • Mantenga un peso saludable. Pregúntele a sanchez equipo cuál es el peso ideal para usted. El peso saludable puede ayudarle a controlar sanchez diabetes y a evitar josiane enfermedad cardíaca. Pregúntele a sanchez equipo que lo ayude a elaborar un plan para perder peso de manera si lo necesita. La pérdida de peso puede ayudar a hacer josiane diferencia en el control de sanchez diabetes. Sanchez equipo establecerá josiane meta de pérdida de peso, partha 10 a 15 libras o 5% de sanchez sobrepeso. Juntos, usted y sanchez equipo, podrán fijar metas de pérdida de peso alcanzables. • Tómese sanchez medicamento para la diabetes o la insulina según las indicaciones. Es posible que necesite medicamento para la diabetes, insulina o ambos para controlar joao niveles de glucosa en domenico. Sanchez médico le indicará cómo y cuándo se debe lucius sanchez medicamento de diabetes o la insulina. También se le enseñarán los efectos secundarios que pueden causar los medicamentos orales para la diabetes. La insulina puede administrarse mediante inyección o josiane bomba o josiane pluma. Usted y los médicos decidirán cuál es el mejor método para usted:    ? La bomba de insulina es un dispositivo implantado que le da insulina las 24 horas del día. Josiane bomba de insulina previene la necesidad de inyecciones múltiples de insulina en un día. ? La pluma para insulina es un dispositivo precargado con la cantidad [place] de insulina. ? A usted y sanchez alok les enseñarán cómo preparar y administrar la insulina si piter es el mejor método para usted.  Marlene Trimble también le enseñarán cómo desechar las agujas y [place]ol[place]. ? Aprenderá la cantidad de insulina que necesita y cuándo administrarla. Se le enseñará cuándo no administrar la insulina. También se le enseñará lo que debe hacer si sanchez nivel de azúcar en la domenico baja demasiado. [place] puede suceder si usted no insulina y no come la cantidad [place] de carbohidratos. Más maneras para controlar la diabetes tipo 2:  • Use identificación de alerta médica. Use un brazalete o collar de alerta médica o lleve consigo josiane tarjeta que indique que tiene diabetes. Pregunte a sanchez médico dónde puede conseguir esos artículos. • No fume. La nicotina y otras sustancias químicas de los cigarrillos y los cigarros pueden dañar el pulmón y los vasos sanguíneos. También dificulta el control de la diabetes. Pida información a sanchez médico si usted actualmente fuma y necesita ayuda para dejar de fumar. No use cigarrillos electrónicos o tabaco sin humo en vez de cigarrillos o para tratar de dejar de fumar. Todos estos aún contienen nicotina. • Revise joao pies todos los días por cortadas, raspones, callos u otras heridas. Janice pendiente de enrojecimiento e inflamación y de calor al tacto. Use zapatos que le calcen angelica. Compruebe que no haya piedras u otros objetos dentro de joao zapatos que le podrían [employer]. No camine descalzo ni use zapatos sin calcetines. Use calcetines de algodón para ayudar a [place]. • Pregunte sobre las vacunas que pudiera necesitar. Usted corre un mayor riesgo de presentar enfermedades graves si se contagia gripe, neumonía, COVID-19 o hepatitis. Pregunte a sanchez médico si debe vacunarse para prevenir estas u otras enfermedades, y cuándo debe hacerlo. • Hable con sanchez médico si se siente estresado por el cuidado de la diabetes. A veces, encajar el cuidado de la diabetes en sanchez oriana puede provocar un aumento del estrés. El estrés puede hacer que no se cuide Jose.  Los médicos de sanchez equipo de atención pueden ayudarlo con consejos sobre el autocuidado. Joao médicos pueden sugerirle que hable con un profesional de la vibha mental que pueda escuchar y ofrecerle ayuda en cuestiones de autocuidado. • Hágase un control de la A1c según las indicaciones. Sanchez médico puede comprobar sanchez A1c cada 3 meses, o 2 veces al año si sanchez diabetes está controlada. El examen de A1c muestra la cantidad promedio de azúcar en sanchez domenico reggie los últimos 2 a 3 meses. Sanchez médico le dirá cuál debe ser sanchez nivel de A1c.    • Hágase las pruebas de detección partha se le indique. Sanchez médico podría recomendarle que se onur pruebas para detectar complicaciones de la diabetes y otras condiciones que puedan desarrollarse. Algunas pruebas de detección pueden comenzar inmediatamente y otros pueden ocurrir dentro de los primeros 5 años del diagnóstico:    ? Los ejemplos de complicaciones de la diabetes incluyen problemas renales, colesterol alto, presión arterial kristen, problemas vasculares, problemas oculares y apnea del sueño. ? Se le puede hacer josiane prueba para detectar niveles bajos de vitamina B si no medicamentos orales para la diabetes reggie mucho tiempo. ? Las [employer] en edad fértil pueden hacerse pruebas para detectar síndrome del ovario poliquístico (SOPQ). Onur un seguimiento con sanchez médico o con los proveedores del equipo de cuidado de la diabetes según las instrucciones: Es posible que a usted le radhika análisis de domenico antes de la ollie de control. [employer] de las pruebas mostrarán si es necesario hacer cambios en el tratamiento o en los cuidados personales. Hable con sanchez médico si no puede pagar joao medicamentos. Anote joao preguntas para que se acuerde de hacerlas reggie joao visitas. © Copyright Burtis Nicole 2022 Information is for End User's use only and may not be sold, redistributed or otherwise used for commercial purposes. Esta información es sólo para uso en educación.  Sanchez intención no es darle un consejo médico sobre enfermedades o tratamientos. Colsulte con sanchez Bobbi Leather farmacéutico antes de seguir cualquier régimen médico para saber si es seguro y efectivo para usted.

## 2023-09-14 NOTE — TELEPHONE ENCOUNTER
Patient's A1C at 6.8, qualifies as diabetic. Lipid panel shows increased cholesterol and LDL levels even on atorvastatin 10 mg. Started patient on metformin 500 mg BID and atorvastatin increased to 40 mg daily. Repeat A1C in 3 months. F/U in 3 months. Patient notified and all questions answered.

## 2023-09-22 ENCOUNTER — HOSPITAL ENCOUNTER (OUTPATIENT)
Dept: GASTROENTEROLOGY | Facility: AMBULARY SURGERY CENTER | Age: 68
Setting detail: OUTPATIENT SURGERY
End: 2023-09-22
Attending: INTERNAL MEDICINE
Payer: COMMERCIAL

## 2023-09-22 ENCOUNTER — ANESTHESIA EVENT (OUTPATIENT)
Dept: GASTROENTEROLOGY | Facility: AMBULARY SURGERY CENTER | Age: 68
End: 2023-09-22

## 2023-09-22 ENCOUNTER — ANESTHESIA (OUTPATIENT)
Dept: GASTROENTEROLOGY | Facility: AMBULARY SURGERY CENTER | Age: 68
End: 2023-09-22

## 2023-09-22 VITALS
HEART RATE: 88 BPM | WEIGHT: 139 LBS | HEIGHT: 61 IN | DIASTOLIC BLOOD PRESSURE: 53 MMHG | SYSTOLIC BLOOD PRESSURE: 101 MMHG | BODY MASS INDEX: 26.24 KG/M2 | OXYGEN SATURATION: 99 % | RESPIRATION RATE: 19 BRPM | TEMPERATURE: 97.7 F

## 2023-09-22 DIAGNOSIS — K21.9 GASTROESOPHAGEAL REFLUX DISEASE WITHOUT ESOPHAGITIS: ICD-10-CM

## 2023-09-22 DIAGNOSIS — Z12.11 COLON CANCER SCREENING: ICD-10-CM

## 2023-09-22 LAB — GLUCOSE SERPL-MCNC: 108 MG/DL (ref 65–140)

## 2023-09-22 PROCEDURE — 88305 TISSUE EXAM BY PATHOLOGIST: CPT | Performed by: PATHOLOGY

## 2023-09-22 PROCEDURE — 82948 REAGENT STRIP/BLOOD GLUCOSE: CPT

## 2023-09-22 RX ORDER — PROPOFOL 10 MG/ML
INJECTION, EMULSION INTRAVENOUS AS NEEDED
Status: DISCONTINUED | OUTPATIENT
Start: 2023-09-22 | End: 2023-09-22

## 2023-09-22 RX ORDER — SODIUM CHLORIDE, SODIUM LACTATE, POTASSIUM CHLORIDE, CALCIUM CHLORIDE 600; 310; 30; 20 MG/100ML; MG/100ML; MG/100ML; MG/100ML
INJECTION, SOLUTION INTRAVENOUS CONTINUOUS PRN
Status: DISCONTINUED | OUTPATIENT
Start: 2023-09-22 | End: 2023-09-22

## 2023-09-22 RX ORDER — LIDOCAINE HYDROCHLORIDE 20 MG/ML
INJECTION, SOLUTION EPIDURAL; INFILTRATION; INTRACAUDAL; PERINEURAL AS NEEDED
Status: DISCONTINUED | OUTPATIENT
Start: 2023-09-22 | End: 2023-09-22

## 2023-09-22 RX ORDER — PHENYLEPHRINE HCL IN 0.9% NACL 1 MG/10 ML
SYRINGE (ML) INTRAVENOUS AS NEEDED
Status: DISCONTINUED | OUTPATIENT
Start: 2023-09-22 | End: 2023-09-22

## 2023-09-22 RX ORDER — EPHEDRINE SULFATE 50 MG/ML
INJECTION INTRAVENOUS AS NEEDED
Status: DISCONTINUED | OUTPATIENT
Start: 2023-09-22 | End: 2023-09-22

## 2023-09-22 RX ADMIN — PROPOFOL 50 MG: 10 INJECTION, EMULSION INTRAVENOUS at 10:54

## 2023-09-22 RX ADMIN — PROPOFOL 20 MG: 10 INJECTION, EMULSION INTRAVENOUS at 10:57

## 2023-09-22 RX ADMIN — LIDOCAINE HYDROCHLORIDE 70 MG: 20 INJECTION, SOLUTION EPIDURAL; INFILTRATION; INTRACAUDAL at 10:52

## 2023-09-22 RX ADMIN — Medication 100 MCG: at 10:58

## 2023-09-22 RX ADMIN — EPHEDRINE SULFATE 10 MG: 50 INJECTION INTRAVENOUS at 11:00

## 2023-09-22 RX ADMIN — SODIUM CHLORIDE, SODIUM LACTATE, POTASSIUM CHLORIDE, AND CALCIUM CHLORIDE: .6; .31; .03; .02 INJECTION, SOLUTION INTRAVENOUS at 10:20

## 2023-09-22 RX ADMIN — EPHEDRINE SULFATE 15 MG: 50 INJECTION INTRAVENOUS at 10:54

## 2023-09-22 RX ADMIN — PROPOFOL 20 MG: 10 INJECTION, EMULSION INTRAVENOUS at 11:01

## 2023-09-22 RX ADMIN — PROPOFOL 30 MG: 10 INJECTION, EMULSION INTRAVENOUS at 10:53

## 2023-09-22 RX ADMIN — PROPOFOL 120 MG: 10 INJECTION, EMULSION INTRAVENOUS at 10:52

## 2023-09-22 RX ADMIN — Medication 40 MG: at 10:59

## 2023-09-22 NOTE — ANESTHESIA POSTPROCEDURE EVALUATION
Post-Op Assessment Note    CV Status:  Stable  Pain Score: 0    Pain management: adequate     Mental Status:  Sleepy   Hydration Status:  Stable   PONV Controlled:  None   Airway Patency:  Patent      Post Op Vitals Reviewed: Yes      Staff: CRNA         No notable events documented.     /57 (09/22/23 1107)    Temp 97.7 °F (36.5 °C) (09/22/23 1107)    Pulse 81 (09/22/23 1107)   Resp 16 (09/22/23 1107)    SpO2 99 % (09/22/23 1107)

## 2023-09-22 NOTE — H&P
History and Physical - [facility]  Jon Bates 79 y.o. female MRN: [id number]        HPI: 51-year-old female with history of GERD was referred for screening colonoscopy. Regular bowel movements.     Historical Information   Past Medical History:   Diagnosis Date   • Abnormal glucose     last assessed 12/22/15   • Acid reflux    • Anxiety    • Arthritis     osteoarthritis multiple sites   • Benign paroxysmal positional vertigo    • Carpal tunnel syndrome    • Chronic kidney disease     nephrolithiasis   • Depression    • Diabetes mellitus ([address])    • GERD (gastroesophageal reflux disease)    • Insomnia    • Patellofemoral dysfunction     last assessed  9/15/14     Past Surgical History:   Procedure Laterality Date   • BREAST CYST EXCISION Left     abscess removed   50yrs ago   • CARPAL TUNNEL RELEASE     • COLONOSCOPY     • KNEE SURGERY Right    • NM NDSC WRST SURG W/RLS TRANSVRS CARPL LIGM Right 08/25/2016    Procedure: RELEASE CARPAL TUNNEL ENDOSCOPIC;  Surgeon: Marianna Mathew MD;  Location: [facility];  Service: Orthopedics   • NM TENDON SHEATH INCISION Right 08/25/2016    Procedure: RELEASE LONG AND RING TRIGGER FINGERS;  Surgeon: Marianna Mathew MD;  Location: [facility];  Service: Orthopedics   • NM TENDON SHEATH INCISION Left 09/15/2020    Procedure: RELEASE TRIGGER FINGER RING;  Surgeon: Marianna Mathew MD;  [facility];  Service: Orthopedics   • TUBAL LIGATION       Social History   Social History     Substance and Sexual Activity   Alcohol Use No    Comment: rare/social as per [employer]     Social History     Substance and Sexual Activity   Drug Use No     Social History     Tobacco Use   Smoking Status Never   Smokeless Tobacco Never     Family History   Problem Relation Age of Onset   • Diabetes Mother    • Heart disease Mother    • Hypertension Mother    • Hypothyroidism Mother    • Arthritis Father    • Crohn's disease Family    • Psoriasis Family    • Rheum arthritis Family    • Lupus Family         systemic- erythematosus   • Ulcerative colitis Family    • Breast cancer Cousin 46        paternal   • No Known Problems Sister    • No Known Problems Maternal Grandmother    • No Known Problems Maternal Grandfather    • No Known Problems Paternal Grandmother    • No Known Problems Paternal Grandfather    • No Known Problems Son    • No Known Problems Son    • No Known Problems Sister    • No Known Problems Sister    • No Known Problems Brother    • No Known Problems Brother    • No Known Problems Brother        Meds/Allergies     (Not in a hospital admission)      Allergies   Allergen Reactions   • Bee Pollen Other (See Comments)     Other reaction(s): Sneezing   • Pollen Extract Allergic Rhinitis and Sneezing       Objective     Blood pressure 113/58, pulse 73, temperature (!) 96.7 °F (35.9 °C), temperature source Temporal, resp. rate 18, height 5' 1" (1.549 m), weight 63 kg (139 lb), SpO2 98 %.     PHYSICAL EXAM:    Gen: NAD  CV: S1 & S2 normal, RRR  CHEST: Clear to auscultate  ABD: soft, NT/ND, good bowel sounds  EXT: no edema    ASSESSMENT:     GERD, screening for colon cancer     PLAN:    EGD and colonoscopy

## 2023-09-22 NOTE — ANESTHESIA PREPROCEDURE EVALUATION
Procedure:  COLONOSCOPY  EGD    Relevant Problems   CARDIO   (+) Symptomatic PVCs      ENDO   (+) Type 2 diabetes mellitus without complication, without long-term current use of insulin (HCC)      GI/HEPATIC   (+) Gastroesophageal reflux disease without esophagitis      MUSCULOSKELETAL   (+) Generalized osteoarthritis of multiple sites      NEURO/PSYCH   (+) Chronic right shoulder pain   (+) Depression with anxiety        Physical Exam    Airway    Mallampati score: II  TM Distance: >3 FB  Neck ROM: full     Dental   No notable dental hx     Cardiovascular  Rhythm: regular, Rate: normal,     Pulmonary  Breath sounds clear to auscultation,     Other Findings        Anesthesia Plan  ASA Score- 2     Anesthesia Type- IV sedation with anesthesia with ASA Monitors. Additional Monitors:   Airway Plan:           Plan Factors-Exercise tolerance (METS): >4 METS. Chart reviewed. Existing labs reviewed. Patient summary reviewed. Patient is not a current smoker. Induction- intravenous. Postoperative Plan-     Informed Consent- Anesthetic plan and risks discussed with patient. I personally reviewed this patient with the CRNA. Discussed and agreed on the Anesthesia Plan with the CRNA. Jordyn Caro

## 2023-09-26 PROCEDURE — 88305 TISSUE EXAM BY PATHOLOGIST: CPT | Performed by: PATHOLOGY

## 2023-10-15 PROBLEM — Z12.11 COLON CANCER SCREENING: Status: RESOLVED | Noted: 2023-08-16 | Resolved: 2023-10-15

## 2023-10-24 ENCOUNTER — OFFICE VISIT (OUTPATIENT)
Dept: OBGYN CLINIC | Facility: CLINIC | Age: 68
End: 2023-10-24
Payer: COMMERCIAL

## 2023-10-24 ENCOUNTER — HOSPITAL ENCOUNTER (OUTPATIENT)
Dept: RADIOLOGY | Facility: HOSPITAL | Age: 68
Discharge: HOME | End: 2023-10-24
Attending: ORTHOPAEDIC SURGERY
Payer: COMMERCIAL

## 2023-10-24 VITALS
DIASTOLIC BLOOD PRESSURE: 78 MMHG | BODY MASS INDEX: 26.26 KG/M2 | SYSTOLIC BLOOD PRESSURE: 116 MMHG | HEIGHT: 61 IN | HEART RATE: 77 BPM

## 2023-10-24 DIAGNOSIS — S83.241A OTHER TEAR OF MEDIAL MENISCUS, CURRENT INJURY, RIGHT KNEE, INITIAL ENCOUNTER: ICD-10-CM

## 2023-10-24 DIAGNOSIS — M17.11 PRIMARY OSTEOARTHRITIS OF RIGHT KNEE: Primary | ICD-10-CM

## 2023-10-24 DIAGNOSIS — M17.11 PRIMARY OSTEOARTHRITIS OF RIGHT KNEE: ICD-10-CM

## 2023-10-24 PROCEDURE — 73562 X-RAY EXAM OF KNEE 3: CPT

## 2023-10-24 PROCEDURE — 99204 OFFICE O/P NEW MOD 45 MIN: CPT | Performed by: ORTHOPAEDIC SURGERY

## 2023-10-24 NOTE — PROGRESS NOTES
Assessment:  1. Primary osteoarthritis of right knee  Ambulatory Referral to Orthopedic Surgery    XR knee 3 vw right non injury    Injection Procedure Prior Authorization      2. Other tear of medial meniscus, current injury, right knee, initial encounter  Ambulatory Referral to Orthopedic Surgery          Plan:    Patient has right knee pain due to moderate osteoarthritis, medial meniscal tear and right hip pain   Weightbearing as tolerated   Will be ordering Durolane injection for the right knee   Continue with home exercises from physical therapy and chiropractic tx  Provided patient with VMO strengthening exercises  Will ordering Right hip x-ray at the next office visit    Will contact patient when injection has been approved   Follow up PRN         The above stated was discussed in layman's terms and the patient expressed understanding. All questions were answered to the patient's satisfaction. Subjective:   Arcadio Perkins is a 79 y.o. female who presents today to establish care for right knee pain. She was referred by Dr. Ezell Goldmann. She has been treated with Dr. Ezell Goldmann and with right knee steroid injection on 3/3/23 and pes anserine bursa injection on 6/13/23 and states she had minimal relief with both injections. She states of having right knee pain for over a year, denies any injuries. She is having diffuse right knee pain. She states she does have pain in the posterior aspect of the knee with sitting to standing. Does feel her knee is unstable and has buckled a few times when walking. Her pain is worse with walking and going up and down steps. She did go to physical therapy in the past with minimal relief. She is currently treating with a chiropractor for her right has been doing cold laser therapy which is has been helping and she is on natural supplements. She also has tried taking Tylenol and using Voltaren gel for pain relief. She is  wears a compression sleeve with some relief.  She has hx of right arthroscopy for meniscal tear     She states that last week she started having increased pain in her right hip. States she has pain going up steps and walking.         Review of systems negative unless otherwise specified in HPI    Past Medical History:   Diagnosis Date    Abnormal glucose     last assessed 12/22/15    Acid reflux     Anxiety     Arthritis     osteoarthritis multiple sites    Benign paroxysmal positional vertigo     Carpal tunnel syndrome     Chronic kidney disease     nephrolithiasis    Depression     Diabetes mellitus (HCC)     GERD (gastroesophageal reflux disease)     Insomnia     Patellofemoral dysfunction     last assessed  9/15/14       Past Surgical History:   Procedure Laterality Date    BREAST CYST EXCISION Left     abscess removed   50yrs ago    CARPAL TUNNEL RELEASE      COLONOSCOPY      KNEE SURGERY Right     OK NDSC WRST SURG W/RLS TRANSVRS CARPL LIGM Right 08/25/2016    Procedure: RELEASE CARPAL TUNNEL ENDOSCOPIC;  Surgeon: Colleen Braun MD;  [facility];  Service: Orthopedics    OK TENDON SHEATH INCISION Right 08/25/2016    Procedure: RELEASE LONG AND RING TRIGGER FINGERS;  Surgeon: Colleen Braun MD;  [facility];  Service: Orthopedics    OK TENDON SHEATH INCISION Left 09/15/2020    Procedure: RELEASE TRIGGER FINGER RING;  Surgeon: Colleen Braun MD;  [facility];  Service: Orthopedics    TUBAL LIGATION         Family History   Problem Relation Age of Onset    Diabetes Mother     Heart disease Mother     Hypertension Mother     Hypothyroidism Mother     Arthritis Father     Crohn's disease Family     Psoriasis Family     Rheum arthritis Family     Lupus Family         systemic- erythematosus    Ulcerative colitis Family     Breast cancer Cousin 46        paternal    No Known Problems Sister     No Known Problems Maternal Grandmother     No Known Problems Maternal Grandfather     No Known Problems Paternal Grandmother     No Known Problems Paternal Grandfather     No Known Problems Son     No Known Problems Son     No Known Problems Sister     No Known Problems Sister     No Known Problems Brother     No Known Problems Brother     No Known Problems Brother        Social History     Occupational History    Not on file   Tobacco Use    Smoking status: Never    Smokeless tobacco: Never   Vaping Use    Vaping Use: Never used   Substance and Sexual Activity    Alcohol use: No     Comment: rare/social as per [employer]    Drug use: No    Sexual activity: Yes     Partners: Male     Birth control/protection: None         Current Outpatient Medications:     alendronate (Fosamax) 70 mg tablet, Take 1 tablet (70 mg total) by mouth every 7 days, Disp: 12 tablet, Rfl: 3    atorvastatin (LIPITOR) 40 mg tablet, Take 1 tablet (40 mg total) by mouth every evening, Disp: 90 tablet, Rfl: 1    Cyanocobalamin (VITAMIN B12 PO), Take by mouth daily, Disp: , Rfl:     Diclofenac Sodium (VOLTAREN) 1 %, Apply 2 g topically 4 (four) times a day, Disp: 100 g, Rfl: 1    fluticasone (FLONASE) 50 mcg/act nasal spray, 1 spray into each nostril daily, Disp: 16 g, Rfl: 4    glucosamine 500 MG CAPS capsule, Take 500 mg by mouth, Disp: , Rfl:     lidocaine (LMX) 4 % cream, Apply topically as needed for mild pain, Disp: 30 g, Rfl: 2    loratadine (CLARITIN) 10 mg tablet, Take 10 mg by mouth daily, Disp: , Rfl:     metFORMIN (GLUCOPHAGE-XR) 500 mg 24 hr tablet, Take 2 tablets (1,000 mg total) by mouth daily with breakfast, Disp: 180 tablet, Rfl: 1    Omega-3 Fatty Acids (OMEGA 3 PO), Take by mouth, Disp: , Rfl:     BIOTIN 5000 PO, Take by mouth daily, Disp: , Rfl:     Blood Glucose Monitoring Suppl (Contour Next One) KIT, Use daily (Patient not taking: Reported on 8/25/2023), Disp: 1 kit, Rfl: 0    glucose blood (Contour Test) test strip, Use 1 each daily Use as instructed (Patient not taking: Reported on 8/25/2023), Disp: 100 each, Rfl: 3    Microlet Lancets MISC, Use daily (Patient not taking: Reported on 8/25/2023), Disp: 100 each, Rfl: 3    OneTouch Ultra test strip, USE 1 STRIP DAILY AS NEEDED FOR HYPOGLYCEMIA AS  DIRECTED (Patient not taking: Reported on 8/25/2023), Disp: 100 each, Rfl: 3    TURMERIC PO, Take by mouth daily, Disp: , Rfl:     Allergies   Allergen Reactions    Bee Pollen Other (See Comments)     Other reaction(s): Sneezing    Pollen Extract Allergic Rhinitis and Sneezing            Vitals:    10/24/23 0824   BP: 116/78   Pulse: 77       Objective:            Physical Exam  Physical Exam:      General Appearance:    Alert, cooperative, no distress, appears stated age   Head:    Normocephalic, without obvious abnormality, atraumatic   Eyes:    conjunctiva/corneas clear, both eyes         Nose:   Nares normal, septum midline, no drainage    Throat:   Lips normal; teeth and gums normal   Neck:    symmetrical, trachea midline, ;     thyroid:  no enlargement/   Back:     Symmetric, no curvature, ROM normal   Lungs:   No audible wheezing or labored breathing   Chest Wall:    No tenderness or deformity    Heart:    Regular rate and rhythm                         Pulses:   2+ and symmetric all extremities   Skin:   Skin color, texture, turgor normal, no rashes or lesions   Neurologic:   normal strength, sensation and reflexes     throughout                       Ortho Exam  Right knee   Skin intact  No erythema   Full extension  Tenderness to palpation over the medial joint line  No tenderness to palpation of the lateral joint line  Stable to varus and valgus stress  Negative Lachman  Negative posterior drawer  Mild effusion  Neurovascularly Intact Distally       Right hip  Positive Jackman Burow  + Ivonne   + faddir     Diagnostics, reviewed and taken today if performed as documented    The attending physician has personally reviewed the pertinent films in PACS and interpretation is as follows: X-ray right knee demonstrates tricompartment arthritis worse in the medial and patellofemoral joint with osteophyte formation    MRI right knee demonstrates moderate medial femoral-tibial compartment joint space narrowing and complex tear of the posterior horn the medial meniscus      Procedures, if performed today:    Procedures    None performed      Scribe Attestation      I,:  Raulerwin Meneses Prasanna am acting as a scribe while in the presence of the attending physician.:       I,:  Danna Tejada MD personally performed the services described in this documentation    as scribed in my presence.:               Portions of the record may have been created with voice recognition software. Occasional wrong word or "sound a like" substitutions may have occurred due to the inherent limitations of voice recognition software. Read the chart carefully and recognize, using context, where substitutions have occurred.

## 2023-11-13 ENCOUNTER — OFFICE VISIT (OUTPATIENT)
Dept: FAMILY MEDICINE CLINIC | Facility: CLINIC | Age: 68
End: 2023-11-13

## 2023-11-13 VITALS
BODY MASS INDEX: 26.57 KG/M2 | RESPIRATION RATE: 16 BRPM | SYSTOLIC BLOOD PRESSURE: 104 MMHG | WEIGHT: 140.6 LBS | HEART RATE: 72 BPM | DIASTOLIC BLOOD PRESSURE: 70 MMHG | TEMPERATURE: 97.2 F

## 2023-11-13 DIAGNOSIS — M81.0 OSTEOPOROSIS OF LUMBAR SPINE: ICD-10-CM

## 2023-11-13 DIAGNOSIS — G89.29 CHRONIC PAIN OF RIGHT KNEE: ICD-10-CM

## 2023-11-13 DIAGNOSIS — E11.9 TYPE 2 DIABETES MELLITUS WITHOUT COMPLICATION, WITHOUT LONG-TERM CURRENT USE OF INSULIN (HCC): ICD-10-CM

## 2023-11-13 DIAGNOSIS — M17.11 PRIMARY OSTEOARTHRITIS OF RIGHT KNEE: ICD-10-CM

## 2023-11-13 DIAGNOSIS — M79.602 LEFT ARM PAIN: ICD-10-CM

## 2023-11-13 DIAGNOSIS — M25.561 CHRONIC PAIN OF RIGHT KNEE: ICD-10-CM

## 2023-11-13 DIAGNOSIS — J30.1 SEASONAL ALLERGIC RHINITIS DUE TO POLLEN: ICD-10-CM

## 2023-11-13 LAB — SL AMB POCT HEMOGLOBIN AIC: 6.3 (ref ?–6.5)

## 2023-11-13 PROCEDURE — 99213 OFFICE O/P EST LOW 20 MIN: CPT | Performed by: FAMILY MEDICINE

## 2023-11-13 PROCEDURE — 83036 HEMOGLOBIN GLYCOSYLATED A1C: CPT | Performed by: FAMILY MEDICINE

## 2023-11-13 RX ORDER — METFORMIN HYDROCHLORIDE 500 MG/1
1000 TABLET, EXTENDED RELEASE ORAL
Qty: 180 TABLET | Refills: 2 | Status: SHIPPED | OUTPATIENT
Start: 2023-11-13 | End: 2024-05-11

## 2023-11-13 RX ORDER — BLOOD SUGAR DIAGNOSTIC
1 STRIP MISCELLANEOUS DAILY PRN
Qty: 100 EACH | Refills: 3 | Status: SHIPPED | OUTPATIENT
Start: 2023-11-13

## 2023-11-13 RX ORDER — LIDOCAINE 40 MG/G
CREAM TOPICAL AS NEEDED
Qty: 30 G | Refills: 2 | Status: SHIPPED | OUTPATIENT
Start: 2023-11-13

## 2023-11-13 RX ORDER — ALENDRONATE SODIUM 70 MG/1
70 TABLET ORAL
Qty: 12 TABLET | Refills: 3 | Status: SHIPPED | OUTPATIENT
Start: 2023-11-13

## 2023-11-13 RX ORDER — FLUTICASONE PROPIONATE 50 MCG
1 SPRAY, SUSPENSION (ML) NASAL DAILY
Qty: 16 G | Refills: 4 | Status: SHIPPED | OUTPATIENT
Start: 2023-11-13 | End: 2023-11-13

## 2023-11-13 RX ORDER — ATORVASTATIN CALCIUM 40 MG/1
40 TABLET, FILM COATED ORAL EVERY EVENING
Qty: 90 TABLET | Refills: 1 | Status: SHIPPED | OUTPATIENT
Start: 2023-11-13 | End: 2024-05-11

## 2023-11-13 RX ORDER — FLUTICASONE PROPIONATE 50 MCG
1 SPRAY, SUSPENSION (ML) NASAL DAILY
Qty: 16 G | Refills: 4 | Status: SHIPPED | OUTPATIENT
Start: 2023-11-13

## 2023-11-13 NOTE — ASSESSMENT & PLAN NOTE
Lab Results   Component Value Date    HGBA1C 6.3 11/13/2023     - A1C 6.3% today; improved from 6.8% in May 2023  - Lost 7lbs since Aug 2023  - Eating lighter meals, decreased amount of carbs daily, decreasedvsweet drink intake   - Physically active 3x weekly - walking, stretching exercises  - Current medications: metformin 1000 mg, atorvastatin 40 mg  Continue current medications  F/U in 6 months  Reminded for previously ordered labs - BMP, Alb/Cr

## 2023-11-13 NOTE — PROGRESS NOTES
Name: Debra Ambriz      : 1955      MRN: [id number]  Encounter Provider: Amanda Griffiths MD  Encounter Date: 2023   Encounter department: [address]     1. Type 2 diabetes mellitus without complication, without long-term current use of insulin ([address])  Assessment & Plan:    Lab Results   Component Value Date    HGBA1C 6.3 2023     - A1C 6.3% today; improved from 6.8% in May 2023  - Lost 7lbs since Aug 2023  - Eating lighter meals, decreased amount of carbs daily, decreasedvsweet drink intake   - Physically active 3x weekly - walking, stretching exercises  - Current medications: metformin 1000 mg, atorvastatin 40 mg  Continue current medications  F/U in 6 months  Reminded for previously ordered labs - BMP, Alb/Cr      Orders:  -     POCT hemoglobin A1c  -     glucose blood (OneTouch Ultra) test strip; Use 1 each daily as needed (use as needed) Use as instructed    2. Primary osteoarthritis of right knee  -     lidocaine (LMX) 4 % cream; Apply topically as needed for mild pain    3. Chronic pain of right knee  -     lidocaine (LMX) 4 % cream; Apply topically as needed for mild pain    4. Left arm pain  -     Diclofenac Sodium (VOLTAREN) 1 %; Apply 2 g topically 4 (four) times a day           Subjective      78 yo female presents for diabetes check. Last A1c at 6.8. Since then patient is adopted better eating habits and increased physical activity. Diabetic exam was performed visit in May 2023. Patient was previously prescribed lab work for diabetes and has not yet been performed; patient reminded. Patient requested multiple medication refills. Patient plans to visit home country, [place], for winter months; will be back early . Review of Systems   Constitutional:  Negative for chills and fever. HENT:  Negative for ear pain and sore throat. Eyes:  Negative for pain and visual disturbance.    Respiratory: Negative for cough and shortness of breath. Cardiovascular:  Negative for chest pain and palpitations. Gastrointestinal:  Negative for abdominal pain and vomiting. Genitourinary:  Negative for dysuria and hematuria. Musculoskeletal:  Positive for arthralgias. Negative for back pain. Skin:  Negative for color change and rash. Neurological:  Negative for seizures and syncope. All other systems reviewed and are negative. Current Outpatient Medications on File Prior to Visit   Medication Sig    loratadine (CLARITIN) 10 mg tablet Take 10 mg by mouth daily    Omega-3 Fatty Acids (OMEGA 3 PO) Take by mouth    BIOTIN 5000 PO Take by mouth daily (Patient not taking: Reported on 11/13/2023)    Cyanocobalamin (VITAMIN B12 PO) Take by mouth daily (Patient not taking: Reported on 11/13/2023)       Objective     /70   Pulse 72   Temp (!) 97.2 °F (36.2 °C)   Resp 16   Wt 63.8 kg (140 lb 9.6 oz)   BMI 26.57 kg/m²     Physical Exam  Vitals reviewed. Constitutional:       General: She is awake. She is not in acute distress. Appearance: Normal appearance. She is not ill-appearing. HENT:      Head: Normocephalic and atraumatic. Right Ear: External ear normal.      Left Ear: External ear normal.      Nose: Nose normal.      Mouth/Throat:      Mouth: Mucous membranes are moist.      Tongue: Tongue does not deviate from midline. Pharynx: Oropharynx is clear. Eyes:      Extraocular Movements: Extraocular movements intact. Cardiovascular:      Rate and Rhythm: Normal rate and regular rhythm. Pulses: Normal pulses. Heart sounds: Normal heart sounds. No murmur heard. Pulmonary:      Effort: Pulmonary effort is normal. No respiratory distress. Breath sounds: Normal breath sounds and air entry. No wheezing or rales. Abdominal:      General: Bowel sounds are normal. There is no distension. Palpations: Abdomen is soft. There is no mass. Tenderness:  There is no abdominal tenderness. There is no guarding or rebound. Musculoskeletal:         General: No swelling, deformity or signs of injury. Normal range of motion. Cervical back: Normal range of motion and neck supple. Skin:     General: Skin is warm and dry. Capillary Refill: Capillary refill takes less than 2 seconds. Findings: No bruising or rash. Neurological:      General: No focal deficit present. Mental Status: She is alert and oriented to person, place, and time. Mental status is at baseline. Psychiatric:         Mood and Affect: Mood normal.         Behavior: Behavior normal. Behavior is cooperative. Thought Content:  Thought content normal.       Lisa San MD

## 2023-11-15 ENCOUNTER — PROCEDURE VISIT (OUTPATIENT)
Dept: OBGYN CLINIC | Facility: CLINIC | Age: 68
End: 2023-11-15
Payer: COMMERCIAL

## 2023-11-15 VITALS — WEIGHT: 140 LBS | HEIGHT: 61 IN | BODY MASS INDEX: 26.43 KG/M2

## 2023-11-15 DIAGNOSIS — M17.11 PRIMARY OSTEOARTHRITIS OF RIGHT KNEE: Primary | ICD-10-CM

## 2023-11-15 PROCEDURE — 20610 DRAIN/INJ JOINT/BURSA W/O US: CPT | Performed by: ORTHOPAEDIC SURGERY

## 2023-11-15 RX ORDER — LIDOCAINE HYDROCHLORIDE 10 MG/ML
2 INJECTION, SOLUTION INFILTRATION; PERINEURAL
Status: COMPLETED | OUTPATIENT
Start: 2023-11-15 | End: 2023-11-15

## 2023-11-15 RX ADMIN — LIDOCAINE HYDROCHLORIDE 2 ML: 10 INJECTION, SOLUTION INFILTRATION; PERINEURAL at 13:30

## 2023-11-15 NOTE — PROGRESS NOTES
Isaac Lind 79 y.o. female MRN: [id number]      Chief Complaint:   right knee pain    HPI:   79 y. o.female complaining of right knee pain. She presents office today regarding right knee pain. Patient has been treated with intra-articular injections in the past with mild pain relief however the pain since returned she has pain is anywhere from 2-5 out of 10 ,worse going sitting to stand to standing.   Patient been performing home range of motion stretching strengthening exercises denies any changes numbness and tingling                Review Of Systems:   Skin: Normal  Neuro: See HPI  Musculoskeletal: See HPI  All other systems reviewed and are negative    Past Medical History:   Past Medical History:   Diagnosis Date    Abnormal glucose     last assessed 12/22/15    Acid reflux     Anxiety     Arthritis     osteoarthritis multiple sites    Benign paroxysmal positional vertigo     Carpal tunnel syndrome     Chronic kidney disease     nephrolithiasis    Depression     Diabetes mellitus (HCC)     GERD (gastroesophageal reflux disease)     Insomnia     Patellofemoral dysfunction     last assessed  9/15/14       Past Surgical History:   Past Surgical History:   Procedure Laterality Date    BREAST CYST EXCISION Left     abscess removed   50yrs ago    CARPAL TUNNEL RELEASE      COLONOSCOPY      KNEE SURGERY Right     [place] [id number] [facility] WRST SURG W/RLS TRANSVRS CARPL LIGM Right 08/25/2016    Procedure: RELEASE CARPAL TUNNEL ENDOSCOPIC;  Surgeon: Sapna Ibarra MD;  Location: [facility];  Service: Orthopedics    SD TENDON SHEATH INCISION Right 08/25/2016    Procedure: RELEASE LONG AND RING TRIGGER FINGERS;  Surgeon: Sapna Ibarra MD;  [facility];  Service: Orthopedics    SD TENDON SHEATH INCISION Left 09/15/2020    Procedure: RELEASE TRIGGER FINGER RING;  Surgeon: Sapna Ibarra MD;  [facility];  Service: Orthopedics    TUBAL LIGATION         Family History:  Family history reviewed and non-contributory  Family History   Problem Relation Age of Onset    Diabetes Mother     Heart disease Mother     Hypertension Mother     Hypothyroidism Mother     Arthritis Father     Crohn's disease Family     Psoriasis Family     Rheum arthritis Family     Lupus Family         systemic- erythematosus    Ulcerative colitis Family     Breast cancer Cousin 46        paternal    No Known Problems Sister     No Known Problems Maternal Grandmother     No Known Problems Maternal Grandfather     No Known Problems Paternal Grandmother     No Known Problems Paternal Grandfather     No Known Problems Son     No Known Problems Son     No Known Problems Sister     No Known Problems Sister     No Known Problems Brother     No Known Problems Brother     No Known Problems Brother          Social History:  Social History     Socioeconomic History    Marital status: /Civil Union     Spouse name: None    Number of children: None    Years of education: None    Highest education level: None   Occupational History    None   Tobacco Use    Smoking status: Never    Smokeless tobacco: Never   Vaping Use    Vaping Use: Never used   Substance and Sexual Activity    Alcohol use: No     Comment: rare/social as per Allscripts    Drug use: No    Sexual activity: Yes     Partners: Male     Birth control/protection: None   Other Topics Concern    None   Social History Narrative    None     Social Determinants of Health     Financial Resource Strain: Low Risk  (5/16/2023)    Overall Financial Resource Strain (CARDIA)     Difficulty of Paying Living Expenses: Not hard at all   Food Insecurity: No Food Insecurity (8/16/2022)    Hunger Vital Sign     Worried About Running Out of Food in the Last Year: Never true     Ran Out of Food in the Last Year: Never true   Transportation Needs: No Transportation Needs (5/16/2023)    PRAPARE - Transportation     Lack of Transportation (Medical): No     Lack of Transportation (Non-Medical):  No Physical Activity: Inactive (8/25/2023)    Exercise Vital Sign     Days of Exercise per Week: 0 days     Minutes of Exercise per Session: 0 min   Stress: No Stress Concern Present (8/25/2023)    [facility]     Feeling of Stress : Not at all   Social Connections: Moderately Integrated (8/25/2023)    Social Connection and Isolation Panel [NHANES]     Frequency of Communication with Friends and Family: Twice a week     Frequency of Social Gatherings with Friends and Family: Twice a week     Attends Mu-ism Services: 1 to 4 times per year     Active Member of [email or web address] Group or Organizations: No     Attends Club or Organization Meetings: Never     Marital Status:    Intimate Partner Violence: Not At Risk (8/16/2022)    Humiliation, Afraid, Rape, and Kick questionnaire     Fear of Current or Ex-Partner: No     Emotionally Abused: No     Physically Abused: No     Sexually Abused: No   Housing Stability: Low Risk  (8/16/2022)    Housing Stability Vital Sign     Unable to Pay for Housing in the Last Year: No     Number of [address] in the Last Year: 1     Unstable Housing in the Last Year: No       Allergies:    Allergies   Allergen Reactions    Bee Pollen Other (See Comments)     Other reaction(s): Sneezing    Pollen Extract Allergic Rhinitis and Sneezing       Labs:  0   Lab Value Date/Time    HCT 45.2 05/19/2023 0804    HCT 42.6 09/07/2021 0720    HCT 45.1 (H) 03/12/2020 0905    HCT 43.0 01/18/2016 0931    HGB 15.2 05/19/2023 0804    HGB 14.4 09/07/2021 0720    HGB 15.3 03/12/2020 0905    HGB 13.9 01/18/2016 0931    WBC 7.91 05/19/2023 0804    WBC 6.3 09/07/2021 0720    WBC 6.7 03/12/2020 0905    WBC 10-20 (A) 01/19/2016 0000    WBC 8.0 01/18/2016 0931       Meds:    Current Outpatient Medications:     alendronate (Fosamax) 70 mg tablet, Take 1 tablet (70 mg total) by mouth every 7 days, Disp: 12 tablet, Rfl: 3    atorvastatin (LIPITOR) 40 mg tablet, Take 1 tablet (40 mg total) by mouth every evening, Disp: 90 tablet, Rfl: 1    Diclofenac Sodium (VOLTAREN) 1 %, Apply 2 g topically 4 (four) times a day, Disp: 100 g, Rfl: 1    fluticasone (FLONASE) 50 mcg/act nasal spray, 1 spray into each nostril daily, Disp: 16 g, Rfl: 4    glucose blood (OneTouch Ultra) test strip, Use 1 each daily as needed (use as needed) Use as instructed, Disp: 100 each, Rfl: 3    lidocaine (LMX) 4 % cream, Apply topically as needed for mild pain, Disp: 30 g, Rfl: 2    loratadine (CLARITIN) 10 mg tablet, Take 10 mg by mouth daily, Disp: , Rfl:     metFORMIN (GLUCOPHAGE-XR) 500 mg 24 hr tablet, Take 2 tablets (1,000 mg total) by mouth daily with breakfast, Disp: 180 tablet, Rfl: 2    Omega-3 Fatty Acids (OMEGA 3 PO), Take by mouth, Disp: , Rfl:     BIOTIN 5000 PO, Take by mouth daily (Patient not taking: Reported on 11/13/2023), Disp: , Rfl:     Cyanocobalamin (VITAMIN B12 PO), Take by mouth daily (Patient not taking: Reported on 11/13/2023), Disp: , Rfl:     Body mass index is 26.45 kg/m².   Wt Readings from Last 3 Encounters:   11/15/23 63.5 kg (140 lb)   11/13/23 63.8 kg (140 lb 9.6 oz)   09/22/23 63 kg (139 lb)     Physical Exam:   Vitals:       General Appearance:    Alert, cooperative, no distress, appears stated age   Head:    Normocephalic, without obvious abnormality, atraumatic   Eyes:    conjunctiva/corneas clear, both eyes        Nose:   Nares normal, septum midline, no drainage    Throat:   Lips normal; teeth and gums normal   Neck:    symmetrical, trachea midline, ;     thyroid:  no enlargement/   Back:     Symmetric, no curvature, ROM normal   Lungs:   No audible wheezing or labored breathing   Chest Wall:    No tenderness or deformity    Heart:    Regular rate and rhythm               Pulses:   2+ and symmetric all extremities   Skin:   Skin color, texture, turgor normal, no rashes or lesions   Neurologic:   normal strength, sensation and reflexes     throughout Musculoskeletal: right lower extremity  On examination of the right knee there is no effusion, no erythema. Range of motion to full active extension and flexion to greater than 120°. Pain on palpation medial and lateral joint lines. There is crepitus with range of motion, no warmth to palpation, bony enlargement noted. No pain on palpation pes anserine bursa region or distal iliotibial band. Stable to varus and valgus stress without pain or gapping. Negative anterior and posterior drawer testing. Sensation intact distal pulses present. Large joint arthrocentesis: R knee  Universal Protocol:  Consent: Verbal consent obtained. Consent given by: patient  Patient understanding: patient states understanding of the procedure being performed  Site marked: the operative site was marked  Patient identity confirmed: verbally with patient  Supporting Documentation  Indications: pain   Procedure Details  Location: knee - R knee  Needle size: 22 G  Approach: superior  Medications administered: 2 mL lidocaine 1 %; 48 mg hylan 48 MG/6ML    Patient tolerance: patient tolerated the procedure well with no immediate complications         _*_*_*_*_*_*_*_*_*_*_*_*_*_*_*_*_*_*_*_*_*_*_*_*_*_*_*_*_*_*_*_*_*_*_*_*_*_*_*_*_*    Assessment:  79 y. o.female with right knee pain due to arthritis    Plan:   Weight bearing as tolerated  right lower extremity  Advised no significant activity or increased ambulation over the next 24-48 hours and warm compresses to the knee no greater 20 minutes 3-4 times 24 hours following the injection. Patient advised should they develop any increasing pain, redness, drainage, numbness, tingling or swelling surrounding the injection sight, they are to contact our office or present to the emergency department.     Follow up in 3 months or sooner if needed      Lynn Scott PA-C                    .

## 2024-01-11 ENCOUNTER — TELEPHONE (OUTPATIENT)
Dept: FAMILY MEDICINE CLINIC | Facility: CLINIC | Age: 69
End: 2024-01-11

## 2024-01-11 NOTE — TELEPHONE ENCOUNTER
LVM for patient to call to reschedule appointment for PCP on 2/23/24 provider not avail    Patient can be reached at     [phone]

## 2024-02-19 ENCOUNTER — ESTABLISHED COMPREHENSIVE EXAM (OUTPATIENT)
Dept: URBAN - METROPOLITAN AREA CLINIC 6 | Facility: CLINIC | Age: 69
End: 2024-02-19

## 2024-02-19 DIAGNOSIS — H04.123: ICD-10-CM

## 2024-02-19 DIAGNOSIS — H43.811: ICD-10-CM

## 2024-02-19 DIAGNOSIS — H25.13: ICD-10-CM

## 2024-02-19 DIAGNOSIS — E11.9: ICD-10-CM

## 2024-02-19 LAB
LEFT EYE DIABETIC RETINOPATHY: NORMAL
RIGHT EYE DIABETIC RETINOPATHY: NORMAL

## 2024-02-19 PROCEDURE — 92014 COMPRE OPH EXAM EST PT 1/>: CPT

## 2024-02-19 ASSESSMENT — TONOMETRY
OD_IOP_MMHG: 20
OS_IOP_MMHG: 18

## 2024-02-19 ASSESSMENT — VISUAL ACUITY
OS_CC: 20/40
OD_CC: 20/25

## 2024-02-20 ENCOUNTER — OFFICE VISIT (OUTPATIENT)
Dept: OBGYN CLINIC | Facility: CLINIC | Age: 69
End: 2024-02-20
Payer: COMMERCIAL

## 2024-02-20 VITALS
BODY MASS INDEX: 26.43 KG/M2 | HEIGHT: 61 IN | DIASTOLIC BLOOD PRESSURE: 73 MMHG | HEART RATE: 79 BPM | WEIGHT: 140 LBS | SYSTOLIC BLOOD PRESSURE: 101 MMHG

## 2024-02-20 DIAGNOSIS — E11.9 TYPE 2 DIABETES MELLITUS WITHOUT COMPLICATION, WITHOUT LONG-TERM CURRENT USE OF INSULIN (HCC): ICD-10-CM

## 2024-02-20 DIAGNOSIS — M17.11 PRIMARY OSTEOARTHRITIS OF RIGHT KNEE: Primary | ICD-10-CM

## 2024-02-20 PROCEDURE — 20610 DRAIN/INJ JOINT/BURSA W/O US: CPT | Performed by: ORTHOPAEDIC SURGERY

## 2024-02-20 PROCEDURE — 99213 OFFICE O/P EST LOW 20 MIN: CPT | Performed by: ORTHOPAEDIC SURGERY

## 2024-02-20 RX ORDER — BUPIVACAINE HYDROCHLORIDE 2.5 MG/ML
2 INJECTION, SOLUTION INFILTRATION; PERINEURAL
Status: COMPLETED | OUTPATIENT
Start: 2024-02-20 | End: 2024-02-20

## 2024-02-20 RX ORDER — KETOROLAC TROMETHAMINE 30 MG/ML
30 INJECTION, SOLUTION INTRAMUSCULAR; INTRAVENOUS
Status: COMPLETED | OUTPATIENT
Start: 2024-02-20 | End: 2024-02-20

## 2024-02-20 RX ADMIN — BUPIVACAINE HYDROCHLORIDE 2 ML: 2.5 INJECTION, SOLUTION INFILTRATION; PERINEURAL at 11:45

## 2024-02-20 RX ADMIN — KETOROLAC TROMETHAMINE 30 MG: 30 INJECTION, SOLUTION INTRAMUSCULAR; INTRAVENOUS at 11:45

## 2024-02-20 NOTE — PROGRESS NOTES
Orthopedics          Siobhan Grimm 69 y.o. female MRN: [id number]      Chief Complaint:   right knee pain    HPI:   69 y.o.female complaining of right knee pain.  She presents office today regarding right knee pain due to arthritis.  Patient did receive intra-articular viscosupplementation injection 3 months ago which gave her significant pain relief she does have some intermittent pain localized in her right knee.  States pain is aching nature worse with mildly relieved with rest.  Patient is also diabetic and is concerned for any further intra-articular corticosteroid injections as this has increased her blood glucose levels in the past.  She denies any radiation pain denies any clicking popping catching of her right knee.                Review Of Systems:   Skin: Normal  Neuro: See HPI  Musculoskeletal: See HPI  All other systems reviewed and are negative    Past Medical History:   Past Medical History:   Diagnosis Date    Abnormal glucose     last assessed 12/22/15    Acid reflux     Anxiety     Arthritis     osteoarthritis multiple sites    Benign paroxysmal positional vertigo     Carpal tunnel syndrome     Chronic kidney disease     nephrolithiasis    Depression     Diabetes mellitus (HCC)     GERD (gastroesophageal reflux disease)     Insomnia     Patellofemoral dysfunction     last assessed  9/15/14       Past Surgical History:   Past Surgical History:   Procedure Laterality Date    BREAST CYST EXCISION Left     abscess removed   50yrs ago    CARPAL TUNNEL RELEASE      COLONOSCOPY      KNEE SURGERY Right     [facility] WRST SURG W/RLS TRANSVRS CARPL LIGM Right 08/25/2016    Procedure: RELEASE CARPAL TUNNEL ENDOSCOPIC;  Surgeon: Darek Nguyen MD;  [facility];  Service: Orthopedics    CO TENDON SHEATH INCISION Right 08/25/2016    Procedure: RELEASE LONG AND RING TRIGGER FINGERS;  Surgeon: Darek Nguyen MD;  [facility];  Service: Orthopedics    CO TENDON SHEATH INCISION Left  09/15/2020    Procedure: RELEASE TRIGGER FINGER RING;  Surgeon: Darek Nguyen MD;  [facility];  Service: Orthopedics    TUBAL LIGATION         Family History:  Family history reviewed and non-contributory  Family History   Problem Relation Age of Onset    Diabetes Mother     Heart disease Mother     Hypertension Mother     Hypothyroidism Mother     Arthritis Father     Crohn's disease Family     Psoriasis Family     Rheum arthritis Family     Lupus Family         systemic- erythematosus    Ulcerative colitis Family     Breast cancer Cousin 52        paternal    No Known Problems Sister     No Known Problems Maternal Grandmother     No Known Problems Maternal Grandfather     No Known Problems Paternal Grandmother     No Known Problems Paternal Grandfather     No Known Problems Son     No Known Problems Son     No Known Problems Sister     No Known Problems Sister     No Known Problems Brother     No Known Problems Brother     No Known Problems Brother          Social History:  Social History     Socioeconomic History    Marital status: /Civil Union     Spouse name: None    Number of children: None    Years of education: None    Highest education level: None   Occupational History    None   Tobacco Use    Smoking status: Never    Smokeless tobacco: Never   Vaping Use    Vaping status: Never Used   Substance and Sexual Activity    Alcohol use: No     Comment: rare/social as per [employer]    Drug use: No    Sexual activity: Yes     Partners: Male     Birth control/protection: None   Other Topics Concern    None   Social History Narrative    None     Social Determinants of Health     Financial Resource Strain: Low Risk  (5/16/2023)    Overall Financial Resource Strain (CARDIA)     Difficulty of Paying Living Expenses: Not hard at all   Food Insecurity: No Food Insecurity (8/16/2022)    Hunger Vital Sign     Worried About Running Out of Food in the Last Year: Never true     Ran Out of Food in the  Last Year: Never true   Transportation Needs: No Transportation Needs (5/16/2023)    PRAPARE - Transportation     Lack of Transportation (Medical): No     Lack of Transportation (Non-Medical): No   Physical Activity: Inactive (8/25/2023)    Exercise Vital Sign     Days of Exercise per Week: 0 days     Minutes of Exercise per Session: 0 min   Stress: No Stress Concern Present (8/25/2023)    Citizen of [place] of Occup[employer] - Occupational Stress Questionnaire     Feeling of Stress : Not at all   Social Connections: Moderately Integrated (8/25/2023)    Social Connection and Isolation Panel [NHANES]     Frequency of Communication with Friends and Family: Twice a week     Frequency of Social Gatherings with Friends and Family: Twice a week     Attends Mu-ism Services: 1 to 4 times per year     Active Member of Clubs or Organizations: No     Attends Club or Organization Meetings: Never     Marital Status:    Intimate Partner Violence: Not At Risk (8/16/2022)    Humiliation, Afraid, Rape, and Kick questionnaire     Fear of Current or Ex-Partner: No     Emotionally Abused: No     Physically Abused: No     Sexually Abused: No   Housing Stability: Low Risk  (8/16/2022)    Housing Stability Vital Sign     Unable to Pay for Housing in the Last Year: No     Number of Places Lived in the Last Year: 1     Unstable Housing in the Last Year: No       Allergies:   Allergies   Allergen Reactions    Bee Pollen Other (See Comments)     Other reaction(s): Sneezing    Pollen Extract Allergic Rhinitis and Sneezing       Labs:  0   Lab Value Date/Time    HCT 45.2 05/19/2023 0804    HCT 42.6 09/07/2021 0720    HCT 45.1 (H) 03/12/2020 0905    HCT 43.0 01/18/2016 0931    HGB 15.2 05/19/2023 0804    HGB 14.4 09/07/2021 0720    HGB 15.3 03/12/2020 0905    HGB 13.9 01/18/2016 0931    WBC 7.91 05/19/2023 0804    WBC 6.3 09/07/2021 0720    WBC 6.7 03/12/2020 0905    WBC 10-20 (A) 01/19/2016 0000    WBC 8.0 01/18/2016 0931        Meds:    Current Outpatient Medications:     alendronate (Fosamax) 70 mg tablet, Take 1 tablet (70 mg total) by mouth every 7 days, Disp: 12 tablet, Rfl: 3    atorvastatin (LIPITOR) 40 mg tablet, Take 1 tablet (40 mg total) by mouth every evening, Disp: 90 tablet, Rfl: 1    BIOTIN 5000 PO, Take by mouth daily (Patient not taking: Reported on 11/13/2023), Disp: , Rfl:     Cyanocobalamin (VITAMIN B12 PO), Take by mouth daily (Patient not taking: Reported on 11/13/2023), Disp: , Rfl:     Diclofenac Sodium (VOLTAREN) 1 %, Apply 2 g topically 4 (four) times a day, Disp: 100 g, Rfl: 1    fluticasone (FLONASE) 50 mcg/act nasal spray, 1 spray into each nostril daily, Disp: 16 g, Rfl: 4    glucose blood (OneTouch Ultra) test strip, Use 1 each daily as needed (use as needed) Use as instructed, Disp: 100 each, Rfl: 3    lidocaine (LMX) 4 % cream, Apply topically as needed for mild pain, Disp: 30 g, Rfl: 2    loratadine (CLARITIN) 10 mg tablet, Take 10 mg by mouth daily, Disp: , Rfl:     metFORMIN (GLUCOPHAGE-XR) 500 mg 24 hr tablet, Take 2 tablets (1,000 mg total) by mouth daily with breakfast, Disp: 180 tablet, Rfl: 2    Omega-3 Fatty Acids (OMEGA 3 PO), Take by mouth, Disp: , Rfl:     Body mass index is 26.45 kg/m².  Wt Readings from Last 3 Encounters:   02/20/24 63.5 kg (140 lb)   11/15/23 63.5 kg (140 lb)   11/13/23 63.8 kg (140 lb 9.6 oz)     Physical Exam:   Vitals:    02/20/24 1158   BP: 101/73   Pulse: 79       General Appearance:    Alert, cooperative, no distress, appears stated age   Head:    Normocephalic, without obvious abnormality, atraumatic   Eyes:    conjunctiva/corneas clear, both eyes        Nose:   Nares normal, septum midline, no drainage    Throat:   Lips normal; teeth and gums normal   Neck:    symmetrical, trachea midline, ;     thyroid:  no enlargement/   Back:     Symmetric, no curvature, ROM normal   Lungs:   No audible wheezing or labored breathing   Chest Wall:    No tenderness or deformity     Heart:    Regular rate and rhythm               Pulses:   2+ and symmetric all extremities   Skin:   Skin color, texture, turgor normal, no rashes or lesions   Neurologic:   normal strength, sensation and reflexes     throughout       Musculoskeletal: right lower extremity  On examination of the right knee there is no effusion, no erythema.  Range of motion to full active extension and flexion to greater than 120°.  Pain on palpation medial and lateral joint lines.  There is crepitus with range of motion, no warmth to palpation, bony enlargement noted. No pain on palpation pes anserine bursa region or distal iliotibial band.  Stable to varus and valgus stress without pain or gapping.  Negative anterior and posterior drawer testing.  Sensation intact distal pulses present.    Large joint arthrocentesis: R knee  Universal Protocol:  Consent: Verbal consent obtained.  Consent given by: patient  Patient understanding: patient states understanding of the procedure being performed  Site marked: the operative site was marked  Patient identity confirmed: verbally with patient  Supporting Documentation  Indications: pain   Procedure Details  Location: knee - R knee  Needle size: 22 G  Approach: superior  Medications administered: 2 mL bupivacaine 0.25 %; 30 mg ketorolac 30 mg/mL    Patient tolerance: patient tolerated the procedure well with no immediate complications         _*_*_*_*_*_*_*_*_*_*_*_*_*_*_*_*_*_*_*_*_*_*_*_*_*_*_*_*_*_*_*_*_*_*_*_*_*_*_*_*_*    Assessment:  69 y.o.female with right knee pain due to arthritis    Plan:   Weight bearing as tolerated  right lower extremity  Right knee intra-articular Toradol injection administered as noted above  Patient advised should they develop any increasing pain, redness, drainage, numbness, tingling or swelling surrounding the injection site, they are to contact our office or present to the emergency department.  Patient will follow-up with her primary doctor regarding  her diabetes  Prior authorization for right knee intra-articular viscosupplementation injection initiated today  Follow up in 3 months or sooner if needed      Taras Cordova PA-C                    .

## 2024-02-21 PROBLEM — R05.9 COUGH: Status: RESOLVED | Noted: 2018-04-20 | Resolved: 2024-02-21

## 2024-02-23 ENCOUNTER — RA CDI HCC (OUTPATIENT)
Dept: OTHER | Facility: HOSPITAL | Age: 69
End: 2024-02-23

## 2024-03-04 ENCOUNTER — OFFICE VISIT (OUTPATIENT)
Dept: FAMILY MEDICINE CLINIC | Facility: CLINIC | Age: 69
End: 2024-03-04

## 2024-03-04 VITALS
HEIGHT: 61 IN | SYSTOLIC BLOOD PRESSURE: 105 MMHG | RESPIRATION RATE: 18 BRPM | HEART RATE: 75 BPM | OXYGEN SATURATION: 96 % | TEMPERATURE: 98.3 F | WEIGHT: 143.8 LBS | BODY MASS INDEX: 27.15 KG/M2 | DIASTOLIC BLOOD PRESSURE: 70 MMHG

## 2024-03-04 DIAGNOSIS — E11.9 TYPE 2 DIABETES MELLITUS WITHOUT COMPLICATION, WITHOUT LONG-TERM CURRENT USE OF INSULIN (HCC): Primary | ICD-10-CM

## 2024-03-04 DIAGNOSIS — L60.0 INGROWN NAIL OF GREAT TOE OF RIGHT FOOT: ICD-10-CM

## 2024-03-04 LAB — SL AMB POCT HEMOGLOBIN AIC: 7.1 (ref ?–6.5)

## 2024-03-04 PROCEDURE — 83036 HEMOGLOBIN GLYCOSYLATED A1C: CPT | Performed by: FAMILY MEDICINE

## 2024-03-04 PROCEDURE — 99213 OFFICE O/P EST LOW 20 MIN: CPT | Performed by: FAMILY MEDICINE

## 2024-03-04 NOTE — ASSESSMENT & PLAN NOTE
-Pt likely has an ingrown toe nail on R hallux   -C/o of significant intermittent pain   -Referral to Podiatry

## 2024-03-04 NOTE — ASSESSMENT & PLAN NOTE
Lab Results   Component Value Date    HGBA1C 7.1 (A) 03/04/2024     -A1c increased to 7.1 from 6.7  -No medication adjustments indicated at this time.  -A1c increase most likely due to medication noncompliance and temporary non-adherence to diabetic diet  -Will follow up A1c in 3 months from now

## 2024-03-04 NOTE — PROGRESS NOTES
Name: Siobhan Grimm      : 1955      MRN: [id number]  Encounter Provider: Shelby Muse MD  Encounter Date: 3/4/2024   Encounter department: [facility]    Assessment & Plan     1. Type 2 diabetes mellitus without complication, without long-term current use of insulin ([facility])  Assessment & Plan:    Lab Results   Component Value Date    HGBA1C 7.1 (A) 2024     -A1c increased to 7.1 from 6.7  -No medication adjustments indicated at this time.  -A1c increase most likely due to medication noncompliance and temporary non-adherence to diabetic diet  -Will follow up A1c in 3 months from now    Orders:  -     POCT hemoglobin A1c    2. Ingrown nail of great toe of right foot  Assessment & Plan:  -Pt likely has an ingrown toe nail on R hallux   -C/o of significant intermittent pain   -Referral to Podiatry     Orders:  -     Ambulatory Referral to Podiatry; Future      BMI Counseling: Body mass index is 27.17 kg/m². The BMI is above normal. Nutrition recommendations include decreasing portion sizes, encouraging healthy choices of fruits and vegetables, decreasing fast food intake, consuming healthier snacks, limiting drinks that contain sugar, moderation in carbohydrate intake, increasing intake of lean protein, reducing intake of saturated and trans fat and reducing intake of cholesterol. Exercise recommendations include moderate physical activity 150 minutes/week. No pharmacotherapy was ordered. Rationale for BMI follow-up plan is due to patient being overweight or obese.         Subjective      HPI  Pt presents for a follow up visit for a Diabetes and A1c check.  Pt's A1c increased to 7.1 from 6.7.  Pt states that she was in [facility] visiting her sister from the last week of November until February.  Pt states that she was not adhering to her standard diabetic and low carbohydrate diet that she normally does when she is in the [place].  Pt also states that  while she was in [facility] she was confused about her medication regimen and was only taking 500 mg of metformin once a day instead of her prescribed 500 mg twice a day dose.  In addition Pt has been getting less physical activity while the weather has been cold.    Pt also states that her right big toe has been hurting her.  She denies sustaining any trauma and specifically states that the right top corner of her right big toenail is where the pain is.    Review of Systems   Constitutional:  Negative for fatigue and fever.   Respiratory:  Negative for shortness of breath.    Cardiovascular:  Negative for chest pain.   Gastrointestinal:  Negative for abdominal pain, blood in stool and constipation.   Genitourinary:  Negative for difficulty urinating, dysuria and vaginal bleeding.   Skin:  Negative for rash.       Current Outpatient Medications on File Prior to Visit   Medication Sig    alendronate (Fosamax) 70 mg tablet Take 1 tablet (70 mg total) by mouth every 7 days    atorvastatin (LIPITOR) 40 mg tablet Take 1 tablet (40 mg total) by mouth every evening    Diclofenac Sodium (VOLTAREN) 1 % Apply 2 g topically 4 (four) times a day    fluticasone (FLONASE) 50 mcg/act nasal spray 1 spray into each nostril daily    glucose blood (OneTouch Ultra) test strip Use 1 each daily as needed (use as needed) Use as instructed    lidocaine (LMX) 4 % cream Apply topically as needed for mild pain    loratadine (CLARITIN) 10 mg tablet Take 10 mg by mouth daily    metFORMIN (GLUCOPHAGE-XR) 500 mg 24 hr tablet Take 2 tablets (1,000 mg total) by mouth daily with breakfast    Omega-3 Fatty Acids (OMEGA 3 PO) Take by mouth    BIOTIN 5000 PO Take by mouth daily (Patient not taking: Reported on 11/13/2023)    Cyanocobalamin (VITAMIN B12 PO) Take by mouth daily (Patient not taking: Reported on 11/13/2023)       Objective     /70 (BP Location: Left arm, Patient Position: Sitting, Cuff Size: Standard)   Pulse 75   Temp 98.3 °F (36.8  "°C) (Temporal)   Resp 18   Ht 5' 1\" (1.549 m)   Wt 65.2 kg (143 lb 12.8 oz)   SpO2 96%   BMI 27.17 kg/m²     Physical Exam  Constitutional:       Appearance: Normal appearance.   Cardiovascular:      Rate and Rhythm: Normal rate and regular rhythm.      Pulses:           Dorsalis pedis pulses are 3+ on the right side and 3+ on the left side.        Posterior tibial pulses are 3+ on the right side and 3+ on the left side.      Heart sounds: Normal heart sounds.   Pulmonary:      Effort: Pulmonary effort is normal.      Breath sounds: Normal breath sounds.   Musculoskeletal:      Right foot: Normal range of motion. No Charcot foot.      Left foot: Normal range of motion. No Charcot foot.      Comments: Slight inflammation around the top corners of Pt's right big toe.  No trauma noted       Feet:      Right foot:      Skin integrity: Skin integrity normal.      Toenail Condition: Right toenails are ingrown.      Left foot:      Skin integrity: Skin integrity normal.      Comments: L hallux nail missing s/p surgery  Skin:     General: Skin is warm and dry.   Neurological:      Mental Status: She is alert and oriented to person, place, and time.   Psychiatric:         Mood and Affect: Mood normal.         Behavior: Behavior normal.       Shelby Muse MD    "

## 2024-03-06 ENCOUNTER — OFFICE VISIT (OUTPATIENT)
Dept: PODIATRY | Facility: CLINIC | Age: 69
End: 2024-03-06
Payer: COMMERCIAL

## 2024-03-06 VITALS
HEIGHT: 61 IN | DIASTOLIC BLOOD PRESSURE: 66 MMHG | WEIGHT: 142 LBS | HEART RATE: 72 BPM | BODY MASS INDEX: 26.81 KG/M2 | SYSTOLIC BLOOD PRESSURE: 107 MMHG

## 2024-03-06 DIAGNOSIS — L60.0 INGROWN NAIL OF GREAT TOE OF RIGHT FOOT: ICD-10-CM

## 2024-03-06 PROCEDURE — 99212 OFFICE O/P EST SF 10 MIN: CPT | Performed by: PODIATRIST

## 2024-03-06 PROCEDURE — 11730 AVULSION NAIL PLATE SIMPLE 1: CPT | Performed by: PODIATRIST

## 2024-03-06 NOTE — PROGRESS NOTES
Name: Siobhan Grimm      : 1955      MRN: [id number]  Encounter Provider: Allen Velazquez DPM  Encounter Date: 3/6/2024   Encounter department: [facility] [place]    Assessment & Plan     1. Ingrown nail of great toe of right foot  -     Ambulatory Referral to Podiatry  -     Nail removal        Nail removal    Date/Time: 3/6/2024 11:00 AM    Performed by: Allen Velazquez DPM  Authorized by: Allen Velazquez DPM    Patient location:  Clinic  Indications / Diagnosis:  Ingrown nail  Universal Protocol:  Consent: Verbal consent obtained.  Risks and benefits: risks, benefits and alternatives were discussed  Consent given by: patient  Patient understanding: patient states understanding of the procedure being performed    Location:     Foot:  R big toe (Medial and lateral borders)  Pre-procedure details:     Skin preparation:  Betadine    Preparation: Patient was prepped and draped in the usual sterile fashion    Anesthesia (see MAR for exact dosages):     Anesthesia method:  Nerve block    Block needle gauge:  25 G    Block anesthetic:  Lidocaine 1% w/o epi    Block technique:  Digital Block    Block outcome:  Anesthesia achieved  Nail Removal:     Nail removed:  Partial    Nail bed sutured: no    Post-procedure details:     Dressing:  4x4 sterile gauze, antibiotic ointment, gauze roll and petrolatum-impregnated gauze    Patient tolerance of procedure:  Tolerated well, no immediate complications  Comments:      Discussion with patient was completed today regarding diagnosis and potential etiologies as well as treatment options for this ingrown nail diagnosis.  Discussed how to avoid recurrence and possible treatment options if recurrence does occur.    Antibiotic ointment applied to border with bandage dressing  Pt instructed to keep dressing intact for 24 hours.  After this time use triple antibiotic ointment to the area and a dry dressing changed daily  Return to clinic in  about 3 weeks for reevaluation.  If ingrown nail recurs can consider use of phenol at nail matrix.  If notice any redness, swelling, drainage, or excessive pain or signs of infection to notify the office sooner.  Procedure completed without incident.  Do not soak foot.    Procedure in detail: Once the toe was anesthetized, the area was scrubbed and prepped with sterile technique.  A hemostat was used to lift up the involved border of the great toe.  Care was taken to protect the underlying nail bed.  An English anvil was used to cut back corner to the base of the nail.  A hemostat was then used to remove the nail that was ingrown.  This was then checked that the entire ingrown portion was removed.  This was removed from the operative area.  Hemostasis was achieved and dressings were applied.         Right great toe medial and lateral nail borders partial nail avulsion completed today.      Subjective      Patient returns for reevaluation has an issue with her right great toenail ingrowing nail with tenderness on palpation to the area.  She has tried to treat it herself however continues to have pain and discomfort to the area.      Review of Systems   Constitutional:  Negative for chills and fever.   Respiratory:  Negative for chest tightness, shortness of breath and wheezing.    Cardiovascular:  Negative for chest pain and leg swelling.   Gastrointestinal:  Negative for diarrhea, nausea and vomiting.   Musculoskeletal:  Negative for joint swelling.   Skin:  Negative for color change and wound.   Neurological:  Negative for dizziness, weakness and numbness.   Hematological:  Does not bruise/bleed easily.   Psychiatric/Behavioral:  Negative for agitation.        Current Outpatient Medications on File Prior to Visit   Medication Sig   • alendronate (Fosamax) 70 mg tablet Take 1 tablet (70 mg total) by mouth every 7 days   • atorvastatin (LIPITOR) 40 mg tablet Take 1 tablet (40 mg total) by mouth every evening   •  "Diclofenac Sodium (VOLTAREN) 1 % Apply 2 g topically 4 (four) times a day   • fluticasone (FLONASE) 50 mcg/act nasal spray 1 spray into each nostril daily   • glucose blood (OneTouch Ultra) test strip Use 1 each daily as needed (use as needed) Use as instructed   • lidocaine (LMX) 4 % cream Apply topically as needed for mild pain   • loratadine (CLARITIN) 10 mg tablet Take 10 mg by mouth daily   • metFORMIN (GLUCOPHAGE-XR) 500 mg 24 hr tablet Take 2 tablets (1,000 mg total) by mouth daily with breakfast   • Omega-3 Fatty Acids (OMEGA 3 PO) Take by mouth   • BIOTIN 5000 PO Take by mouth daily (Patient not taking: Reported on 11/13/2023)   • Cyanocobalamin (VITAMIN B12 PO) Take by mouth daily (Patient not taking: Reported on 11/13/2023)           Objective     /66   Pulse 72   Ht 5' 1\" (1.549 m)   Wt 64.4 kg (142 lb)   BMI 26.83 kg/m²     Physical Exam  Constitutional:       General: She is not in acute distress.     Appearance: Normal appearance.   Musculoskeletal:         General: Tenderness present.        Feet:    Feet:      Comments: Pain on palpation noted to the involved nail borders.  There is discomfort with medial lateral squeeze at the level of the great toe.  Intact pedal pulses.  Neurological sensation is grossly intact distally.  Neurological:      Mental Status: She is alert and oriented to person, place, and time.   Psychiatric:         Mood and Affect: Mood normal.         Judgment: Judgment normal.         "

## 2024-03-27 ENCOUNTER — OFFICE VISIT (OUTPATIENT)
Dept: PODIATRY | Facility: CLINIC | Age: 69
End: 2024-03-27
Payer: COMMERCIAL

## 2024-03-27 VITALS
SYSTOLIC BLOOD PRESSURE: 111 MMHG | HEIGHT: 61 IN | HEART RATE: 70 BPM | DIASTOLIC BLOOD PRESSURE: 74 MMHG | BODY MASS INDEX: 26.88 KG/M2 | WEIGHT: 142.4 LBS

## 2024-03-27 DIAGNOSIS — L60.0 INGROWN NAIL OF GREAT TOE OF RIGHT FOOT: Primary | ICD-10-CM

## 2024-03-27 PROCEDURE — 99212 OFFICE O/P EST SF 10 MIN: CPT | Performed by: PODIATRIST

## 2024-03-27 NOTE — PROGRESS NOTES
Name: Siobhan Grimm      : 1955      MRN: [id number]  Encounter Provider: Allen Velazquez DPM  Encounter Date: 3/27/2024   Encounter department: [facility] PODIAT[facility] [place]    Assessment & Plan     1. Ingrown nail of great toe of right foot        Patient is doing well at this point.  Denies any new acute pain or discomfort.    She go back to her normal activities notify office of any new acute changes.  At this point we will plan on having her follow-up as needed.    Subjective     Patient returns for follow-up after ingrown nail removal.  She is doing well no pain to the area.  Has done well with the current treatment.      Review of Systems   Constitutional:  Negative for chills and fever.   Respiratory:  Negative for chest tightness, shortness of breath and wheezing.    Cardiovascular:  Negative for chest pain and leg swelling.   Gastrointestinal:  Negative for diarrhea, nausea and vomiting.   Musculoskeletal:  Negative for joint swelling.   Skin:  Negative for color change and wound.   Neurological:  Negative for dizziness, weakness and numbness.   Hematological:  Does not bruise/bleed easily.   Psychiatric/Behavioral:  Negative for agitation.        Current Outpatient Medications on File Prior to Visit   Medication Sig   • alendronate (Fosamax) 70 mg tablet Take 1 tablet (70 mg total) by mouth every 7 days   • atorvastatin (LIPITOR) 40 mg tablet Take 1 tablet (40 mg total) by mouth every evening   • Diclofenac Sodium (VOLTAREN) 1 % Apply 2 g topically 4 (four) times a day   • fluticasone (FLONASE) 50 mcg/act nasal spray 1 spray into each nostril daily   • glucose blood (OneTouch Ultra) test strip Use 1 each daily as needed (use as needed) Use as instructed   • lidocaine (LMX) 4 % cream Apply topically as needed for mild pain   • loratadine (CLARITIN) 10 mg tablet Take 10 mg by mouth daily   • metFORMIN (GLUCOPHAGE-XR) 500 mg 24 hr tablet Take 2 tablets (1,000 mg total) by mouth daily  "with breakfast   • Omega-3 Fatty Acids (OMEGA 3 PO) Take by mouth   • BIOTIN 5000 PO Take by mouth daily (Patient not taking: Reported on 11/13/2023)   • Cyanocobalamin (VITAMIN B12 PO) Take by mouth daily (Patient not taking: Reported on 11/13/2023)       Objective     /74   Pulse 70   Ht 5' 1\" (1.549 m)   Wt 64.6 kg (142 lb 6.4 oz)   BMI 26.91 kg/m²     Physical Exam  Feet:      Comments: Right great toe shows no signs of infection noted currently.  No tenderness on palpation noted to the area.  There are no other areas of open ulcerations or lesions noted at this time.  Neurovascular status is at baseline.      "

## 2024-04-22 ENCOUNTER — TELEPHONE (OUTPATIENT)
Dept: FAMILY MEDICINE CLINIC | Facility: CLINIC | Age: 69
End: 2024-04-22

## 2024-04-22 NOTE — TELEPHONE ENCOUNTER
Patient left voicemail requesting script for mammogram and appointment. Returned call to patient, no answer, unable to leave message. Will attempt to call again at alter time. Please sign off on mammogram referral if appropriate

## 2024-04-25 ENCOUNTER — TELEPHONE (OUTPATIENT)
Dept: FAMILY MEDICINE CLINIC | Facility: CLINIC | Age: 69
End: 2024-04-25

## 2024-04-26 DIAGNOSIS — Z12.31 ENCOUNTER FOR SCREENING MAMMOGRAM FOR MALIGNANT NEOPLASM OF BREAST: Primary | ICD-10-CM

## 2024-05-09 DIAGNOSIS — E11.9 TYPE 2 DIABETES MELLITUS WITHOUT COMPLICATION, WITHOUT LONG-TERM CURRENT USE OF INSULIN (HCC): ICD-10-CM

## 2024-05-10 RX ORDER — BLOOD SUGAR DIAGNOSTIC
1 STRIP MISCELLANEOUS DAILY PRN
Qty: 100 EACH | Refills: 3 | Status: SHIPPED | OUTPATIENT
Start: 2024-05-10 | End: 2024-05-13 | Stop reason: SDUPTHER

## 2024-05-13 DIAGNOSIS — E11.9 TYPE 2 DIABETES MELLITUS WITHOUT COMPLICATION, WITHOUT LONG-TERM CURRENT USE OF INSULIN (HCC): ICD-10-CM

## 2024-05-13 RX ORDER — BLOOD SUGAR DIAGNOSTIC
1 STRIP MISCELLANEOUS DAILY PRN
Qty: 100 EACH | Refills: 3 | Status: SHIPPED | OUTPATIENT
Start: 2024-05-13

## 2024-05-13 NOTE — TELEPHONE ENCOUNTER
Patient called requesting that the strips be sent to [facility] pharmacy instead of [facility] due to them not being delivered anymore. Please advise thank you

## 2024-05-15 ENCOUNTER — RA CDI HCC (OUTPATIENT)
Dept: OTHER | Facility: HOSPITAL | Age: 69
End: 2024-05-15

## 2024-05-21 ENCOUNTER — PROCEDURE VISIT (OUTPATIENT)
Dept: OBGYN CLINIC | Facility: CLINIC | Age: 69
End: 2024-05-21
Payer: COMMERCIAL

## 2024-05-21 VITALS
OXYGEN SATURATION: 96 % | DIASTOLIC BLOOD PRESSURE: 74 MMHG | HEIGHT: 61 IN | SYSTOLIC BLOOD PRESSURE: 118 MMHG | HEART RATE: 65 BPM | BODY MASS INDEX: 26.81 KG/M2 | WEIGHT: 142 LBS

## 2024-05-21 DIAGNOSIS — M17.11 PRIMARY OSTEOARTHRITIS OF RIGHT KNEE: Primary | ICD-10-CM

## 2024-05-21 PROCEDURE — 20610 DRAIN/INJ JOINT/BURSA W/O US: CPT | Performed by: ORTHOPAEDIC SURGERY

## 2024-05-21 NOTE — PROGRESS NOTES
Orthopedics          Siobhan Grimm 69 y.o. female MRN: [id number]      Chief Complaint:   right knee pain    HPI:   69 y.o.female complaining of right knee pain.  She presents office today regarding right knee pain.  Patient has previously diagnosed right knee pain due to osteoarthritis.  She presents today for viscosupplementation injection form of Durolane in her right knee.  States pain is aching nature worse with mildly relieved with rest and minimally relieved with anti-inflammatory medication.  She states her pain is anywhere from a 2-6 out of 10.                Review Of Systems:   Skin: Normal  Neuro: See HPI  Musculoskeletal: See HPI  All other systems reviewed and are negative    Past Medical History:   Past Medical History:   Diagnosis Date    Abnormal glucose     last assessed 12/22/15    Acid reflux     Anxiety     Arthritis     osteoarthritis multiple sites    Benign paroxysmal positional vertigo     Carpal tunnel syndrome     Chronic kidney disease     nephrolithiasis    Depression     Diabetes mellitus (HCC)     GERD (gastroesophageal reflux disease)     Insomnia     Patellofemoral dysfunction     last assessed  9/15/14       Past Surgical History:   Past Surgical History:   Procedure Laterality Date    BREAST CYST EXCISION Left     abscess removed   50yrs ago    CARPAL TUNNEL RELEASE      COLONOSCOPY      KNEE SURGERY Right     IA NDSC WRST SURG W/RLS TRANSVRS CARPL LIGM Right 08/25/2016    Procedure: RELEASE CARPAL TUNNEL ENDOSCOPIC;  Surgeon: Darek Nguyen MD;  Location: [facility];  Service: Orthopedics    IA TENDON SHEATH INCISION Right 08/25/2016    Procedure: RELEASE LONG AND RING TRIGGER FINGERS;  Surgeon: Darek Nguyen MD;  Location: [facility];  Service: Orthopedics    IA TENDON SHEATH INCISION Left 09/15/2020    Procedure: RELEASE TRIGGER FINGER RING;  Surgeon: Darek Nguyen MD;  Location: [facility];  Service: Orthopedics    TUBAL LIGATION         Family  History:  Family history reviewed and non-contributory  Family History   Problem Relation Age of Onset    Diabetes Mother     Heart disease Mother     Hypertension Mother     Hypothyroidism Mother     Arthritis Father     Crohn's disease Family     Psoriasis Family     Rheum arthritis Family     Lupus Family         systemic- erythematosus    Ulcerative colitis Family     Breast cancer Cousin 52        paternal    No Known Problems Sister     No Known Problems Maternal Grandmother     No Known Problems Maternal Grandfather     No Known Problems Paternal Grandmother     No Known Problems Paternal Grandfather     No Known Problems Son     No Known Problems Son     No Known Problems Sister     No Known Problems Sister     No Known Problems Brother     No Known Problems Brother     No Known Problems Brother          Social History:  Social History     Socioeconomic History    Marital status: /Civil Union     Spouse name: None    Number of children: None    Years of education: None    Highest education level: None   Occupational History    None   Tobacco Use    Smoking status: Never    Smokeless tobacco: Never   Vaping Use    Vaping status: Never Used   Substance and Sexual Activity    Alcohol use: No     Comment: rare/social as per [employer]    Drug use: No    Sexual activity: Yes     Partners: Male     Birth control/protection: None   Other Topics Concern    None   Social History Narrative    None     Social Determinants of Health     Financial Resource Strain: Low Risk  (3/4/2024)    Overall Financial Resource Strain (CARDIA)     Difficulty of Paying Living Expenses: Not hard at all   Food Insecurity: No Food Insecurity (3/4/2024)    Hunger Vital Sign     Worried About Running Out of Food in the Last Year: Never true     Ran Out of Food in the Last Year: Never true   Transportation Needs: No Transportation Needs (3/4/2024)    PRAPARE - Transportation     Lack of Transportation (Medical): No     Lack of  Transportation (Non-Medical): No   Physical Activity: Inactive (8/25/2023)    Exercise Vital Sign     Days of Exercise per Week: 0 days     Minutes of Exercise per Session: 0 min   Stress: No Stress Concern Present (3/4/2024)    [employer] [place] [employer] - Occupational Stress Questionnaire     Feeling of Stress : Not at all   Social Connections: Moderately Integrated (8/25/2023)    Social Connection and Isolation Panel [NHANES]     Frequency of Communication with Friends and Family: Twice a week     Frequency of Social Gatherings with Friends and Family: Twice a week     Attends [employer] Services: 1 to 4 times per year     Active Member of Clubs or Organizations: No     Attends Club or Organization Meetings: Never     Marital Status:    Intimate Partner Violence: Not At Risk (3/4/2024)    Humiliation, Afraid, Rape, and Kick questionnaire     Fear of Current or Ex-Partner: No     Emotionally Abused: No     Physically Abused: No     Sexually Abused: No   Housing Stability: Low Risk  (3/4/2024)    Housing Stability Vital Sign     Unable to Pay for Housing in the Last Year: No     Number of Places Lived in the Last Year: 1     Unstable Housing in the Last Year: No       Allergies:   Allergies   Allergen Reactions    Bee Pollen Other (See Comments)     Other reaction(s): Sneezing    Pollen Extract Allergic Rhinitis and Sneezing       Labs:  0   Lab Value Date/Time    HCT 45.2 05/19/2023 0804    HCT 42.6 09/07/2021 0720    HCT 45.1 (H) 03/12/2020 0905    HCT 43.0 01/18/2016 0931    HGB 15.2 05/19/2023 0804    HGB 14.4 09/07/2021 0720    HGB 15.3 03/12/2020 0905    HGB 13.9 01/18/2016 0931    WBC 7.91 05/19/2023 0804    WBC 6.3 09/07/2021 0720    WBC 6.7 03/12/2020 0905    WBC 10-20 (A) 01/19/2016 0000    WBC 8.0 01/18/2016 0931       Meds:    Current Outpatient Medications:     glucose blood (OneTouch Ultra) test strip, Use 1 each daily as needed (use as needed) Use as instructed, Disp: 100 each,  Rfl: 3    alendronate (Fosamax) 70 mg tablet, Take 1 tablet (70 mg total) by mouth every 7 days, Disp: 12 tablet, Rfl: 3    atorvastatin (LIPITOR) 40 mg tablet, Take 1 tablet (40 mg total) by mouth every evening, Disp: 90 tablet, Rfl: 1    BIOTIN 5000 PO, Take by mouth daily (Patient not taking: Reported on 11/13/2023), Disp: , Rfl:     Cyanocobalamin (VITAMIN B12 PO), Take by mouth daily (Patient not taking: Reported on 11/13/2023), Disp: , Rfl:     Diclofenac Sodium (VOLTAREN) 1 %, Apply 2 g topically 4 (four) times a day, Disp: 100 g, Rfl: 1    fluticasone (FLONASE) 50 mcg/act nasal spray, 1 spray into each nostril daily, Disp: 16 g, Rfl: 4    lidocaine (LMX) 4 % cream, Apply topically as needed for mild pain, Disp: 30 g, Rfl: 2    loratadine (CLARITIN) 10 mg tablet, Take 10 mg by mouth daily, Disp: , Rfl:     metFORMIN (GLUCOPHAGE-XR) 500 mg 24 hr tablet, Take 2 tablets (1,000 mg total) by mouth daily with breakfast, Disp: 180 tablet, Rfl: 2    Omega-3 Fatty Acids (OMEGA 3 PO), Take by mouth, Disp: , Rfl:     Body mass index is 26.83 kg/m².  Wt Readings from Last 3 Encounters:   05/21/24 64.4 kg (142 lb)   03/27/24 64.6 kg (142 lb 6.4 oz)   03/06/24 64.4 kg (142 lb)     Physical Exam:   Vitals:    05/21/24 1418   BP: 118/74   Pulse: 65   SpO2: 96%       General Appearance:    Alert, cooperative, no distress, appears stated age   Head:    Normocephalic, without obvious abnormality, atraumatic   Eyes:    conjunctiva/corneas clear, both eyes        Nose:   Nares normal, septum midline, no drainage    Throat:   Lips normal; teeth and gums normal   Neck:    symmetrical, trachea midline, ;     thyroid:  no enlargement/   Back:     Symmetric, no curvature, ROM normal   Lungs:   No audible wheezing or labored breathing   Chest Wall:    No tenderness or deformity    Heart:    Regular rate and rhythm               Pulses:   2+ and symmetric all extremities   Skin:   Skin color, texture, turgor normal, no rashes or lesions    Neurologic:   normal strength, sensation and reflexes     throughout       Musculoskeletal: right lower extremity  On examination of the right knee there is no effusion, no erythema, varus deformity noted.  Range of motion to full active extension and flexion to greater than 120°.  Pain on palpation medial and lateral joint lines.  There is crepitus with range of motion, no warmth to palpation, bony enlargement noted. No pain on palpation pes anserine bursa region or distal iliotibial band.  Stable to varus and valgus stress without pain or gapping.  Negative anterior and posterior drawer testing.  Sensation intact distal pulses present.    Large joint arthrocentesis: R knee  Universal Protocol:  Consent given by: patient  Patient understanding: patient states understanding of the procedure being performed  Site marked: the operative site was marked  Patient identity confirmed: verbally with patient  Supporting Documentation  Indications: pain   Procedure Details  Location: knee - R knee  Needle size: 22 G  Approach: superior  Medications administered: 3 mL sodium hyaluronate 60 MG/3ML    Patient tolerance: patient tolerated the procedure well with no immediate complications         _*_*_*_*_*_*_*_*_*_*_*_*_*_*_*_*_*_*_*_*_*_*_*_*_*_*_*_*_*_*_*_*_*_*_*_*_*_*_*_*_*    Assessment:  69 y.o.female with right knee pain due to arthritis    Plan:   Weight bearing as tolerated  right lower extremity  Right knee intra-articular Durolane injection ministered as noted above  Advised no significant activity or increased ambulation over the next 24-48 hours and warm compresses to the knee no greater 20 minutes 3-4 times 24 hours following the injection.  Patient advised should they develop any increasing pain, redness, drainage, numbness, tingling or swelling surrounding the injection sight, they are to contact our office or present to the emergency department.  Activities as tolerated   Follow up in 3 months or sooner if  needed      Taras Cordova PA-C                    .

## 2024-05-23 ENCOUNTER — OFFICE VISIT (OUTPATIENT)
Dept: FAMILY MEDICINE CLINIC | Facility: CLINIC | Age: 69
End: 2024-05-23

## 2024-05-23 VITALS
SYSTOLIC BLOOD PRESSURE: 112 MMHG | HEART RATE: 79 BPM | HEIGHT: 61 IN | BODY MASS INDEX: 26.62 KG/M2 | TEMPERATURE: 98 F | DIASTOLIC BLOOD PRESSURE: 64 MMHG | WEIGHT: 141 LBS | RESPIRATION RATE: 20 BRPM | OXYGEN SATURATION: 98 %

## 2024-05-23 DIAGNOSIS — N89.8 VAGINAL DRYNESS: ICD-10-CM

## 2024-05-23 DIAGNOSIS — Z00.00 MEDICARE ANNUAL WELLNESS VISIT, SUBSEQUENT: Primary | ICD-10-CM

## 2024-05-23 DIAGNOSIS — E11.9 TYPE 2 DIABETES MELLITUS WITHOUT COMPLICATION, WITHOUT LONG-TERM CURRENT USE OF INSULIN (HCC): ICD-10-CM

## 2024-05-23 LAB — SL AMB POCT HEMOGLOBIN AIC: 5.9 (ref ?–6.5)

## 2024-05-23 PROCEDURE — G0439 PPPS, SUBSEQ VISIT: HCPCS | Performed by: FAMILY MEDICINE

## 2024-05-23 PROCEDURE — 83036 HEMOGLOBIN GLYCOSYLATED A1C: CPT | Performed by: FAMILY MEDICINE

## 2024-05-23 RX ORDER — CONJUGATED ESTROGENS 0.62 MG/G
0.5 CREAM VAGINAL DAILY
Qty: 30 G | Refills: 2 | Status: SHIPPED | OUTPATIENT
Start: 2024-05-23

## 2024-05-23 NOTE — PROGRESS NOTES
Ambulatory Visit  Name: Siobhan Grimm      : 1955      MRN: [id number]  Encounter Provider: Shelby Muse MD  Encounter Date: 2024   Encounter department: [facility]    Assessment & Plan   1. Medicare annual wellness visit, subsequent  Assessment & Plan:  - No acute complaints  - Immunizations up to date  - Last pap smear:   Next pap smear due: 2024, Dec  Contraception: n/a  Gyn surgeries: b/l tubal ligation  - Last mammogram: , next scheduled Aug 2024  - Last colonoscopy: , recall in 5 years  - Smoking history: n/a  - Family History:  Cardiac hx: mom htn, MI at 70s,   Cancers: Denies breast, ovarian, uterine, cervical, endometrial, colorectal, prostate in 1st degree  Cousin with breast cancer  - VSS  - PE: unremarkable, Body mass index is 26.64 kg/m².    Plan:  Lipid panel  A1C: 5.9, improved with lifestyle  BMP  Alb/Cr  F/U in 1 month    Orders:  -     Albumin / creatinine urine ratio; Future; Expected date: 2024  -     Basic metabolic panel; Future; Expected date: 2024  -     Lipid Panel with Direct LDL reflex; Future; Expected date: 2024  -     POCT hemoglobin A1c  2. Type 2 diabetes mellitus without complication, without long-term current use of insulin ([facility])  Assessment & Plan:  - Goal A1C < 7.0, significant improvement with diet and exercise  - HgbA1C         Lab Results   Component Value Date    HGBA1C 5.9 2024     - MicAlb:Cr     Lab Results   Component Value Date    CREATININEUR 93.6 2023    LABMICR 7.7 2023    MICROALBCRE 8 2023    MICROCREAT 4 2021     - Renal      Lab Results   Component Value Date    BUN 20 2023    CREATININE 0.78 2023    EGFR 79 2023     - Current medications: metformin 1000 daily   - On statin: yes  - Diabetic foot exam: deferred  - Iris exam: deferred  Medication changes: none  Continue current regimen  Avoid hypoglycemia, precautions  given  Diabetic education  F/U in 6 months         Orders:  -     Albumin / creatinine urine ratio; Future; Expected date: 05/23/2024  -     Basic metabolic panel; Future; Expected date: 05/23/2024  -     Lipid Panel with Direct LDL reflex; Future; Expected date: 05/23/2024  -     POCT hemoglobin A1c  3. Vaginal dryness  Assessment & Plan:  - C/o vaginal dryness during sexual intercourse  - 1 partner  - Denies discharge, bleeding   - Menopause at 54  Vaginal estrogen cream daily, then taper after 2 weeks to 1-3 x weekly  F/U in 1 month  Orders:  -     estrogens, conjugated (Premarin) vaginal cream; Insert 0.5 g into the vagina daily      Depression Screening and Follow-up Plan: Patient was screened for depression during today's encounter. They screened negative with a PHQ-9 score of 4.      Preventive health issues were discussed with patient, and age appropriate screening tests were ordered as noted in patient's After Visit Summary. Personalized health advice and appropriate referrals for health education or preventive services given if needed, as noted in patient's After Visit Summary.    History of Present Illness     70 yo female presents for MAW. C/o vaginal dryness during sexual intercourse with . Has tried cream before a long time ago; requests renewal. Has made significant changes to lifestyle with diet and exercise; interested in repeat A1C. Willing to get blood tests to check lipids.   All questions answered.        Patient Care Team:  Shelby Muse MD as PCP - General (Family Medicine)  DO Eric Pryor DO    Review of Systems   Constitutional:  Negative for chills and fever.   HENT:  Negative for ear pain and sore throat.    Eyes:  Negative for pain and visual disturbance.   Respiratory:  Negative for cough and shortness of breath.    Cardiovascular:  Negative for chest pain and palpitations.   Gastrointestinal:  Negative for abdominal pain and vomiting.   Genitourinary:   Negative for dysuria and hematuria.   Musculoskeletal:  Negative for arthralgias and back pain.   Skin:  Negative for color change and rash.   Neurological:  Negative for seizures and syncope.   All other systems reviewed and are negative.    Medical History Reviewed by provider this encounter:       Annual Wellness Visit Questionnaire   Siobhan is here for her Subsequent Wellness visit. Last Medicare Wellness visit information reviewed, patient interviewed, no change since last AWV.     Health Risk Assessment:   Patient rates overall health as good. Patient feels that their physical health rating is same. Patient is satisfied with their life. Eyesight was rated as same. Hearing was rated as same. Patient feels that their emotional and mental health rating is same. Patients states they are never, rarely angry. Patient states they are never, rarely unusually tired/fatigued. Pain experienced in the last 7 days has been none. Patient states that she has experienced no weight loss or gain in last 6 months. Knee pain but gets injections, and uses tylenol     Depression Screening:   PHQ-9 Score: 4      Fall Risk Screening:   In the past year, patient has experienced: no history of falling in past year      Urinary Incontinence Screening:   Patient has not leaked urine accidently in the last six months.     Home Safety:  Patient does not have trouble with stairs inside or outside of their home. Patient has working smoke alarms and has working carbon monoxide detector. Home safety hazards include: none.     Nutrition:   Current diet is Regular.     Medications:   Patient is currently taking over-the-counter supplements. OTC medications include: see medication list. Patient is able to manage medications.     Activities of Daily Living (ADLs)/Instrumental Activities of Daily Living (IADLs):   Walk and transfer into and out of bed and chair?: Yes  Dress and groom yourself?: Yes    Bathe or shower yourself?: Yes    Feed  yourself? Yes  Do your laundry/housekeeping?: Yes  Manage your money, pay your bills and track your expenses?: Yes  Make your own meals?: Yes    Do your own shopping?: Yes    Previous Hospitalizations:   Any hospitalizations or ED visits within the last 12 months?: No      Advance Care Planning:   Living will: No      PREVENTIVE SCREENINGS      Cardiovascular Screening:    General: Screening Current      Diabetes Screening:     General: Screening Not Indicated and History Diabetes      Colorectal Cancer Screening:     General: Screening Current      Breast Cancer Screening:     General: Screening Current      Cervical Cancer Screening:    General: Screening Not Indicated      Osteoporosis Screening:    General: Screening Not Indicated and History Osteoporosis      Lung Cancer Screening:     General: Screening Not Indicated      Hepatitis C Screening:    General: Screening Current    Screening, Brief Intervention, and Referral to Treatment (SBIRT)    Screening      Single Item Drug Screening:  How often have you used an illegal drug (including marijuana) or a prescription medication for non-medical reasons in the past year? never    Single Item Drug Screen Score: 0  Interpretation: Negative screen for possible drug use disorder    Other Counseling Topics:   Car/seat belt/driving safety, sunscreen and regular weightbearing exercise.     Social Determinants of Health     Financial Resource Strain: Low Risk  (3/4/2024)    Overall Financial Resource Strain (CARDIA)     Difficulty of Paying Living Expenses: Not hard at all   Food Insecurity: No Food Insecurity (3/4/2024)    Hunger Vital Sign     Worried About Running Out of Food in the Last Year: Never true     Ran Out of Food in the Last Year: Never true   Transportation Needs: No Transportation Needs (3/4/2024)    PRAPARE - Transportation     Lack of Transportation (Medical): No     Lack of Transportation (Non-Medical): No   Housing Stability: Low Risk  (5/23/2024)     "Housing Stability Vital Sign     Unable to Pay for Housing in the Last Year: No     Number of Times Moved in the Last Year: 1     Homeless in the Last Year: No   Utilities: Not At Risk (3/4/2024)    [facility] Utilities     Threatened with loss of utilities: No     No results found.    Objective     /64   Pulse 79   Temp 98 °F (36.7 °C) (Temporal)   Resp 20   Ht 5' 1\" (1.549 m)   Wt 64 kg (141 lb)   SpO2 98%   BMI 26.64 kg/m²     Physical Exam  Vitals and nursing note reviewed.   Constitutional:       General: She is not in acute distress.     Appearance: She is well-developed.   HENT:      Head: Normocephalic and atraumatic.      Right Ear: External ear normal.      Left Ear: External ear normal.      Nose: Nose normal.      Mouth/Throat:      Mouth: Mucous membranes are moist.      Pharynx: Oropharynx is clear.   Eyes:      Conjunctiva/sclera: Conjunctivae normal.   Cardiovascular:      Rate and Rhythm: Normal rate and regular rhythm.      Pulses: Normal pulses.      Heart sounds: Normal heart sounds. No murmur heard.  Pulmonary:      Effort: Pulmonary effort is normal. No respiratory distress.      Breath sounds: Normal breath sounds.   Abdominal:      General: Abdomen is flat. Bowel sounds are normal.      Palpations: Abdomen is soft.      Tenderness: There is no abdominal tenderness. There is no right CVA tenderness or left CVA tenderness.   Musculoskeletal:         General: No swelling.      Cervical back: Neck supple.   Skin:     General: Skin is warm and dry.      Capillary Refill: Capillary refill takes less than 2 seconds.   Neurological:      General: No focal deficit present.      Mental Status: She is alert and oriented to person, place, and time. Mental status is at baseline.   Psychiatric:         Mood and Affect: Mood normal.       Administrative Statements   I have spent a total time of 30 minutes on 05/24/24 In caring for this patient including Risks and benefits of tx options, Instructions " for management, Patient and family education, Importance of tx compliance, Impressions, Counseling / Coordination of care, Documenting in the medical record, Reviewing / ordering tests, medicine, procedures  , Obtaining or reviewing history  , and Communicating with other healthcare professionals .

## 2024-05-23 NOTE — ASSESSMENT & PLAN NOTE
- C/o vaginal dryness during sexual intercourse  - 1 partner  - Denies discharge, bleeding   - Menopause at 54  Vaginal estrogen cream daily, then taper after 2 weeks to 1-3 x weekly  F/U in 1 month

## 2024-05-23 NOTE — ASSESSMENT & PLAN NOTE
- No acute complaints  - Immunizations up to date  - Last pap smear: 2019  Next pap smear due: 2024, Dec  Contraception: n/a  Gyn surgeries: b/l tubal ligation  - Last mammogram: 2023, next scheduled Aug 2024  - Last colonoscopy: 2023, recall in 5 years  - Smoking history: n/a  - Family History:  Cardiac hx: mom htn, MI at 70s,   Cancers: Denies breast, ovarian, uterine, cervical, endometrial, colorectal, prostate in 1st degree  Cousin with breast cancer  - VSS  - PE: unremarkable, Body mass index is 26.64 kg/m².    Plan:  Lipid panel  A1C: 5.9, improved with lifestyle  BMP  Alb/Cr  F/U in 1 month

## 2024-05-23 NOTE — ASSESSMENT & PLAN NOTE
- Goal A1C < 7.0, significant improvement with diet and exercise  - HgbA1C         Lab Results   Component Value Date    HGBA1C 5.9 05/23/2024     - MicAlb:Cr     Lab Results   Component Value Date    CREATININEUR 93.6 08/31/2023    LABMICR 7.7 08/31/2023    MICROALBCRE 8 08/31/2023    MICROCREAT 4 09/07/2021     - Renal      Lab Results   Component Value Date    BUN 20 05/19/2023    CREATININE 0.78 05/19/2023    EGFR 79 05/19/2023     - Current medications: metformin 1000 daily   - On statin: yes  - Diabetic foot exam: deferred  - Iris exam: deferred  Medication changes: none  Continue current regimen  Avoid hypoglycemia, precautions given  Diabetic education  F/U in 6 months

## 2024-05-23 NOTE — PATIENT INSTRUCTIONS
Medicare Preventive Visit Patient Instructions  Thank you for completing your Welcome to Medicare Visit or Medicare Annual Wellness Visit today. Your next wellness visit will be due in one year (5/24/2025).  The screening/preventive services that you may require over the next 5-10 years are detailed below. Some tests may not apply to you based off risk factors and/or age. Screening tests ordered at today's visit but not completed yet may show as past due. Also, please note that scanned in results may not display below.  Preventive Screenings:  Service Recommendations Previous Testing/Comments   Colorectal Cancer Screening  * Colonoscopy    * Fecal Occult Blood Test (FOBT)/Fecal Immunochemical Test (FIT)  * Fecal DNA/Cologuard Test  * Flexible Sigmoidoscopy Age: 45-75 years old   Colonoscopy: every 10 years (may be performed more frequently if at higher risk)  OR  FOBT/FIT: every 1 year  OR  Cologuard: every 3 years  OR  Sigmoidoscopy: every 5 years  Screening may be recommended earlier than age 45 if at higher risk for colorectal cancer. Also, an individualized decision between you and your healthcare provider will decide whether screening between the ages of 76-85 would be appropriate. Colonoscopy: 09/22/2023  FOBT/FIT: Not on file  Cologuard: Not on file  Sigmoidoscopy: Not on file          Breast Cancer Screening Age: 40+ years old  Frequency: every 1-2 years  Not required if history of left and right mastectomy Mammogram: 05/08/2023        Cervical Cancer Screening Between the ages of 21-29, pap smear recommended once every 3 years.   Between the ages of 30-65, can perform pap smear with HPV co-testing every 5 years.   Recommendations may differ for women with a history of total hysterectomy, cervical cancer, or abnormal pap smears in past. Pap Smear: 12/12/2019        Hepatitis C Screening Once for adults born between 1945 and 1965  More frequently in patients at high risk for Hepatitis C Hep C Antibody:  03/12/2020        Diabetes Screening 1-2 times per year if you're at risk for diabetes or have pre-diabetes Fasting glucose: 134 mg/dL (5/19/2023)  A1C: 7.1 (3/4/2024)      Cholesterol Screening Once every 5 years if you don't have a lipid disorder. May order more often based on risk factors. Lipid panel: 05/19/2023          Other Preventive Screenings Covered by Medicare:  Abdominal Aortic Aneurysm (AAA) Screening: covered once if your at risk. You're considered to be at risk if you have a family history of AAA.  Lung Cancer Screening: covers low dose CT scan once per year if you meet all of the following conditions: (1) Age 55-77; (2) No signs or symptoms of lung cancer; (3) Current smoker or have quit smoking within the last 15 years; (4) You have a tobacco smoking history of at least 20 pack years (packs per day multiplied by number of years you smoked); (5) You get a written order from a healthcare provider.  Glaucoma Screening: covered annually if you're considered high risk: (1) You have diabetes OR (2) Family history of glaucoma OR (3)  aged 50 and older OR (4)  American aged 65 and older  Osteoporosis Screening: covered every 2 years if you meet one of the following conditions: (1) You're estrogen deficient and at risk for osteoporosis based off medical history and other findings; (2) Have a vertebral abnormality; (3) On glucocorticoid therapy for more than 3 months; (4) Have primary hyperparathyroidism; (5) On osteoporosis medications and need to assess response to drug therapy.   Last bone density test (DXA Scan): 11/10/2022.  HIV Screening: covered annually if you're between the age of 15-65. Also covered annually if you are younger than 15 and older than 65 with risk factors for HIV infection. For pregnant patients, it is covered up to 3 times per pregnancy.    Immunizations:  Immunization Recommendations   Influenza Vaccine Annual influenza vaccination during flu season is  recommended for all persons aged >= 6 months who do not have contraindications   Pneumococcal Vaccine   * Pneumococcal conjugate vaccine = PCV13 (Prevnar 13), PCV15 (Vaxneuvance), PCV20 (Prevnar 20)  * Pneumococcal polysaccharide vaccine = PPSV23 (Pneumovax) Adults 19-65 yo with certain risk factors or if 65+ yo  If never received any pneumonia vaccine: recommend Prevnar 20 (PCV20)  Give PCV20 if previously received 1 dose of PCV13 or PPSV23   Hepatitis B Vaccine 3 dose series if at intermediate or high risk (ex: diabetes, end stage renal disease, liver disease)   Respiratory syncytial virus (RSV) Vaccine - COVERED BY MEDICARE PART D  * RSVPreF3 (Arexvy) CDC recommends that adults 60 years of age and older may receive a single dose of RSV vaccine using shared clinical decision-making (SCDM)   Tetanus (Td) Vaccine - COST NOT COVERED BY MEDICARE PART B Following completion of primary series, a booster dose should be given every 10 years to maintain immunity against tetanus. Td may also be given as tetanus wound prophylaxis.   Tdap Vaccine - COST NOT COVERED BY MEDICARE PART B Recommended at least once for all adults. For pregnant patients, recommended with each pregnancy.   Shingles Vaccine (Shingrix) - COST NOT COVERED BY MEDICARE PART B  2 shot series recommended in those 19 years and older who have or will have weakened immune systems or those 50 years and older     Health Maintenance Due:      Topic Date Due   • Breast Cancer Screening: Mammogram  05/08/2024   • Cervical Cancer Screening  12/12/2024   • Colorectal Cancer Screening  09/20/2028   • Hepatitis C Screening  Completed     Immunizations Due:      Topic Date Due   • COVID-19 Vaccine (4 - 2023-24 season) 09/01/2023     Advance Directives   What are advance directives?  Advance directives are legal documents that state your wishes and plans for medical care. These plans are made ahead of time in case you lose your ability to make decisions for yourself.  Advance directives can apply to any medical decision, such as the treatments you want, and if you want to donate organs.   What are the types of advance directives?  There are many types of advance directives, and each state has rules about how to use them. You may choose a combination of any of the following:  Living will:  This is a written record of the treatment you want. You can also choose which treatments you do not want, which to limit, and which to stop at a certain time. This includes surgery, medicine, IV fluid, and tube feedings.   Durable power of  for healthcare (DPAHC):  This is a written record that states who you want to make healthcare choices for you when you are unable to make them for yourself. This person, called a proxy, is usually a family member or a friend. You may choose more than 1 proxy.  Do not resuscitate (DNR) order:  A DNR order is used in case your heart stops beating or you stop breathing. It is a request not to have certain forms of treatment, such as CPR. A DNR order may be included in other types of advance directives.  Medical directive:  This covers the care that you want if you are in a coma, near death, or unable to make decisions for yourself. You can list the treatments you want for each condition. Treatment may include pain medicine, surgery, blood transfusions, dialysis, IV or tube feedings, and a ventilator (breathing machine).  Values history:  This document has questions about your views, beliefs, and how you feel and think about life. This information can help others choose the care that you would choose.  Why are advance directives important?  An advance directive helps you control your care. Although spoken wishes may be used, it is better to have your wishes written down. Spoken wishes can be misunderstood, or not followed. Treatments may be given even if you do not want them. An advance directive may make it easier for your family to make difficult  choices about your care.   Weight Management   Why it is important to manage your weight:  Being overweight increases your risk of health conditions such as heart disease, high blood pressure, type 2 diabetes, and certain types of cancer. It can also increase your risk for osteoarthritis, sleep apnea, and other respiratory problems. Aim for a slow, steady weight loss. Even a small amount of weight loss can lower your risk of health problems.  How to lose weight safely:  A safe and healthy way to lose weight is to eat fewer calories and get regular exercise. You can lose up about 1 pound a week by decreasing the number of calories you eat by 500 calories each day.   Healthy meal plan for weight management:  A healthy meal plan includes a variety of foods, contains fewer calories, and helps you stay healthy. A healthy meal plan includes the following:  Eat whole-grain foods more often.  A healthy meal plan should contain fiber. Fiber is the part of grains, fruits, and vegetables that is not broken down by your body. Whole-grain foods are healthy and provide extra fiber in your diet. Some examples of whole-grain foods are whole-wheat breads and pastas, oatmeal, brown rice, and bulgur.  Eat a variety of vegetables every day.  Include dark, leafy greens such as spinach, kale, sonali greens, and mustard greens. Eat yellow and orange vegetables such as carrots, sweet potatoes, and winter squash.   Eat a variety of fruits every day.  Choose fresh or canned fruit (canned in its own juice or light syrup) instead of juice. Fruit juice has very little or no fiber.  Eat low-fat dairy foods.  Drink fat-free (skim) milk or 1% milk. Eat fat-free yogurt and low-fat cottage cheese. Try low-fat cheeses such as mozzarella and other reduced-fat cheeses.  Choose meat and other protein foods that are low in fat.  Choose beans or other legumes such as split peas or lentils. Choose fish, skinless poultry (chicken or turkey), or lean cuts  of red meat (beef or pork). Before you cook meat or poultry, cut off any visible fat.   Use less fat and oil.  Try baking foods instead of frying them. Add less fat, such as margarine, sour cream, regular salad dressing and mayonnaise to foods. Eat fewer high-fat foods. Some examples of high-fat foods include french fries, doughnuts, ice cream, and cakes.  Eat fewer sweets.  Limit foods and drinks that are high in sugar. This includes candy, cookies, regular soda, and sweetened drinks.  Exercise:  Exercise at least 30 minutes per day on most days of the week. Some examples of exercise include walking, biking, dancing, and swimming. You can also fit in more physical activity by taking the stairs instead of the elevator or parking farther away from stores. Ask your healthcare provider about the best exercise plan for you.      © Copyright [employer] 2018 Information is for End User's use only and may not be sold, redistributed or otherwise used for commercial purposes. All illustrations and images included in CareNotes® are the copyrighted property of [employer].D.A.M., Inc. or [employer]

## 2024-05-24 ENCOUNTER — APPOINTMENT (OUTPATIENT)
Dept: LAB | Facility: CLINIC | Age: 69
End: 2024-05-24
Payer: COMMERCIAL

## 2024-05-24 DIAGNOSIS — Z00.00 MEDICARE ANNUAL WELLNESS VISIT, SUBSEQUENT: ICD-10-CM

## 2024-05-24 DIAGNOSIS — E11.9 TYPE 2 DIABETES MELLITUS WITHOUT COMPLICATION, WITHOUT LONG-TERM CURRENT USE OF INSULIN (HCC): ICD-10-CM

## 2024-05-24 LAB
ANION GAP SERPL CALCULATED.3IONS-SCNC: 7 MMOL/L (ref 4–13)
BUN SERPL-MCNC: 21 MG/DL (ref 5–25)
CALCIUM SERPL-MCNC: 8.9 MG/DL (ref 8.4–10.2)
CHLORIDE SERPL-SCNC: 106 MMOL/L (ref 96–108)
CHOLEST SERPL-MCNC: 205 MG/DL
CO2 SERPL-SCNC: 26 MMOL/L (ref 21–32)
CREAT SERPL-MCNC: 0.68 MG/DL (ref 0.6–1.3)
CREAT UR-MCNC: 171.1 MG/DL
GFR SERPL CREATININE-BSD FRML MDRD: 90 ML/MIN/1.73SQ M
GLUCOSE P FAST SERPL-MCNC: 81 MG/DL (ref 65–99)
HDLC SERPL-MCNC: 53 MG/DL
LDLC SERPL CALC-MCNC: 129 MG/DL (ref 0–100)
MICROALBUMIN UR-MCNC: 23.7 MG/L
MICROALBUMIN/CREAT 24H UR: 14 MG/G CREATININE (ref 0–30)
POTASSIUM SERPL-SCNC: 4.1 MMOL/L (ref 3.5–5.3)
SODIUM SERPL-SCNC: 139 MMOL/L (ref 135–147)
TRIGL SERPL-MCNC: 113 MG/DL

## 2024-05-24 PROCEDURE — 80048 BASIC METABOLIC PNL TOTAL CA: CPT

## 2024-05-24 PROCEDURE — 80061 LIPID PANEL: CPT

## 2024-05-24 PROCEDURE — 36415 COLL VENOUS BLD VENIPUNCTURE: CPT

## 2024-05-24 PROCEDURE — 82570 ASSAY OF URINE CREATININE: CPT

## 2024-05-24 PROCEDURE — 82043 UR ALBUMIN QUANTITATIVE: CPT

## 2024-05-30 ENCOUNTER — TELEPHONE (OUTPATIENT)
Dept: FAMILY MEDICINE CLINIC | Facility: CLINIC | Age: 69
End: 2024-05-30

## 2024-05-30 NOTE — TELEPHONE ENCOUNTER
Folder (To be completed by) -  Dr. Muse     Name of Form - [employer] Clarification Request     Color folder ([place])    Form to be Faxed (Fax #), [phone]    Patient was made aware of the 7-10 business day form policy.

## 2024-06-21 ENCOUNTER — OFFICE VISIT (OUTPATIENT)
Dept: FAMILY MEDICINE CLINIC | Facility: CLINIC | Age: 69
End: 2024-06-21

## 2024-06-21 VITALS
SYSTOLIC BLOOD PRESSURE: 109 MMHG | BODY MASS INDEX: 26.62 KG/M2 | HEIGHT: 61 IN | OXYGEN SATURATION: 99 % | DIASTOLIC BLOOD PRESSURE: 69 MMHG | WEIGHT: 141 LBS | TEMPERATURE: 98 F | HEART RATE: 82 BPM | RESPIRATION RATE: 20 BRPM

## 2024-06-21 DIAGNOSIS — E78.2 MIXED HYPERLIPIDEMIA: ICD-10-CM

## 2024-06-21 DIAGNOSIS — E11.9 TYPE 2 DIABETES MELLITUS WITHOUT COMPLICATION, WITHOUT LONG-TERM CURRENT USE OF INSULIN (HCC): ICD-10-CM

## 2024-06-21 DIAGNOSIS — M54.31 BILATERAL SCIATICA: Primary | ICD-10-CM

## 2024-06-21 DIAGNOSIS — M54.32 BILATERAL SCIATICA: Primary | ICD-10-CM

## 2024-06-21 PROCEDURE — G2211 COMPLEX E/M VISIT ADD ON: HCPCS | Performed by: FAMILY MEDICINE

## 2024-06-21 PROCEDURE — 99213 OFFICE O/P EST LOW 20 MIN: CPT | Performed by: FAMILY MEDICINE

## 2024-06-21 RX ORDER — METFORMIN HYDROCHLORIDE 500 MG/1
1000 TABLET, EXTENDED RELEASE ORAL
Qty: 180 TABLET | Refills: 2 | Status: SHIPPED | OUTPATIENT
Start: 2024-06-21 | End: 2024-12-18

## 2024-06-21 RX ORDER — BLOOD SUGAR DIAGNOSTIC
1 STRIP MISCELLANEOUS DAILY PRN
Qty: 100 EACH | Refills: 3 | Status: SHIPPED | OUTPATIENT
Start: 2024-06-21

## 2024-06-21 RX ORDER — ATORVASTATIN CALCIUM 40 MG/1
40 TABLET, FILM COATED ORAL EVERY EVENING
Qty: 90 TABLET | Refills: 1 | Status: SHIPPED | OUTPATIENT
Start: 2024-06-21 | End: 2024-12-18

## 2024-06-21 RX ORDER — GABAPENTIN 300 MG/1
300 CAPSULE ORAL 2 TIMES DAILY
Qty: 180 CAPSULE | Refills: 3 | Status: SHIPPED | OUTPATIENT
Start: 2024-06-21

## 2024-06-21 NOTE — ASSESSMENT & PLAN NOTE
- Mixed hyperlipidemia  - Improving with diet and exercise  - Current meds: atorvastatin 40 mg daily   - ASCVD: 9.8%    Lipid       Lab Results   Component Value Date    CHOLESTEROL 205 (H) 05/24/2024    TRIG 113 05/24/2024    HDL 53 05/24/2024    LDLCALC 129 (H) 05/24/2024     Continue atorvastatin 40 mg daily  Continue improving diet and exercise  Educational material provided for Mediterranean diet

## 2024-06-21 NOTE — PROGRESS NOTES
Ambulatory Visit  Name: Siobhan Grimm      : 1955      MRN: [id number]  Encounter Provider: Shelby Muse MD  Encounter Date: 2024   Encounter department: [facility] [place]    Assessment & Plan   1. Bilateral sciatica  Assessment & Plan:  - C/o LBP with b/l sciatica to buttocks, at times radiating down to R knee  - Exacerbated by recent trip to beach where sat on sand and when getting up  - Denies red flag symptoms  - PE: normal, slight tenderness on lower back  Gabapentin 300 mg BID PRN  Tylenol PRN   Referral to PT  F/U in 3 months  Orders:  -     gabapentin (NEURONTIN) 300 mg capsule; Take 1 capsule (300 mg total) by mouth 2 (two) times a day  -     Ambulatory Referral to Physical Therapy; Future  2. Mixed hyperlipidemia  Assessment & Plan:  - Mixed hyperlipidemia  - Improving with diet and exercise  - Current meds: atorvastatin 40 mg daily   - ASCVD: 9.8%    Lipid       Lab Results   Component Value Date    CHOLESTEROL 205 (H) 2024    TRIG 113 2024    HDL 53 2024    LDLCALC 129 (H) 2024     Continue atorvastatin 40 mg daily  Continue improving diet and exercise  Educational material provided for Mediterranean diet  Orders:  -     atorvastatin (LIPITOR) 40 mg tablet; Take 1 tablet (40 mg total) by mouth every evening  3. Type 2 diabetes mellitus without complication, without long-term current use of insulin (HCC)  -     metFORMIN (GLUCOPHAGE-XR) 500 mg 24 hr tablet; Take 2 tablets (1,000 mg total) by mouth daily with breakfast  -     glucose blood (OneTouch Ultra) test strip; Use 1 each daily as needed (use as needed) Use as instructed  -     atorvastatin (LIPITOR) 40 mg tablet; Take 1 tablet (40 mg total) by mouth every evening       History of Present Illness     70 yo female presents to clinic for review of blood work. Overall has significantly improved her diet and exercise. Improved lipids and A1C.  Patient stated that recently  "she visited the beach, sat on the sand and exacerbated her lower back pain while getting up. Pain radiates to b/l buttocks, sometimes down to R knee.   Improved vaginal symptoms after starting vaginal estrogen cream 2 weeks ago. Reduce to 2-3 times weekly.   Metformin and strips refilled.          Review of Systems   Constitutional:  Negative for chills and fever.   HENT:  Negative for ear pain and sore throat.    Eyes:  Negative for pain and visual disturbance.   Respiratory:  Negative for cough and shortness of breath.    Cardiovascular:  Negative for chest pain and palpitations.   Gastrointestinal:  Negative for abdominal pain and vomiting.   Genitourinary:  Negative for dysuria and hematuria.   Musculoskeletal:  Positive for back pain. Negative for arthralgias.   Skin:  Negative for color change and rash.   Neurological:  Negative for seizures and syncope.   All other systems reviewed and are negative.      Objective     /69   Pulse 82   Temp 98 °F (36.7 °C) (Temporal)   Resp 20   Ht 5' 1\" (1.549 m)   Wt 64 kg (141 lb)   SpO2 99%   BMI 26.64 kg/m²     Physical Exam  Vitals and nursing note reviewed.   Constitutional:       General: She is not in acute distress.     Appearance: She is well-developed.   HENT:      Head: Normocephalic and atraumatic.      Right Ear: External ear normal.      Left Ear: External ear normal.      Nose: Nose normal.      Mouth/Throat:      Mouth: Mucous membranes are moist.      Pharynx: Oropharynx is clear.   Eyes:      Conjunctiva/sclera: Conjunctivae normal.   Cardiovascular:      Rate and Rhythm: Normal rate and regular rhythm.      Pulses: Normal pulses.      Heart sounds: Normal heart sounds. No murmur heard.  Pulmonary:      Effort: Pulmonary effort is normal. No respiratory distress.      Breath sounds: Normal breath sounds.   Abdominal:      General: Abdomen is flat. Bowel sounds are normal.      Palpations: Abdomen is soft.      Tenderness: There is no abdominal " tenderness. There is no right CVA tenderness or left CVA tenderness.   Musculoskeletal:         General: No swelling.      Cervical back: Neck supple.      Lumbar back: Tenderness present.      Comments: B/l sciatica to buttocks   Skin:     General: Skin is warm and dry.      Capillary Refill: Capillary refill takes less than 2 seconds.   Neurological:      General: No focal deficit present.      Mental Status: She is alert and oriented to person, place, and time. Mental status is at baseline.   Psychiatric:         Mood and Affect: Mood normal.       Administrative Statements

## 2024-06-21 NOTE — ASSESSMENT & PLAN NOTE
- C/o LBP with b/l sciatica to buttocks, at times radiating down to R knee  - Exacerbated by recent trip to beach where sat on sand and when getting up  - Denies red flag symptoms  - PE: normal, slight tenderness on lower back  Gabapentin 300 mg BID PRN  Tylenol PRN   Referral to PT  F/U in 3 months

## 2024-06-22 PROBLEM — Z00.00 MEDICARE ANNUAL WELLNESS VISIT, SUBSEQUENT: Status: RESOLVED | Noted: 2024-05-23 | Resolved: 2024-06-22

## 2024-08-06 ENCOUNTER — HOSPITAL ENCOUNTER (OUTPATIENT)
Dept: RADIOLOGY | Age: 69
Discharge: HOME | End: 2024-08-06
Payer: COMMERCIAL

## 2024-08-06 DIAGNOSIS — Z12.31 ENCOUNTER FOR SCREENING MAMMOGRAM FOR MALIGNANT NEOPLASM OF BREAST: ICD-10-CM

## 2024-08-06 PROCEDURE — 77063 BREAST TOMOSYNTHESIS BI: CPT

## 2024-08-06 PROCEDURE — 77067 SCR MAMMO BI INCL CAD: CPT

## 2024-08-07 DIAGNOSIS — N89.8 VAGINAL DRYNESS: ICD-10-CM

## 2024-08-09 RX ORDER — CONJUGATED ESTROGENS 0.62 MG/G
CREAM VAGINAL
Qty: 60 G | Refills: 3 | Status: SHIPPED | OUTPATIENT
Start: 2024-08-09

## 2024-08-20 ENCOUNTER — OFFICE VISIT (OUTPATIENT)
Dept: OBGYN CLINIC | Facility: CLINIC | Age: 69
End: 2024-08-20
Payer: COMMERCIAL

## 2024-08-20 VITALS
BODY MASS INDEX: 26.62 KG/M2 | SYSTOLIC BLOOD PRESSURE: 109 MMHG | DIASTOLIC BLOOD PRESSURE: 66 MMHG | WEIGHT: 141 LBS | HEIGHT: 61 IN | HEART RATE: 65 BPM

## 2024-08-20 DIAGNOSIS — M17.11 PRIMARY OSTEOARTHRITIS OF RIGHT KNEE: Primary | ICD-10-CM

## 2024-08-20 PROCEDURE — 99213 OFFICE O/P EST LOW 20 MIN: CPT | Performed by: ORTHOPAEDIC SURGERY

## 2024-08-20 NOTE — PROGRESS NOTES
Orthopedics          Siobhan Grimm 69 y.o. female MRN: [id number]      Chief Complaint:   right knee pain    HPI:   69 y.o.female complaining of right knee pain.  She presents to the office today regarding right knee pain.  She states the pain is aching nature worse with weightbearing mildly relieved with rest.  She has had some relief with her viscosupplementation injection she states pain is aching nature worse after standing for long periods of time pain is localized to her right knee she denies any stability she is ambulate without the assistance of a cane or walker she has been doing for Silver sneakers to improve strength as well as aqua exercises denies any changes numbness tingling right lower extremity.                Review Of Systems:   Skin: Normal  Neuro: See HPI  Musculoskeletal: See HPI  All other systems reviewed and are negative    Past Medical History:   Past Medical History:   Diagnosis Date    Abnormal glucose     last assessed 12/22/15    Acid reflux     Anxiety     Arthritis     osteoarthritis multiple sites    Benign paroxysmal positional vertigo     Carpal tunnel syndrome     Chronic kidney disease     nephrolithiasis    Depression     Diabetes mellitus (HCC)     GERD (gastroesophageal reflux disease)     Insomnia     Patellofemoral dysfunction     last assessed  9/15/14       Past Surgical History:   Past Surgical History:   Procedure Laterality Date    BREAST CYST EXCISION Left     abscess removed   50yrs ago    CARPAL TUNNEL RELEASE      COLONOSCOPY      KNEE SURGERY Right     HI NDSC WRST SURG W/RLS TRANSVRS CARPL LIGM Right 08/25/2016    Procedure: RELEASE CARPAL TUNNEL ENDOSCOPIC;  Surgeon: Darek Nguyen MD;  Location: [facility];  Service: Orthopedics    HI TENDON SHEATH INCISION Right 08/25/2016    Procedure: RELEASE LONG AND RING TRIGGER FINGERS;  Surgeon: Darek Nguyen MD;  Location: [facility];  Service: Orthopedics    HI TENDON SHEATH INCISION Left 09/15/2020     Procedure: RELEASE TRIGGER FINGER RING;  Surgeon: Darek Nguyen MD;  Location: [facility];  Service: Orthopedics    TUBAL LIGATION         Family History:  Family history reviewed and non-contributory  Family History   Problem Relation Age of Onset    Diabetes Mother     Heart disease Mother     Hypertension Mother     Hypothyroidism Mother     Arthritis Father     No Known Problems Sister     No Known Problems Sister     No Known Problems Sister     No Known Problems Maternal Grandmother     No Known Problems Maternal Grandfather     No Known Problems Paternal Grandmother     No Known Problems Paternal Grandfather     No Known Problems Brother     No Known Problems Brother     No Known Problems Brother     No Known Problems Son     No Known Problems Son     Breast cancer Cousin 52        paternal    Crohn's disease Family     Psoriasis Family     Rheum arthritis Family     Lupus Family         systemic- erythematosus    Ulcerative colitis Family          Social History:  Social History     Socioeconomic History    Marital status: /Civil Union     Spouse name: None    Number of children: None    Years of education: None    Highest education level: None   Occupational History    None   Tobacco Use    Smoking status: Never    Smokeless tobacco: Never   Vaping Use    Vaping status: Never Used   Substance and Sexual Activity    Alcohol use: No     Comment: rare/social as per [employer]    Drug use: No    Sexual activity: Yes     Partners: Male     Birth control/protection: None   Other Topics Concern    None   Social History Narrative    None     Social Determinants of Health     Financial Resource Strain: Low Risk  (3/4/2024)    Overall Financial Resource Strain (CARDIA)     Difficulty of Paying Living Expenses: Not hard at all   Food Insecurity: No Food Insecurity (3/4/2024)    Hunger Vital Sign     Worried About Running Out of Food in the Last Year: Never true     Ran Out of Food in the Last Year: Never  true   Transportation Needs: No Transportation Needs (3/4/2024)    PRAPARE - Transportation     Lack of Transportation (Medical): No     Lack of Transportation (Non-Medical): No   Physical Activity: Inactive (8/25/2023)    Exercise Vital Sign     Days of Exercise per Week: 0 days     Minutes of Exercise per Session: 0 min   Stress: No Stress Concern Present (3/4/2024)    [place] [place] [employer] - Occupational Stress Questionnaire     Feeling of Stress : Not at all   Social Connections: Moderately Integrated (8/25/2023)    Social Connection and Isolation Panel [NHANES]     Frequency of Communication with Friends and Family: Twice a week     Frequency of Social Gatherings with Friends and Family: Twice a week     Attends [employer] Services: 1 to 4 times per year     Active Member of Clubs or Organizations: No     Attends Club or Organization Meetings: Never     Marital Status:    Intimate Partner Violence: Not At Risk (3/4/2024)    Humiliation, Afraid, Rape, and Kick questionnaire     Fear of Current or Ex-Partner: No     Emotionally Abused: No     Physically Abused: No     Sexually Abused: No   Housing Stability: Low Risk  (5/23/2024)    Housing Stability Vital Sign     Unable to Pay for Housing in the Last Year: No     Number of Times Moved in the Last Year: 1     Homeless in the Last Year: No       Allergies:   Allergies   Allergen Reactions    Bee Pollen Other (See Comments)     Other reaction(s): Sneezing    Pollen Extract Allergic Rhinitis and Sneezing       Labs:  0   Lab Value Date/Time    HCT 45.2 05/19/2023 0804    HCT 42.6 09/07/2021 0720    HCT 45.1 (H) 03/12/2020 0905    HCT 43.0 01/18/2016 0931    HGB 15.2 05/19/2023 0804    HGB 14.4 09/07/2021 0720    HGB 15.3 03/12/2020 0905    HGB 13.9 01/18/2016 0931    WBC 7.91 05/19/2023 0804    WBC 6.3 09/07/2021 0720    WBC 6.7 03/12/2020 0905    WBC 10-20 (A) 01/19/2016 0000    WBC 8.0 01/18/2016 0931       Meds:    Current Outpatient  Medications:     alendronate (Fosamax) 70 mg tablet, Take 1 tablet (70 mg total) by mouth every 7 days, Disp: 12 tablet, Rfl: 3    atorvastatin (LIPITOR) 40 mg tablet, Take 1 tablet (40 mg total) by mouth every evening, Disp: 90 tablet, Rfl: 1    BIOTIN 5000 PO, Take by mouth daily (Patient not taking: Reported on 11/13/2023), Disp: , Rfl:     Cyanocobalamin (VITAMIN B12 PO), Take by mouth daily (Patient not taking: Reported on 11/13/2023), Disp: , Rfl:     Diclofenac Sodium (VOLTAREN) 1 %, Apply 2 g topically 4 (four) times a day, Disp: 100 g, Rfl: 1    fluticasone (FLONASE) 50 mcg/act nasal spray, 1 spray into each nostril daily, Disp: 16 g, Rfl: 4    gabapentin (NEURONTIN) 300 mg capsule, Take 1 capsule (300 mg total) by mouth 2 (two) times a day, Disp: 180 capsule, Rfl: 3    glucose blood (OneTouch Ultra) test strip, Use 1 each daily as needed (use as needed) Use as instructed, Disp: 100 each, Rfl: 3    lidocaine (LMX) 4 % cream, Apply topically as needed for mild pain, Disp: 30 g, Rfl: 2    loratadine (CLARITIN) 10 mg tablet, Take 10 mg by mouth daily, Disp: , Rfl:     metFORMIN (GLUCOPHAGE-XR) 500 mg 24 hr tablet, Take 2 tablets (1,000 mg total) by mouth daily with breakfast, Disp: 180 tablet, Rfl: 2    Omega-3 Fatty Acids (OMEGA 3 PO), Take by mouth, Disp: , Rfl:     Premarin vaginal cream, INSERT 0.5 GRAM VAGINALLY DAILY, Disp: 60 g, Rfl: 3    Body mass index is 26.64 kg/m².  Wt Readings from Last 3 Encounters:   08/20/24 64 kg (141 lb)   06/21/24 64 kg (141 lb)   05/23/24 64 kg (141 lb)     Physical Exam:   Vitals:    08/20/24 1307   BP: 109/66   Pulse: 65       General Appearance:    Alert, cooperative, no distress, appears stated age   Head:    Normocephalic, without obvious abnormality, atraumatic   Eyes:    conjunctiva/corneas clear, both eyes        Nose:   Nares normal, septum midline, no drainage    Throat:   Lips normal; teeth and gums normal   Neck:    symmetrical, trachea midline, ;     thyroid:   no enlargement/   Back:     Symmetric, no curvature, ROM normal   Lungs:   No audible wheezing or labored breathing   Chest Wall:    No tenderness or deformity    Heart:    Regular rate and rhythm               Pulses:   2+ and symmetric all extremities   Skin:   Skin color, texture, turgor normal, no rashes or lesions   Neurologic:   normal strength, sensation and reflexes     throughout       Musculoskeletal: right lower extremity  On examination of the right knee there is no effusion, no erythema.  Range of motion to full active extension and flexion to greater than 120°.  Pain on palpation medial and lateral joint lines.  There is crepitus with range of motion, no warmth to palpation, bony enlargement noted. No pain on palpation pes anserine bursa region or distal iliotibial band.  Stable to varus and valgus stress without pain or gapping.  Negative anterior and posterior drawer testing.  Sensation intact distal pulses present.    Radiology:   I personally reviewed the films.  X-rays right knee shows significant medial joint space narrowing osteophyte formation and deformity with moderate to severe osteoarthritis    _*_*_*_*_*_*_*_*_*_*_*_*_*_*_*_*_*_*_*_*_*_*_*_*_*_*_*_*_*_*_*_*_*_*_*_*_*_*_*_*_*    Assessment:  69 y.o.female with right knee pain due to arthritis    Plan:   Weight bearing as tolerated  right lower extremity  Patient wishes to pursue further viscosupplementation injections in the future as she has had good pain relief  Patient provided with home range of motion stretching strengthening exercises  Patient is aware she would be a candidate for total knee arthroplasty should she fail conservative management  Patient does not wish to pursue any corticosteroid injections due to her diabetes  Follow up in 6 months or sooner if needed as she is traveling out of the country      Taras Cordova PA-C

## 2024-09-12 ENCOUNTER — RA CDI HCC (OUTPATIENT)
Dept: OTHER | Facility: HOSPITAL | Age: 69
End: 2024-09-12

## 2024-09-19 ENCOUNTER — OFFICE VISIT (OUTPATIENT)
Dept: FAMILY MEDICINE CLINIC | Facility: CLINIC | Age: 69
End: 2024-09-19

## 2024-09-19 VITALS
RESPIRATION RATE: 16 BRPM | WEIGHT: 142.6 LBS | BODY MASS INDEX: 26.94 KG/M2 | TEMPERATURE: 98.2 F | HEART RATE: 73 BPM | DIASTOLIC BLOOD PRESSURE: 76 MMHG | OXYGEN SATURATION: 98 % | SYSTOLIC BLOOD PRESSURE: 111 MMHG

## 2024-09-19 DIAGNOSIS — E11.9 TYPE 2 DIABETES MELLITUS WITHOUT COMPLICATION, WITHOUT LONG-TERM CURRENT USE OF INSULIN (HCC): Primary | ICD-10-CM

## 2024-09-19 DIAGNOSIS — H81.10 BENIGN PAROXYSMAL POSITIONAL VERTIGO, UNSPECIFIED LATERALITY: ICD-10-CM

## 2024-09-19 PROCEDURE — 99213 OFFICE O/P EST LOW 20 MIN: CPT

## 2024-09-19 PROCEDURE — 83036 HEMOGLOBIN GLYCOSYLATED A1C: CPT

## 2024-09-19 RX ORDER — METFORMIN HCL 500 MG
1000 TABLET, EXTENDED RELEASE 24 HR ORAL
Qty: 180 TABLET | Refills: 2 | Status: SHIPPED | OUTPATIENT
Start: 2024-09-19 | End: 2025-03-18

## 2024-09-20 PROBLEM — U07.1 COVID-19: Status: RESOLVED | Noted: 2021-01-26 | Resolved: 2024-09-20

## 2024-09-20 PROBLEM — R09.82 POSTNASAL DRIP: Status: RESOLVED | Noted: 2021-03-02 | Resolved: 2024-09-20

## 2024-09-20 PROBLEM — H81.10 BENIGN PAROXYSMAL POSITIONAL VERTIGO: Status: ACTIVE | Noted: 2024-09-20

## 2024-09-20 LAB — SL AMB POCT HEMOGLOBIN AIC: 6.2 (ref ?–6.5)

## 2024-09-20 NOTE — ASSESSMENT & PLAN NOTE
- Worsened dizziness when laying down and moving from left to right   - Denies vision changes  - Intact neuro exam   VT physical therapy  F/U in 3 months    Orders:    Ambulatory Referral to Physical Therapy; Future

## 2024-09-20 NOTE — PROGRESS NOTES
Ambulatory Visit  Name: Siobhan Grimm      : 1955      MRN: [id number]  Encounter Provider: Shelby Muse MD  Encounter Date: 2024   Encounter department: [facility]    Assessment & Plan  Type 2 diabetes mellitus without complication, without long-term current use of insulin (HCC)    - Goal A1C < 7.0, at goal but worsened slightly from 5.9  - HgbA1C         Lab Results   Component Value Date    HGBA1C 6.2 2024     - MicAlb:Cr     Lab Results   Component Value Date    CREATININEUR 171.1 2024    LABMICR 23.7 (H) 2024    MICROALBCRE 14 2024    MICROCREAT 4 2021     - Renal      Lab Results   Component Value Date    BUN 21 2024    CREATININE 0.68 2024    EGFR 90 2024     - Current medications: metformin 500 mg BID  - On statin: atorvastatin 40 mg dialy  - Diabetic foot exam: deferred  - Iris exam: deferred  Medication changes: none  Interested in improving lifestyle with diet and exercise  Continue current regimen  Avoid hypoglycemia, precautions given  Diabetic education  F/U in 3 months       Orders:    metFORMIN (GLUCOPHAGE-XR) 500 mg 24 hr tablet; Take 2 tablets (1,000 mg total) by mouth daily with breakfast    POCT hemoglobin A1c    Benign paroxysmal positional vertigo, unspecified laterality  - Worsened dizziness when laying down and moving from left to right   - Denies vision changes  - Intact neuro exam   VT physical therapy  F/U in 3 months    Orders:    Ambulatory Referral to Physical Therapy; Future       History of Present Illness     70 yo female with PMH of type 2 diabetes and intermittent dizziness with no hearing loss presents for follow up. Admits to slight lapse in diet since last visit and curious about A1C. Follows with orthopedics with hx of steroid injection in joints but did not receive it in last 3 months.   C/o intermittent dizziness worse when moving from one side to another. Denies  any focal neuro abnormalities. Denies personal or hx of known brain tumors/cancer/stroke.    Back Pain  Pertinent negatives include no abdominal pain, chest pain, dysuria or fever.   Diabetes  Hypoglycemia symptoms include dizziness. Pertinent negatives for hypoglycemia include no seizures. Pertinent negatives for diabetes include no chest pain.         Review of Systems   Constitutional:  Negative for chills and fever.   HENT:  Negative for ear pain and sore throat.    Eyes:  Negative for pain and visual disturbance.   Respiratory:  Negative for cough and shortness of breath.    Cardiovascular:  Negative for chest pain and palpitations.   Gastrointestinal:  Negative for abdominal pain and vomiting.   Genitourinary:  Negative for dysuria and hematuria.   Musculoskeletal:  Negative for arthralgias and back pain.   Skin:  Negative for color change and rash.   Neurological:  Positive for dizziness. Negative for seizures and syncope.   All other systems reviewed and are negative.          Objective     /76   Pulse 73   Temp 98.2 °F (36.8 °C)   Resp 16   Wt 64.7 kg (142 lb 9.6 oz)   SpO2 98%   BMI 26.94 kg/m²     Physical Exam  Vitals and nursing note reviewed.   Constitutional:       General: She is not in acute distress.     Appearance: She is well-developed.   HENT:      Head: Normocephalic and atraumatic.      Right Ear: External ear normal.      Left Ear: External ear normal.      Nose: Nose normal.      Mouth/Throat:      Mouth: Mucous membranes are moist.      Pharynx: Oropharynx is clear.   Eyes:      Conjunctiva/sclera: Conjunctivae normal.   Cardiovascular:      Rate and Rhythm: Normal rate and regular rhythm.      Pulses: Normal pulses.      Heart sounds: Normal heart sounds. No murmur heard.  Pulmonary:      Effort: Pulmonary effort is normal. No respiratory distress.      Breath sounds: Normal breath sounds.   Abdominal:      General: Abdomen is flat. Bowel sounds are normal.      Palpations:  Abdomen is soft.      Tenderness: There is no abdominal tenderness. There is no right CVA tenderness or left CVA tenderness.   Musculoskeletal:         General: No swelling.      Cervical back: Neck supple.      Right lower leg: No edema.      Left lower leg: No edema.   Skin:     General: Skin is warm and dry.      Capillary Refill: Capillary refill takes less than 2 seconds.   Neurological:      General: No focal deficit present.      Mental Status: She is alert and oriented to person, place, and time. Mental status is at baseline.   Psychiatric:         Mood and Affect: Mood normal.

## 2024-09-23 ENCOUNTER — EVALUATION (OUTPATIENT)
Dept: PHYSICAL THERAPY | Facility: CLINIC | Age: 69
End: 2024-09-23
Payer: COMMERCIAL

## 2024-09-23 DIAGNOSIS — H81.10 BENIGN PAROXYSMAL POSITIONAL VERTIGO, UNSPECIFIED LATERALITY: ICD-10-CM

## 2024-09-23 DIAGNOSIS — H83.2X9 VESTIBULAR HYPOFUNCTION, UNSPECIFIED LATERALITY: Primary | ICD-10-CM

## 2024-09-23 PROCEDURE — 97112 NEUROMUSCULAR REEDUCATION: CPT

## 2024-09-23 PROCEDURE — 97162 PT EVAL MOD COMPLEX 30 MIN: CPT

## 2024-09-23 NOTE — PROGRESS NOTES
PT Evaluation     Today's date: 2024  Patient name: Siobhan Grimm  : 1955  MRN: [id number]  Referring provider: Shelby Muse MD  Dx:   Encounter Diagnosis     ICD-10-CM    1. Vestibular hypofunction, unspecified laterality  H83.2X9       2. Benign paroxysmal positional vertigo, unspecified laterality  H81.10 Ambulatory Referral to Physical Therapy          Start Time: 1010  Stop Time: 1055  Total time in clinic (min): 45 minutes    Assessment  Impairments: abnormal coordination, abnormal or restricted ROM, abnormal movement, activity intolerance, impaired balance, impaired physical strength, lacks appropriate home exercise program, safety issue, poor posture , poor body mechanics, participation limitations, activity limitations and endurance  Other impairment: dizziness  Symptom irritability: moderate    Assessment details: Assessment:  Patient presents to outpatient physical therapy with sx consistent with vestibular hypofunctioning however difficult to assess which side is affected, but noted more difficulty with vertical head and oculomotor movements compared to horizontal along with pt reported symptoms with all testing; positional testing was negative in all directions. Patient's impairments include dizziness with head and eye movements which inhibits her to perform certain tasks at home and during her volunteer activities. Further static and dynamic balance testing will be completed in future sessions. Pt would benefit from skilled outpatient physical therapy to address the above impairments in order to improve patient's QOL,  reduce symptoms of dizziness and maximize function.     Patient educated that symptoms of vertigo may be exacerbated over the next 24-48 hours and that this can be normal after PT evaluation and treatment. Patient provided seated VORx1 HEP for both horizontal and vertical directions, instructed to perform 2x/day, 30 seconds at a time and 3 rounds total for each  direction, however was also instructed to stop exercise if dizziness increases more than 2 points on the 0-10 scale. Patient verbalized understanding.             Barriers to intervention: ESL  Understanding of Dx/Px/POC: fair     Prognosis: fair    Goals    STG's/LTGs:   Patient will continue to test negative for all positional BPPV within 3 treatment sessions    - Patient is independent with initial home exercise program for improvements at home within 4 weeks  -  Patinet will report a reduction in dizziness symptoms by 50% or greater within 4 weeks   - Patient improves on all tasks of the MCSTIB for improved static balance within 4 weeks   - Patient improves score on FGA for improved dynamic balance within 4 weeks  - Patient will improve DHI by 17 points indicating improved perception of balance within 4 weeks  - Patient will be able to turn head/look up without onset of dizziness within 4 weeks    Plan  Patient would benefit from: skilled physical therapy    Planned therapy interventions: abdominal trunk stabilization, activity modification, balance, canalith repositioning, coordination, fine motor coordination training, flexibility, functional ROM exercises, gait training, graded activity, home exercise program, therapeutic exercise, therapeutic activities, stretching, strengthening, sensory integrative techniques, postural training, patient/caregiver education, neuromuscular re-education, motor coordination training and manual therapy    Frequency: 2x week  Duration in weeks: 4  Plan of Care beginning date: 9/23/2024  Plan of Care expiration date: 10/25/2024  Treatment plan discussed with: patient  Plan details: Complete static and balance testing via MCTSIB and FGA        Subjective Evaluation    History of Present Illness  Mechanism of injury: HISTORY:  HPI: Siobhan Grimm is a 69 y.o. female referred to outpatient physical therapy for reports of dizziness. Patient reports noticing dizziness about 3-4  "weeks ago, did have a spinning sensation many years ago but was never treated for it and it just went away on it;s own, however describes this time more of a swaying sensation especially when she's walking around, moreso whenever she turns around or is walking quickly   Onset: 3-4 weeks ago   Symptoms: swaying, rocking  Frequency: depends on what she's doing   Duration: a few minutes  Intensity: Best 0/10, Worst 6/10, Average 3/10    Dysequilibrium: Yes  Lightheadedness: No  Vertigo: Yes  Rocking or Swaying: Yes         Oscillopsia: No  Diplopia: No  Motion sickness: Yes  Floating, Swimming, Disconnected: Yes    Exacerbation Factors:  Bending over: Yes  Turning Head: Yes  Rolling in bed: Yes  Looking up: Yes  Supine to/from sitting: Yes  Optokinetic movement: No  Walking: Yes  Walking in busy environment: Yes if she has to turn around a lot     Concurrent Complaints:  Tinnitus:Yes only  happened twice, thinks it was the left ear but didn't happen when she was dizzy   Aural Fullness:No  Known hearing loss:No  Nausea, Vomiting: Yes  Altered Vision: No describes her eyes as being \"tired\" sometimes  Poor Concentration: No  Memory Loss: No  Peripheral Neuropathy:No  Cervical Pain: No   Headache: No    Is patient taking medication for this issue? No    Past Medical History:  Diagnosis Date  · Abnormal glucose    last assessed 12/22/15  · Acid reflux   · Anxiety   · Arthritis    osteoarthritis multiple sites  · Benign paroxysmal positional vertigo   · Carpal tunnel syndrome   · Chronic kidney disease    nephrolithiasis  · Depression   · Diabetes mellitus (HCC)   · GERD (gastroesophageal reflux disease)   · Insomnia   · Patellofemoral dysfunction    last assessed  9/15/14    Quality of life: fair    Patient Goals  Patient goals for therapy: improved balance, increased motion, independence with ADLs/IADLs, return to sport/leisure activities and return to work  Patient goal: decrease her dizziness  Pain  No pain " reported    Social Support  Lives with: spouse    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, worked with disabled population)  Life stress: (+) , no sx while driving          Objective  PT/OT Neuro Exam  Neurologic Exam        PHYSICAL FINDINGS:    /72  HR 62  SpO2 99%    Cervical Spine Examination:    Resting posture:      Normal    Cervical spine active range of motion:   within normal limits      Sharp endy:      Normal  Alar ligament stability test    Normal  MVBI      Right: Asymptomatic Left:Asymptomatic    Oculomotor ROM :  Resting nystagmus: No  Gaze holding nystagmus No   Smooth pursuit Abnormal    Vertical Saccades:Abnormal  Horizontal Saccades:Abnormal  Convergence: Normal    Head thrust (room light): Abnormal when head turned fast to the left  VORx1: Abnormal when head turned fast to the left and with vertical VOR  VOR cancel:    Dynamic Visual Acuity: TBA  Static Head: 20/  Dynamic Head: 20/    MCTSIB TBA    FGA: TBA      DHI: 42/100  0-30 mild , 30-60 moderate,  severe disability      Positional testing: Right Left   [facility] negative negative   Roll test: negative negative       Gwendolyn Linda, PT  9/23/2024      Short Term Goal/Long Term Goal Expiration Date:(10/25/24)  POC Expiration Date: (10/25/24)    Visit count: 1     POC expires Unit limit Auth Expiration date PT/OT/ST + Visit Limit?   10/25/24 N/a pending bomn                           Visit/Unit Tracking  AUTH Status:  Date 9/23 IE             pending Used 1              Remaining                     Precautions vertigo, elevated A1c levels       Manuals 9/23 IE                                       Neuro Re-Ed  Vesitbular testing and results education, VORx1 HEP and demonstration, vestibular pathology and possible causes                                                               Ther Ex                                                                        Ther Activity                        Gait Training                         Modalities

## 2024-09-25 ENCOUNTER — OFFICE VISIT (OUTPATIENT)
Dept: PHYSICAL THERAPY | Facility: CLINIC | Age: 69
End: 2024-09-25
Payer: COMMERCIAL

## 2024-09-25 DIAGNOSIS — H81.10 BENIGN PAROXYSMAL POSITIONAL VERTIGO, UNSPECIFIED LATERALITY: Primary | ICD-10-CM

## 2024-09-25 DIAGNOSIS — H83.2X9 VESTIBULAR HYPOFUNCTION, UNSPECIFIED LATERALITY: ICD-10-CM

## 2024-09-25 PROCEDURE — 97112 NEUROMUSCULAR REEDUCATION: CPT

## 2024-09-25 NOTE — PROGRESS NOTES
Daily Note     Today's date: 2024  Patient name: Siobhan Grimm  : 1955  MRN: [id number]  Referring provider: Shleby Muse MD  Dx:   Encounter Diagnosis     ICD-10-CM    1. Benign paroxysmal positional vertigo, unspecified laterality  H81.10       2. Vestibular hypofunction, unspecified laterality  H83.2X9           Start Time: 0930  Stop Time: 1015  Total time in clinic (min): 45 minutes    Subjective: feels better this morning! Really no dizziness that she noticed so far, exercises are going well at home      Objective: See treatment diary below    MCTSIB:   Firm EO 30  Firm EC 30  Foam EO 30  Foam EC 30      FGA:     6minWT: 1020ft no AD (some dizziness with turning around in hallway)    Gait speed: 0.88m/s no AD    Assessment: Tolerated treatment well. Patient would benefit from continued PT Completed testing and pt does demonstrate some deficits in dynamic balance with head movements and dec visual input, indicating possible vestibular hypofunctioning however will continue to assess via DVA and/or head shake test. Did educate pt on the importance of performing her VORx1 with inc speed vs trying to turn her head farther to each side, she did have inc dizziness when done faster but subsided with short rest break. Will cont to challenge with compliant surfaces as well.       Plan: Continue per plan of care.      Short Term Goal/Long Term Goal Expiration Date:(10/25/24)  POC Expiration Date: (10/25/24)    Visit count: 1     POC expires Unit limit Auth Expiration date PT/OT/ST + Visit Limit?   10/25/24 N/a bomn bomn                           Visit/Unit Tracking  AUTH Status:  Date  IE             bomn Used 1 2             Remaining  9 8                  Precautions vertigo, elevated A1c levels       Manuals  IE                                       Neuro Re-Ed  Vesitbular testing and results education, VORx1 HEP and demonstration, vestibular pathology and possible causes  "FGA, MCTSIB and 6minWT/gait speed      VORx1  Standing plain background   H 2x30\"  V 2x30\"       Walking with head movements  Walking in // bars long   Minimal UE  HT x4 laps   HN x4 laps                                               Ther Ex                                                                        Ther Activity                        Gait Training                        Modalities                                      "

## 2024-09-30 ENCOUNTER — OFFICE VISIT (OUTPATIENT)
Dept: PHYSICAL THERAPY | Facility: CLINIC | Age: 69
End: 2024-09-30
Payer: COMMERCIAL

## 2024-09-30 DIAGNOSIS — H83.2X9 VESTIBULAR HYPOFUNCTION, UNSPECIFIED LATERALITY: ICD-10-CM

## 2024-09-30 DIAGNOSIS — H81.10 BENIGN PAROXYSMAL POSITIONAL VERTIGO, UNSPECIFIED LATERALITY: Primary | ICD-10-CM

## 2024-09-30 PROCEDURE — 97112 NEUROMUSCULAR REEDUCATION: CPT

## 2024-09-30 NOTE — PROGRESS NOTES
Daily Note     Today's date: 2024  Patient name: Siobhan Grimm  : 1955  MRN: [id number]  Referring provider: Shelby Muse MD  Dx:   Encounter Diagnosis     ICD-10-CM    1. Benign paroxysmal positional vertigo, unspecified laterality  H81.10       2. Vestibular hypofunction, unspecified laterality  H83.2X9             Start Time: 0930  Stop Time: 1015  Total time in clinic (min): 45 minutes    Subjective: reports having two instances of dizziness this weekend when she was standing back up from cleaning out under her sink, not the spinning sensation just dizziness, only lasted a few seconds, noticed that she doesn't have the spinning sensation whenever she's rolling around in bed      Objective: See treatment diary below    DVA: 3 line difference      Assessment: Tolerated treatment well. Patient would benefit from continued PT Based on DVA testing, pt demonstrated impairments in vestibular functioning with a 3 line difference bwt no head movements and with head movements. Introduced further vestibular integration with vertical perturbations and full body rotations while also visually scanning for objects. Pt did report some dizziness with frequent turning and looking side to side while walking but no LOB noted. Will also continue to progress to complaint surfaces when able.       Plan: Continue per plan of care.      Short Term Goal/Long Term Goal Expiration Date:(10/25/24)  POC Expiration Date: (10/25/24)    Visit count: 1     POC expires Unit limit Auth Expiration date PT/OT/ST + Visit Limit?   10/25/24 N/a bomn bomn                           Visit/Unit Tracking  AUTH Status:  Date  IE            bomn Used 1 2 3            Remaining  9 8 7                 Precautions vertigo, elevated A1c levels       Manuals  IE                                      Neuro Re-Ed  Vesitbular testing and results education, VORx1 HEP and demonstration, vestibular pathology and possible  "causes FGA, MCTSIB and 6minWT/gait speed DVA testing     VORx1  Standing plain background   H 2x30\"  V 2x30\"       Walking with head movements  Walking in // bars long   Minimal UE  HT x4 laps   HN x4 laps  Hallway number finding activity 1 round     VOR integration   Seated on blue PB   Spot It cards   1 round horizontal  1 round diagonal     Blazepods   1 in front on table  2 behind pt on mat table   3 rounds of 15 hits    1 in front down on 8\" step   2 behind pt on mat table   3 rounds of 15 hits                             Ther Ex                                                                        Ther Activity                        Gait Training                        Modalities                                      "

## 2024-10-02 ENCOUNTER — OFFICE VISIT (OUTPATIENT)
Dept: PHYSICAL THERAPY | Facility: CLINIC | Age: 69
End: 2024-10-02
Payer: COMMERCIAL

## 2024-10-02 DIAGNOSIS — H81.10 BENIGN PAROXYSMAL POSITIONAL VERTIGO, UNSPECIFIED LATERALITY: Primary | ICD-10-CM

## 2024-10-02 DIAGNOSIS — H83.2X9 VESTIBULAR HYPOFUNCTION, UNSPECIFIED LATERALITY: ICD-10-CM

## 2024-10-02 PROCEDURE — 97150 GROUP THERAPEUTIC PROCEDURES: CPT

## 2024-10-02 PROCEDURE — 97112 NEUROMUSCULAR REEDUCATION: CPT

## 2024-10-02 NOTE — PROGRESS NOTES
"Daily Note     Today's date: 10/2/2024  Patient name: Siobhan Grimm  : 1955  MRN: [id number]  Referring provider: Yordy Kothari DO  Dx:   Encounter Diagnosis     ICD-10-CM    1. Benign paroxysmal positional vertigo, unspecified laterality  H81.10       2. Vestibular hypofunction, unspecified laterality  H83.2X9               Start Time: 930  Stop Time: 1015  Total time in clinic (min): 45 minutes    Subjective: is feeling a lot better today!      Objective: See treatment diary below    1:1 [id number]  Group [id number]  Self direct [id number]    Assessment: Tolerated treatment well. Patient would benefit from continued PT introduced head movements with amb on TM for continuity of walking and was able to dec UE eventually without any LOB however pt did report inc difficulty with this. Did not appear to be sig challenged with addition of cog task during Blazepods. Will also continue to progress to complaint surfaces when able.       Plan: Continue per plan of care.      Short Term Goal/Long Term Goal Expiration Date:(10/25/24)  POC Expiration Date: (10/25/24)    Visit count: 1     POC expires Unit limit Auth Expiration date PT/OT/ST + Visit Limit?   10/25/24 N/a bomn bomn                           Visit/Unit Tracking  AUTH Status:  Date  IE 9/25 9/30 10/2          bomn Used 1 2 3 4           Remaining  9 8 7 6                Precautions vertigo, elevated A1c levels       Manuals  IE 9/25 9/30 10/2                                    Neuro Re-Ed  Vesitbular testing and results education, VORx1 HEP and demonstration, vestibular pathology and possible causes FGA, MCTSIB and 6minWT/gait speed DVA testing     VORx1  Standing plain background   H 2x30\"  V 2x30\"       Walking with head movements  Walking in // bars long   Minimal UE  HT x4 laps   HN x4 laps  Hallway number finding activity 1 round On TM   Alt bwt H and V  30\" bouts, 15\" rest period  Total of 12mins  Final 4 mins without UE support    VOR " "integration   Seated on blue PB   Spot It cards   1 round horizontal  1 round diagonal     Blazepods   1 in front on table  2 behind pt on mat table   3 rounds of 15 hits    1 in front down on 8\" step   2 behind pt on mat table   3 rounds of 15 hits 1 in front down on 8\" step   1 on table in front  2 behind pt on mat table   5 rounds of 15 hits  Final 3 rounds added Red=RUE blue=LUE                            Ther Ex                                                                        Ther Activity                        Gait Training                        Modalities                                      "

## 2024-10-07 ENCOUNTER — OFFICE VISIT (OUTPATIENT)
Dept: PHYSICAL THERAPY | Facility: CLINIC | Age: 69
End: 2024-10-07
Payer: COMMERCIAL

## 2024-10-07 DIAGNOSIS — H83.2X9 VESTIBULAR HYPOFUNCTION, UNSPECIFIED LATERALITY: ICD-10-CM

## 2024-10-07 DIAGNOSIS — H81.10 BENIGN PAROXYSMAL POSITIONAL VERTIGO, UNSPECIFIED LATERALITY: Primary | ICD-10-CM

## 2024-10-07 PROCEDURE — 97112 NEUROMUSCULAR REEDUCATION: CPT

## 2024-10-07 NOTE — PROGRESS NOTES
"Daily Note     Today's date: 10/7/2024  Patient name: Siobhan Grimm  : 1955  MRN: [id number]  Referring provider: Olga Ward MD  Dx:   Encounter Diagnosis     ICD-10-CM    1. Benign paroxysmal positional vertigo, unspecified laterality  H81.10       2. Vestibular hypofunction, unspecified laterality  H83.2X9                 Start Time: 1100  Stop Time: 1145  Total time in clinic (min): 45 minutes    Subjective: still hasn't had any dizziness at home      Objective: See treatment diary below      Assessment: Tolerated treatment well. Patient would benefit from continued PT Continued to progress head movements with TM amb wihtout UE support, no obsvious LOB noted. Able to integrate cog tasks with vestibular activities without any inc in dizziness. Was challenged on compliant surface however able to use ankle strategy to maintain balance with PT CG for safety.       Plan: Continue per plan of care.      Short Term Goal/Long Term Goal Expiration Date:(10/25/24)  POC Expiration Date: (10/25/24)    Visit count: 1     POC expires Unit limit Auth Expiration date PT/OT/ST + Visit Limit?   10/25/24 N/a bomn bomn                           Visit/Unit Tracking  AUTH Status:  Date  IE 9/25 9/30 10/2 10/7         bomn Used 1 2 3 4 5          Remaining  9 8 7 6 5               Precautions vertigo, elevated A1c levels       Manuals  IE 9/25 9/30 10/2 10/7                                   Neuro Re-Ed  Vesitbular testing and results education, VORx1 HEP and demonstration, vestibular pathology and possible causes FGA, MCTSIB and 6minWT/gait speed DVA testing     VORx1  Standing plain background   H 2x30\"  V 2x30\"       Walking with head movements  Walking in // bars long   Minimal UE  HT x4 laps   HN x4 laps  Hallway number finding activity 1 round On TM   Alt bwt H and V  30\" bouts, 15\" rest period  Total of 12mins  Final 4 mins without UE support Amb on TM   No UE  HT and HN  X10 ea direction   Spent total " "of 12 mins    VOR integration   Seated on blue PB   Spot It cards   1 round horizontal  1 round diagonal     Blazepods   1 in front on table  2 behind pt on mat table   3 rounds of 15 hits    1 in front down on 8\" step   2 behind pt on mat table   3 rounds of 15 hits 1 in front down on 8\" step   1 on table in front  2 behind pt on mat table   5 rounds of 15 hits  Final 3 rounds added Red=RUE blue=LUE 4 on mirror   2 over head   2 waist level   Green=RUE  Blue =LUE  4 rounds of 45\"       Cone pick ups     6 cones around pt in [place]   Cone pick ups/put downs while following with eyes   alt CW/CCW  Add standing on AirEx pad   Total of 4 rounds   Tandem amb      15' length   PT CS/CG   X3 laps fwd    Ball tosses     Facing away from PT  PT calling out left or right direction  Turning around to catch ball and then turning other direction to throw back to PT  Prgoressed to narrow LUCI  Total x5mins   Ther Ex                                                                        Ther Activity                        Gait Training                        Modalities                                      "

## 2024-10-09 ENCOUNTER — OFFICE VISIT (OUTPATIENT)
Dept: PHYSICAL THERAPY | Facility: CLINIC | Age: 69
End: 2024-10-09
Payer: COMMERCIAL

## 2024-10-09 DIAGNOSIS — H83.2X9 VESTIBULAR HYPOFUNCTION, UNSPECIFIED LATERALITY: ICD-10-CM

## 2024-10-09 DIAGNOSIS — H81.10 BENIGN PAROXYSMAL POSITIONAL VERTIGO, UNSPECIFIED LATERALITY: Primary | ICD-10-CM

## 2024-10-09 PROCEDURE — 97112 NEUROMUSCULAR REEDUCATION: CPT

## 2024-10-09 NOTE — PROGRESS NOTES
"Daily Note     Today's date: 10/9/2024  Patient name: Siobhan Grimm  : 1955  MRN: [id number]  Referring provider: Yordy Kothari DO  Dx:   Encounter Diagnosis     ICD-10-CM    1. Benign paroxysmal positional vertigo, unspecified laterality  H81.10       2. Vestibular hypofunction, unspecified laterality  H83.2X9                   Start Time: 1015  Stop Time: 1100  Total time in clinic (min): 45 minutes    Subjective: felt pretty good after the last session!      Objective: See treatment diary below      Assessment: Tolerated treatment well. Patient would benefit from continued PT Continued to work on head movements with ambulation and progressed vestibular integration with dual tasking activities and movements in horizontal, vertical and rotational planes of motion. No dizziness reported t/o session. Will plan to re-evaluate next week with possibly discharging sooner than planned as she continues to be sx free during session and doing tasks at home.       Plan: Continue per plan of care.      Short Term Goal/Long Term Goal Expiration Date:(10/25/24)  POC Expiration Date: (10/25/24)    Visit count: 1     POC expires Unit limit Auth Expiration date PT/OT/ST + Visit Limit?   10/25/24 N/a bomn bomn                           Visit/Unit Tracking  AUTH Status:  Date  IE 9/25 9/30 10/2 10/7 10/9        bomn Used 1 2 3 4 5 6         Remaining  9 8 7 6 5 4              Precautions vertigo, elevated A1c levels       Manuals 10/9 9/25 9/30 10/2 10/7                                   Neuro Re-Ed   FGA, MCTSIB and 6minWT/gait speed DVA testing     VORx1  Standing plain background   H 2x30\"  V 2x30\"       Walking with head movements Amb on TM   No UE  HT and HN  X10 ea direction   Spent total of 10 mins  Walking in // bars long   Minimal UE  HT x4 laps   HN x4 laps  Hallway number finding activity 1 round On TM   Alt bwt H and V  30\" bouts, 15\" rest period  Total of 12mins  Final 4 mins without UE support Amb on TM   No " "UE  HT and HN  X10 ea direction   Spent total of 12 mins    VOR integration   Seated on blue PB   Spot It cards   1 round horizontal  1 round diagonal     Blazepods   1 in front on table  2 behind pt on mat table   3 rounds of 15 hits    1 in front down on 8\" step   2 behind pt on mat table   3 rounds of 15 hits 1 in front down on 8\" step   1 on table in front  2 behind pt on mat table   5 rounds of 15 hits  Final 3 rounds added Red=RUE blue=LUE 4 on mirror   2 over head   2 waist level   Green=RUE  Blue =LUE  4 rounds of 45\"       Cone pick ups     6 cones around pt in [place]   Cone pick ups/put downs while following with eyes   alt CW/CCW  Add standing on AirEx pad   Total of 4 rounds   Tandem amb      15' length   PT CS/CG   X3 laps fwd    Ball tosses Facing away from PT  PT calling out left or right direction  Turning around to catch ball, naming foods with letter on ball and then turning other direction to throw back to PT normal stance  Then narrow stance     Add: 3 blazepods in front of pt during ball toss  Green=hit with feet   Red=hit with ball  2 rounds of 20 hits     Facing away from PT  PT calling out left or right direction  Turning around to catch ball and then turning other direction to throw back to PT  Prgoressed to narrow LUCI  Total x5mins   360 deg turns 20' length   360 deg turns every 5'   X5 laps        Ther Ex                                                                        Ther Activity                        Gait Training                        Modalities                                      "

## 2024-10-11 DIAGNOSIS — M79.602 LEFT ARM PAIN: ICD-10-CM

## 2024-10-14 ENCOUNTER — OFFICE VISIT (OUTPATIENT)
Dept: PHYSICAL THERAPY | Facility: CLINIC | Age: 69
End: 2024-10-14
Payer: COMMERCIAL

## 2024-10-14 DIAGNOSIS — H83.2X9 VESTIBULAR HYPOFUNCTION, UNSPECIFIED LATERALITY: ICD-10-CM

## 2024-10-14 DIAGNOSIS — H81.10 BENIGN PAROXYSMAL POSITIONAL VERTIGO, UNSPECIFIED LATERALITY: Primary | ICD-10-CM

## 2024-10-14 PROCEDURE — 97112 NEUROMUSCULAR REEDUCATION: CPT

## 2024-10-14 NOTE — PROGRESS NOTES
Daily Note     Today's date: 10/14/2024  Patient name: Siobhan Grimm  : 1955  MRN: [id number]  Referring provider: Yordy Kothari DO  Dx:   Encounter Diagnosis     ICD-10-CM    1. Benign paroxysmal positional vertigo, unspecified laterality  H81.10       2. Vestibular hypofunction, unspecified laterality  H83.2X9                     Start Time: 1015  Stop Time: 1045  Total time in clinic (min): 30 minutes    Subjective: is still feeling really good and is interested in today being her last session so she can focus on packing for her trip to [facility] next week      Objective: See treatment diary below      Assessment: Tolerated treatment well. Progress note completed today as pt has been symptom free for the last few sessions and reports being able to return to her activities outside of PT without any increase in dizziness. Her balance testing also has sig improved since IE as well as her vestibular testing, allowing only for a 1 line difference in her DVA testing with head still vs head movement. At this time she is suitable for discharge from skilled PT services. The remainder of her appointments have been removed and her episode of care has been resolved. She is also welcome to return if needed but a new script will be required. Pt voiced understanding and is in agreement with this plan. I wish her all the best going forward and a safe trip to [facility]!     MCTSIB:   IE:  Firm EO 30  Firm EC 30  Foam EO 30  Foam EC 30  PN 10/14:   Firm EO 30  Firm EC 30  Foam EO 30  Foam EC 30     FGA: IE=17; PN /30    DVA testin line difference     Goals     STG's/LTGs:   Patient will continue to test negative for all positional BPPV within 3 treatment sessions-met 10/14    - Patient is independent with initial home exercise program for improvements at home within 4 weeks-met 10/14  -  Patinet will report a reduction in dizziness symptoms by 50% or greater within 4 weeks-met 10/14   - Patient improves on all  "tasks of the MCSTIB for improved static balance within 4 weeks-met 10/14   - Patient improves score on FGA for improved dynamic balance within 4 weeks-met 10/14  - Patient will improve DHI by 17 points indicating improved perception of balance within 4 weeks  - Patient will be able to turn head/look up without onset of dizziness within 4 weeks-met 10/14      Plan: Discharge from PT     Short Term Goal/Long Term Goal Expiration Date:(10/25/24)  POC Expiration Date: (10/25/24)    Visit count: 1     POC expires Unit limit Auth Expiration date PT/OT/ST + Visit Limit?   10/25/24 N/a bomn bomn                           Visit/Unit Tracking  AUTH Status:  Date 9/23 IE 9/25 9/30 10/2 10/7 10/9 10/14       bomn Used 1 2 3 4 5 6 7        Remaining  9 8 7 6 5 4 3             Precautions vertigo, elevated A1c levels       Manuals 10/9 10/14 9/30 10/2 10/7                                   Neuro Re-Ed   PN testing DVA testing     VORx1        Walking with head movements Amb on TM   No UE  HT and HN  X10 ea direction   Spent total of 10 mins   Hallway number finding activity 1 round On TM   Alt bwt H and V  30\" bouts, 15\" rest period  Total of 12mins  Final 4 mins without UE support Amb on TM   No UE  HT and HN  X10 ea direction   Spent total of 12 mins    VOR integration   Seated on blue PB   Spot It cards   1 round horizontal  1 round diagonal     Blazepods   1 in front on table  2 behind pt on mat table   3 rounds of 15 hits    1 in front down on 8\" step   2 behind pt on mat table   3 rounds of 15 hits 1 in front down on 8\" step   1 on table in front  2 behind pt on mat table   5 rounds of 15 hits  Final 3 rounds added Red=RUE blue=LUE 4 on mirror   2 over head   2 waist level   Green=RUE  Blue =LUE  4 rounds of 45\"       Cone pick ups     6 cones around pt in [place]   Cone pick ups/put downs while following with eyes   alt CW/CCW  Add standing on AirEx pad   Total of 4 rounds   Tandem amb      15' length   PT CS/CG   X3 laps " fwd    Ball tosses Facing away from PT  PT calling out left or right direction  Turning around to catch ball, naming foods with letter on ball and then turning other direction to throw back to PT normal stance  Then narrow stance     Add: 3 blazepods in front of pt during ball toss  Green=hit with feet   Red=hit with ball  2 rounds of 20 hits     Facing away from PT  PT calling out left or right direction  Turning around to catch ball and then turning other direction to throw back to PT  Prgoressed to narrow LUCI  Total x5mins   360 deg turns 20' length   360 deg turns every 5'   X5 laps        Ther Ex                                                                        Ther Activity                        Gait Training                        Modalities

## 2024-10-16 ENCOUNTER — APPOINTMENT (OUTPATIENT)
Dept: PHYSICAL THERAPY | Facility: CLINIC | Age: 69
End: 2024-10-16
Payer: COMMERCIAL

## 2024-10-21 ENCOUNTER — APPOINTMENT (OUTPATIENT)
Dept: PHYSICAL THERAPY | Facility: CLINIC | Age: 69
End: 2024-10-21
Payer: COMMERCIAL

## 2024-10-23 ENCOUNTER — APPOINTMENT (OUTPATIENT)
Dept: PHYSICAL THERAPY | Facility: CLINIC | Age: 69
End: 2024-10-23
Payer: COMMERCIAL

## 2024-10-23 DIAGNOSIS — E11.9 TYPE 2 DIABETES MELLITUS WITHOUT COMPLICATION, WITHOUT LONG-TERM CURRENT USE OF INSULIN (HCC): ICD-10-CM

## 2024-10-23 RX ORDER — BLOOD SUGAR DIAGNOSTIC
1 STRIP MISCELLANEOUS DAILY PRN
Qty: 100 EACH | Refills: 3 | Status: SHIPPED | OUTPATIENT
Start: 2024-10-23 | End: 2024-10-23 | Stop reason: SDUPTHER

## 2024-10-23 RX ORDER — BLOOD SUGAR DIAGNOSTIC
1 STRIP MISCELLANEOUS DAILY
Qty: 100 EACH | Refills: 3 | Status: SHIPPED | OUTPATIENT
Start: 2024-10-23

## 2024-10-28 ENCOUNTER — APPOINTMENT (OUTPATIENT)
Dept: PHYSICAL THERAPY | Facility: CLINIC | Age: 69
End: 2024-10-28
Payer: COMMERCIAL

## 2024-10-30 ENCOUNTER — APPOINTMENT (OUTPATIENT)
Dept: PHYSICAL THERAPY | Facility: CLINIC | Age: 69
End: 2024-10-30
Payer: COMMERCIAL

## 2024-11-01 RX ORDER — BLOOD SUGAR DIAGNOSTIC
1 STRIP MISCELLANEOUS DAILY
Qty: 100 EACH | Refills: 3 | Status: SHIPPED | OUTPATIENT
Start: 2024-11-01

## 2024-11-01 NOTE — TELEPHONE ENCOUNTER
Pt called asking if her prescription for test strip send over to [facility] on [facility]. Please and thank you.

## 2025-01-02 DIAGNOSIS — M81.0 OSTEOPOROSIS OF LUMBAR SPINE: ICD-10-CM

## 2025-01-02 RX ORDER — ALENDRONATE SODIUM 70 MG/1
TABLET ORAL
Qty: 12 TABLET | Refills: 3 | Status: SHIPPED | OUTPATIENT
Start: 2025-01-02

## 2025-01-31 ENCOUNTER — TELEPHONE (OUTPATIENT)
Age: 70
End: 2025-01-31

## 2025-01-31 NOTE — TELEPHONE ENCOUNTER
Caller: Patient    Doctor: Dr. Hoyt    Reason for call: Patient calling to schedule Durolance injections.  Referral opened and patient scheduled for 2/19/25 with Dr. Hoyt.    Call back#:

## 2025-02-04 LAB
LEFT EYE DIABETIC RETINOPATHY: NORMAL
RIGHT EYE DIABETIC RETINOPATHY: NORMAL

## 2025-02-17 ENCOUNTER — HOSPITAL ENCOUNTER (OUTPATIENT)
Dept: MRI IMAGING | Facility: HOSPITAL | Age: 70
Discharge: HOME | End: 2025-02-17
Payer: COMMERCIAL

## 2025-02-17 DIAGNOSIS — M54.16 RADICULOPATHY, LUMBAR REGION: ICD-10-CM

## 2025-02-17 PROCEDURE — 72148 MRI LUMBAR SPINE W/O DYE: CPT

## 2025-02-19 ENCOUNTER — PROCEDURE VISIT (OUTPATIENT)
Dept: OBGYN CLINIC | Facility: CLINIC | Age: 70
End: 2025-02-19
Payer: COMMERCIAL

## 2025-02-19 VITALS — BODY MASS INDEX: 27.19 KG/M2 | WEIGHT: 144 LBS | HEIGHT: 61 IN

## 2025-02-19 DIAGNOSIS — M17.11 PRIMARY OSTEOARTHRITIS OF RIGHT KNEE: Primary | ICD-10-CM

## 2025-02-19 PROCEDURE — 20610 DRAIN/INJ JOINT/BURSA W/O US: CPT | Performed by: ORTHOPAEDIC SURGERY

## 2025-02-19 NOTE — PROGRESS NOTES
Orthopedics          Siobhan Grimm 70 y.o. female MRN: [id number]      Chief Complaint:   right knee pain    HPI:   70 y.o.female complaining of right knee pain.  She presents office a regarding chronic right knee pain due to osteoarthritis.  She states her pain is aching nature worse right mildly relieved with rest she rates her pain anywhere from a 2-5 out of 10.  Denies any changes numbness and tingling states pain is worse when standing for long periods of time denies any radiation of pain instability changes in numbness and tingling.                Review Of Systems:   Skin: Normal  Neuro: See HPI  Musculoskeletal: See HPI  All other systems reviewed and are negative    Past Medical History:   Past Medical History:   Diagnosis Date    Abnormal glucose     last assessed 12/22/15    Acid reflux     Anxiety     Arthritis     osteoarthritis multiple sites    Benign paroxysmal positional vertigo     Carpal tunnel syndrome     Chronic kidney disease     nephrolithiasis    Depression     Diabetes mellitus (HCC)     GERD (gastroesophageal reflux disease)     Insomnia     Patellofemoral dysfunction     last assessed  9/15/14       Past Surgical History:   Past Surgical History:   Procedure Laterality Date    BREAST CYST EXCISION Left     abscess removed   50yrs ago    CARPAL TUNNEL RELEASE      COLONOSCOPY      KNEE SURGERY Right     ND NDSC WRST SURG W/RLS TRANSVRS CARPL LIGM Right 08/25/2016    Procedure: RELEASE CARPAL TUNNEL ENDOSCOPIC;  Surgeon: Darek Nguyen MD;  Location: [facility];  Service: Orthopedics    ND TENDON SHEATH INCISION Right 08/25/2016    Procedure: RELEASE LONG AND RING TRIGGER FINGERS;  Surgeon: Darek Nguyen MD;  Location: [facility];  Service: Orthopedics    ND TENDON SHEATH INCISION Left 09/15/2020    Procedure: RELEASE TRIGGER FINGER RING;  Surgeon: Darek Nguyen MD;  Location: [facility];  Service: Orthopedics    TUBAL LIGATION         Family History:  Family history  reviewed and non-contributory  Family History   Problem Relation Age of Onset    Diabetes Mother     Heart disease Mother     Hypertension Mother     Hypothyroidism Mother     Arthritis Father     No Known Problems Sister     No Known Problems Sister     No Known Problems Sister     No Known Problems Maternal Grandmother     No Known Problems Maternal Grandfather     No Known Problems Paternal Grandmother     No Known Problems Paternal Grandfather     No Known Problems Brother     No Known Problems Brother     No Known Problems Brother     No Known Problems Son     No Known Problems Son     Breast cancer Cousin 52        paternal    Crohn's disease Family     Psoriasis Family     Rheum arthritis Family     Lupus Family         systemic- erythematosus    Ulcerative colitis Family          Social History:  Social History     Socioeconomic History    Marital status: /Civil Union     Spouse name: None    Number of children: None    Years of education: None    Highest education level: None   Occupational History    None   Tobacco Use    Smoking status: Never    Smokeless tobacco: Never   Vaping Use    Vaping status: Never Used   Substance and Sexual Activity    Alcohol use: No     Comment: rare/social as per [employer]    Drug use: No    Sexual activity: Yes     Partners: Male     Birth control/protection: None   Other Topics Concern    None   Social History Narrative    None     Social Drivers of Health     Financial Resource Strain: Low Risk  (3/4/2024)    Overall Financial Resource Strain (CARDIA)     Difficulty of Paying Living Expenses: Not hard at all   Food Insecurity: No Food Insecurity (3/4/2024)    Nursing - Inadequate Food Risk Classification     Worried About Running Out of Food in the Last Year: Never true     Ran Out of Food in the Last Year: Never true     Ran Out of Food in the Last Year: Not on file   Transportation Needs: No Transportation Needs (3/4/2024)    PRAPARE - Transportation     Lack of  Transportation (Medical): No     Lack of Transportation (Non-Medical): No   Physical Activity: Inactive (8/25/2023)    Exercise Vital Sign     Days of Exercise per Week: 0 days     Minutes of Exercise per Session: 0 min   Stress: No Stress Concern Present (3/4/2024)    [place] [employer] - Occupational Stress Questionnaire     Feeling of Stress : Not at all   Social Connections: Moderately Integrated (8/25/2023)    Social Connection and Isolation Panel [NHANES]     Frequency of Communication with Friends and Family: Twice a week     Frequency of Social Gatherings with Friends and Family: Twice a week     Attends Protestant Services: 1 to 4 times per year     Active Member of Clubs or Organizations: No     Attends Club or Organization Meetings: Never     Marital Status:    Intimate Partner Violence: Not At Risk (3/4/2024)    Humiliation, Afraid, Rape, and Kick questionnaire     Fear of Current or Ex-Partner: No     Emotionally Abused: No     Physically Abused: No     Sexually Abused: No   Housing Stability: Low Risk  (5/23/2024)    Housing Stability Vital Sign     Unable to Pay for Housing in the Last Year: No     Number of Times Moved in the Last Year: 1     Homeless in the Last Year: No       Allergies:   Allergies   Allergen Reactions    Bee Pollen Other (See Comments)     Other reaction(s): Sneezing    Pollen Extract Allergic Rhinitis and Sneezing       Labs:  0   Lab Value Date/Time    HCT 45.2 05/19/2023 0804    HCT 42.6 09/07/2021 0720    HCT 45.1 (H) 03/12/2020 0905    HCT 43.0 01/18/2016 0931    HGB 15.2 05/19/2023 0804    HGB 14.4 09/07/2021 0720    HGB 15.3 03/12/2020 0905    HGB 13.9 01/18/2016 0931    WBC 7.91 05/19/2023 0804    WBC 6.3 09/07/2021 0720    WBC 6.7 03/12/2020 0905    WBC 10-20 (A) 01/19/2016 0000    WBC 8.0 01/18/2016 0931       Meds:    Current Outpatient Medications:     alendronate (FOSAMAX) 70 mg tablet, TAKE 1 TABLET BY MOUTH WEEKLY  WITH 8 OZ OF PLAIN  WATER 30  MINUTES BEFORE FIRST FOOD, DRINK OR MEDS. STAY UPRIGHT FOR 30  MINS, Disp: 12 tablet, Rfl: 3    atorvastatin (LIPITOR) 40 mg tablet, Take 1 tablet (40 mg total) by mouth every evening, Disp: 90 tablet, Rfl: 1    BIOTIN 5000 PO, Take by mouth daily (Patient not taking: Reported on 11/13/2023), Disp: , Rfl:     Cyanocobalamin (VITAMIN B12 PO), Take by mouth daily (Patient not taking: Reported on 11/13/2023), Disp: , Rfl:     Diclofenac Sodium (VOLTAREN) 1 %, Apply 2 g topically 4 (four) times a day, Disp: 100 g, Rfl: 1    fluticasone (FLONASE) 50 mcg/act nasal spray, 1 spray into each nostril daily, Disp: 16 g, Rfl: 4    gabapentin (NEURONTIN) 300 mg capsule, Take 1 capsule (300 mg total) by mouth 2 (two) times a day, Disp: 180 capsule, Rfl: 3    glucose blood (OneTouch Ultra) test strip, Use 1 each in the morning Use as instructed, Disp: 100 each, Rfl: 3    lidocaine (LMX) 4 % cream, Apply topically as needed for mild pain, Disp: 30 g, Rfl: 2    loratadine (CLARITIN) 10 mg tablet, Take 10 mg by mouth daily, Disp: , Rfl:     metFORMIN (GLUCOPHAGE-XR) 500 mg 24 hr tablet, Take 2 tablets (1,000 mg total) by mouth daily with breakfast, Disp: 180 tablet, Rfl: 2    Omega-3 Fatty Acids (OMEGA 3 PO), Take by mouth, Disp: , Rfl:     Premarin vaginal cream, INSERT 0.5 GRAM VAGINALLY DAILY, Disp: 60 g, Rfl: 3    Body mass index is 27.21 kg/m².  Wt Readings from Last 3 Encounters:   02/19/25 65.3 kg (144 lb)   09/19/24 64.7 kg (142 lb 9.6 oz)   08/20/24 64 kg (141 lb)     Physical Exam:   There were no vitals filed for this visit.    General Appearance:    Alert, cooperative, no distress, appears stated age   Head:    Normocephalic, without obvious abnormality, atraumatic   Eyes:    conjunctiva/corneas clear, both eyes        Nose:   Nares normal, septum midline, no drainage    Throat:   Lips normal; teeth and gums normal   Neck:    symmetrical, trachea midline, ;     thyroid:  no enlargement/   Back:     Symmetric, no  curvature, ROM normal   Lungs:   No audible wheezing or labored breathing   Chest Wall:    No tenderness or deformity    Heart:    Regular rate and rhythm               Pulses:   2+ and symmetric all extremities   Skin:   Skin color, texture, turgor normal, no rashes or lesions   Neurologic:   normal strength, sensation and reflexes     throughout       Musculoskeletal: right lower extremity  On examination of the right knee there is no effusion, no erythema.  Range of motion to full active extension and flexion to greater than 120°.  Pain on palpation medial and lateral joint lines.  There is crepitus with range of motion, no warmth to palpation, bony enlargement noted. No pain on palpation pes anserine bursa region or distal iliotibial band.  Stable to varus and valgus stress without pain or gapping.  Negative anterior and posterior drawer testing.  Sensation intact distal pulses present.    Large joint arthrocentesis: R knee  Universal Protocol:  Consent: Verbal consent obtained.  Consent given by: patient  Patient understanding: patient states understanding of the procedure being performed  Site marked: the operative site was marked  Patient identity confirmed: verbally with patient  Supporting Documentation  Indications: pain   Procedure Details  Location: knee - R knee  Needle size: 22 G  Approach: superior  Medications administered: 3 mL sodium hyaluronate 60 MG/3ML    Patient tolerance: patient tolerated the procedure well with no immediate complications        _*_*_*_*_*_*_*_*_*_*_*_*_*_*_*_*_*_*_*_*_*_*_*_*_*_*_*_*_*_*_*_*_*_*_*_*_*_*_*_*_*    Assessment:  70 y.o.female with right knee pain due to arthritis    Plan:   Weight bearing as tolerated  right lower extremity  Right knee intra-articular Durolane injection administered as noted above  Advised no significant activity or increased ambulation over the next 24-48 hours and warm compresses to the knee no greater 20 minutes 3-4 times 24 hours following  the injection.  Patient advised should they develop any increasing pain, redness, drainage, numbness, tingling or swelling surrounding the injection sight, they are to contact our office or present to the emergency department.  Prior authorization for right knee Durolane injection initiated today  Follow up in 6 months or sooner if needed  Plan discussed with patient and she is in agreement with plan.

## 2025-02-27 ENCOUNTER — APPOINTMENT (OUTPATIENT)
Dept: LAB | Facility: CLINIC | Age: 70
End: 2025-02-27
Payer: COMMERCIAL

## 2025-02-27 DIAGNOSIS — M46.46 DISCITIS OF LUMBAR REGION: ICD-10-CM

## 2025-02-27 DIAGNOSIS — M54.16 RADICULOPATHY, LUMBAR REGION: ICD-10-CM

## 2025-02-27 LAB
BASOPHILS # BLD AUTO: 0.05 THOUSANDS/ÂΜL (ref 0–0.1)
BASOPHILS NFR BLD AUTO: 1 % (ref 0–1)
CRP SERPL QL: 1.2 MG/L
EOSINOPHIL # BLD AUTO: 0.1 THOUSAND/ÂΜL (ref 0–0.61)
EOSINOPHIL NFR BLD AUTO: 1 % (ref 0–6)
ERYTHROCYTE [DISTWIDTH] IN BLOOD BY AUTOMATED COUNT: 12.9 % (ref 11.6–15.1)
ERYTHROCYTE [SEDIMENTATION RATE] IN BLOOD: 11 MM/HOUR (ref 0–29)
HCT VFR BLD AUTO: 45 % (ref 34.8–46.1)
HGB BLD-MCNC: 15 G/DL (ref 11.5–15.4)
IMM GRANULOCYTES # BLD AUTO: 0.04 THOUSAND/UL (ref 0–0.2)
IMM GRANULOCYTES NFR BLD AUTO: 0 % (ref 0–2)
LYMPHOCYTES # BLD AUTO: 3.04 THOUSANDS/ÂΜL (ref 0.6–4.47)
LYMPHOCYTES NFR BLD AUTO: 32 % (ref 14–44)
MCH RBC QN AUTO: 30.4 PG (ref 26.8–34.3)
MCHC RBC AUTO-ENTMCNC: 33.3 G/DL (ref 31.4–37.4)
MCV RBC AUTO: 91 FL (ref 82–98)
MONOCYTES # BLD AUTO: 0.68 THOUSAND/ÂΜL (ref 0.17–1.22)
MONOCYTES NFR BLD AUTO: 7 % (ref 4–12)
NEUTROPHILS # BLD AUTO: 5.57 THOUSANDS/ÂΜL (ref 1.85–7.62)
NEUTS SEG NFR BLD AUTO: 59 % (ref 43–75)
NRBC BLD AUTO-RTO: 0 /100 WBCS
PLATELET # BLD AUTO: 235 THOUSANDS/UL (ref 149–390)
PMV BLD AUTO: 10.2 FL (ref 8.9–12.7)
RBC # BLD AUTO: 4.93 MILLION/UL (ref 3.81–5.12)
WBC # BLD AUTO: 9.48 THOUSAND/UL (ref 4.31–10.16)

## 2025-02-27 PROCEDURE — 86140 C-REACTIVE PROTEIN: CPT

## 2025-02-27 PROCEDURE — 85025 COMPLETE CBC W/AUTO DIFF WBC: CPT

## 2025-02-27 PROCEDURE — 36415 COLL VENOUS BLD VENIPUNCTURE: CPT

## 2025-02-27 PROCEDURE — 85652 RBC SED RATE AUTOMATED: CPT

## 2025-04-18 DIAGNOSIS — E11.9 TYPE 2 DIABETES MELLITUS WITHOUT COMPLICATION, WITHOUT LONG-TERM CURRENT USE OF INSULIN (HCC): ICD-10-CM

## 2025-04-20 RX ORDER — METFORMIN HYDROCHLORIDE 500 MG/1
1000 TABLET, EXTENDED RELEASE ORAL
Qty: 180 TABLET | Refills: 3 | Status: SHIPPED | OUTPATIENT
Start: 2025-04-20

## 2025-05-19 ENCOUNTER — HOSPITAL ENCOUNTER (OUTPATIENT)
Dept: RADIOLOGY | Facility: HOSPITAL | Age: 70
Discharge: HOME | End: 2025-05-19
Payer: COMMERCIAL

## 2025-05-19 ENCOUNTER — TELEPHONE (OUTPATIENT)
Dept: FAMILY MEDICINE CLINIC | Facility: CLINIC | Age: 70
End: 2025-05-19

## 2025-05-19 DIAGNOSIS — M25.551 RIGHT HIP PAIN: ICD-10-CM

## 2025-05-19 PROCEDURE — 73502 X-RAY EXAM HIP UNI 2-3 VIEWS: CPT

## 2025-05-30 RX ORDER — CELECOXIB 100 MG/1
CAPSULE ORAL
COMMUNITY
End: 2025-06-03

## 2025-06-03 ENCOUNTER — OFFICE VISIT (OUTPATIENT)
Dept: FAMILY MEDICINE CLINIC | Facility: CLINIC | Age: 70
End: 2025-06-03
Payer: COMMERCIAL

## 2025-06-03 VITALS
OXYGEN SATURATION: 98 % | BODY MASS INDEX: 27.19 KG/M2 | WEIGHT: 144 LBS | SYSTOLIC BLOOD PRESSURE: 104 MMHG | DIASTOLIC BLOOD PRESSURE: 68 MMHG | HEART RATE: 71 BPM | HEIGHT: 61 IN | TEMPERATURE: 98 F

## 2025-06-03 DIAGNOSIS — E11.9 TYPE 2 DIABETES MELLITUS WITHOUT COMPLICATION, WITHOUT LONG-TERM CURRENT USE OF INSULIN (HCC): ICD-10-CM

## 2025-06-03 DIAGNOSIS — M51.16 RADICULOPATHY DUE TO LUMBAR INTERVERTEBRAL DISC DISORDER: ICD-10-CM

## 2025-06-03 DIAGNOSIS — Z76.89 ENCOUNTER TO ESTABLISH CARE: Primary | ICD-10-CM

## 2025-06-03 DIAGNOSIS — M81.0 OSTEOPOROSIS OF LUMBAR SPINE: ICD-10-CM

## 2025-06-03 DIAGNOSIS — E78.2 MIXED HYPERLIPIDEMIA: ICD-10-CM

## 2025-06-03 PROCEDURE — G2211 COMPLEX E/M VISIT ADD ON: HCPCS | Performed by: STUDENT IN AN ORGANIZED HEALTH CARE EDUCATION/TRAINING PROGRAM

## 2025-06-03 PROCEDURE — 99214 OFFICE O/P EST MOD 30 MIN: CPT | Performed by: STUDENT IN AN ORGANIZED HEALTH CARE EDUCATION/TRAINING PROGRAM

## 2025-06-03 NOTE — ASSESSMENT & PLAN NOTE
Lab Results   Component Value Date    HGBA1C 6.2 09/19/2024     Continue monitoring fasting sugars at home  Repeating labs  Diabetic foot exam today  Going for eye exam this afternoon at 2 pm - asked pt to request report to be faxed to the office  Orders:  •  Albumin / creatinine urine ratio; Future  •  CBC and differential; Future  •  Comprehensive metabolic panel; Future  •  Hemoglobin A1C; Future  •  Lipid panel; Future  •  TSH, 3rd generation with Free T4 reflex; Future

## 2025-06-03 NOTE — PROGRESS NOTES
Name: Siobhan Grimm      : 1955      MRN: [id number]  Encounter Provider: Lucy Reed DO  Encounter Date: 6/3/2025   Encounter department: [facility] [place]    Assessment & Plan  Encounter to establish care  Reviewed history in detail  Due for labs - ordered  To return for [facility] in the next 3 months       Type 2 diabetes mellitus without complication, without long-term current use of insulin (HCC)    Lab Results   Component Value Date    HGBA1C 6.2 2024     Continue monitoring fasting sugars at home  Repeating labs  Diabetic foot exam today  Going for eye exam this afternoon at 2 pm - asked pt to request report to be faxed to the office  Orders:  •  Albumin / creatinine urine ratio; Future  •  CBC and differential; Future  •  Comprehensive metabolic panel; Future  •  Hemoglobin A1C; Future  •  Lipid panel; Future  •  TSH, 3rd generation with Free T4 reflex; Future    Osteoporosis of lumbar spine  Pt holding alendronate due to dental procedures - dentist to decide when safe to resume medication  Orders:  •  Comprehensive metabolic panel; Future  •  TSH, 3rd generation with Free T4 reflex; Future    Mixed hyperlipidemia  Advised to restart statin  Orders:  •  Comprehensive metabolic panel; Future  •  Lipid panel; Future    Radiculopathy due to lumbar intervertebral disc disorder  Recent back injection last week - with 65% improvement in pain              History of Present Illness     Chief Complaint   Patient presents with   • New Patient Visit     Mild R shoulder pain     HPI    New pt, establishing care. From [facility]    Osteoporosis - stopped alendronate recently, due to having dental procedure (pulled tooth yesterday), with implant next week  Not taking statin - was recently refilled, stopped because wasn't sure if could take with recent back injection. Advised to resume  Compliant with metformin XR 1,000 mg once daily. Monitors blood sugar at home - 90's-110's    Due for repeat  "labs    Right upper back/neck pain - feels muscular  Started after  had a heart attack early May in [facility], then came home when  started feeling better, [facility] st[facility] - pt attributes her pain to lots of stress    Nurse from insurance coming to home this Saturday afternoon     Review of Systems   Constitutional:  Negative for chills, fatigue and fever.   HENT:  Negative for congestion and sore throat.    Eyes:  Negative for visual disturbance.   Respiratory:  Negative for cough, shortness of breath and wheezing.    Cardiovascular:  Negative for chest pain and palpitations.   Gastrointestinal:  Negative for abdominal pain, blood in stool, constipation, diarrhea, nausea and vomiting.   Endocrine: Negative for polydipsia, polyphagia and polyuria.   Genitourinary:  Negative for decreased urine volume and difficulty urinating.   Musculoskeletal:         Right upper back/shoulder (really trapezius) muscle pain  Improvement in chronic lower back pain after injection last week   Skin:  Negative for rash.   Neurological:  Negative for dizziness, weakness, light-headedness and headaches.   Psychiatric/Behavioral:  Negative for dysphoric mood.        Objective   /68 (BP Location: Left arm, Patient Position: Sitting, Cuff Size: Standard)   Pulse 71   Temp 98 °F (36.7 °C) (Temporal)   Ht 5' 1\" (1.549 m)   Wt 65.3 kg (144 lb)   SpO2 98%   BMI 27.21 kg/m²      Physical Exam  Vitals and nursing note reviewed.   Constitutional:       General: She is not in acute distress.     Appearance: She is well-developed. She is not ill-appearing.   HENT:      Head: Normocephalic and atraumatic.      Nose: Nose normal.      Mouth/Throat:      Mouth: Mucous membranes are moist.      Pharynx: Oropharynx is clear. No posterior oropharyngeal erythema.     Eyes:      Extraocular Movements: Extraocular movements intact.      Conjunctiva/sclera: Conjunctivae normal.     Neck:      Vascular: No carotid " bruit.      Comments: No thyromegaly  Cardiovascular:      Rate and Rhythm: Normal rate and regular rhythm.      Heart sounds: No murmur heard.  Pulmonary:      Effort: Pulmonary effort is normal. No respiratory distress.      Breath sounds: Normal breath sounds.   Abdominal:      General: There is no distension.      Palpations: Abdomen is soft.      Tenderness: There is no abdominal tenderness. There is no guarding.     Musculoskeletal:      Cervical back: Neck supple.      Right lower leg: No edema.      Left lower leg: No edema.   Lymphadenopathy:      Cervical: No cervical adenopathy.     Skin:     General: Skin is warm and dry.      Capillary Refill: Capillary refill takes less than 2 seconds.     Neurological:      General: No focal deficit present.      Mental Status: She is alert. Mental status is at baseline.      Sensory: No sensory deficit.      Motor: No weakness.     Psychiatric:         Mood and Affect: Mood normal.       PHQ-2/9 Depression Screening    Little interest or pleasure in doing things: 0 - not at all  Feeling down, depressed, or hopeless: 0 - not at all  Trouble falling or staying asleep, or sleeping too much: 0 - not at all  Feeling tired or having little energy: 0 - not at all  Poor appetite or overeatin - not at all  Feeling bad about yourself - or that you are a failure or have let yourself or your family down: 0 - not at all  Trouble concentrating on things, such as reading the newspaper or watching television: 0 - not at all  Moving or speaking so slowly that other people could have noticed. Or the opposite - being so fidgety or restless that you have been moving around a lot more than usual: 0 - not at all  Thoughts that you would be better off dead, or of hurting yourself in some way: 0 - not at all  PHQ-9 Score: 0  PHQ-9 Interpretation: No or Minimal depression

## 2025-06-03 NOTE — ASSESSMENT & PLAN NOTE
Advised to restart statin  Orders:  •  Comprehensive metabolic panel; Future  •  Lipid panel; Future

## 2025-06-04 ENCOUNTER — APPOINTMENT (OUTPATIENT)
Dept: LAB | Facility: CLINIC | Age: 70
End: 2025-06-04
Attending: STUDENT IN AN ORGANIZED HEALTH CARE EDUCATION/TRAINING PROGRAM
Payer: COMMERCIAL

## 2025-06-04 ENCOUNTER — RESULTS FOLLOW-UP (OUTPATIENT)
Dept: FAMILY MEDICINE CLINIC | Facility: CLINIC | Age: 70
End: 2025-06-04

## 2025-06-04 DIAGNOSIS — M81.0 OSTEOPOROSIS OF LUMBAR SPINE: ICD-10-CM

## 2025-06-04 DIAGNOSIS — E78.2 MIXED HYPERLIPIDEMIA: ICD-10-CM

## 2025-06-04 DIAGNOSIS — E11.9 TYPE 2 DIABETES MELLITUS WITHOUT COMPLICATION, WITHOUT LONG-TERM CURRENT USE OF INSULIN (HCC): ICD-10-CM

## 2025-06-04 LAB
ALBUMIN SERPL BCG-MCNC: 4.4 G/DL (ref 3.5–5)
ALP SERPL-CCNC: 62 U/L (ref 34–104)
ALT SERPL W P-5'-P-CCNC: 22 U/L (ref 7–52)
ANION GAP SERPL CALCULATED.3IONS-SCNC: 4 MMOL/L (ref 4–13)
AST SERPL W P-5'-P-CCNC: 13 U/L (ref 13–39)
BASOPHILS # BLD AUTO: 0.05 THOUSANDS/ÂΜL (ref 0–0.1)
BASOPHILS NFR BLD AUTO: 1 % (ref 0–1)
BILIRUB SERPL-MCNC: 0.6 MG/DL (ref 0.2–1)
BUN SERPL-MCNC: 18 MG/DL (ref 5–25)
CALCIUM SERPL-MCNC: 9.9 MG/DL (ref 8.4–10.2)
CHLORIDE SERPL-SCNC: 104 MMOL/L (ref 96–108)
CHOLEST SERPL-MCNC: 218 MG/DL (ref ?–200)
CO2 SERPL-SCNC: 32 MMOL/L (ref 21–32)
CREAT SERPL-MCNC: 0.78 MG/DL (ref 0.6–1.3)
CREAT UR-MCNC: 102.4 MG/DL
EOSINOPHIL # BLD AUTO: 0.07 THOUSAND/ÂΜL (ref 0–0.61)
EOSINOPHIL NFR BLD AUTO: 1 % (ref 0–6)
ERYTHROCYTE [DISTWIDTH] IN BLOOD BY AUTOMATED COUNT: 12.6 % (ref 11.6–15.1)
EST. AVERAGE GLUCOSE BLD GHB EST-MCNC: 148 MG/DL
GFR SERPL CREATININE-BSD FRML MDRD: 78 ML/MIN/1.73SQ M
GLUCOSE P FAST SERPL-MCNC: 122 MG/DL (ref 65–99)
HBA1C MFR BLD: 6.8 %
HCT VFR BLD AUTO: 46.4 % (ref 34.8–46.1)
HDLC SERPL-MCNC: 73 MG/DL
HGB BLD-MCNC: 15.9 G/DL (ref 11.5–15.4)
IMM GRANULOCYTES # BLD AUTO: 0.06 THOUSAND/UL (ref 0–0.2)
IMM GRANULOCYTES NFR BLD AUTO: 1 % (ref 0–2)
LDLC SERPL CALC-MCNC: 123 MG/DL (ref 0–100)
LYMPHOCYTES # BLD AUTO: 3 THOUSANDS/ÂΜL (ref 0.6–4.47)
LYMPHOCYTES NFR BLD AUTO: 33 % (ref 14–44)
MCH RBC QN AUTO: 31.2 PG (ref 26.8–34.3)
MCHC RBC AUTO-ENTMCNC: 34.3 G/DL (ref 31.4–37.4)
MCV RBC AUTO: 91 FL (ref 82–98)
MICROALBUMIN UR-MCNC: <7 MG/L
MONOCYTES # BLD AUTO: 0.62 THOUSAND/ÂΜL (ref 0.17–1.22)
MONOCYTES NFR BLD AUTO: 7 % (ref 4–12)
NEUTROPHILS # BLD AUTO: 5.21 THOUSANDS/ÂΜL (ref 1.85–7.62)
NEUTS SEG NFR BLD AUTO: 57 % (ref 43–75)
NONHDLC SERPL-MCNC: 145 MG/DL
NRBC BLD AUTO-RTO: 0 /100 WBCS
PLATELET # BLD AUTO: 267 THOUSANDS/UL (ref 149–390)
PMV BLD AUTO: 10.1 FL (ref 8.9–12.7)
POTASSIUM SERPL-SCNC: 4.7 MMOL/L (ref 3.5–5.3)
PROT SERPL-MCNC: 6.9 G/DL (ref 6.4–8.4)
RBC # BLD AUTO: 5.1 MILLION/UL (ref 3.81–5.12)
SODIUM SERPL-SCNC: 140 MMOL/L (ref 135–147)
TRIGL SERPL-MCNC: 110 MG/DL (ref ?–150)
TSH SERPL DL<=0.05 MIU/L-ACNC: 3.81 UIU/ML (ref 0.45–4.5)
WBC # BLD AUTO: 9.01 THOUSAND/UL (ref 4.31–10.16)

## 2025-06-04 PROCEDURE — 82570 ASSAY OF URINE CREATININE: CPT

## 2025-06-04 PROCEDURE — 83036 HEMOGLOBIN GLYCOSYLATED A1C: CPT

## 2025-06-04 PROCEDURE — 82043 UR ALBUMIN QUANTITATIVE: CPT

## 2025-06-04 PROCEDURE — 36415 COLL VENOUS BLD VENIPUNCTURE: CPT

## 2025-06-04 PROCEDURE — 84443 ASSAY THYROID STIM HORMONE: CPT

## 2025-06-04 PROCEDURE — 80061 LIPID PANEL: CPT

## 2025-06-04 PROCEDURE — 80053 COMPREHEN METABOLIC PANEL: CPT

## 2025-06-04 PROCEDURE — 85025 COMPLETE CBC W/AUTO DIFF WBC: CPT

## 2025-06-06 ENCOUNTER — RA CDI HCC (OUTPATIENT)
Dept: OTHER | Facility: HOSPITAL | Age: 70
End: 2025-06-06

## 2025-06-06 NOTE — PROGRESS NOTES
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    Thickened precancer with small area of early skin cancer (SCCIS).  Let's bring him back for 15 min OV so I can look at biopsy site to see if we need to do any additional treatment.  May need to treat with liquid nitrogen or may need to ED&C.   May treat with Efudex  Thanks  YTSON

## 2025-07-03 ENCOUNTER — IOP CHECK (OUTPATIENT)
Dept: URBAN - METROPOLITAN AREA CLINIC 6 | Facility: CLINIC | Age: 70
End: 2025-07-03

## 2025-07-03 DIAGNOSIS — H43.811: ICD-10-CM

## 2025-07-03 DIAGNOSIS — H25.13: ICD-10-CM

## 2025-07-03 DIAGNOSIS — H04.123: ICD-10-CM

## 2025-07-03 DIAGNOSIS — E11.9: ICD-10-CM

## 2025-07-03 DIAGNOSIS — H40.023: ICD-10-CM

## 2025-07-03 PROCEDURE — 92133 CPTRZD OPH DX IMG PST SGM ON: CPT

## 2025-07-03 PROCEDURE — 92014 COMPRE OPH EXAM EST PT 1/>: CPT

## 2025-07-03 PROCEDURE — 92020 GONIOSCOPY: CPT

## 2025-07-03 ASSESSMENT — VISUAL ACUITY
OD_CC: 20/30
OS_CC: 20/30-2

## 2025-07-03 ASSESSMENT — TONOMETRY
OD_IOP_MMHG: 28
OS_IOP_MMHG: 24

## 2025-07-21 DIAGNOSIS — E11.9 TYPE 2 DIABETES MELLITUS WITHOUT COMPLICATION, WITHOUT LONG-TERM CURRENT USE OF INSULIN (HCC): ICD-10-CM

## 2025-07-21 DIAGNOSIS — E78.2 MIXED HYPERLIPIDEMIA: ICD-10-CM

## 2025-07-21 RX ORDER — ATORVASTATIN CALCIUM 40 MG/1
40 TABLET, FILM COATED ORAL EVERY EVENING
Qty: 90 TABLET | Refills: 3 | Status: SHIPPED | OUTPATIENT
Start: 2025-07-21

## 2025-08-11 ENCOUNTER — HOSPITAL ENCOUNTER (OUTPATIENT)
Dept: MAMMOGRAPHY | Facility: HOSPITAL | Age: 70
Discharge: HOME | End: 2025-08-11
Payer: COMMERCIAL

## 2025-08-12 ENCOUNTER — OFFICE VISIT (OUTPATIENT)
Dept: FAMILY MEDICINE CLINIC | Facility: CLINIC | Age: 70
End: 2025-08-12
Payer: COMMERCIAL

## (undated) DEVICE — GLOVE INDICATOR PI UNDERGLOVE SZ 8 BLUE

## (undated) DEVICE — GAUZE SPONGES,16 PLY: Brand: CURITY

## (undated) DEVICE — CHLORAPREP HI-LITE 26ML ORANGE

## (undated) DEVICE — GLOVE SRG BIOGEL 7.5

## (undated) DEVICE — STRETCH BANDAGE: Brand: CURITY

## (undated) DEVICE — INTENDED FOR TISSUE SEPARATION, AND OTHER PROCEDURES THAT REQUIRE A SHARP SURGICAL BLADE TO PUNCTURE OR CUT.: Brand: BARD-PARKER SAFETY BLADES SIZE 15, STERILE

## (undated) DEVICE — DISPOSABLE EQUIPMENT COVER: Brand: SMALL TOWEL DRAPE

## (undated) DEVICE — STERILE BETHLEHEM PLASTIC HAND: Brand: CARDINAL HEALTH

## (undated) DEVICE — OCCLUSIVE GAUZE STRIP,3% BISMUTH TRIBROMOPHENATE IN PETROLATUM BLEND: Brand: XEROFORM

## (undated) DEVICE — SUT PROLENE 4-0 PS-2 18 IN 8682G

## (undated) DEVICE — SPONGE PVP SCRUB WING STERILE

## (undated) DEVICE — ACE WRAP 4 IN STERILE